# Patient Record
Sex: FEMALE | Race: WHITE | Employment: OTHER | URBAN - NONMETROPOLITAN AREA
[De-identification: names, ages, dates, MRNs, and addresses within clinical notes are randomized per-mention and may not be internally consistent; named-entity substitution may affect disease eponyms.]

---

## 2017-11-07 ENCOUNTER — HOSPITAL ENCOUNTER (OUTPATIENT)
Dept: INTERVENTIONAL RADIOLOGY/VASCULAR | Age: 82
Discharge: HOME | End: 2017-11-07
Payer: MEDICARE

## 2017-11-07 DIAGNOSIS — I82.4Z1 ACUTE DEEP VEIN THROMBOSIS (DVT) OF DISTAL VEIN OF RIGHT LOWER EXTREMITY (HCC): ICD-10-CM

## 2017-11-07 PROCEDURE — 93971 EXTREMITY STUDY: CPT

## 2018-12-06 ENCOUNTER — HOSPITAL ENCOUNTER (EMERGENCY)
Age: 83
Discharge: HOME | End: 2018-12-06
Payer: MEDICARE

## 2018-12-06 VITALS
TEMPERATURE: 98.3 F | HEART RATE: 87 BPM | SYSTOLIC BLOOD PRESSURE: 133 MMHG | OXYGEN SATURATION: 95 % | RESPIRATION RATE: 20 BRPM | BODY MASS INDEX: 28.28 KG/M2 | DIASTOLIC BLOOD PRESSURE: 63 MMHG | WEIGHT: 140 LBS

## 2018-12-06 DIAGNOSIS — B96.89 ACUTE BACTERIAL SINUSITIS: Primary | ICD-10-CM

## 2018-12-06 DIAGNOSIS — J01.90 ACUTE BACTERIAL SINUSITIS: Primary | ICD-10-CM

## 2018-12-06 PROCEDURE — 99213 OFFICE O/P EST LOW 20 MIN: CPT | Performed by: NURSE PRACTITIONER

## 2018-12-06 PROCEDURE — 99214 OFFICE O/P EST MOD 30 MIN: CPT

## 2018-12-06 RX ORDER — AMOXICILLIN AND CLAVULANATE POTASSIUM 875; 125 MG/1; MG/1
1 TABLET, FILM COATED ORAL 2 TIMES DAILY
Qty: 20 TABLET | Refills: 0 | Status: SHIPPED | OUTPATIENT
Start: 2018-12-06 | End: 2018-12-16

## 2018-12-06 ASSESSMENT — PAIN DESCRIPTION - PAIN TYPE: TYPE: ACUTE PAIN

## 2018-12-06 ASSESSMENT — ENCOUNTER SYMPTOMS
COUGH: 1
SINUS PAIN: 1
SINUS PRESSURE: 1

## 2018-12-06 ASSESSMENT — PAIN DESCRIPTION - LOCATION: LOCATION: THROAT

## 2018-12-06 ASSESSMENT — PAIN SCALES - GENERAL: PAINLEVEL_OUTOF10: 5

## 2019-09-09 ENCOUNTER — HOSPITAL ENCOUNTER (EMERGENCY)
Age: 84
Discharge: HOME | End: 2019-09-09
Payer: MEDICARE

## 2019-09-09 VITALS
SYSTOLIC BLOOD PRESSURE: 132 MMHG | RESPIRATION RATE: 16 BRPM | HEIGHT: 59 IN | TEMPERATURE: 98.3 F | WEIGHT: 126 LBS | OXYGEN SATURATION: 98 % | DIASTOLIC BLOOD PRESSURE: 76 MMHG | HEART RATE: 78 BPM | BODY MASS INDEX: 25.4 KG/M2

## 2019-09-09 DIAGNOSIS — R30.0 DYSURIA: Primary | ICD-10-CM

## 2019-09-09 DIAGNOSIS — N30.00 ACUTE CYSTITIS WITHOUT HEMATURIA: ICD-10-CM

## 2019-09-09 DIAGNOSIS — N34.2 URETHRITIS: ICD-10-CM

## 2019-09-09 LAB
BILIRUBIN URINE: NEGATIVE
BLOOD, URINE: ABNORMAL
CHARACTER, URINE: ABNORMAL
COLOR: YELLOW
GLUCOSE, URINE: NEGATIVE MG/DL
KETONES, URINE: NEGATIVE
LEUKOCYTES, UA: ABNORMAL
NITRATE, UA: POSITIVE
PH UA: 6.5 (ref 5–9)
PROTEIN UA: 30 MG/DL
REFLEX TO URINE C & S: ABNORMAL
SPECIFIC GRAVITY UA: 1.02 (ref 1–1.03)
UROBILINOGEN, URINE: 0.2 EU/DL (ref 0–1)

## 2019-09-09 PROCEDURE — 99213 OFFICE O/P EST LOW 20 MIN: CPT | Performed by: NURSE PRACTITIONER

## 2019-09-09 PROCEDURE — 87086 URINE CULTURE/COLONY COUNT: CPT

## 2019-09-09 PROCEDURE — 81003 URINALYSIS AUTO W/O SCOPE: CPT

## 2019-09-09 PROCEDURE — 87186 SC STD MICRODIL/AGAR DIL: CPT

## 2019-09-09 PROCEDURE — 99213 OFFICE O/P EST LOW 20 MIN: CPT

## 2019-09-09 PROCEDURE — 87077 CULTURE AEROBIC IDENTIFY: CPT

## 2019-09-09 RX ORDER — NITROFURANTOIN 25; 75 MG/1; MG/1
100 CAPSULE ORAL 2 TIMES DAILY
Qty: 20 CAPSULE | Refills: 0 | Status: SHIPPED | OUTPATIENT
Start: 2019-09-09 | End: 2019-09-19

## 2019-09-09 RX ORDER — PHENAZOPYRIDINE HYDROCHLORIDE 100 MG/1
100 TABLET, FILM COATED ORAL 3 TIMES DAILY PRN
Qty: 9 TABLET | Refills: 0 | Status: SHIPPED | OUTPATIENT
Start: 2019-09-09 | End: 2019-09-12

## 2019-09-09 ASSESSMENT — ENCOUNTER SYMPTOMS
ABDOMINAL PAIN: 0
DIARRHEA: 0
VOMITING: 0
BACK PAIN: 0
NAUSEA: 0

## 2019-09-09 NOTE — ED PROVIDER NOTES
Misc. Devices Lakshmi Chris) MISC Comments:   Reason for Stopping:         metroNIDAZOLE (METROGEL) 1 % gel Comments:   Reason for Stopping:         triamcinolone (KENALOG) 0.1 % cream Comments:   Reason for Stopping:         predniSONE (DELTASONE) 20 MG tablet Comments:   Reason for Stopping:               Discharge Medication List as of 9/9/2019  1:12 PM          EDUARDO Acosta NP    (Please note that portions of this note were completed with a voice recognition program. Efforts were made to edit the dictations but occasionally words are mis-transcribed.)         EDUARDO Nair NP  09/09/19 0959

## 2019-09-11 LAB
ORGANISM: ABNORMAL
URINE CULTURE REFLEX: ABNORMAL
URINE CULTURE REFLEX: ABNORMAL

## 2019-11-23 ENCOUNTER — HOSPITAL ENCOUNTER (EMERGENCY)
Age: 84
Discharge: HOME | End: 2019-11-23
Payer: MEDICARE

## 2019-11-23 VITALS
DIASTOLIC BLOOD PRESSURE: 69 MMHG | HEIGHT: 59 IN | WEIGHT: 122 LBS | SYSTOLIC BLOOD PRESSURE: 133 MMHG | BODY MASS INDEX: 24.6 KG/M2 | RESPIRATION RATE: 16 BRPM | HEART RATE: 71 BPM | TEMPERATURE: 99.6 F | OXYGEN SATURATION: 93 %

## 2019-11-23 DIAGNOSIS — N30.01 ACUTE CYSTITIS WITH HEMATURIA: Primary | ICD-10-CM

## 2019-11-23 LAB
BILIRUBIN URINE: NEGATIVE
BLOOD, URINE: NEGATIVE
CHARACTER, URINE: ABNORMAL
COLOR: ABNORMAL
GLUCOSE, URINE: NEGATIVE MG/DL
KETONES, URINE: NEGATIVE
LEUKOCYTES, UA: ABNORMAL
NITRATE, UA: POSITIVE
PH UA: 8.5 (ref 5–9)
PROTEIN UA: NEGATIVE MG/DL
REFLEX TO URINE C & S: ABNORMAL
SPECIFIC GRAVITY UA: 1.01 (ref 1–1.03)
UROBILINOGEN, URINE: 0.2 EU/DL (ref 0–1)

## 2019-11-23 PROCEDURE — 99214 OFFICE O/P EST MOD 30 MIN: CPT | Performed by: NURSE PRACTITIONER

## 2019-11-23 PROCEDURE — 87077 CULTURE AEROBIC IDENTIFY: CPT

## 2019-11-23 PROCEDURE — 87086 URINE CULTURE/COLONY COUNT: CPT

## 2019-11-23 PROCEDURE — 99213 OFFICE O/P EST LOW 20 MIN: CPT

## 2019-11-23 PROCEDURE — 87186 SC STD MICRODIL/AGAR DIL: CPT

## 2019-11-23 PROCEDURE — 81003 URINALYSIS AUTO W/O SCOPE: CPT

## 2019-11-23 RX ORDER — CIPROFLOXACIN 250 MG/1
250 TABLET, FILM COATED ORAL 2 TIMES DAILY
Qty: 20 TABLET | Refills: 0 | Status: SHIPPED | OUTPATIENT
Start: 2019-11-23 | End: 2019-12-03

## 2019-11-23 RX ORDER — PHENAZOPYRIDINE HYDROCHLORIDE 200 MG/1
200 TABLET, FILM COATED ORAL 3 TIMES DAILY PRN
Qty: 6 TABLET | Refills: 1 | Status: SHIPPED | OUTPATIENT
Start: 2019-11-23 | End: 2019-11-25

## 2019-11-23 RX ORDER — LATANOPROST 50 UG/ML
1 SOLUTION/ DROPS OPHTHALMIC NIGHTLY
COMMUNITY

## 2019-11-23 ASSESSMENT — ENCOUNTER SYMPTOMS
NAUSEA: 0
SINUS PRESSURE: 0
BACK PAIN: 0
SHORTNESS OF BREATH: 0
EYE ITCHING: 0
DIARRHEA: 0
TROUBLE SWALLOWING: 0
EYE REDNESS: 0
RHINORRHEA: 0
VOMITING: 0
COUGH: 0
ABDOMINAL PAIN: 0
CHEST TIGHTNESS: 0
EYE PAIN: 0
SORE THROAT: 0
WHEEZING: 0
EYE DISCHARGE: 0
CONSTIPATION: 0

## 2019-11-26 LAB
ORGANISM: ABNORMAL
URINE CULTURE REFLEX: ABNORMAL
URINE CULTURE REFLEX: ABNORMAL

## 2020-08-22 ENCOUNTER — NURSE ONLY (OUTPATIENT)
Dept: LAB | Age: 85
End: 2020-08-22

## 2020-08-22 LAB
ALBUMIN SERPL-MCNC: 4.4 G/DL (ref 3.5–5.1)
ALP BLD-CCNC: 36 U/L (ref 38–126)
ALT SERPL-CCNC: 13 U/L (ref 11–66)
ANION GAP SERPL CALCULATED.3IONS-SCNC: 10 MEQ/L (ref 8–16)
AST SERPL-CCNC: 17 U/L (ref 5–40)
BILIRUB SERPL-MCNC: 0.5 MG/DL (ref 0.3–1.2)
BUN BLDV-MCNC: 19 MG/DL (ref 7–22)
CALCIUM SERPL-MCNC: 9.7 MG/DL (ref 8.5–10.5)
CHLORIDE BLD-SCNC: 106 MEQ/L (ref 98–111)
CHOLESTEROL, FASTING: 196 MG/DL (ref 100–199)
CO2: 26 MEQ/L (ref 23–33)
CREAT SERPL-MCNC: 0.6 MG/DL (ref 0.4–1.2)
GFR SERPL CREATININE-BSD FRML MDRD: > 90 ML/MIN/1.73M2
GLUCOSE BLD-MCNC: 96 MG/DL (ref 70–108)
HDLC SERPL-MCNC: 68 MG/DL
LDL CHOLESTEROL CALCULATED: 106 MG/DL
POTASSIUM SERPL-SCNC: 4 MEQ/L (ref 3.5–5.2)
SODIUM BLD-SCNC: 142 MEQ/L (ref 135–145)
TOTAL PROTEIN: 7.4 G/DL (ref 6.1–8)
TRIGLYCERIDE, FASTING: 110 MG/DL (ref 0–199)

## 2020-10-14 ENCOUNTER — HOSPITAL ENCOUNTER (EMERGENCY)
Age: 85
Discharge: HOME | End: 2020-10-14
Payer: MEDICARE

## 2020-10-14 VITALS
WEIGHT: 120 LBS | TEMPERATURE: 97.4 F | BODY MASS INDEX: 24.19 KG/M2 | RESPIRATION RATE: 16 BRPM | HEIGHT: 59 IN | HEART RATE: 98 BPM | OXYGEN SATURATION: 96 % | SYSTOLIC BLOOD PRESSURE: 135 MMHG | DIASTOLIC BLOOD PRESSURE: 66 MMHG

## 2020-10-14 PROCEDURE — 6360000002 HC RX W HCPCS: Performed by: NURSE PRACTITIONER

## 2020-10-14 PROCEDURE — 99214 OFFICE O/P EST MOD 30 MIN: CPT | Performed by: NURSE PRACTITIONER

## 2020-10-14 PROCEDURE — 96372 THER/PROPH/DIAG INJ SC/IM: CPT

## 2020-10-14 PROCEDURE — 99212 OFFICE O/P EST SF 10 MIN: CPT

## 2020-10-14 RX ORDER — METHYLPREDNISOLONE ACETATE 80 MG/ML
80 INJECTION, SUSPENSION INTRA-ARTICULAR; INTRALESIONAL; INTRAMUSCULAR; SOFT TISSUE ONCE
Status: COMPLETED | OUTPATIENT
Start: 2020-10-14 | End: 2020-10-14

## 2020-10-14 RX ORDER — PREDNISONE 10 MG/1
TABLET ORAL
Qty: 15 TABLET | Refills: 0 | Status: SHIPPED | OUTPATIENT
Start: 2020-10-14 | End: 2020-10-24

## 2020-10-14 RX ADMIN — METHYLPREDNISOLONE ACETATE 80 MG: 80 INJECTION, SUSPENSION INTRA-ARTICULAR; INTRALESIONAL; INTRAMUSCULAR; SOFT TISSUE at 14:04

## 2020-10-14 ASSESSMENT — ENCOUNTER SYMPTOMS
CHEST TIGHTNESS: 0
EYE PAIN: 0
NAUSEA: 0
TROUBLE SWALLOWING: 0
VOMITING: 0
DIARRHEA: 0
COLOR CHANGE: 1
ABDOMINAL PAIN: 0
SHORTNESS OF BREATH: 0
EYE REDNESS: 0

## 2020-10-14 ASSESSMENT — PAIN SCALES - GENERAL: PAINLEVEL_OUTOF10: 8

## 2020-10-14 ASSESSMENT — PAIN DESCRIPTION - LOCATION: LOCATION: HAND;NECK

## 2020-10-14 NOTE — ED NOTES
To [facility] with complaints of redness and swelling to shila hands/wrists, back of neck. States she was stung by yellowjackets last night.       Tina Elaine RN  10/14/20 5462

## 2020-10-14 NOTE — ED PROVIDER NOTES
suspension; and Eye surgery. CURRENT MEDICATIONS       Previous Medications    ACETAMINOPHEN-CODEINE (TYLENOL/CODEINE #3) 300-30 MG PER TABLET    Take 0.5-1 tablets by mouth every 6 hours as needed (Pain. WARNING: THIS MEDICATION MAY MAKE YOU DROWSY OR SLEEPY AND AT INCREASED RISK FOR FALLS. USE PRECAUTION. DO NOT TAKE WITH FLEXERIL.)    CLONAZEPAM (KLONOPIN) 0.5 MG TABLET    Take 0.5 mg by mouth 2 times daily as needed. DENOSUMAB (PROLIA SC)    Inject into the skin    DHA-EPA-COENZYME Q10-VITAMIN E (COQ-10 & FISH OIL PO)    Take  by mouth. FISH OIL-OMEGA-3 FATTY ACIDS 1000 MG CAPSULE    Take 2 g by mouth daily. FLUTICASONE (FLONASE) 50 MCG/ACT NASAL SPRAY    1 spray by Nasal route daily. GLUCOSAMINE SULFATE 750 MG TABS    Take 1 tablet by mouth daily. IBUPROFEN (ADVIL;MOTRIN) 400 MG TABLET    Take 1.5 tablets by mouth every 6 hours as needed for Pain    LATANOPROST (XALATAN) 0.005 % OPHTHALMIC SOLUTION    1 drop nightly    MAGNESIUM 30 MG TABLET    Take 30 mg by mouth 2 times daily. POLYETHYLENE GLYCOL (GLYCOLAX) PACKET    Take 17 g by mouth daily as needed. PREDNISOLONE ACETATE (PRED FORTE) 1 % OPHTHALMIC SUSPENSION    1 drop 4 times daily. PSEUDOEPHEDRINE (SUDAFED) 30 MG TABLET    Take 30 mg by mouth every 4 hours as needed. THERAPEUTIC MULTIVITAMIN-MINERALS (THERAGRAN-M) TABLET    Take 1 tablet by mouth daily. VITAMIN D (CHOLECALCIFEROL) 400 UNITS TABS TABLET    Take 400 Units by mouth daily. ZINC 50 MG CAPS    Take  by mouth. ALLERGIES     Patient is is allergic to chocolate; sulfa antibiotics; and augmentin [amoxicillin-pot clavulanate]. Patients   There is no immunization history on file for this patient. FAMILY HISTORY     Patient's family history is not on file. SOCIAL HISTORY     Patient  reports that she has never smoked. She has never used smokeless tobacco. She reports that she does not drink alcohol or use drugs.     PHYSICAL EXAM     ED TRIAGE VITALS  BP: 135/66, Temp: 97.4 °F (36.3 °C), Pulse: 98, Resp: 16, SpO2: 96 %,Estimated body mass index is 24.24 kg/m² as calculated from the following:    Height as of this encounter: 4' 11\" (1.499 m). Weight as of this encounter: 120 lb (54.4 kg). ,No LMP recorded. Patient is postmenopausal.    Physical Exam  Vitals signs and nursing note reviewed. Constitutional:       General: She is not in acute distress. Appearance: Normal appearance. HENT:      Head: Normocephalic and atraumatic. Nose: No rhinorrhea. Mouth/Throat:      Mouth: Mucous membranes are moist.      Pharynx: No posterior oropharyngeal erythema. Eyes:      Conjunctiva/sclera: Conjunctivae normal.      Pupils: Pupils are equal, round, and reactive to light. Neck:      Musculoskeletal: Normal range of motion. No neck rigidity. Cardiovascular:      Rate and Rhythm: Normal rate and regular rhythm. Heart sounds: Normal heart sounds. Pulmonary:      Effort: Pulmonary effort is normal.      Breath sounds: Normal breath sounds. Musculoskeletal: Normal range of motion. Right wrist: She exhibits tenderness and swelling. She exhibits normal range of motion. Skin:     General: Skin is warm and dry. Findings: Erythema present. No ecchymosis. Comments: Redness and swelling to right neck and shoulder, bilateral upper arms, and left elbow. Neurological:      Mental Status: She is alert and oriented to person, place, and time. Psychiatric:         Behavior: Behavior normal.       DIAGNOSTIC RESULTS     Labs:No results found for this visit on 10/14/20.     IMAGING:  None    EKG:  None    URGENT CARE COURSE:     Vitals:    10/14/20 1338   BP: 135/66   Pulse: 98   Resp: 16   Temp: 97.4 °F (36.3 °C)   TempSrc: Temporal   SpO2: 96%   Weight: 120 lb (54.4 kg)   Height: 4' 11\" (1.499 m)       Medications   methylPREDNISolone acetate (DEPO-MEDROL) injection 80 mg (has no administration in time range) PROCEDURES:  None    FINAL IMPRESSION      1. Bee sting, accidental or unintentional, initial encounter    2. Allergic dermatitis      DISPOSITION/ PLAN   DISPOSITION Decision To Discharge 10/14/2020 01:49:56 PM     Clinical exam consistent with allergic reaction to multiple bee stings. Opted to treat her with Depo-Medrol injection today in clinic and prednisone for 10 days with tapering dose. She tolerated Depo-Medrol injection. Patient may use over-the-counter Benadryl for itching. Patient instructions provided regarding bee stings. Patient voiced understanding. She should follow-up with PCP if symptoms remain in 1 week. She was discharged in stable condition. PATIENT REFERRED TO:  EDUARDO Castillo CNP  [address] [address]      DISCHARGE MEDICATIONS:  New Prescriptions    PREDNISONE (DELTASONE) 10 MG TABLET    Take 2 tablets by mouth daily for 5 days, THEN 1 tablet daily for 5 days.        Discontinued Medications    No medications on file       Current Discharge Medication List          EDUARDO Tobar CNP    (Please note that portions of this note were completed with a voice recognition program. Efforts were made to edit the dictations but occasionally words are mis-transcribed.)           EDUARDO Tobar CNP  10/14/20 7847

## 2020-10-14 NOTE — ED NOTES
Pt. Released in stable condition, ambulated per self to private car. Instructed pt to follow-up with family doctor as needed for recheck or go directly to the emergency department for any concerns/worsening conditions. Pt. Verbalized understanding of instructions. No questions at this time. RX in hand.       María Quiroga RN  10/14/20 0068

## 2021-11-20 ENCOUNTER — APPOINTMENT (OUTPATIENT)
Dept: GENERAL RADIOLOGY | Age: 86
End: 2021-11-20
Payer: MEDICARE

## 2021-11-20 ENCOUNTER — HOSPITAL ENCOUNTER (EMERGENCY)
Age: 86
Discharge: HOME | End: 2021-11-21
Attending: EMERGENCY MEDICINE
Payer: MEDICARE

## 2021-11-20 DIAGNOSIS — U07.1 COVID: Primary | ICD-10-CM

## 2021-11-20 LAB
ALBUMIN SERPL-MCNC: 4.8 G/DL (ref 3.5–5.1)
ALP BLD-CCNC: 63 U/L (ref 38–126)
ALT SERPL-CCNC: 15 U/L (ref 11–66)
ANION GAP SERPL CALCULATED.3IONS-SCNC: 15 MEQ/L (ref 8–16)
AST SERPL-CCNC: 24 U/L (ref 5–40)
BASOPHILS # BLD: 0.8 %
BASOPHILS ABSOLUTE: 0.1 THOU/MM3 (ref 0–0.1)
BILIRUB SERPL-MCNC: 0.3 MG/DL (ref 0.3–1.2)
BILIRUBIN DIRECT: < 0.2 MG/DL (ref 0–0.3)
BUN BLDV-MCNC: 12 MG/DL (ref 7–22)
CALCIUM SERPL-MCNC: 9.2 MG/DL (ref 8.5–10.5)
CHLORIDE BLD-SCNC: 100 MEQ/L (ref 98–111)
CO2: 21 MEQ/L (ref 23–33)
CREAT SERPL-MCNC: 0.6 MG/DL (ref 0.4–1.2)
EOSINOPHIL # BLD: 0.3 %
EOSINOPHILS ABSOLUTE: 0 THOU/MM3 (ref 0–0.4)
ERYTHROCYTE [DISTWIDTH] IN BLOOD BY AUTOMATED COUNT: 13.2 % (ref 11.5–14.5)
ERYTHROCYTE [DISTWIDTH] IN BLOOD BY AUTOMATED COUNT: 46.1 FL (ref 35–45)
FERRITIN: 124 NG/ML (ref 10–291)
FIBRINOGEN: 332 MG/100ML (ref 155–475)
GFR SERPL CREATININE-BSD FRML MDRD: > 90 ML/MIN/1.73M2
GLUCOSE BLD-MCNC: 111 MG/DL (ref 70–108)
HCT VFR BLD CALC: 36.7 % (ref 37–47)
HEMOGLOBIN: 12.6 GM/DL (ref 12–16)
IMMATURE GRANS (ABS): 0.01 THOU/MM3 (ref 0–0.07)
IMMATURE GRANULOCYTES: 0.2 %
LACTIC ACID, SEPSIS: 1.2 MMOL/L (ref 0.5–1.9)
LD: 203 U/L (ref 100–190)
LYMPHOCYTES # BLD: 22.7 %
LYMPHOCYTES ABSOLUTE: 1.5 THOU/MM3 (ref 1–4.8)
MCH RBC QN AUTO: 33 PG (ref 26–33)
MCHC RBC AUTO-ENTMCNC: 34.3 GM/DL (ref 32.2–35.5)
MCV RBC AUTO: 96.1 FL (ref 81–99)
MONOCYTES # BLD: 12.7 %
MONOCYTES ABSOLUTE: 0.8 THOU/MM3 (ref 0.4–1.3)
NUCLEATED RED BLOOD CELLS: 0 /100 WBC
OSMOLALITY CALCULATION: 272.4 MOSMOL/KG (ref 275–300)
PLATELET # BLD: 190 THOU/MM3 (ref 130–400)
PMV BLD AUTO: 10.5 FL (ref 9.4–12.4)
POTASSIUM REFLEX MAGNESIUM: 3.6 MEQ/L (ref 3.5–5.2)
PROCALCITONIN: 0.16 NG/ML (ref 0.01–0.09)
RBC # BLD: 3.82 MILL/MM3 (ref 4.2–5.4)
SEG NEUTROPHILS: 63.3 %
SEGMENTED NEUTROPHILS ABSOLUTE COUNT: 4.1 THOU/MM3 (ref 1.8–7.7)
SODIUM BLD-SCNC: 136 MEQ/L (ref 135–145)
TOTAL PROTEIN: 7.6 G/DL (ref 6.1–8)
TROPONIN T: < 0.01 NG/ML
WBC # BLD: 6.5 THOU/MM3 (ref 4.8–10.8)

## 2021-11-20 PROCEDURE — 2580000003 HC RX 258: Performed by: EMERGENCY MEDICINE

## 2021-11-20 PROCEDURE — 6360000002 HC RX W HCPCS: Performed by: EMERGENCY MEDICINE

## 2021-11-20 PROCEDURE — 85385 FIBRINOGEN ANTIGEN: CPT

## 2021-11-20 PROCEDURE — 84145 PROCALCITONIN (PCT): CPT

## 2021-11-20 PROCEDURE — 36415 COLL VENOUS BLD VENIPUNCTURE: CPT

## 2021-11-20 PROCEDURE — 84484 ASSAY OF TROPONIN QUANT: CPT

## 2021-11-20 PROCEDURE — 83605 ASSAY OF LACTIC ACID: CPT

## 2021-11-20 PROCEDURE — 99284 EMERGENCY DEPT VISIT MOD MDM: CPT

## 2021-11-20 PROCEDURE — 83615 LACTATE (LD) (LDH) ENZYME: CPT

## 2021-11-20 PROCEDURE — 6370000000 HC RX 637 (ALT 250 FOR IP): Performed by: EMERGENCY MEDICINE

## 2021-11-20 PROCEDURE — 96365 THER/PROPH/DIAG IV INF INIT: CPT

## 2021-11-20 PROCEDURE — 93005 ELECTROCARDIOGRAM TRACING: CPT | Performed by: EMERGENCY MEDICINE

## 2021-11-20 PROCEDURE — 82728 ASSAY OF FERRITIN: CPT

## 2021-11-20 PROCEDURE — 71045 X-RAY EXAM CHEST 1 VIEW: CPT

## 2021-11-20 PROCEDURE — 80076 HEPATIC FUNCTION PANEL: CPT

## 2021-11-20 PROCEDURE — 85025 COMPLETE CBC W/AUTO DIFF WBC: CPT

## 2021-11-20 PROCEDURE — 2500000003 HC RX 250 WO HCPCS: Performed by: EMERGENCY MEDICINE

## 2021-11-20 PROCEDURE — 80048 BASIC METABOLIC PNL TOTAL CA: CPT

## 2021-11-20 RX ORDER — SODIUM CHLORIDE 9 MG/ML
100 INJECTION, SOLUTION INTRAVENOUS CONTINUOUS PRN
Status: DISCONTINUED | OUTPATIENT
Start: 2021-11-20 | End: 2021-11-21 | Stop reason: HOSPADM

## 2021-11-20 RX ORDER — ACETAMINOPHEN 500 MG
1000 TABLET ORAL ONCE
Status: COMPLETED | OUTPATIENT
Start: 2021-11-20 | End: 2021-11-20

## 2021-11-20 RX ORDER — 0.9 % SODIUM CHLORIDE 0.9 %
500 INTRAVENOUS SOLUTION INTRAVENOUS ONCE
Status: COMPLETED | OUTPATIENT
Start: 2021-11-20 | End: 2021-11-20

## 2021-11-20 RX ORDER — SODIUM CHLORIDE 9 MG/ML
20 INJECTION, SOLUTION INTRAVENOUS CONTINUOUS PRN
Status: DISCONTINUED | OUTPATIENT
Start: 2021-11-20 | End: 2021-11-21 | Stop reason: HOSPADM

## 2021-11-20 RX ORDER — DIPHENHYDRAMINE HYDROCHLORIDE 50 MG/ML
50 INJECTION INTRAMUSCULAR; INTRAVENOUS
Status: ACTIVE | OUTPATIENT
Start: 2021-11-20 | End: 2021-11-20

## 2021-11-20 RX ORDER — METHYLPREDNISOLONE SODIUM SUCCINATE 125 MG/2ML
125 INJECTION, POWDER, LYOPHILIZED, FOR SOLUTION INTRAMUSCULAR; INTRAVENOUS
Status: ACTIVE | OUTPATIENT
Start: 2021-11-20 | End: 2021-11-20

## 2021-11-20 RX ADMIN — ACETAMINOPHEN 1000 MG: 500 TABLET ORAL at 20:08

## 2021-11-20 RX ADMIN — SODIUM CHLORIDE: 9 INJECTION, SOLUTION INTRAVENOUS at 23:33

## 2021-11-20 RX ADMIN — SODIUM CHLORIDE 500 ML: 9 INJECTION, SOLUTION INTRAVENOUS at 20:09

## 2021-11-20 ASSESSMENT — PAIN SCALES - GENERAL
PAINLEVEL_OUTOF10: 2
PAINLEVEL_OUTOF10: 0

## 2021-11-21 VITALS
DIASTOLIC BLOOD PRESSURE: 57 MMHG | OXYGEN SATURATION: 93 % | SYSTOLIC BLOOD PRESSURE: 100 MMHG | HEIGHT: 58 IN | RESPIRATION RATE: 18 BRPM | WEIGHT: 130 LBS | HEART RATE: 82 BPM | BODY MASS INDEX: 27.29 KG/M2 | TEMPERATURE: 99.5 F

## 2021-11-21 LAB
EKG ATRIAL RATE: 90 BPM
EKG P AXIS: 58 DEGREES
EKG P-R INTERVAL: 154 MS
EKG Q-T INTERVAL: 342 MS
EKG QRS DURATION: 92 MS
EKG QTC CALCULATION (BAZETT): 418 MS
EKG R AXIS: 9 DEGREES
EKG T AXIS: 23 DEGREES
EKG VENTRICULAR RATE: 90 BPM

## 2021-11-21 PROCEDURE — 93010 ELECTROCARDIOGRAM REPORT: CPT | Performed by: NUCLEAR MEDICINE

## 2021-11-21 NOTE — ED NOTES
Pt is resting in bed, family at bedside. VSS, RR is regular and unlabored. Call light in reach. Denies any needs at this time.       Isela Alberts RN  11/20/21 2100

## 2021-11-21 NOTE — ED NOTES
Pt ambulated to restroom and back to bed. Nesmith was steady. Call light in reach. Denies any other needs at this time.       Dorothy Machado RN  11/20/21 2885

## 2021-11-21 NOTE — ED PROVIDER NOTES
10:48 PM     Received in signout. 31-year-old female diagnosed with Covid here in the emergency department. Ambulatory pulse ox is 93% however she was significantly febrile and appeared unwell. Hospitalist has been notified and requested that we give Regeneron and reevaluate after IV fluids and Regeneron. Patient is currently getting IV fluids. Waiting on Regeneron from the pharmacy. 1:08 AM feeling better than when she arrived here in the emergency department. Spoke with the family at length about how she will need to rest and continue to treat her fever as fever often makes us feel unwell. We will discharge her home with a home pulse ox.     DISPOSITION Decision To Discharge 11/21/2021 01:09:14 AM         Sarina Mullins MD  11/21/21 0111

## 2021-11-22 ENCOUNTER — CARE COORDINATION (OUTPATIENT)
Dept: CARE COORDINATION | Age: 86
End: 2021-11-22

## 2021-11-22 NOTE — ACP (ADVANCE CARE PLANNING)
Advance Care Planning   Healthcare Decision Maker:    Primary Decision Maker: Booker Solis - Child - [phone]    Secondary Decision Maker: Gely  - Spouse - [phone]    Click here to complete Healthcare Decision Makers including selection of the Healthcare Decision Maker Relationship (ie \"Primary\"). Today we documented Decision Maker(s) consistent with Legal Next of Kin hierarchy.

## 2021-11-22 NOTE — CARE COORDINATION
Pulse ox education given  She is making appt with PCP for follow up  Had dionne in ED    Patient contacted regarding COVID-19 diagnosis, pulse oximeter ordered at discharge and monoclonal antibody infusion follow up. Discussed COVID-19 related testing which was available at this time. Test results were positive. Patient informed of results, if available? Yes. Ambulatory Care Manager contacted the patient by telephone to perform post discharge assessment. Call within 2 business days of discharge: Yes. Verified name and  with patient as identifiers. Provided introduction to self, and explanation of the CTN/ACM role, and reason for call due to risk factors for infection and/or exposure to COVID-19. Symptoms reviewed with patient who verbalized the following symptoms: cough, no new symptoms and no worsening symptoms. Due to no new or worsening symptoms encounter was not routed to provider for escalation. Discussed follow-up appointments. If no appointment was previously scheduled, appointment scheduling offered: No.  [address] follow up appointment(s): No future appointments. Non-[facility] follow up appointment(s): she is making appt with PCP for follow up    Non-face-to-face services provided:  Obtained and reviewed discharge summary and/or continuity of care documents     Advance Care Planning:   Does patient have an Advance Directive:  reviewed and current. Educated patient about risk for severe COVID-19 due to risk factors according to CDC guidelines. ACM reviewed discharge instructions, medical action plan and red flag symptoms with the patient who verbalized understanding. Discussed COVID vaccination status: Yes. Education provided on COVID-19 vaccination as appropriate. Discussed exposure protocols and quarantine with CDC Guidelines.  Patient was given an opportunity to verbalize any questions and concerns and agrees to contact ACM or health care provider for questions related to their healthcare. Reviewed and educated patient on any new and changed medications related to discharge diagnosis     Was patient discharged with a pulse oximeter? Yes Discussed and confirmed pulse oximeter discharge instructions and when to notify provider or seek emergency care. ACM provided contact information. Plan for follow-up call in 5-7 days based on severity of symptoms and risk factors.

## 2021-11-25 ASSESSMENT — ENCOUNTER SYMPTOMS
SHORTNESS OF BREATH: 1
CHEST TIGHTNESS: 0
BLOOD IN STOOL: 0
BACK PAIN: 0
NAUSEA: 0
DIARRHEA: 0
COUGH: 1
VOMITING: 0
TROUBLE SWALLOWING: 0
VOICE CHANGE: 0
ABDOMINAL PAIN: 0

## 2021-11-25 NOTE — ED PROVIDER NOTES
[address] [facility] [address] [address] EMERGENCY DEPT    EMERGENCY MEDICINE     Pt Name: Gabe Rivas  MRN: [id number]  [place] 1935  Date of evaluation: 11/20/2021  Provider: Elzbieta Berger DO, [address]       Chief Complaint   Patient presents with    Shortness of Breath    Cough       HISTORY OF PRESENT ILLNESS    Gabe Rivas is a pleasant 81 y.o. female   Presents to the emergency department from home   SOB and cough, sats 93%. No LE pain or swelling./ No CP  +nonproductive Cough      Triage notes and Nursing notes were reviewed by myself. Any discrepancies are addressed above. PAST MEDICAL HISTORY     Past Medical History:   Diagnosis Date    Osteopenia     Osteopetrosis        SURGICAL HISTORY       Past Surgical History:   Procedure Laterality Date    APPENDECTOMY      BLADDER SUSPENSION      EYE SURGERY      TONSILLECTOMY         CURRENT MEDICATIONS       Discharge Medication List as of 11/21/2021  1:11 AM      CONTINUE these medications which have NOT CHANGED    Details   latanoprost (XALATAN) 0.005 % ophthalmic solution 1 drop nightlyHistorical Med      Denosumab (PROLIA SC) Inject into the skinHistorical Med      acetaminophen-codeine (TYLENOL/CODEINE #3) 300-30 MG per tablet Take 0.5-1 tablets by mouth every 6 hours as needed (Pain. WARNING: THIS MEDICATION MAY MAKE YOU DROWSY OR SLEEPY AND AT INCREASED RISK FOR FALLS. USE PRECAUTION. DO NOT TAKE WITH FLEXERIL.), Disp-10 tablet, R-0Print      ibuprofen (ADVIL;MOTRIN) 400 MG tablet Take 1.5 tablets by mouth every 6 hours as needed for Pain, Disp-12 tablet, R-0Print      vitamin D (CHOLECALCIFEROL) 400 UNITS TABS tablet Take 400 Units by mouth daily. clonazePAM (KLONOPIN) 0.5 MG tablet Take 0.5 mg by mouth 2 times daily as needed. fluticasone (FLONASE) 50 MCG/ACT nasal spray 1 spray by Nasal route daily. prednisoLONE acetate (PRED FORTE) 1 % ophthalmic suspension 1 drop 4 times daily.         Glucosamine Sulfate 750 MG TABS Take 1 tablet by mouth daily. therapeutic multivitamin-minerals (THERAGRAN-M) tablet Take 1 tablet by mouth daily. fish oil-omega-3 fatty acids 1000 MG capsule Take 2 g by mouth daily. magnesium 30 MG tablet Take 30 mg by mouth 2 times daily. Zinc 50 MG CAPS Take  by mouth. DHA-EPA-Coenzyme Q10-Vitamin E (COQ-10 & FISH OIL PO) Take  by mouth.        polyethylene glycol (GLYCOLAX) packet Take 17 g by mouth daily as needed. pseudoephedrine (SUDAFED) 30 MG tablet Take 30 mg by mouth every 4 hours as needed. ALLERGIES     Chocolate, Sulfa antibiotics, and Augmentin [amoxicillin-pot clavulanate]    FAMILY HISTORY     History reviewed. No pertinent family history. SOCIAL HISTORY       Social History     Socioeconomic History    Marital status:      Spouse name: None    Number of children: None    Years of education: None    Highest education level: None   Occupational History    None   Tobacco Use    Smoking status: Never Smoker    Smokeless tobacco: Never Used   Substance and Sexual Activity    Alcohol use: No    Drug use: No    Sexual activity: None   Other Topics Concern    None   Social History Narrative    None     Social Determinants of Health     Financial Resource Strain:     Difficulty of Paying Living Expenses: Not on file   Food Insecurity:     Worried About Running Out of Food in the Last Year: Not on file    Yajaira of Food in the Last Year: Not on file   Transportation Needs:     Lack of Transportation (Medical): Not on file    Lack of Transportation (Non-Medical):  Not on file   Physical Activity:     Days of Exercise per Week: Not on file    Minutes of Exercise per Session: Not on file   Stress:     Feeling of Stress : Not on file   Social Connections:     Frequency of Communication with Friends and Family: Not on file    Frequency of Social Gatherings with Friends and Family: Not on file    Attends Result   Opacities near the lung bases which could be due to infiltrate or atelectasis. **This report has been created using voice recognition software. It may contain minor errors which are inherent in voice recognition technology. **      Final report electronically signed by Dr Kenji Dasilva on 11/20/2021 8:22 PM          LABS:  Labs Reviewed   CBC WITH AUTO DIFFERENTIAL - Abnormal; Notable for the following components:       Result Value    RBC 3.82 (*)     Hematocrit 36.7 (*)     RDW-SD 46.1 (*)     All other components within normal limits   BASIC METABOLIC PANEL W/ REFLEX TO MG FOR LOW K - Abnormal; Notable for the following components:    CO2 21 (*)     Glucose 111 (*)     All other components within normal limits   LACTATE DEHYDROGENASE - Abnormal; Notable for the following components:     (*)     All other components within normal limits   PROCALCITONIN - Abnormal; Notable for the following components:    Procalcitonin 0.16 (*)     All other components within normal limits   OSMOLALITY - Abnormal; Notable for the following components:    Osmolality Calc 272.4 (*)     All other components within normal limits   HEPATIC FUNCTION PANEL   TROPONIN   LACTATE, SEPSIS   FERRITIN   FIBRINOGEN   ANION GAP   GLOMERULAR FILTRATION RATE, ESTIMATED       All other labs were within normal range or not returned as of this dictation. Please note, any cultures that may have been sent were not resulted at the time of this patient visit.     EMERGENCY DEPARTMENT COURSE andMedical Decision Making:     MDM/   Consented for regenron  Risks/benefits discussed  She agrees to proceed  Discussed case with dr Gael Moralez and case signed otu to her    ED Medications administered this visit:    Medications   diphenhydrAMINE (BENADRYL) injection 50 mg (has no administration in time range)   methylPREDNISolone sodium (SOLU-MEDROL) injection 125 mg (has no administration in time range)   famotidine (PEPCID) injection 20

## 2021-11-29 ENCOUNTER — CARE COORDINATION (OUTPATIENT)
Dept: CARE COORDINATION | Age: 86
End: 2021-11-29

## 2021-11-29 NOTE — CARE COORDINATION
You Patient resolved from the Care Transitions episode on 11-29-21  Discussed COVID-19 related testing which was available at this time. Test results were positive. Patient informed of results, if available? Yes    Patient/family has been provided the following resources and education related to COVID-19:                         Signs, symptoms and red flags related to COVID-19            CDC exposure and quarantine guidelines            [facility] exposure contact - [phone]            Contact for their local Department of Health                 Patient currently reports that the following symptoms have improved:  fatigue and no new/worsening symptoms     No further outreach scheduled with this CTN/ACM. Episode of Care resolved. Patient has this CTN/ACM contact information if future needs arise.

## 2021-12-08 ENCOUNTER — HOSPITAL ENCOUNTER (EMERGENCY)
Age: 86
Discharge: HOME | End: 2021-12-08
Payer: MEDICARE

## 2021-12-08 VITALS
BODY MASS INDEX: 27.17 KG/M2 | RESPIRATION RATE: 16 BRPM | SYSTOLIC BLOOD PRESSURE: 162 MMHG | HEART RATE: 83 BPM | WEIGHT: 130 LBS | DIASTOLIC BLOOD PRESSURE: 75 MMHG | TEMPERATURE: 97.1 F | OXYGEN SATURATION: 96 %

## 2021-12-08 DIAGNOSIS — R30.0 DYSURIA: Primary | ICD-10-CM

## 2021-12-08 LAB
BACTERIA: ABNORMAL
BILIRUBIN URINE: ABNORMAL
BLOOD, URINE: ABNORMAL
CASTS: ABNORMAL /LPF
CASTS: ABNORMAL /LPF
CHARACTER, URINE: ABNORMAL
COLOR: ABNORMAL
CRYSTALS: ABNORMAL
EPITHELIAL CELLS, UA: ABNORMAL /HPF
GLUCOSE, URINE: NEGATIVE MG/DL
ICTOTEST: NEGATIVE
KETONES, URINE: NEGATIVE
LEUKOCYTE EST, POC: ABNORMAL
MISCELLANEOUS LAB TEST RESULT: ABNORMAL
NITRITE, URINE: POSITIVE
PH UA: 6.5 (ref 5–9)
PROTEIN UA: 30 MG/DL
RBC URINE: ABNORMAL /HPF
RENAL EPITHELIAL, UA: ABNORMAL
SPECIFIC GRAVITY UA: 1.01 (ref 1–1.03)
UROBILINOGEN, URINE: 1 EU/DL (ref 0–1)
WBC UA: > 200 /HPF
YEAST: ABNORMAL

## 2021-12-08 PROCEDURE — 87086 URINE CULTURE/COLONY COUNT: CPT

## 2021-12-08 PROCEDURE — 87077 CULTURE AEROBIC IDENTIFY: CPT

## 2021-12-08 PROCEDURE — 99213 OFFICE O/P EST LOW 20 MIN: CPT

## 2021-12-08 PROCEDURE — 81001 URINALYSIS AUTO W/SCOPE: CPT

## 2021-12-08 PROCEDURE — 87186 SC STD MICRODIL/AGAR DIL: CPT

## 2021-12-08 PROCEDURE — 99213 OFFICE O/P EST LOW 20 MIN: CPT | Performed by: NURSE PRACTITIONER

## 2021-12-08 RX ORDER — CIPROFLOXACIN 500 MG/1
500 TABLET, FILM COATED ORAL 2 TIMES DAILY
Qty: 10 TABLET | Refills: 0 | Status: SHIPPED | OUTPATIENT
Start: 2021-12-08 | End: 2021-12-13

## 2021-12-08 ASSESSMENT — ENCOUNTER SYMPTOMS
COUGH: 0
NAUSEA: 0
SHORTNESS OF BREATH: 0
VOMITING: 0

## 2021-12-08 NOTE — ED NOTES
Discharge assessment complete. No changes. All discharge education and information given. Pt instructed to go to ED for any fever, bloody urine, abd. Pain, Verbalized Understanding. Left stable. E-script.      Cece Sommer LPN  46/93/80 5830

## 2021-12-08 NOTE — ED PROVIDER NOTES
[place]  Urgent Care Encounter       CHIEF COMPLAINT       Chief Complaint   Patient presents with    Urinary Tract Infection       Nurses Notes reviewed and I agree except as noted in the HPI. HISTORY OF PRESENT ILLNESS   Lilia Asencio is a 81 y.o. female who presents for evaluation of dysuria and urinary frequency that have been ongoing for the past 2 days. Patient denies any fever, chills, nausea, vomiting. States that she has had UTIs before but this feels similar. She states that she took Azo at home for her symptoms 1 hour before arrival to urgent care. The history is provided by the patient. REVIEW OF SYSTEMS     Review of Systems   Constitutional: Negative for chills and fever. Respiratory: Negative for cough and shortness of breath. Cardiovascular: Negative for chest pain. Gastrointestinal: Negative for nausea and vomiting. Genitourinary: Positive for dysuria and frequency. Musculoskeletal: Negative for arthralgias and myalgias. Skin: Negative for rash. Neurological: Negative for headaches. PAST MEDICAL HISTORY         Diagnosis Date    Osteopenia     Osteopetrosis        SURGICALHISTORY     Patient  has a past surgical history that includes Tonsillectomy; Appendectomy; bladder suspension; and Eye surgery. CURRENT MEDICATIONS       Previous Medications    CLONAZEPAM (KLONOPIN) 0.5 MG TABLET    Take 0.5 mg by mouth 2 times daily as needed. DHA-EPA-COENZYME Q10-VITAMIN E (COQ-10 & FISH OIL PO)    Take  by mouth. FISH OIL-OMEGA-3 FATTY ACIDS 1000 MG CAPSULE    Take 2 g by mouth daily. FLUTICASONE (FLONASE) 50 MCG/ACT NASAL SPRAY    1 spray by Nasal route daily. GLUCOSAMINE SULFATE 750 MG TABS    Take 1 tablet by mouth daily.       IBUPROFEN (ADVIL;MOTRIN) 400 MG TABLET    Take 1.5 tablets by mouth every 6 hours as needed for Pain    LATANOPROST (XALATAN) 0.005 % OPHTHALMIC SOLUTION    1 drop nightly    MAGNESIUM 30 MG TABLET    Take 30 mg by mouth 2 times daily. POLYETHYLENE GLYCOL (GLYCOLAX) PACKET    Take 17 g by mouth daily as needed. THERAPEUTIC MULTIVITAMIN-MINERALS (THERAGRAN-M) TABLET    Take 1 tablet by mouth daily. VITAMIN D (CHOLECALCIFEROL) 400 UNITS TABS TABLET    Take 400 Units by mouth daily. ZINC 50 MG CAPS    Take  by mouth. ALLERGIES     Patient is is allergic to chocolate, sulfa antibiotics, and augmentin [amoxicillin-pot clavulanate]. Patients   Immunization History   Administered Date(s) Administered    COVID-19, Fonseca Peter, PF, 30mcg/0.3mL 02/01/2021, 03/01/2021       FAMILY HISTORY     Patient's family history is not on file. SOCIAL HISTORY     Patient  reports that she has never smoked. She has never used smokeless tobacco. She reports that she does not drink alcohol and does not use drugs. PHYSICAL EXAM     ED TRIAGE VITALS  BP: (!) 162/75, Temp: 97.1 °F (36.2 °C), Pulse: 83, Resp: 16, SpO2: 96 %,Estimated body mass index is 27.17 kg/m² as calculated from the following:    Height as of 11/20/21: 4' 10\" (1.473 m). Weight as of this encounter: 130 lb (59 kg). ,No LMP recorded. Patient is postmenopausal.    Physical Exam  Vitals and nursing note reviewed. Constitutional:       General: She is not in acute distress. Appearance: She is well-developed. She is not diaphoretic. Eyes:      Conjunctiva/sclera:      Right eye: Right conjunctiva is not injected. Left eye: Left conjunctiva is not injected. Pupils: Pupils are equal.   Cardiovascular:      Rate and Rhythm: Normal rate and regular rhythm. Heart sounds: No murmur heard. Pulmonary:      Effort: Pulmonary effort is normal. No respiratory distress. Breath sounds: Normal breath sounds. Abdominal:      Palpations: Abdomen is soft. Tenderness: There is no abdominal tenderness. There is no right CVA tenderness or left CVA tenderness.    Musculoskeletal:      Cervical back: Normal range of motion. Right knee: Normal range of motion. Left knee: Normal range of motion. Skin:     General: Skin is warm. Findings: No rash. Neurological:      Mental Status: She is alert and oriented to person, place, and time. Psychiatric:         Behavior: Behavior normal.         DIAGNOSTIC RESULTS     Labs:No results found for this visit on 12/08/21. IMAGING:    No orders to display         EKG:      URGENT CARE COURSE:     Vitals:    12/08/21 1610   BP: (!) 162/75   Pulse: 83   Resp: 16   Temp: 97.1 °F (36.2 °C)   TempSrc: Temporal   SpO2: 96%   Weight: 130 lb (59 kg)       Medications - No data to display         PROCEDURES:  None    FINAL IMPRESSION      1. Dysuria          DISPOSITION/ PLAN     Patient's urine was unable to be ran at the urgent care due to having recently taken Azo. She will be placed on oral antibiotics and is advised to remain hydrated at home. She is instructed that she may continue her Azo and to follow-up on outpatient basis as needed. She is agreeable to plan as discussed. PATIENT REFERRED TO:  EDUARDO Thompson CNP  [address] [address]      DISCHARGE MEDICATIONS:  New Prescriptions    CIPROFLOXACIN (CIPRO) 500 MG TABLET    Take 1 tablet by mouth 2 times daily for 5 days       Discontinued Medications    ACETAMINOPHEN-CODEINE (TYLENOL/CODEINE #3) 300-30 MG PER TABLET    Take 0.5-1 tablets by mouth every 6 hours as needed (Pain. WARNING: THIS MEDICATION MAY MAKE YOU DROWSY OR SLEEPY AND AT INCREASED RISK FOR FALLS. USE PRECAUTION. DO NOT TAKE WITH FLEXERIL.)    DENOSUMAB (PROLIA SC)    Inject into the skin    PREDNISOLONE ACETATE (PRED FORTE) 1 % OPHTHALMIC SUSPENSION    1 drop 4 times daily. PSEUDOEPHEDRINE (SUDAFED) 30 MG TABLET    Take 30 mg by mouth every 4 hours as needed.          Current Discharge Medication List          EDUARDO Owens CNP    (Please note that portions of this note were completed with a voice recognition program. Efforts were made to edit the dictations but occasionally words are mis-transcribed.)          Hubert Laguerre, EDUARDO - TRAVIS  12/08/21 5487

## 2021-12-12 LAB
ORGANISM: ABNORMAL
URINE CULTURE, ROUTINE: ABNORMAL

## 2023-02-02 ENCOUNTER — APPOINTMENT (OUTPATIENT)
Dept: CT IMAGING | Age: 88
DRG: 065 | End: 2023-02-02
Payer: MEDICARE

## 2023-02-02 ENCOUNTER — APPOINTMENT (OUTPATIENT)
Dept: MRI IMAGING | Age: 88
DRG: 065 | End: 2023-02-02
Payer: MEDICARE

## 2023-02-02 ENCOUNTER — HOSPITAL ENCOUNTER (INPATIENT)
Age: 88
LOS: 7 days | Discharge: NURSING, REHAB OR LONG-TERM CARE | DRG: 065 | End: 2023-02-09
Attending: EMERGENCY MEDICINE | Admitting: INTERNAL MEDICINE
Payer: MEDICARE

## 2023-02-02 ENCOUNTER — APPOINTMENT (OUTPATIENT)
Dept: GENERAL RADIOLOGY | Age: 88
DRG: 065 | End: 2023-02-02
Payer: MEDICARE

## 2023-02-02 DIAGNOSIS — R13.19 ESOPHAGEAL DYSPHAGIA: ICD-10-CM

## 2023-02-02 DIAGNOSIS — I63.81 ACUTE LACUNAR INFARCTION (HCC): Primary | ICD-10-CM

## 2023-02-02 LAB
ALBUMIN SERPL BCG-MCNC: 4.3 G/DL (ref 3.5–5.1)
ALP SERPL-CCNC: 49 U/L (ref 38–126)
ALT SERPL W/O P-5'-P-CCNC: 13 U/L (ref 11–66)
ANION GAP SERPL CALC-SCNC: 9 MEQ/L (ref 8–16)
AST SERPL-CCNC: 20 U/L (ref 5–40)
BASOPHILS ABSOLUTE: 0.1 THOU/MM3 (ref 0–0.1)
BASOPHILS NFR BLD AUTO: 0.8 %
BILIRUB CONJ SERPL-MCNC: < 0.2 MG/DL (ref 0–0.3)
BILIRUB SERPL-MCNC: 0.4 MG/DL (ref 0.3–1.2)
BILIRUB UR QL STRIP.AUTO: NEGATIVE
BUN SERPL-MCNC: 15 MG/DL (ref 7–22)
CALCIUM SERPL-MCNC: 9.3 MG/DL (ref 8.5–10.5)
CHARACTER UR: CLEAR
CHLORIDE SERPL-SCNC: 104 MEQ/L (ref 98–111)
CO2 SERPL-SCNC: 26 MEQ/L (ref 23–33)
COLOR: YELLOW
CREAT SERPL-MCNC: 0.5 MG/DL (ref 0.4–1.2)
DEPRECATED RDW RBC AUTO: 46 FL (ref 35–45)
EKG ATRIAL RATE: 68 BPM
EKG P AXIS: 65 DEGREES
EKG P-R INTERVAL: 162 MS
EKG Q-T INTERVAL: 386 MS
EKG QRS DURATION: 88 MS
EKG QTC CALCULATION (BAZETT): 410 MS
EKG R AXIS: 29 DEGREES
EKG T AXIS: 52 DEGREES
EKG VENTRICULAR RATE: 68 BPM
EOSINOPHIL NFR BLD AUTO: 1.2 %
EOSINOPHILS ABSOLUTE: 0.1 THOU/MM3 (ref 0–0.4)
ERYTHROCYTE [DISTWIDTH] IN BLOOD BY AUTOMATED COUNT: 13.2 % (ref 11.5–14.5)
FLUAV RNA RESP QL NAA+PROBE: NOT DETECTED
FLUBV RNA RESP QL NAA+PROBE: NOT DETECTED
GFR SERPL CREATININE-BSD FRML MDRD: > 60 ML/MIN/1.73M2
GLUCOSE SERPL-MCNC: 108 MG/DL (ref 70–108)
GLUCOSE UR QL STRIP.AUTO: NEGATIVE MG/DL
HCT VFR BLD AUTO: 36.5 % (ref 37–47)
HGB BLD-MCNC: 12.2 GM/DL (ref 12–16)
HGB UR QL STRIP.AUTO: NEGATIVE
IMM GRANULOCYTES # BLD AUTO: 0.01 THOU/MM3 (ref 0–0.07)
IMM GRANULOCYTES NFR BLD AUTO: 0.1 %
KETONES UR QL STRIP.AUTO: NEGATIVE
LYMPHOCYTES ABSOLUTE: 1.6 THOU/MM3 (ref 1–4.8)
LYMPHOCYTES NFR BLD AUTO: 21.3 %
MCH RBC QN AUTO: 31.9 PG (ref 26–33)
MCHC RBC AUTO-ENTMCNC: 33.4 GM/DL (ref 32.2–35.5)
MCV RBC AUTO: 95.5 FL (ref 81–99)
MONOCYTES ABSOLUTE: 0.4 THOU/MM3 (ref 0.4–1.3)
MONOCYTES NFR BLD AUTO: 5.7 %
MRSA DNA SPEC QL NAA+PROBE: NEGATIVE
NEUTROPHILS NFR BLD AUTO: 70.9 %
NITRITE UR QL STRIP: NEGATIVE
NRBC BLD AUTO-RTO: 0 /100 WBC
OSMOLALITY SERPL CALC.SUM OF ELEC: 278.9 MOSMOL/KG (ref 275–300)
PH UR STRIP.AUTO: 7 [PH] (ref 5–9)
PLATELET # BLD AUTO: 226 THOU/MM3 (ref 130–400)
PMV BLD AUTO: 10.4 FL (ref 9.4–12.4)
POTASSIUM SERPL-SCNC: 4.1 MEQ/L (ref 3.5–5.2)
PROT SERPL-MCNC: 7 G/DL (ref 6.1–8)
PROT UR STRIP.AUTO-MCNC: NEGATIVE MG/DL
RBC # BLD AUTO: 3.82 MILL/MM3 (ref 4.2–5.4)
SARS-COV-2 RNA RESP QL NAA+PROBE: NOT DETECTED
SEGMENTED NEUTROPHILS ABSOLUTE COUNT: 5.5 THOU/MM3 (ref 1.8–7.7)
SODIUM SERPL-SCNC: 139 MEQ/L (ref 135–145)
SP GR UR REFRACT.AUTO: 1.01 (ref 1–1.03)
UROBILINOGEN, URINE: 0.2 EU/DL (ref 0–1)
VANA ISLT/SPM QL: NEGATIVE
WBC # BLD AUTO: 7.7 THOU/MM3 (ref 4.8–10.8)
WBC #/AREA URNS HPF: NEGATIVE /[HPF]

## 2023-02-02 PROCEDURE — 6370000000 HC RX 637 (ALT 250 FOR IP): Performed by: EMERGENCY MEDICINE

## 2023-02-02 PROCEDURE — 85025 COMPLETE CBC W/AUTO DIFF WBC: CPT

## 2023-02-02 PROCEDURE — 72125 CT NECK SPINE W/O DYE: CPT

## 2023-02-02 PROCEDURE — 73502 X-RAY EXAM HIP UNI 2-3 VIEWS: CPT

## 2023-02-02 PROCEDURE — 2580000003 HC RX 258: Performed by: INTERNAL MEDICINE

## 2023-02-02 PROCEDURE — 82248 BILIRUBIN DIRECT: CPT

## 2023-02-02 PROCEDURE — 87641 MR-STAPH DNA AMP PROBE: CPT

## 2023-02-02 PROCEDURE — 87070 CULTURE OTHR SPECIMN AEROBIC: CPT

## 2023-02-02 PROCEDURE — 99222 1ST HOSP IP/OBS MODERATE 55: CPT | Performed by: PHYSICAL MEDICINE & REHABILITATION

## 2023-02-02 PROCEDURE — 6370000000 HC RX 637 (ALT 250 FOR IP): Performed by: INTERNAL MEDICINE

## 2023-02-02 PROCEDURE — 71045 X-RAY EXAM CHEST 1 VIEW: CPT

## 2023-02-02 PROCEDURE — 81003 URINALYSIS AUTO W/O SCOPE: CPT

## 2023-02-02 PROCEDURE — 6360000002 HC RX W HCPCS: Performed by: INTERNAL MEDICINE

## 2023-02-02 PROCEDURE — 87636 SARSCOV2 & INF A&B AMP PRB: CPT

## 2023-02-02 PROCEDURE — 2580000003 HC RX 258: Performed by: EMERGENCY MEDICINE

## 2023-02-02 PROCEDURE — 99285 EMERGENCY DEPT VISIT HI MDM: CPT

## 2023-02-02 PROCEDURE — 2060000000 HC ICU INTERMEDIATE R&B

## 2023-02-02 PROCEDURE — 36415 COLL VENOUS BLD VENIPUNCTURE: CPT

## 2023-02-02 PROCEDURE — 93005 ELECTROCARDIOGRAM TRACING: CPT | Performed by: EMERGENCY MEDICINE

## 2023-02-02 PROCEDURE — 87500 VANOMYCIN DNA AMP PROBE: CPT

## 2023-02-02 PROCEDURE — 93010 ELECTROCARDIOGRAM REPORT: CPT | Performed by: INTERNAL MEDICINE

## 2023-02-02 PROCEDURE — 80053 COMPREHEN METABOLIC PANEL: CPT

## 2023-02-02 PROCEDURE — 6360000004 HC RX CONTRAST MEDICATION: Performed by: EMERGENCY MEDICINE

## 2023-02-02 PROCEDURE — 70450 CT HEAD/BRAIN W/O DYE: CPT

## 2023-02-02 PROCEDURE — 70498 CT ANGIOGRAPHY NECK: CPT

## 2023-02-02 PROCEDURE — 70496 CT ANGIOGRAPHY HEAD: CPT

## 2023-02-02 PROCEDURE — 70551 MRI BRAIN STEM W/O DYE: CPT

## 2023-02-02 RX ORDER — ASPIRIN 81 MG/1
81 TABLET ORAL DAILY
Status: DISCONTINUED | OUTPATIENT
Start: 2023-02-03 | End: 2023-02-09 | Stop reason: HOSPADM

## 2023-02-02 RX ORDER — MULTIVITAMIN WITH IRON
1 TABLET ORAL DAILY
Status: DISCONTINUED | OUTPATIENT
Start: 2023-02-02 | End: 2023-02-09 | Stop reason: HOSPADM

## 2023-02-02 RX ORDER — LATANOPROST 50 UG/ML
1 SOLUTION/ DROPS OPHTHALMIC NIGHTLY
Status: DISCONTINUED | OUTPATIENT
Start: 2023-02-02 | End: 2023-02-02

## 2023-02-02 RX ORDER — ONDANSETRON 2 MG/ML
4 INJECTION INTRAMUSCULAR; INTRAVENOUS EVERY 6 HOURS PRN
Status: DISCONTINUED | OUTPATIENT
Start: 2023-02-02 | End: 2023-02-09 | Stop reason: HOSPADM

## 2023-02-02 RX ORDER — SODIUM CHLORIDE 9 MG/ML
INJECTION, SOLUTION INTRAVENOUS CONTINUOUS
Status: DISCONTINUED | OUTPATIENT
Start: 2023-02-02 | End: 2023-02-02

## 2023-02-02 RX ORDER — ONDANSETRON 4 MG/1
4 TABLET, ORALLY DISINTEGRATING ORAL EVERY 8 HOURS PRN
Status: DISCONTINUED | OUTPATIENT
Start: 2023-02-02 | End: 2023-02-09 | Stop reason: HOSPADM

## 2023-02-02 RX ORDER — CLOPIDOGREL BISULFATE 75 MG/1
75 TABLET ORAL ONCE
Status: COMPLETED | OUTPATIENT
Start: 2023-02-02 | End: 2023-02-02

## 2023-02-02 RX ORDER — POLYVINYL ALCOHOL 14 MG/ML
1 SOLUTION/ DROPS OPHTHALMIC PRN
Status: DISCONTINUED | OUTPATIENT
Start: 2023-02-02 | End: 2023-02-09 | Stop reason: HOSPADM

## 2023-02-02 RX ORDER — ENOXAPARIN SODIUM 100 MG/ML
40 INJECTION SUBCUTANEOUS NIGHTLY
Status: DISCONTINUED | OUTPATIENT
Start: 2023-02-02 | End: 2023-02-08

## 2023-02-02 RX ORDER — MAGNESIUM 30 MG
30 TABLET ORAL 2 TIMES DAILY
Status: DISCONTINUED | OUTPATIENT
Start: 2023-02-02 | End: 2023-02-02 | Stop reason: RX

## 2023-02-02 RX ORDER — ROSUVASTATIN CALCIUM 20 MG/1
40 TABLET, COATED ORAL NIGHTLY
Status: DISCONTINUED | OUTPATIENT
Start: 2023-02-02 | End: 2023-02-09 | Stop reason: HOSPADM

## 2023-02-02 RX ORDER — POLYETHYLENE GLYCOL 3350 17 G/17G
17 POWDER, FOR SOLUTION ORAL DAILY PRN
Status: DISCONTINUED | OUTPATIENT
Start: 2023-02-02 | End: 2023-02-09 | Stop reason: HOSPADM

## 2023-02-02 RX ORDER — POLYVINYL ALCOHOL 14 MG/ML
1 SOLUTION/ DROPS OPHTHALMIC PRN
COMMUNITY

## 2023-02-02 RX ORDER — SODIUM CHLORIDE 9 MG/ML
INJECTION, SOLUTION INTRAVENOUS CONTINUOUS
Status: DISCONTINUED | OUTPATIENT
Start: 2023-02-02 | End: 2023-02-04

## 2023-02-02 RX ORDER — ASPIRIN 81 MG/1
324 TABLET, CHEWABLE ORAL ONCE
Status: COMPLETED | OUTPATIENT
Start: 2023-02-02 | End: 2023-02-02

## 2023-02-02 RX ORDER — TRAZODONE HYDROCHLORIDE 50 MG/1
50 TABLET ORAL NIGHTLY
Status: DISCONTINUED | OUTPATIENT
Start: 2023-02-02 | End: 2023-02-09 | Stop reason: HOSPADM

## 2023-02-02 RX ORDER — ASPIRIN 300 MG/1
300 SUPPOSITORY RECTAL DAILY
Status: DISCONTINUED | OUTPATIENT
Start: 2023-02-03 | End: 2023-02-08

## 2023-02-02 RX ORDER — VITAMIN B COMPLEX
500 TABLET ORAL DAILY
Status: DISCONTINUED | OUTPATIENT
Start: 2023-02-02 | End: 2023-02-09 | Stop reason: HOSPADM

## 2023-02-02 RX ORDER — OMEGA-3/DHA/EPA/FISH OIL 300-1000MG
2 CAPSULE ORAL DAILY
Status: DISCONTINUED | OUTPATIENT
Start: 2023-02-02 | End: 2023-02-02 | Stop reason: RX

## 2023-02-02 RX ORDER — ACETAMINOPHEN 325 MG/1
650 TABLET ORAL EVERY 4 HOURS PRN
Status: DISCONTINUED | OUTPATIENT
Start: 2023-02-02 | End: 2023-02-09 | Stop reason: HOSPADM

## 2023-02-02 RX ADMIN — ASPIRIN 81 MG 324 MG: 81 TABLET ORAL at 14:25

## 2023-02-02 RX ADMIN — IOPAMIDOL 80 ML: 755 INJECTION, SOLUTION INTRAVENOUS at 14:39

## 2023-02-02 RX ADMIN — SODIUM CHLORIDE: 9 INJECTION, SOLUTION INTRAVENOUS at 12:43

## 2023-02-02 RX ADMIN — ACETAMINOPHEN 650 MG: 325 TABLET ORAL at 23:54

## 2023-02-02 RX ADMIN — ROSUVASTATIN CALCIUM 40 MG: 20 TABLET, FILM COATED ORAL at 21:05

## 2023-02-02 RX ADMIN — Medication 1 TABLET: at 21:05

## 2023-02-02 RX ADMIN — SODIUM CHLORIDE: 9 INJECTION, SOLUTION INTRAVENOUS at 22:17

## 2023-02-02 RX ADMIN — Medication 500 UNITS: at 21:05

## 2023-02-02 RX ADMIN — TRAZODONE HYDROCHLORIDE 50 MG: 50 TABLET ORAL at 22:00

## 2023-02-02 RX ADMIN — CLOPIDOGREL BISULFATE 75 MG: 75 TABLET ORAL at 14:25

## 2023-02-02 RX ADMIN — ENOXAPARIN SODIUM 40 MG: 100 INJECTION SUBCUTANEOUS at 21:04

## 2023-02-02 ASSESSMENT — ENCOUNTER SYMPTOMS
WHEEZING: 0
DIARRHEA: 0
RHINORRHEA: 0
ABDOMINAL PAIN: 0
CONSTIPATION: 0
EYE DISCHARGE: 0
SHORTNESS OF BREATH: 0
COUGH: 0
VOMITING: 0
BACK PAIN: 0
EYE PAIN: 0
SORE THROAT: 0
NAUSEA: 0
TROUBLE SWALLOWING: 0

## 2023-02-02 ASSESSMENT — PAIN DESCRIPTION - FREQUENCY: FREQUENCY: INTERMITTENT

## 2023-02-02 ASSESSMENT — PAIN DESCRIPTION - PAIN TYPE: TYPE: ACUTE PAIN

## 2023-02-02 ASSESSMENT — PAIN DESCRIPTION - LOCATION: LOCATION: HEAD

## 2023-02-02 ASSESSMENT — PAIN - FUNCTIONAL ASSESSMENT: PAIN_FUNCTIONAL_ASSESSMENT: ACTIVITIES ARE NOT PREVENTED

## 2023-02-02 ASSESSMENT — PAIN DESCRIPTION - DESCRIPTORS: DESCRIPTORS: ACHING

## 2023-02-02 ASSESSMENT — PAIN DESCRIPTION - ONSET: ONSET: GRADUAL

## 2023-02-02 ASSESSMENT — PAIN DESCRIPTION - ORIENTATION: ORIENTATION: INNER

## 2023-02-02 NOTE — ED NOTES
Pt transported to  on cart in stable condition. Writer spoke to Baljinder prior to transport.       Anushka Eye  02/02/23 7845

## 2023-02-02 NOTE — ED PROVIDER NOTES
[address] ENCOUNTER        PATIENT NAME: Tra Trujillo  MRN: [id number]  : 1935  VIGIL: 2023  PROVIDER: Navid Mcclain MD    CHIEF COMPLAINT       Chief Complaint   Patient presents with    Fall    Other     Feels like something is stuck in throat       Patient is seen and evaluated in a timely fashion. Nurses Notes are reviewed and I agree except as noted in the HPI. HISTORY OF PRESENT ILLNESS   Tra Trujillo is a 81 y.o. female who presents to Emergency Department with Fall and Other (Feels like something is stuck in throat)     Patient is brought in by EMS for evaluation of fall x2 today. Patient says she fell in the kitchen and again in the dining room. Lives at home by herself. Patient states she had no idea why and how she fell. She denies syncope. No fainting episodes. She denies generalized weakness. After the second fall, patient noticed it was hard for her to get up, she called her daughter who recommended she should call EMS. On arrival, patient was alert and oriented x 4. She complains of mild left hip pain. She denies LOC. No headache. She has been having foreign body sensation in throat since last night, but she was able to swallow a piece of bread this morning. No vomiting. No fever or chills. No chest pain. No SOB. No abdominal pain. She has mild watery diarrhea. No urine symptoms. This HPI was provided by patient. REVIEW OF SYSTEMS   Ten-point review of systems is negative except those documented in above HPI including constitutional, HEENT, respiratory, cardiovascular, gastrointestinal, genitourinary, musculoskeletal, skin, neurological, hematological and behavioral     PASTMEDICAL HISTORY    has a past medical history of Osteopenia and Osteopetrosis. SURGICAL HISTORY      has a past surgical history that includes Tonsillectomy; Appendectomy; bladder suspension; and Eye surgery.     CURRENT MEDICATIONS Previous Medications    CLONAZEPAM (KLONOPIN) 0.5 MG TABLET    Take 0.5 mg by mouth 2 times daily as needed. DHA-EPA-COENZYME Q10-VITAMIN E (COQ-10 & FISH OIL PO)    Take  by mouth. FISH OIL-OMEGA-3 FATTY ACIDS 1000 MG CAPSULE    Take 2 g by mouth daily. FLUTICASONE (FLONASE) 50 MCG/ACT NASAL SPRAY    1 spray by Nasal route daily. GLUCOSAMINE SULFATE 750 MG TABS    Take 1 tablet by mouth daily. IBUPROFEN (ADVIL;MOTRIN) 400 MG TABLET    Take 1.5 tablets by mouth every 6 hours as needed for Pain    LATANOPROST (XALATAN) 0.005 % OPHTHALMIC SOLUTION    1 drop nightly    MAGNESIUM 30 MG TABLET    Take 30 mg by mouth 2 times daily. POLYETHYLENE GLYCOL (GLYCOLAX) PACKET    Take 17 g by mouth daily as needed. THERAPEUTIC MULTIVITAMIN-MINERALS (THERAGRAN-M) TABLET    Take 1 tablet by mouth daily. VITAMIN D (CHOLECALCIFEROL) 400 UNITS TABS TABLET    Take 400 Units by mouth daily. ZINC 50 MG CAPS    Take  by mouth. ALLERGIES     is allergic to chocolate, sulfa antibiotics, and augmentin [amoxicillin-pot clavulanate]. FAMILY HISTORY     She indicated that her mother is . She indicated that her father is . family history is not on file. SOCIAL HISTORY      reports that she has never smoked. She has never used smokeless tobacco. She reports that she does not drink alcohol and does not use drugs. PHYSICAL EXAM      height is 4' 11\" (1.499 m) and weight is 118 lb (53.5 kg). Her temperature is 97.9 °F (36.6 °C). Her blood pressure is 142/78 (abnormal) and her pulse is 70. Her respiration is 18 and oxygen saturation is 98%. Physical Exam  Vitals and nursing note reviewed. Constitutional:       Appearance: She is well-developed. She is not diaphoretic. HENT:      Head: Normocephalic and atraumatic. Nose: Nose normal.   Eyes:      General: No scleral icterus. Right eye: No discharge. Left eye: No discharge. Conjunctiva/sclera: Conjunctivae normal.      Pupils: Pupils are equal, round, and reactive to light. Neck:      Vascular: No JVD. Trachea: No tracheal deviation. Cardiovascular:      Rate and Rhythm: Normal rate and regular rhythm. Heart sounds: Normal heart sounds. No murmur heard. No friction rub. No gallop. Pulmonary:      Effort: Pulmonary effort is normal. No respiratory distress. Breath sounds: Normal breath sounds. No stridor. No wheezing or rales. Chest:      Chest wall: No tenderness. Abdominal:      General: Bowel sounds are normal. There is no distension. Palpations: Abdomen is soft. There is no mass. Tenderness: There is no abdominal tenderness. There is no guarding or rebound. Hernia: No hernia is present. Musculoskeletal:         General: Tenderness present. No deformity. Cervical back: Normal range of motion and neck supple. Comments: Mild left hip tenderness, left hip he has normal range of motion. Lymphadenopathy:      Cervical: No cervical adenopathy. Skin:     General: Skin is warm and dry. Capillary Refill: Capillary refill takes less than 2 seconds. Coloration: Skin is not pale. Findings: No erythema or rash. Neurological:      Mental Status: She is alert and oriented to person, place, and time. Cranial Nerves: No cranial nerve deficit. Sensory: No sensory deficit. Motor: No abnormal muscle tone. Coordination: Coordination normal.      Deep Tendon Reflexes: Reflexes normal.   Psychiatric:         Behavior: Behavior normal.         Thought Content: Thought content normal.         Judgment: Judgment normal.       FORMAL DIAGNOSTIC RESULTS     RADIOLOGY: Interpretation per the Radiologist below, if available at the time of this note (none if blank):  CT HEAD WO CONTRAST   Final Result    Small lacunar infarction right insula. **This report has been created using voice recognition software. It may contain minor errors which are inherent in voice recognition technology. **      Final report electronically signed by Dr. Madelyn Benitez on 2/2/2023 1:27 PM      CT CERVICAL SPINE WO CONTRAST   Final Result   No acute process            **This report has been created using voice recognition software. It may contain minor errors which are inherent in voice recognition technology. **      Final report electronically signed by Dr. Madelyn Benitez on 2/2/2023 1:35 PM      XR HIP 2-3 VW W PELVIS LEFT   Final Result   No acute process            **This report has been created using voice recognition software. It may contain minor errors which are inherent in voice recognition technology. **      Final report electronically signed by Dr. Madelyn Benitez on 2/2/2023 1:24 PM      XR CHEST 1 VIEW   Final Result   No acute cardiopulmonary disease            **This report has been created using voice recognition software. It may contain minor errors which are inherent in voice recognition technology. **      Final report electronically signed by Dr. Madelyn Benitez on 2/2/2023 1:24 PM      CTA HEAD W [address]    (Results Pending)   CTA NECK W [address]    (Results Pending)   MRI LIMITED BRAIN    (Results Pending)       LABS: (none if blank)  Results for orders placed or performed during the hospital encounter of 02/02/23   COVID-19 & Influenza Combo    Specimen: Nasopharyngeal Swab   Result Value Ref Range    SARS-CoV-2 RNA, RT PCR NOT DETECTED NOT DETECTED    INFLUENZA A NOT DETECTED NOT DETECTED    INFLUENZA B NOT DETECTED NOT DETECTED   Basic Metabolic Panel   Result Value Ref Range    Sodium 139 135 - 145 meq/L    Potassium 4.1 3.5 - 5.2 meq/L    Chloride 104 98 - 111 meq/L    CO2 26 23 - 33 meq/L    Glucose 108 70 - 108 mg/dL    BUN 15 7 - 22 mg/dL    Creatinine 0.5 0.4 - 1.2 mg/dL    Calcium 9.3 8.5 - 10.5 mg/dL   CBC with Auto Differential   Result Value Ref Range    WBC 7.7 4.8 - 10.8 thou/mm3    RBC 3.82 (L) 4.20 - 5.40 mill/mm3    Hemoglobin 12.2 12.0 - 16.0 gm/dl    Hematocrit 36.5 (L) 37.0 - 47.0 %    MCV 95.5 81.0 - 99.0 fL    MCH 31.9 26.0 - 33.0 pg    MCHC 33.4 32.2 - 35.5 gm/dl    RDW-CV 13.2 11.5 - 14.5 %    RDW-SD 46.0 (H) 35.0 - 45.0 fL    Platelets 782 187 - 627 thou/mm3    MPV 10.4 9.4 - 12.4 fL    Seg Neutrophils 70.9 %    Lymphocytes 21.3 %    Monocytes 5.7 %    Eosinophils 1.2 %    Basophils 0.8 %    Immature Granulocytes 0.1 %    Segs Absolute 5.5 1.8 - 7.7 thou/mm3    Lymphocytes Absolute 1.6 1.0 - 4.8 thou/mm3    Monocytes Absolute 0.4 0.4 - 1.3 thou/mm3    Eosinophils Absolute 0.1 0.0 - 0.4 thou/mm3    Basophils Absolute 0.1 0.0 - 0.1 thou/mm3    Immature Grans (Abs) 0.01 0.00 - 0.07 thou/mm3    nRBC 0 /100 wbc   Anion Gap   Result Value Ref Range    Anion Gap 9.0 8.0 - 16.0 meq/L   Glomerular Filtration Rate, Estimated   Result Value Ref Range    Est, Glom Filt Rate >60 >60 ml/min/1.73m2   Osmolality   Result Value Ref Range    Osmolality Calc 278.9 275.0 - 300.0 mOsmol/kg   Hepatic Function Panel   Result Value Ref Range    Albumin 4.3 3.5 - 5.1 g/dL    Total Bilirubin 0.4 0.3 - 1.2 mg/dL    Bilirubin, Direct <0.2 0.0 - 0.3 mg/dL    Alkaline Phosphatase 49 38 - 126 U/L    AST 20 5 - 40 U/L    ALT 13 11 - 66 U/L    Total Protein 7.0 6.1 - 8.0 g/dL   EKG Fall   Result Value Ref Range    Ventricular Rate 68 BPM    Atrial Rate 68 BPM    P-R Interval 162 ms    QRS Duration 88 ms    Q-T Interval 386 ms    QTc Calculation (Bazett) 410 ms    P Axis 65 degrees    R Axis 29 degrees    T Axis 52 degrees       (Any cultures that may have been sent were not resulted at the time of this patient visit)    [facility] ([facility]) and ED COURSE:     [facility] Summary:     Patient presents for evaluation of a fall x2. She had no idea how she fell and why she fell. She has mild pain from the left hip. She also complains throat foreign body sensation. No headache. Vital signs are stable on arrival.    Stable ED stay.   ED work-ups revealed lacunar infarct which seems to be new. Patient lives alone at home, patient had a fall x2 with no etiology. Patient complains of throat foreign body sensation which could be due to mild dysphagia. Patient's lacunar infarct certainly could be the cause of fall and dysphgia. Admission is warranted at least for PT/OT evaluation and swallow study. Patient also needs further stroke work-ups including CTAs head and neck, and brain MRI. Case is discussed and patient is admitted by Dr. Negrita Walker. Inpatient neurology consult is requested in the ED. Prior to admission, patient received aspirin and Plavix. ED Course as of 02/02/23 1430   Thu Feb 02, 2023   1200 Pt is seen and evaluated. [LUKE]   1421 CT HEAD WO CONTRAST  IMPRESSION:   Small lacunar infarction right insula. [LUKE]   1421 CTA head and neck ordered. Brain MRI images ordered. Discussed and admitted by Dr. Negrita Walker. [LUKE]   1421 ASA and Plavix are given. [LUKE]      ED Course User Index  [LUKE] Refugio Stein MD       Vitals:    02/02/23 1141 02/02/23 1236 02/02/23 1420   BP: 139/86  (!) 142/78   Pulse: 70  70   Resp: 20 20 18   Temp: 97.9 °F (36.6 °C)     SpO2: 98% 97% 98%   Weight: 118 lb (53.5 kg)     Height: 4' 11\" (1.499 m)         1) Number and Complexity of Problems        Problem List This Visit:   Fall and Other (Feels like something is stuck in throat)      Differential Diagnosis includes (but not limited to): Fall, syncope, dehydration, ACS, pneumonia, UTI, CVA, electrolyte derangement    Diagnoses Considered but I have low suspicion of:   ICH        Pertinent Comorbid Conditions:    See HPI, PMH and PSH    2)  Data Reviewed (none if left blank)    My Independent interpretations:       EKG:   Normal sinus rhythm, ventricular rate of 68 bpm, OK interval 162 ms, QRS duration 88 ms,  ms, no ST elevation or acute T wave    External Documentation Reviewed:   Care everywhere in [facility] is reviewed.     Previous patient encounter documents & history available on EMR was reviewed:   Yes    See Formal Diagnostic Results above for the lab and radiology tests and orders. 3)  Treatment and Disposition    ED Reassessment:  Stable ED stay    Case discussed with consulting clinician:    Dr. Kya Dial    Shared Decision-Making:   Treatment plan and disposition discussed with the patient/family, questions answered     Code Status:    Reviewed with patient and/or family as full code    ED Medications administered this visit:  (None if blank)  Medications   0.9 % sodium chloride infusion ( IntraVENous New Bag 2/2/23 1243)   aspirin chewable tablet 324 mg (324 mg Oral Given 2/2/23 1425)   clopidogrel (PLAVIX) tablet 75 mg (75 mg Oral Given 2/2/23 1425)       PROCEDURES:   None    CRITICAL CARE:   None    FINAL IMPRESSION      1. Acute lacunar infarction ([facility] [address]    2. Esophageal dysphagia          DISPOSITION/PLAN   DISPOSITION Admitted 02/02/2023 02:26:59 PM      OUTPATIENT FOLLOW UP THE PATIENT:  No follow-up provider specified.   DISCHARGE MEDICATIONS:(None if blank)  New Prescriptions    No medications on file         MD Toñito Lainez MD  02/02/23 7323

## 2023-02-02 NOTE — ED NOTES
ED to inpatient nurses report    Chief Complaint   Patient presents with   [address] like something is stuck in throat      Present to ED from home  LOC: alert and orientated to name, place, date  Vital signs   Vitals:    02/02/23 1141 02/02/23 1236 02/02/23 1420   BP: 139/86  (!) 142/78   Pulse: 70  70   Resp: 20 20 18   Temp: 97.9 °F (36.6 °C)     SpO2: 98% 97% 98%   Weight: 118 lb (53.5 kg)     Height: 4' 11\" (1.499 m)        Oxygen Baseline RA    Current needs required RA Bipap/Cpap No  LDAs:   Peripheral IV 02/02/23 Distal;Left; Anterior Cephalic (Active)     Mobility: Requires assistance * 2  Pending ED orders: NA  Present condition: STABLE  C-SSRS    Swallow Screening    Preferred Language: English     Electronically signed by Coleen Morales, RN on 2/2/2023 at 3:30 PM       Emanuel Pierce RN  02/02/23 [id number]

## 2023-02-02 NOTE — PROGRESS NOTES
Pt admitted to  [phone] in a wheelchair. IV site free of s/s of infection or infiltration. Vital signs obtained. Assessment and data collection initiated. Two nurse skin assessment performed by Emir Watson RN and Param Sapp. Oriented to room. Policies and procedures for [facility] explained. All questions answered with no further questions at this time. Fall prevention and safety brochure discussed with patient. Bed alarm on. Call light in reach.

## 2023-02-02 NOTE — CONSULTS
Physical Medicine & Rehabilitation Consultation Note      Admitting Physician: Mahi Gonzalez MD    Primary Care Provider: EDUARDO Nance CNP     Reason for Consult: Acute ischemic stroke    History of Present Illness:  Page Bennett is a 81 y.o. 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 The patient says she began feeling something stuck in her throat last night, 2/1/2023. Today, 2/2/2023, while she was walking in kitchen to try to get something to help him the throat discomfort sensation, she suddenly fell for no reason. She denies having weakness, pain, syncope or fainting prior to the fall. She struggled to get up but fell again after few step into her dining room. At that time she felt the need for bowel movement so she crawled to bathroom and had diarrhea like bowel movement. She did not call her daughter who called 911. The patient was sent to [address] [place] [address] ER for evaluation on 2./2/2023. She was found to have tenderness at the her left hip area. She also complains of feeling dizzy with room spinning sensation when she says suddenly sitting up from supine position. Her blood tests and urine test showed no significant abnormality. X-ray of left hip and chest revealed no acute abnormality. CT of the head without contrast done on 2/2/2023 showed small lacunar infarction at the right insula. CT of cervical spine done on 2/2/2023 was reported to show no acute process. CTA of head and neck done on 2/2/2023 show no significant hemodynamic stenosis in bilateral common and internal carotid arteries, and hypoplastic P1 segment of the right posterior cerebral artery.   MRI of brain was also performed on 2/2/2023 and revealed a small acute right posterior frontal/anterior parietal cortex infarct stroke, and mild white matter atrophy. Neurology consultation was requested. The patient was started on aspirin 81 mg. At the present time the patient says she has mild headache and dizziness. She says she still feels visual image of the room shaking or spinning when she suddenly sit up from supine position. She says she feels tired and fatigue but denies having specific weakness. She says her left hip feel tender to palpation. She still has some cough with throat discomfort sensation but denies having sore throat. She denies having numbness or tingling sensation, shortness of breath, chest pain, nausea or vomiting, diarrhea or constipation, bladder or bowel incontinence. Most Recent Rehabilitation Assessments:  PT:    Initial evaluation pending       OT:    Initial evaluation pending       ST:    Initial evaluation pending      Past Medical History:        Diagnosis Date    Anxiety     Glaucoma, bilateral     Diagnosed in 2000's    Osteopenia     Osteoporosis     Diagnosed in 1990s       Past Surgical History:        Procedure Laterality Date    APPENDECTOMY  1957    BLADDER SUSPENSION      In [address] Bilateral 2018    Eye stent placement for glaucoma    TONSILLECTOMY         Allergies:     Allergies   Allergen Reactions    Chocolate Rash    Sulfa Antibiotics Rash    Augmentin [Amoxicillin-Pot Clavulanate]      Pt States Breaks her out        Current Medications:   Current Facility-Administered Medications   Medication Dose Route Frequency Provider Last Rate Last Admin    latanoprost (XALATAN) 0.005 % ophthalmic solution 1 drop  1 drop Both Eyes Nightly Lexa Perez MD        polyethylene glycol (GLYCOLAX) packet 17 g  17 g Oral Daily PRN Ana Santos MD        Vitamin D (CHOLECALCIFEROL) tablet 500 Units  500 Units Oral Daily Ana Santos MD        multivitamin 1 tablet  1 tablet Oral Daily Ana Santos MD polyvinyl alcohol (LIQUIFILM TEARS) 1.4 % ophthalmic solution 1 drop  1 drop Both Eyes PRN Italia Boss MD        ondansetron (ZOFRAN-ODT) disintegrating tablet 4 mg  4 mg Oral Q8H PRN Italia Boss MD        Or    ondansetron (ZOFRAN) injection 4 mg  4 mg IntraVENous Q6H PRN Italia Boss MD        enoxaparin (LOVENOX) injection 40 mg  40 mg SubCUTAneous Nightly Italia Boss MD        [START ON 2/3/2023] aspirin EC tablet 81 mg  81 mg Oral Daily Italia Boss MD        Or    Edwin Reagan ON 2/3/2023] aspirin suppository 300 mg  300 mg Rectal Daily Lexa Perez MD        perflutren lipid microspheres (DEFINITY) injection 1.5 mL  1.5 mL IntraVENous ONCE PRN Italia Boss MD        0.9 % sodium chloride infusion   IntraVENous Continuous Lexa Preez MD        rosuvastatin (CRESTOR) tablet 40 mg  40 mg Oral Nightly Lexa Perez MD        bisacodyl (DULCOLAX) EC tablet 5 mg  5 mg Oral Daily PRN Italia Boss MD            Social History:  Social History     Socioeconomic History    Marital status:      Spouse name: Not on file    Number of children: Not on file    Years of education: Not on file    Highest education level: Not on file   Occupational History    Not on file   Tobacco Use    Smoking status: Never    Smokeless tobacco: Never   Substance and Sexual Activity    Alcohol use: No    Drug use: No    Sexual activity: Never   Other Topics Concern    Not on file   Social History Narrative    Not on file     Social Determinants of Health     Financial Resource Strain: Not on file   Food Insecurity: Not on file   Transportation Needs: Not on file   Physical Activity: Not on file   Stress: Not on file   Social Connections: Not on file   Intimate Partner Violence: Not on file   Housing Stability: Not on file     Occupation: Retired in 2002 from being   Lives with: Alone; her  passed away at the end of November 2022; she had 2 daughter who lives about 10 minutes drive away from her; one of the daughter works as a nurse and another daughter is a stay home mom  Home setup: 1 level plus basement and attic house; her bedroom and bathroom are on the first floor; there are 4 steps outside front door without handrail; there are 4 steps outside garage door with 1 side handrail  Previous level of independence: Independent in all ADLs, community long distance ambulation without using any assistive walking device, and driving      Family History:       Problem Relation Age of Onset    Heart Failure Mother     Diabetes Mother     Heart Disease Father     Bipolar Disorder Father     Personality Disorder Father     Anxiety Disorder Father     Anxiety Disorder Sister     Blindness Sister     Depression Sister     Anxiety Disorder Sister     Vision Loss Sister     Other Brother         Rheumatic fever    Heart Disease Brother     COPD Brother     Alcohol Abuse Maternal Grandfather     Alcohol Abuse Paternal Grandfather          Review of Systems:  Review of Systems   Constitutional:  Positive for fatigue. Negative for chills, diaphoresis and fever. HENT:  Negative for ear discharge, ear pain, hearing loss, rhinorrhea, sneezing, sore throat, tinnitus and trouble swallowing. Eyes:  Positive for visual disturbance (Room spinning sensation). Negative for pain and discharge. Respiratory:  Negative for cough, shortness of breath and wheezing. Cardiovascular:  Negative for chest pain, palpitations and leg swelling. Gastrointestinal:  Negative for abdominal pain, constipation, diarrhea, nausea and vomiting. Endocrine: Negative for cold intolerance and heat intolerance. Genitourinary:  Negative for difficulty urinating and dysuria. Musculoskeletal:  Positive for arthralgias (Left hip) and gait problem. Negative for back pain, myalgias and neck pain. Skin:  Negative for rash. Allergic/Immunologic: Positive for food allergies. Neurological:  Positive for dizziness and headaches.  Negative for tremors, seizures, speech difficulty, weakness and numbness. Hematological:  Does not bruise/bleed easily. Psychiatric/Behavioral:  Negative for dysphoric mood, hallucinations and sleep disturbance. The patient is not nervous/anxious.         Physical Exam:  BP (!) 158/74   Pulse 63   Temp (!) 96 °F (35.6 °C) (Oral)   Resp 18   Ht 4' 10\" (1.473 m)   Wt 119 lb 3.2 oz (54.1 kg)   SpO2 96%   BMI 24.91 kg/m²   Physical Exam  General:  well-developed, well nourished  female; in no acute distress ; appropriate affect & mood; lying in bed comfortably  Eyes: pupil equally round ; extra-ocular motion intact bilaterally  Head, Ear, Nose, Mouth & Throat : normocephalic ; no tenderness at the face or head scalp; no discharge from ears or nose ; no deformity ; no facial swelling ; oral mucosa pink   Neck :  supple ; tenderness at the right cervical paraspinal muscle and right upper trapezius muscle; mild muscle spasm at the bilateral cervical paraspinal muscle and bilateral upper trapezius muscles  Cardiovascular : regular rate & rhythm ; normal S1 & S2 heart sound ; no murmur ; normal peripheral pulse at the bilateral upper and the lower extremities  Pulmonary : Breath sounds present at bilateral lung fields; no wheezing ; no rale; no crackle  Gastrointestinal : soft, flat abdomen; mild tenderness at epigastric area, periumbilical area and suprapubic area without rebound tenderness; normal bowel sound present   Back : no tenderness; no muscle spasm  Skin: no skin lesion or rash ; no pitting edema at all 4 extremities  Musculoskeletal : no limb asymmetry; no limb deformity; tenderness at the left lateral hip; no tenderness at bilateral upper extremities & the rest of bilateral lower extremities; no palpable mass at limbs ; no joints laxity or crepitation ; shoulder flexion and abduction passive ROM reaching 170 degrees; hip flexion passive ROM reaching 110 degrees on right side and 120 degrees on left side; ankle dorsiflexion passive ROM reaching 0 degrees on right side and 5 degrees on left side; normal functional joints ROM at the rest of bilateral upper & lower extremities  Cerebral :  alert ; awake ; oriented to place, person and time; follow one-step verbal command; able to recall 3/3 items given immediately and 3/3 items about 3 minutes later; able to repeat series of 5 single digit numbers in right order forward but not backward; impaired abstract thinking; perform serial 7 subtraction test for 6 steps and making mistakes in all steps ([id number]  Cerebellum : no dysmetria with bilateral finger-to-nose test ; mild dysmetria with bilateral heel-to-shin test; no dysdiadochokinesia with rapid bilateral forearm supination/pronation  Cranial Nerves :  grossly intact CN II to XII function  Sensory : intact light touch and pin prick sensation at bilateral upper & lower extremities  Motor : normal tone at bilateral upper & lower extremities ; 4+/5 to 5/5 muscle strength at bilateral hip flexion; normal 5/5 muscle strength at bilateral upper extremities & the rest of bilateral lower extremities  Reflex : 2+ bilateral triceps reflexes; 1+ bilateral biceps, bilateral brachioradialis and bilateral knees reflexes; 0 bilateral ankle reflexes  Pathological Reflex :  No Cliff's sign ; no Babinski sign ; no ankle clonus  Gait : Not assessed      Diagnostics:  Recent Results (from the past 24 hour(s))   EKG Fall    Collection Time: 02/02/23 11:37 AM   Result Value Ref Range    Ventricular Rate 68 BPM    Atrial Rate 68 BPM    P-R Interval 162 ms    QRS Duration 88 ms    Q-T Interval 386 ms    QTc Calculation (Bazett) 410 ms    P Axis 65 degrees    R Axis 29 degrees    T Axis 52 degrees   Basic Metabolic Panel    Collection Time: 02/02/23 12:00 PM   Result Value Ref Range    Sodium 139 135 - 145 meq/L    Potassium 4.1 3.5 - 5.2 meq/L    Chloride 104 98 - 111 meq/L    CO2 26 23 - 33 meq/L    Glucose 108 70 - 108 mg/dL    BUN 15 7 - 22 mg/dL    Creatinine 0.5 0.4 - 1.2 mg/dL    Calcium 9.3 8.5 - 10.5 mg/dL   CBC with Auto Differential    Collection Time: 02/02/23 12:00 PM   Result Value Ref Range    WBC 7.7 4.8 - 10.8 thou/mm3    RBC 3.82 (L) 4.20 - 5.40 mill/mm3    Hemoglobin 12.2 12.0 - 16.0 gm/dl    Hematocrit 36.5 (L) 37.0 - 47.0 %    MCV 95.5 81.0 - 99.0 fL    MCH 31.9 26.0 - 33.0 pg    MCHC 33.4 32.2 - 35.5 gm/dl    RDW-CV 13.2 11.5 - 14.5 %    RDW-SD 46.0 (H) 35.0 - 45.0 fL    Platelets 716 898 - 096 thou/mm3    MPV 10.4 9.4 - 12.4 fL    Seg Neutrophils 70.9 %    Lymphocytes 21.3 %    Monocytes 5.7 %    Eosinophils 1.2 %    Basophils 0.8 %    Immature Granulocytes 0.1 %    Segs Absolute 5.5 1.8 - 7.7 thou/mm3    Lymphocytes Absolute 1.6 1.0 - 4.8 thou/mm3    Monocytes Absolute 0.4 0.4 - 1.3 thou/mm3    Eosinophils Absolute 0.1 0.0 - 0.4 thou/mm3    Basophils Absolute 0.1 0.0 - 0.1 thou/mm3    Immature Grans (Abs) 0.01 0.00 - 0.07 thou/mm3    nRBC 0 /100 wbc   Anion Gap    Collection Time: 02/02/23 12:00 PM   Result Value Ref Range    Anion Gap 9.0 8.0 - 16.0 meq/L   Glomerular Filtration Rate, Estimated    Collection Time: 02/02/23 12:00 PM   Result Value Ref Range    Est, Glom Filt Rate >60 >60 ml/min/1.73m2   Osmolality    Collection Time: 02/02/23 12:00 PM   Result Value Ref Range    Osmolality Calc 278.9 275.0 - 300.0 mOsmol/kg   Hepatic Function Panel    Collection Time: 02/02/23 12:00 PM   Result Value Ref Range    Albumin 4.3 3.5 - 5.1 g/dL    Total Bilirubin 0.4 0.3 - 1.2 mg/dL    Bilirubin, Direct <0.2 0.0 - 0.3 mg/dL    Alkaline Phosphatase 49 38 - 126 U/L    AST 20 5 - 40 U/L    ALT 13 11 - 66 U/L    Total Protein 7.0 6.1 - 8.0 g/dL   COVID-19 & Influenza Combo    Collection Time: 02/02/23 12:03 PM    Specimen: Nasopharyngeal Swab   Result Value Ref Range    SARS-CoV-2 RNA, RT PCR NOT DETECTED NOT DETECTED    INFLUENZA A NOT DETECTED NOT DETECTED    INFLUENZA B NOT DETECTED NOT DETECTED   Urinalysis with Reflex to Culture    Collection Time: 02/02/23  2:10 PM    Specimen: Urine   Result Value Ref Range    Glucose, Ur NEGATIVE NEGATIVE mg/dl    Bilirubin Urine NEGATIVE NEGATIVE    Ketones, Urine NEGATIVE NEGATIVE    Specific Gravity, Urine 1.010 1.002 - 1.030    Blood, Urine NEGATIVE NEGATIVE    pH, UA 7.0 5.0 - 9.0    Protein, UA NEGATIVE NEGATIVE    Urobilinogen, Urine 0.2 0.0 - 1.0 eu/dl    Nitrite, Urine NEGATIVE NEGATIVE    Leukocyte Esterase, Urine NEGATIVE NEGATIVE    Color, UA YELLOW STRAW-YELLOW    Character, Urine CLEAR CLEAR-SL CLOUD   MRSA by PCR    Collection Time: 02/02/23  5:00 PM    Specimen: Nares   Result Value Ref Range    MRSA SCREEN RT-PCR NEGATIVE    VRE Screen by PCR    Collection Time: 02/02/23  5:00 PM    Specimen: Rectal Swab   Result Value Ref Range    Vancomycin Resistant Enterococcus NEGATIVE         Latest Reference Range & Units 9/30/22 13:00 2/2/23 12:00   Sodium 135 - 145 meq/L 143 139   Potassium 3.5 - 5.2 meq/L 3.8 4.1   Chloride 98 - 111 meq/L 106 104   CO2 23 - 33 meq/L 25 26   BUN,BUNPL 7 - 22 mg/dL 20 15   Creatinine 0.4 - 1.2 mg/dL 0.6 0.5   Anion Gap 8.0 - 16.0 meq/L 12.0 9.0   Est, Glom Filt Rate >60 ml/min/1.73m2 >90 >60   Glucose, Random 70 - 108 mg/dL 98 108   CALCIUM, SERUM, 550180 8.5 - 10.5 mg/dL 9.5 9.3   Osmolality Calc 275.0 - 300.0 mOsmol/kg  278.9   Total Protein 6.1 - 8.0 g/dL 7.1 7.0        Latest Reference Range & Units 9/30/22 13:00   CHOLESTEROL, TOTAL, [id number] 100 - 199 mg/dL 181   HDL Cholesterol mg/dL 60   LDL Calculated mg/dL 103   Triglycerides 0 - 199 mg/dL 92        Latest Reference Range & Units 9/30/22 13:00 2/2/23 12:00   Albumin 3.5 - 5.1 g/dL 4.5 4.3   Alk Phos 38 - 126 U/L 54 49   ALT 11 - 66 U/L 13 13   AST 5 - 40 U/L 18 20   Bilirubin 0.3 - 1.2 mg/dL 0.5 0.4   Bilirubin, Direct 0.0 - 0.3 mg/dL  <0.2   Total Protein 6.1 - 8.0 g/dL 7.1 7.0        Latest Reference Range & Units 9/30/22 13:00 2/2/23 12:00   WBC 4.8 - 10.8 thou/mm3 6.2 7.7   RBC 4.20 - 5.40 mill/mm3 3.65 (L) 3.82 (L)   Hemoglobin Quant 12.0 - 16.0 gm/dl 11.9 (L) 12.2   Hematocrit 37.0 - 47.0 % 35.4 (L) 36.5 (L)   MCV 81.0 - 99.0 fL 97.0 95.5   MCH 26.0 - 33.0 pg 32.6 31.9   MCHC 32.2 - 35.5 gm/dl 33.6 33.4   MPV 9.4 - 12.4 fL 10.7 10.4   RDW-CV 11.5 - 14.5 % 13.2 13.2   RDW-SD 35.0 - 45.0 fL 47.6 (H) 46.0 (H)   Platelet Count 293 - 400 thou/mm3 236 226   Lymphocytes Absolute 1.0 - 4.8 thou/mm3 2.6 1.6   Monocytes Absolute 0.4 - 1.3 thou/mm3 0.4 0.4   Eosinophils Absolute 0.0 - 0.4 thou/mm3 0.2 0.1   Basophils Absolute 0.0 - 0.1 thou/mm3 0.1 0.1   Seg Neutrophils % 46.9 70.9   Segs Absolute 1.8 - 7.7 thou/mm3 2.9 5.5   Lymphocytes % 42.1 21.3   Monocytes % 6.8 5.7   Eosinophils % 2.9 1.2   Basophils % 1.0 0.8   Immature Grans (Abs) 0.00 - 0.07 thou/mm3 0.02 0.01   Immature Granulocytes % 0.3 0.1   (L): Data is abnormally low  (H): Data is abnormally high       Latest Reference Range & Units 2/2/23 14:10   Color, UA STRAW-YELLOW  YELLOW   Glucose, UA NEGATIVE mg/dl NEGATIVE   Bilirubin, Urine NEGATIVE  NEGATIVE   Ketones, Urine NEGATIVE  NEGATIVE   Blood, Urine NEGATIVE  NEGATIVE   pH, UA 5.0 - 9.0  7.0   Protein, UA NEGATIVE  NEGATIVE   Urobilinogen, Urine 0.0 - 1.0 eu/dl 0.2   Nitrite, Urine NEGATIVE  NEGATIVE   Leukocyte Esterase, Urine NEGATIVE  NEGATIVE   Character, Urine CLEAR-SL CLOUD  CLEAR       Chest x-ray (2/2/2023) : Impression   No acute cardiopulmonary disease       X-ray of left hip and pelvis (2/2/2023) : Impression   No acute process       CT of the head without contrast (2/2/2023) : Impression    Small lacunar infarction right insula. CT of cervical spine without contrast (2/2/2023) : Impression   No acute process       CTA of the head and neck with and without contrast (2/2/2023) : Impression   1. There is no significant hemodynamic stenosis in the right and left common and internal carotid arteries.    2. There is antegrade flow in the right and left vertebral arteries. 3. There is atherosclerotic calcification in the aortic arch and at the origin of the left subclavian and left common carotid arteries. 4. There is a hypoplastic P1 segment of the right posterior cerebral artery. There is fetal origin of the right posterior cerebral artery. 5. There is a patent left posterior communicating artery which helps supply the left posterior cerebral artery. 6. Otherwise negative CTA of the brain. 7. Diffusion MRI scan would be helpful for better evaluation in this patient. Limited MRI of brain (2/2/2023) : Impression   1. 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 Mild atrophy and probable ischemic changes in the white matter. 3. Mild inflammatory changes in ethmoid air cells bilaterally and left mastoid tip. Impression:  Right posterior frontal/anterior parietal cortex small acute infarct stroke causing dizziness, vertigo, and frequent fall  Cognitive impairment  Left hip contusion secondary to fall  History of bilateral glaucoma requiring eye stent placement  History of osteoporosis  History of anxiety disorder    We are waiting for patient to be evaluated by PT, OT and speech therapist in order to start rehabilitation intervention. We will follow up with the patient to monitor her response to initial rehabilitation treatment in order to provide further rehabilitation recommendation. Inpatient rehabilitation admission may be considered in the future. Recommendations:  Waiting for patient to be evaluated by PT, OT and speech therapy in order to start rehabilitation treatment  We will follow up with the patient to monitor her response to rehabilitation intervention in order to provide further rehabilitation recommendation      It was my pleasure to evaluate Dion Campoverde today. Please call with questions.     Rashid Man MD

## 2023-02-02 NOTE — ED NOTES
Patient to ED via EMS after falling at home. Patient was able to get herself up and called her daughter who told her to call the EMS. Patient states her legs just gave out. Patient then states this morning her throat feels like something is stuck. Patient was able to eat some bread and swallow without difficulties. Patient lives at home alone, denies using a walker or can. Patient is alert and oriented, no anticoagulant use.      Sivan Mcconnell RN  02/02/23 [id number]

## 2023-02-02 NOTE — FLOWSHEET NOTE
02/02/23 7367   Safe Environment   Safety Measures Other (comment)  (VN admission)   VN called into patients room and introduced myself and role. Patient answered and permitted video. Video activated. . Patient resting comfortably in bed. VN completed admission documents at this time. Patient voiced no needs or concerns at this time. Call light within reach.

## 2023-02-02 NOTE — ED NOTES
While obtaining orthostatic BP patient reports being very dizzy.  Orthostatic BP HANNAH Castro RN  02/02/23 0438

## 2023-02-03 LAB
CHOLEST SERPL-MCNC: 154 MG/DL (ref 100–199)
DEPRECATED MEAN GLUCOSE BLD GHB EST-ACNC: 123 MG/DL (ref 70–126)
DEPRECATED RDW RBC AUTO: 46.9 FL (ref 35–45)
ERYTHROCYTE [DISTWIDTH] IN BLOOD BY AUTOMATED COUNT: 13.2 % (ref 11.5–14.5)
HBA1C MFR BLD HPLC: 6.1 % (ref 4.4–6.4)
HCT VFR BLD AUTO: 33.9 % (ref 37–47)
HDLC SERPL-MCNC: 48 MG/DL
HGB BLD-MCNC: 11.1 GM/DL (ref 12–16)
LDLC SERPL CALC-MCNC: 80 MG/DL
LV EF: 60 %
LVEF MODALITY: NORMAL
MCH RBC QN AUTO: 31.6 PG (ref 26–33)
MCHC RBC AUTO-ENTMCNC: 32.7 GM/DL (ref 32.2–35.5)
MCV RBC AUTO: 96.6 FL (ref 81–99)
PLATELET # BLD AUTO: 208 THOU/MM3 (ref 130–400)
PMV BLD AUTO: 10.4 FL (ref 9.4–12.4)
RBC # BLD AUTO: 3.51 MILL/MM3 (ref 4.2–5.4)
TRIGL SERPL-MCNC: 129 MG/DL (ref 0–199)
WBC # BLD AUTO: 7.2 THOU/MM3 (ref 4.8–10.8)

## 2023-02-03 PROCEDURE — 99223 1ST HOSP IP/OBS HIGH 75: CPT | Performed by: NURSE PRACTITIONER

## 2023-02-03 PROCEDURE — 97530 THERAPEUTIC ACTIVITIES: CPT

## 2023-02-03 PROCEDURE — 92523 SPEECH SOUND LANG COMPREHEN: CPT

## 2023-02-03 PROCEDURE — 36415 COLL VENOUS BLD VENIPUNCTURE: CPT

## 2023-02-03 PROCEDURE — 2060000000 HC ICU INTERMEDIATE R&B

## 2023-02-03 PROCEDURE — 80061 LIPID PANEL: CPT

## 2023-02-03 PROCEDURE — 97129 THER IVNTJ 1ST 15 MIN: CPT

## 2023-02-03 PROCEDURE — 6370000000 HC RX 637 (ALT 250 FOR IP): Performed by: INTERNAL MEDICINE

## 2023-02-03 PROCEDURE — 99232 SBSQ HOSP IP/OBS MODERATE 35: CPT | Performed by: PHYSICAL MEDICINE & REHABILITATION

## 2023-02-03 PROCEDURE — 97162 PT EVAL MOD COMPLEX 30 MIN: CPT

## 2023-02-03 PROCEDURE — 97166 OT EVAL MOD COMPLEX 45 MIN: CPT

## 2023-02-03 PROCEDURE — 97112 NEUROMUSCULAR REEDUCATION: CPT

## 2023-02-03 PROCEDURE — 6360000002 HC RX W HCPCS: Performed by: INTERNAL MEDICINE

## 2023-02-03 PROCEDURE — 83036 HEMOGLOBIN GLYCOSYLATED A1C: CPT

## 2023-02-03 PROCEDURE — 93306 TTE W/DOPPLER COMPLETE: CPT

## 2023-02-03 PROCEDURE — 97110 THERAPEUTIC EXERCISES: CPT

## 2023-02-03 PROCEDURE — 92610 EVALUATE SWALLOWING FUNCTION: CPT

## 2023-02-03 PROCEDURE — 85027 COMPLETE CBC AUTOMATED: CPT

## 2023-02-03 PROCEDURE — 2580000003 HC RX 258: Performed by: INTERNAL MEDICINE

## 2023-02-03 PROCEDURE — 6370000000 HC RX 637 (ALT 250 FOR IP): Performed by: NURSE PRACTITIONER

## 2023-02-03 RX ORDER — CLONAZEPAM 0.5 MG/1
0.5 TABLET ORAL NIGHTLY
Status: DISCONTINUED | OUTPATIENT
Start: 2023-02-03 | End: 2023-02-09 | Stop reason: HOSPADM

## 2023-02-03 RX ORDER — CLOPIDOGREL BISULFATE 75 MG/1
75 TABLET ORAL DAILY
Status: DISCONTINUED | OUTPATIENT
Start: 2023-02-03 | End: 2023-02-08

## 2023-02-03 RX ADMIN — Medication 500 UNITS: at 09:15

## 2023-02-03 RX ADMIN — CLOPIDOGREL BISULFATE 75 MG: 75 TABLET ORAL at 16:27

## 2023-02-03 RX ADMIN — ASPIRIN 81 MG: 81 TABLET, COATED ORAL at 09:15

## 2023-02-03 RX ADMIN — SODIUM CHLORIDE: 9 INJECTION, SOLUTION INTRAVENOUS at 12:44

## 2023-02-03 RX ADMIN — Medication 1 TABLET: at 09:15

## 2023-02-03 RX ADMIN — TRAZODONE HYDROCHLORIDE 50 MG: 50 TABLET ORAL at 21:36

## 2023-02-03 RX ADMIN — ENOXAPARIN SODIUM 40 MG: 100 INJECTION SUBCUTANEOUS at 21:36

## 2023-02-03 RX ADMIN — POLYETHYLENE GLYCOL 3350 17 G: 17 POWDER, FOR SOLUTION ORAL at 20:00

## 2023-02-03 RX ADMIN — CLONAZEPAM 0.5 MG: 0.5 TABLET ORAL at 21:36

## 2023-02-03 RX ADMIN — ROSUVASTATIN CALCIUM 40 MG: 20 TABLET, FILM COATED ORAL at 21:36

## 2023-02-03 ASSESSMENT — ENCOUNTER SYMPTOMS
TROUBLE SWALLOWING: 0
COUGH: 0
VOMITING: 0
WHEEZING: 0
RHINORRHEA: 0
BACK PAIN: 0
NAUSEA: 0
DIARRHEA: 0
SHORTNESS OF BREATH: 0
ABDOMINAL PAIN: 0
SORE THROAT: 0
CONSTIPATION: 0

## 2023-02-03 ASSESSMENT — PAIN SCALES - GENERAL
PAINLEVEL_OUTOF10: 0

## 2023-02-03 ASSESSMENT — PAIN SCALES - WONG BAKER: WONGBAKER_NUMERICALRESPONSE: 0

## 2023-02-03 NOTE — PLAN OF CARE
Problem: Discharge Planning  Goal: Discharge to home or other facility with appropriate resources  2/3/2023 0921 by Concepcion Trevizo RN  Outcome: Progressing  Flowsheets (Taken 2/3/2023 3888)  Discharge to home or other facility with appropriate resources:   Identify barriers to discharge with patient and caregiver   Arrange for needed discharge resources and transportation as appropriate   Identify discharge learning needs (meds, wound care, etc)     Problem: Skin/Tissue Integrity  Goal: Absence of new skin breakdown  Description: 1. Monitor for areas of redness and/or skin breakdown  2. Assess vascular access sites hourly  3. Every 4-6 hours minimum:  Change oxygen saturation probe site  4. Every 4-6 hours:  If on nasal continuous positive airway pressure, respiratory therapy assess nares and determine need for appliance change or resting period.   2/3/2023 6422 by Concepcion Trevizo RN  Outcome: Progressing  Note: Qshift skin assessment, patient independent with turns/repositioning in the bed, heels elevated, patient continent-up to [facility] w/ assist, skin care provided, pillow support, activity encouraged     Problem: ABCDS Injury Assessment  Goal: Absence of physical injury  2/3/2023 4332 by Concepcion Trevizo RN  Outcome: Progressing  Flowsheets (Taken 2/3/2023 0282)  Absence of Physical Injury: Implement safety measures based on patient assessment     Problem: Infection - Adult  Goal: Absence of infection at discharge  2/3/2023 0921 by Concepcion Trevizo RN  Outcome: Progressing  Flowsheets (Taken 2/3/2023 1541)  Absence of infection at discharge:   Assess and monitor for signs and symptoms of infection   Monitor lab/diagnostic results   Monitor all insertion sites i.e., indwelling lines, tubes and drains   Monitor endotracheal (as able) and nasal secretions for changes in amount and color   Administer medications as ordered   Instruct and encourage patient and family to use good hand hygiene technique   Identify and instruct in appropriate isolation precautions for identified infection/condition     Problem: Infection - Adult  Goal: Absence of infection during hospitalization  2/3/2023 0921 by Jayda Ricardo RN  Outcome: Progressing  Flowsheets (Taken 2/3/2023 7167)  Absence of infection during hospitalization:   Assess and monitor for signs and symptoms of infection   Monitor lab/diagnostic results   Monitor all insertion sites i.e., indwelling lines, tubes and drains   Monitor endotracheal (as able) and nasal secretions for changes in amount and color   Administer medications as ordered   Instruct and encourage patient and family to use good hand hygiene technique   Identify and instruct in appropriate isolation precautions for identified infection/condition     Problem: Metabolic/Fluid and Electrolytes - Adult  Goal: Electrolytes maintained within normal limits  2/3/2023 0921 by Jayda Ricardo RN  Outcome: Progressing  Flowsheets (Taken 2/3/2023 6076)  Electrolytes maintained within normal limits:   Monitor labs and assess patient for signs and symptoms of electrolyte imbalances   Administer electrolyte replacement as ordered     Problem: Metabolic/Fluid and Electrolytes - Adult  Goal: Hemodynamic stability and optimal renal function maintained  2/3/2023 0921 by Jayda Ricardo RN  Outcome: Progressing  Flowsheets (Taken 2/3/2023 1813)  Hemodynamic stability and optimal renal function maintained:   Monitor labs and assess for signs and symptoms of volume excess or deficit   Monitor intake, output and patient weight   Monitor urine specific gravity, serum osmolarity and serum sodium as indicated or ordered   Monitor response to interventions for patient's volume status, including labs, urine output, blood pressure (other measures as available)   Encourage oral intake as appropriate   Instruct patient on fluid and nutrition restrictions as appropriate     Problem: Hematologic - Adult  Goal: Maintains hematologic stability  2/3/2023 0921 by Geena Hale RN  Outcome: Progressing  Flowsheets (Taken 2/3/2023 4382)  Maintains hematologic stability:   Assess for signs and symptoms of bleeding or hemorrhage   Monitor labs for bleeding or clotting disorders     Problem: Pain  Goal: Verbalizes/displays adequate comfort level or baseline comfort level  2/3/2023 0921 by Geena Hale RN  Outcome: Progressing  Flowsheets (Taken 2/3/2023 4137)  Verbalizes/displays adequate comfort level or baseline comfort level:   Encourage patient to monitor pain and request assistance   Assess pain using appropriate pain scale   Administer analgesics based on type and severity of pain and evaluate response   Implement non-pharmacological measures as appropriate and evaluate response     Problem: Chronic Conditions and Co-morbidities  Goal: Patient's chronic conditions and co-morbidity symptoms are monitored and maintained or improved  2/3/2023 0921 by Geena Hale RN  Outcome: Progressing  Flowsheets (Taken 2/3/2023 0921)  Care Plan - Patient's Chronic Conditions and Co-Morbidity Symptoms are Monitored and Maintained or Improved:   Monitor and assess patient's chronic conditions and comorbid symptoms for stability, deterioration, or improvement   Collaborate with multidisciplinary team to address chronic and comorbid conditions and prevent exacerbation or deterioration   Update acute care plan with appropriate goals if chronic or comorbid symptoms are exacerbated and prevent overall improvement and discharge     Problem: Safety - Adult  Goal: Free from fall injury  2/3/2023 0921 by Geena Hale RN  Outcome: Progressing  Flowsheets (Taken 2/3/2023 0921)  Free From Fall Injury:   Instruct family/caregiver on patient safety   Based on caregiver fall risk screen, instruct family/caregiver to ask for assistance with transferring infant if caregiver noted to have fall risk factors     Care plan reviewed with patient.  Patient verbalizes understanding of the plan of care and contribute to goal setting.

## 2023-02-03 NOTE — H&P
Internal Medicine  History and Physical    Patient:  Page Bennett  MRN: [id number]      History Obtained From:  patient  PCP: EDUARDO Nance CNP    CHIEF COMPLAINT:  fall, dizziness    HISTORY OF PRESENT ILLNESS:   The patient is a 81 y.o. female who presents with recurrent dizziness and falls at home. Patient experienced a fall today while standing in the kitchen environment. She reported that her legs went down on her. There was no loss of consciousness. Episode happened 1 more time subsequently crawled into restroom. There was no loss of consciousness with either of these 2 episodes. However she did hit her head. She endorses dizziness. Patient also endorses a sensation of food stuck in the throat, however she has since been able to swallow water and food at the bedside. She denies any headache, she denies any overt weakness in the arms or the legs, new visual disturbance. Patient was just seen in emergency room. CT scan of the head showed a small lacunar infarct in the right insula region. She is admitted for further evaluation. Past Medical History:        Diagnosis Date    Osteopenia     Osteopetrosis        Past Surgical History:        Procedure Laterality Date    APPENDECTOMY      BLADDER SUSPENSION      EYE SURGERY      TONSILLECTOMY         Medications Prior to Admission:    Prior to Admission medications    Medication Sig Start Date End Date Taking? Authorizing Provider   polyvinyl alcohol (LIQUIFILM TEARS) 1.4 % ophthalmic solution 1 drop as needed   Yes Historical Provider, MD   latanoprost (XALATAN) 0.005 % ophthalmic solution 1 drop nightly  Patient not taking: Reported on 2/2/2023    Historical Provider, MD   ibuprofen (ADVIL;MOTRIN) 400 MG tablet Take 1.5 tablets by mouth every 6 hours as needed for Pain 5/4/17   EDUARDO Ramirez CNP   vitamin D (CHOLECALCIFEROL) 400 UNITS TABS tablet Take 400 Units by mouth daily.       Historical Provider, MD   clonazePAM Markos Scanlon) 0.5 MG tablet Take 0.5 mg by mouth 2 times daily as needed. Historical Provider, MD   fluticasone (FLONASE) 50 MCG/ACT nasal spray 1 spray by Nasal route daily. Historical Provider, MD   Glucosamine Sulfate 750 MG TABS Take 1 tablet by mouth daily. Historical Provider, MD   therapeutic multivitamin-minerals (THERAGRAN-M) tablet Take 1 tablet by mouth daily. Historical Provider, MD   fish oil-omega-3 fatty acids 1000 MG capsule Take 2 g by mouth daily. Historical Provider, MD   magnesium 30 MG tablet Take 30 mg by mouth 2 times daily. Historical Provider, MD   Zinc 50 MG CAPS Take  by mouth. Patient not taking: Reported on 2/2/2023    Historical Provider, MD   DHA-EPA-Coenzyme Q10-Vitamin E (COQ-10 & FISH OIL PO) Take  by mouth. Patient not taking: Reported on 2/2/2023    Historical Provider, MD   polyethylene glycol [facility]) packet Take 17 g by mouth daily as needed. Historical Provider, MD       Allergies:  Chocolate, Sulfa antibiotics, and Augmentin [amoxicillin-pot clavulanate]    Social History:   TOBACCO:   reports that she has never smoked. She has never used smokeless tobacco.  ETOH:   reports no history of alcohol use. Family History:   History reviewed. No pertinent family history.     REVIEW OF SYSTEMS:  CONSTITUTIONAL:  positive for  fatigue and malaise  negative for  fevers and chills  EYES:  negative for  blurred vision, visual disturbance, irritation, and redness  HEENT:  negative for  hearing loss, nasal congestion, epistaxis, snoring, sore throat, and hoarseness  RESPIRATORY:  negative for  dry cough, dyspnea, and wheezing  CARDIOVASCULAR:  negative for  chest pain, dyspnea, palpitations  GASTROINTESTINAL:  negative for nausea, vomiting, change in bowel habits, abdominal mass, and abdominal distention  GENITOURINARY:  negative  INTEGUMENT/BREAST:  negative  HEMATOLOGIC/LYMPHATIC:  negative for easy bruising and bleeding  ALLERGIC/IMMUNOLOGIC: negative  ENDOCRINE:  negative for heat intolerance, cold intolerance, weight changes, and change in bowel habits  MUSCULOSKELETAL:  negative for  myalgias, arthralgias, decreased range of motion, and muscle weakness  NEUROLOGICAL:  positive for dizziness, weakness, and near syncope  negative for seizures, memory problems, speech problems, and tremor  BEHAVIOR/PSYCH:  positive for decreased sleep and fatigue and negative for increased agitation and anxiety    Physical Exam:    Vitals: BP (!) 158/74   Pulse 63   Temp (!) 96 °F (35.6 °C) (Oral)   Resp 18   Ht 4' 10\" (1.473 m)   Wt 119 lb 3.2 oz (54.1 kg)   SpO2 96%   BMI 24.91 kg/m²   CONSTITUTIONAL:  awake, alert, cooperative, no apparent distress, and appears stated age  EYES:  Lids and lashes normal, pupils equal, round and reactive to light, extra ocular muscles intact, sclera clear, conjunctiva normal  ENT:  normocepalic, without obvious abnormality  NECK:  supple, symmetrical, trachea midline  HEMATOLOGIC/LYMPHATICS:  no cervical lymphadenopathy and no supraclavicular lymphadenopathy  BACK:  symmetric  LUNGS:  clear to auscultation  CARDIOVASCULAR:  normal S1 and S2 and no edema  ABDOMEN:  No scars, normal bowel sounds, soft, non-distended, non-tender, no masses palpated, no hepatosplenomegally  MUSCULOSKELETAL:  there is no redness, warmth, or swelling of the joints  NEUROLOGIC:  Mental Status Exam:  Level of Alertness:   awake  Orientation:   person, place, time  Memory:   normal  Cranial Nerves:  cranial nerves II-XII are grossly intact  Motor Exam:  Motor exam is symmetrical 5 out of 5 all extremities bilaterally  Sensory:  Sensory intact  Coordination:  Finger/Nose:  Right:  normal  Left:  normal  SKIN:  normal skin color, texture, turgor      CBC:   Recent Labs     02/02/23  1200   WBC 7.7   HGB 12.2        BMP:    Recent Labs     02/02/23  1200      K 4.1      CO2 26   BUN 15   CREATININE 0.5   GLUCOSE 108     Hepatic:   Recent Labs     02/02/23  1200   AST 20   ALT 13   BILITOT 0.4   ALKPHOS 49     CT HEAD WO CONTRAST [[id number]]    Resulted: 02/02/23 1327    Updated: 02/02/23 1330    Narrative:     PROCEDURE: CT HEAD WO CONTRAST   CLINICAL INFORMATION: fall. COMPARISON: No prior study. TECHNIQUE: Noncontrast 5 mm axial images were obtained through the brain. Sagittal and coronal reconstructions were obtained. All CT scans at this facility use dose modulation, iterative reconstruction, and/or weight-based dosing when appropriate to reduce radiation dose to as low as reasonably achievable. FINDINGS: 7 mm hypodense lesion in the right insula suggesting small lacunar infarct. There is no hemorrhage. There are no intra-or extra-axial collections. There is no hydrocephalus, midline shift or mass effect. The gray-white matter differentiation is preserved. Mild generalized cerebral volume loss  The paranasal sinuses and mastoid air cells are normally aerated. There is no suspicious calvarial abnormality. Impression:      Small lacunar infarction right insula. CTA NECK W WO CONTRAST, CTA HEAD W WO CONTRAST  1. There is no significant hemodynamic stenosis in the right and left common and internal carotid arteries. 2. There is antegrade flow in the right and left vertebral arteries. 3. There is atherosclerotic calcification in the aortic arch and at the origin of the left subclavian and left common carotid arteries. 4. There is a hypoplastic P1 segment of the right posterior cerebral artery. There is fetal origin of the right posterior cerebral artery. 5. There is a patent left posterior communicating artery which helps supply the left posterior cerebral artery. 6. Otherwise negative CTA of the brain. 7. Diffusion MRI scan would be helpful for better evaluation in this patient.        MRI LIMITED BRAIN [[id number]]    Resulted: 02/02/23 1627    Updated: 02/02/23 1629    Narrative:     PROCEDURE: MRI LIMITED BRAIN     CLINICAL INFORMATION Small lacunar infarction right insula, fall x 2. COMPARISON: CT scan of the brain obtained on the same day. .     TECHNIQUE: A limited MRI scan was carried out through the brain. FINDINGS: The diffusion-weighted images demonstrate a small area of restricted diffusion in the right posterior frontal/anterior parietal cortex. There is diminished signal intensity on the ADC map consistent with an acute infarct. .  The brain volume is slightly   reduced. There is a mild amount of signal hyperintensity on the FLAIR and T2-weighted sequences in the white matter of the brain. This is consistent with mild severity chronic small vessel ischemic changes. There are no intra-or extra-axial collections. There is no hydrocephalus, midline shift or mass effect. The major intracranial vascular flow voids are present. There is increased signal intensity in the ethmoid air cells and left mastoid tip consistent with inflammatory changes. Impression:       1. 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 Mild atrophy and probable ischemic changes in the white matter. 3. Mild inflammatory changes in ethmoid air cells bilaterally and left mastoid tip.    -----------------------------------------------------------------  PA/lat CXR:   FINDINGS: Heart, mediastinal and hilar contours are within normal limits. No infiltrates or effusions. Vessels are not congested.    EKG leads overlie the chest.    Impression:     No acute cardiopulmonary disease           Ventricular Rate 68 BPM    Atrial Rate 68 BPM    P-R Interval 162 ms    QRS Duration 88 ms    Q-T Interval 386 ms    QTc Calculation (Bazett) 410 ms    P Axis 65 degrees    R Axis 29 degrees    T Axis 52 degrees   Narrative:     Normal sinus rhythm, 68 bpm  Normal ECG        Assessment and Plan    acute infarct in the right posterior frontal/anterior parietal cortex     ASA, IVF Hydration  Lovenox  Echo  Lipid panel am  Neurology consult  PM an R consult.     Patient Active Problem List   Diagnosis Code    [facility] I63.81       Alexandra Reinoso MD, MD  Admitting Internist

## 2023-02-03 NOTE — NURSE NAVIGATOR
Stroke Folder given. What is Stroke/CVA  Signs and Symptoms of stroke (BEFAST)  Treatments for Stroke  Personal Risk Factors for Stroke discussed  Education--Call 911    Patient/family has been educated on their personal risk factors of:  History of DVT    They have been given hand outs on the following medications:( give handouts/attach to AVS)   asa  Plavix  statins(Crestor)      Treatment for stroke includes:  Risk factor modifications  Following the medication regime prescribed by physician      Educated on FAST-Face-Arm-Speech-Time    A stroke is a brain attack. Stroke is a brain injury. It occurs when the brain's blood supply is interrupted. Blood carries oxygen and nutrients to the brain. Without oxygen and nutrients from blood, brain tissue starts to die rapidly. This can happen in less than 10 minutes. A stroke occurs when blood flow to the brain is blocked (called ischemic stroke). This is caused by one of the following:   Sudden decreased blood flow   Damage to a blood vessel supplying blood to the brain can occur suddenly from either:   Injury   A clot that forms and breaks off from another part of the body (such as the heart or neck)   There are certain conditions which predispose people to form blood clots, such as:   Cancer   Pregnancy   Atrial fibrillation   Certain autoimmune diseases   Local blood clot   A build-up of fatty substances ( atherosclerotic plaque ) along the inner lining of the artery causes:   Narrowing of artery   Reduced elasticity   Local inflammation   Blood protein defects leading to increased clotting tendency   Decreased blood flow in the artery   Clot in an artery supplying the brain   Inflammatory conditions in the blood vessels (vasculitis)   A stroke may also occur if a blood vessel breaks and bleeds into or around the brain. This is called hemorrhagic stroke. This condition needs to be monitored closely. Be sure to keep all appointments.  Have exams and blood tests done as directed. Call 911 If Any of the Following Occurs   It is important that you and those around you know the warning signs for stroke. CALL 911 immediately if you have any of the following which may suggest a new stroke:   Sudden weakness or numbness of face, arm, or leg, especially on one side of the body   Sudden confusion   Sudden trouble speaking or understanding   Sudden trouble seeing in one or both eyes   Sudden dizziness, trouble walking, loss of balance, or coordination   Sudden severe headache with no known cause   If you think you have an emergency, CALL 911       To help reduce your risk of stroke, take the following steps:   Eat a well-balanced diet. The DASH diet rich in fruits, vegetables and low-fat dairy foods, and low in saturated fat, total fat, and cholesterolmay help keep your blood pressure in the healthy range. Exercise regularly. Maintain a healthy weight. (Your body mass index should be below 25.)   If you smoke, quit . Drink alcohol in moderation. Moderate is two or fewer drinks per day for men and one or fewer drinks per day for women and older adults. Control your diabetes    All patient/family questions were answered and teach back method was utilized.

## 2023-02-03 NOTE — PROGRESS NOTES
Patient had two 15-16 second runs of sinus tach, jumping up into the 150's and then returning to NSR. Patient is asymptomatic and currently at 89 HR. Dr Danielle Ryder notified and she said we will just continue to monitor for now.

## 2023-02-03 NOTE — PROGRESS NOTES
Physical Medicine & Rehabilitation Progress Note    Chief Complaint: Acute stroke with intermittent dizziness and history of frequent fall    Subjective:    Esdras Farrell is a 81 y.o. 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 on 2/2/2023 after 2 falls accident at home. The patient says she began feeling something stuck in her throat last night, 2/1/2023. Today, 2/2/2023, while she was walking in kitchen to try to get something to help him the throat discomfort sensation, she suddenly fell for no reason. She denies having weakness, pain, syncope or fainting prior to the fall. She struggled to get up but fell again after few step into her dining room. At that time she felt the need for bowel movement so she crawled to bathroom and had diarrhea like bowel movement. She did not call her daughter who called 911. The patient was sent to [address] [place] [address] ER for evaluation on 2./2/2023. She was found to have tenderness at the her left hip area. She also complains of feeling dizzy with room spinning sensation when she says suddenly sitting up from supine position. Her blood tests and urine test showed no significant abnormality. X-ray of left hip and chest revealed no acute abnormality. CT of the head without contrast done on 2/2/2023 showed small lacunar infarction at the right insula. CT of cervical spine done on 2/2/2023 was reported to show no acute process. CTA of head and neck done on 2/2/2023 show no significant hemodynamic stenosis in bilateral common and internal carotid arteries, and hypoplastic P1 segment of the right posterior cerebral artery.   MRI of brain was also performed on 2/2/2023 and revealed a small acute right posterior frontal/anterior parietal cortex infarct stroke, and mild white matter atrophy. The patient was started on aspirin 81 mg.  Echocardiogram was performed on 2/3/2023 but result is pending. Neurology service was consulted on 2/3/2023. Aspirin 81 mg daily was continued and Plavix 75 mg daily for 21 days was added. The patient says she feels better today. She has less dizziness and visual image moving sensation when she sits up. She says her left hip still feels tender when palpated. She tolerated the rehab intervention today. Rehabilitation:  PT: Reviewed.    (2/3/2023) : Timed Code Treatment Minutes: 25 Minutes  Activity Tolerance:  Patient tolerance of  treatment: good. Balance:  Static Standing Balance: Contact Guard Assistance, X 1  Dynamic Standing Balance: Contact Guard Assistance, X 1     Bed Mobility:  Not Tested     Transfers:  Sit to Stand: Contact Guard Assistance, X 1; several trials, from recliner and couch  Stand to Sit:Contact Basilio Schwab, X 1     Ambulation:  Minimal Assistance, X 1  Distance: 20 feet, 3 feet x 2  Surface: Level Tile  Device:No Device  Gait Deviations:  Decreased Step Length Bilaterally, Decreased Weight Shift Bilaterally, Decreased Arm Swing, Decreased Trunk Rotation, Decreased Gait Speed, and Decreased Heel Strike Bilaterally  **Short step length, guarded pace, absent heel strike       OT: Reviewed.    (2/3/2023) : Timed Code Treatment Minutes: 18 Minutes  Activity Tolerance:  Patient tolerance of  treatment: good. Motivated to participate in OT, no c/o pain, fatigue or SOB during eval.        ADL:   Footwear Management: Stand By Assistance. To don (B) slipper socks seated EOB; no LOB during dyn sitting task  Patient declined to complete any other ADLs due to feeling cold and wanted to eat breakfast prior to brushing her teeth. Duncan Dias BALANCE:  Sitting Balance:  Stand By Assistance. Seated EOB due to c/o initial dizziness when sitting EOB  Standing Balance: Contact Guard Assistance.  With no AD with patient holding onto IV pole and bed rail. Resistive to using 2 w/w     BED MOBILITY:  Supine to Sit: Minimal Assistance cues for sequencing and hand placement     TRANSFERS:  Sit to Stand:  Contact Guard Assistance. Cues for slow extension to prevent dizziness and to stand for a few seconds prior to moving     FUNCTIONAL MOBILITY:  Assistive Device: None and hand held assist, reaching out to IV pole  Assist Level:  Contact Guard Assistance. Distance:  from EOB to recliner  Cues for locating recliner and for lining up with recliner to sit safely. ST: Reviewed.    (2/3/2023) : [facility] Cognitive Assessment [facility]) version 7.1 completed. Patient scored 22/30. Normal is greater than or equal to 26/30. DIAGNOSTIC IMPRESSIONS:    Jjkfcs-Kkejiigs-Kijqjjtki Evaluation: Patient presents with mild cognitive impairment evidenced by aforementioned deficits. Speech and voice appear to be [facility] with no presence of dysarthria, dysphonia, or aphonia; patient intelligible at the conversation level with approximately 100% accuracy. Expressive and receptive language skills grossly intact with no apparent communicative breakdowns. Skilled ST services are recommended at this time in order to improve the aforementioned deficits and permit potential return to [facility]. Anticipate needs for supervision with IADLs and ongoing skilled ST services via IPR at discharge. *Patient is an EXCELLENT IPR candidate. Clinical Swallow Evaluation: Patient presents with oral phase of swallow function that is essentially [facility] with inability to fully discern potential presence of pharyngeal phase deficits without formal instrumentation. All labial/lingual structures intact and appear to be functioning appropriately at bedside. Oral phase highly unremarkable during consumption of hard/textured solids with patient demonstrating adequate mastication pattern for textural breakdown, cohesive bolus formation, and manipulation.  Thin liquids consumed without overt difficulty and with suspected control/containment of fluid bolus. NO overt s/s aspiration exhibited across all consistencies/trials consumed, certainly not able to exclude pharyngeal phase dysfunction and/or airway invasion events in its entirety at bedside alone. Patient's swallow physiology does appear appropriate to support PO intake without distress with instrumental evaluation not warranted. Recommend continuation of regular diet with thin liquids. No further ST services are warranted at this time r/t dysphagia management given baseline status of swallow function achieved; please re-consult should further needs be identified. Post evaluation, patient without respiratory distress upon leaving room; RN Abdirahman Mckeon notified re: clinical findings and recommendations from the assessment; verbal receptiveness noted. Review of Systems:  Review of Systems   Constitutional:  Positive for fatigue. Negative for chills, diaphoresis and fever. HENT:  Negative for hearing loss, rhinorrhea, sneezing, sore throat and trouble swallowing. Eyes:  Positive for visual disturbance (Room moving sensation). Respiratory:  Negative for cough, shortness of breath and wheezing. Cardiovascular:  Negative for chest pain and palpitations. Gastrointestinal:  Negative for abdominal pain, constipation, diarrhea, nausea and vomiting. Genitourinary:  Negative for dysuria. Musculoskeletal:  Positive for gait problem. Negative for arthralgias, back pain, myalgias and neck pain. Skin:  Negative for rash. Neurological:  Positive for dizziness. Negative for tremors, speech difficulty, weakness, light-headedness, numbness and headaches. Psychiatric/Behavioral:  Negative for dysphoric mood, hallucinations and sleep disturbance. The patient is not nervous/anxious.          Objective:  /67   Pulse 69   Temp 98.5 °F (36.9 °C) (Oral)   Resp 18   Ht 4' 10\" (1.473 m)   Wt 119 lb 4.3 oz (54.1 kg)   SpO2 93% BMI 24.93 kg/m²   Physical Exam   General:  well-developed, well nourished  female; in no acute distress ; appropriate affect & mood; lying in bed comfortably  Eyes: pupil equally round ; extra-ocular motion intact bilaterally  Head, Ear, Nose, Mouth & Throat : normocephalic ; no discharge from ears or nose ; no deformity ; no facial swelling ; oral mucosa pink   Neck :  supple ; mild tenderness at the right cervical paraspinal muscle and right upper trapezius muscle; mild muscle spasm at the bilateral cervical paraspinal muscle and bilateral upper trapezius muscles  Cardiovascular : regular rate & rhythm ; normal S1 & S2 heart sound ; no murmur ; normal peripheral pulse at the bilateral upper and the lower extremities  Pulmonary : Breath sounds present at bilateral lung fields; no wheezing ; no rale; no crackle  Gastrointestinal : soft, flat abdomen; mild tenderness at epigastric area, periumbilical area and suprapubic area without rebound tenderness; normal bowel sound present   Back : no tenderness; no muscle spasm  Skin: no skin lesion or rash ; no pitting edema at all 4 extremities  Musculoskeletal : no limb asymmetry; no limb deformity; tenderness at the left lateral hip; no tenderness at bilateral upper extremities & the rest of bilateral lower extremities; no palpable mass at limbs ; no joints laxity or crepitation ; shoulder flexion and abduction passive ROM reaching 170 degrees; hip flexion passive ROM reaching 110 degrees on right side and 120 degrees on left side; ankle dorsiflexion passive ROM reaching 0 degrees on right side and 5 degrees on left side; normal functional joints ROM at the rest of bilateral upper & lower extremities  Cerebral :  alert ; awake ; oriented to place, person and time; follow one-step verbal command  Cerebellum : no dysmetria with bilateral finger-to-nose test ; mild dysmetria with bilateral heel-to-shin test  Cranial Nerves :  grossly intact CN II to XII function  Sensory : intact light touch and pin prick sensation at bilateral upper & lower extremities  Motor : normal tone at bilateral upper & lower extremities ; 4+/5 to 5/5 muscle strength at bilateral hip flexion; normal 5/5 muscle strength at bilateral upper extremities & the rest of bilateral lower extremities  Reflex : 1+ bilateral biceps, bilateral brachioradialis and bilateral knees reflexes  Pathological Reflex :  No Cliff's sign ; no ankle clonus  Gait : Not assessed      Diagnostics:   Recent Results (from the past 24 hour(s))   MRSA by PCR    Collection Time: 02/02/23  5:00 PM    Specimen: Nares   Result Value Ref Range    MRSA SCREEN RT-PCR NEGATIVE    VRE Screen by PCR    Collection Time: 02/02/23  5:00 PM    Specimen: Rectal Swab   Result Value Ref Range    Vancomycin Resistant Enterococcus NEGATIVE    CBC    Collection Time: 02/03/23  3:42 AM   Result Value Ref Range    WBC 7.2 4.8 - 10.8 thou/mm3    RBC 3.51 (L) 4.20 - 5.40 mill/mm3    Hemoglobin 11.1 (L) 12.0 - 16.0 gm/dl    Hematocrit 33.9 (L) 37.0 - 47.0 %    MCV 96.6 81.0 - 99.0 fL    MCH 31.6 26.0 - 33.0 pg    MCHC 32.7 32.2 - 35.5 gm/dl    RDW-CV 13.2 11.5 - 14.5 %    RDW-SD 46.9 (H) 35.0 - 45.0 fL    Platelets 249 992 - 762 thou/mm3    MPV 10.4 9.4 - 12.4 fL   Hemoglobin A1c    Collection Time: 02/03/23  3:42 AM   Result Value Ref Range    Hemoglobin A1C 6.1 4.4 - 6.4 %    AVERAGE GLUCOSE 123 70 - 126 mg/dL   Lipid Panel    Collection Time: 02/03/23  3:42 AM   Result Value Ref Range    Cholesterol, Total 154 100 - 199 mg/dL    Triglycerides 129 0 - 199 mg/dL    HDL 48 mg/dL    LDL Calculated 80 mg/dL         Impression:  Right posterior frontal/anterior parietal cortex small acute infarct stroke causing dizziness, vertigo, and frequent fall  Cognitive impairment  Left hip contusion secondary to fall  History of bilateral glaucoma requiring eye stent placement  History of osteoporosis  History of anxiety disorder    Continuing ongoing PT, OT and speech therapy is still medically indicated. The patient will benefit from a course of intensive inpatient rehabilitation treatment program to improve her function. The patient should be able to tolerate minimal 3-hour rehab intervention required for intensive inpatient rehab program.  The patient initially was reluctant to consider inpatient rehab admission but later agrees with suggestion of inpatient rehab after she was persuaded by her daughter and her friends. We will initiate insurance to pre-cert process on [employer],0/5/2097. Plan:  Continuing ongoing PT, OT and speech therapy treatment while the patient remains in acute hospital  The patient will benefit from the intensive inpatient rehabilitation treatment program to improve her function. We will initiate insurance pre-cert process on Monday 2/6/2023.   Plan to admit the patient to inpatient rehab service when insurance approval is obtained, all medically necessary diagnostic test and treatment are completed, and the patient is medically stable and cleared to be discharged from acute hospital.      Ned Mcconnell MD

## 2023-02-03 NOTE — PROGRESS NOTES
[facility] THERAPY  STR ICU STEPDOWN TELEMETRY 4K  Speech - Language - Cognitive Evaluation + Clinical Swallow Evaluation + Cognitive Treatment    SLP Individual Minutes  Time In: 0232  Time Out: 0998  Minutes: 35  Timed Code Treatment Minutes: 8 Minutes     Speech, Language, Cognitive Evaluation: 18 minutes  Clinical Swallow Evaluation: 8 minutes  Cognitive Tx: 8 minutes    Date: 2/3/2023  Patient Name: Mateo Collier      CSN: [id number]   : 1935  (81 y.o.)  Gender: female   Referring Physician:  Kamila Winters MD  Diagnosis: Esophageal Dysphagia  Precautions: Fall risk  History of Present Illness/Injury: Patient admitted to [facility] with above diagnosis; please see physician H&P for full report. Per chart review, \"Summer Bonilla is a 81 y.o. 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 The patient says she began feeling something stuck in her throat last night, 2023. Today, 2023, while she was walking in kitchen to try to get something to help him the throat discomfort sensation, she suddenly fell for no reason. She denies having weakness, pain, syncope or fainting prior to the fall. She struggled to get up but fell again after few step into her dining room. At that time she felt the need for bowel movement so she crawled to bathroom and had diarrhea like bowel movement. She did not call her daughter who called 911. The patient was sent to [address] [place] [address] ER for evaluation on . She was found to have tenderness at the her left hip area. She also complains of feeling dizzy with room spinning sensation when she says suddenly sitting up from supine position.   Her blood tests and urine test showed no significant abnormality. X-ray of left hip and chest revealed no acute abnormality. CT of the head without contrast done on 2/2/2023 showed small lacunar infarction at the right insula. CT of cervical spine done on 2/2/2023 was reported to show no acute process. CTA of head and neck done on 2/2/2023 show no significant hemodynamic stenosis in bilateral common and internal carotid arteries, and hypoplastic P1 segment of the right posterior cerebral artery. MRI of brain was also performed on 2/2/2023 and revealed a small acute right posterior frontal/anterior parietal cortex infarct stroke, and mild white matter atrophy. Neurology consultation was requested. The patient was started on aspirin 81 mg.\"    ST consulted to further evaluate oropharyngeal swallow integrity and cognitive function with implementation of goals/POC as clinically indicated. Past Medical History:   Diagnosis Date    Anxiety     Glaucoma, bilateral     Diagnosed in 2000's    Osteopenia     Osteoporosis     Diagnosed in 1990s       Pain: No pain reported. Subjective:  Patient seen with ANGELY May permission. Patient seen sitting upright in recliner consuming breakfast meal upon ST arrival; alert and cooperative throughout evaluation. No family present. 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    IADLs: Independent  Hobbies: singing, gardening  Vision Status: Impaired; corrective lenses worn  Hearing: WFL  Type of Home: House  Home Layout: One level, Laundry in basement  Home Access: Stairs to enter with rails  Entrance Stairs - Number of Steps: 4  Home Equipment: Walker, rolling, Cane, Rollator, [place] / VOICE:  Speech and Voice appear to be grossly intact for basic and complex daily communication    LANGUAGE:  Receptive:  Receptive language skills appear to be grossly intact for basic and complex daily communication. Expressive:  Expressive language skills appear to be grossly intact for basic and complex daily communication. COGNITION:  [facility] Cognitive Assessment [facility]) version 7.1 completed. Patient scored 22/30. Normal is greater than or equal to 26/30. Orientation: 6/6 independent  Immediate Recall: 5/5 independent, 5/5 with repetition  Short-Term Recall: 2/5 independent, 3/5 with category cuing  Divergent Namin members/60 seconds (Target=11)  Problem Solving: Mildly Impaired  Reasonin/2 independent  Sequencing: Mildly Impaired  Thought Organization: Impaired  Insight: Fair  Attention: 3/3 independent  Math Computation: 1/5 independent  Executive Functionin/5 independent    SWALLOWING:    Respiratory Status: Room Air      Behavioral Observation: Alert and Oriented    CRANIAL NERVE ASSESSMENT   CN V (Trigeminal) Closes and Opens Mandible WFL    Rotary Jaw Movement WFL      CN VII (Facial) Cheeks Hold Food out of Sulci WFL    Opens, Closes/Seals, Protrudes, Retracts Lips WFL    General Appearance WFL    Sensation WFL      CN X (Vagus - Pharyngeal) Raises Back of Tongue WFL      CN XI (Accessory) Lifts Soft Palate WFL      CN XII (Hypoglossal) Elevates Tongue Up and Back WFL    Protrusion   WFL    Lateralizes Tongue WFL    Sensation Not Tested      Other Observations Dentition Good dentition    Vocal Quality WFL    Cough WFL     PATIENT WAS EVALUATED USING:  Thin Liquids, Puree, Soft Solids, and Coarse Solids, Medications    ORAL PHASE:  WFL    PHARYNGEAL PHASE:  WFL:  Pharyngeal phase appears WFL but cannot rule out pharyngeal phase deficits from a bedside swallowing evaluation alone.     SIGNS AND SYMPTOMS OF LARYNGEAL PENETRATION / ASPIRATION:  No signs/symptoms of aspiration evident in this evaluation, but cannot rule out silent aspiration. INSTRUMENTAL EVALUATION: Instrumental evaluation not indicated at this time. DIET RECOMMENDATIONS:  Regular Diet with Thin Liquids    STRATEGIES: Full Upright Position, Limit Distractions, and Monitor for Fatigue        RECOMMENDATIONS/ASSESSMENT:  DIAGNOSTIC IMPRESSIONS:    Suisdc-Lpozndrl-Ytitwpdut Evaluation: Patient presents with mild cognitive impairment evidenced by aforementioned deficits. [facility] with no presence of dysarthria, dysphonia, or aphonia; patient intelligible at the conversation level with approximately 100% accuracy. Expressive and receptive language skills grossly intact with no apparent communicative breakdowns. Skilled ST services are recommended at this time in order to improve the aforementioned deficits and permit potential return to [facility]. Anticipate needs for supervision with IADLs and ongoing skilled ST services via IPR at discharge. *Patient is an EXCELLENT IPR candidate. Clinical Swallow Evaluation: Patient presents with oral phase of swallow function that is essentially [facility] with inability to fully discern potential presence of pharyngeal phase deficits without formal instrumentation. All labial/lingual structures intact and appear to be functioning appropriately at bedside. Oral phase highly unremarkable during consumption of hard/textured solids with patient demonstrating adequate mastication pattern for textural breakdown, cohesive bolus formation, and manipulation. Thin liquids consumed without overt difficulty and with suspected control/containment of fluid bolus. NO overt s/s aspiration exhibited across all consistencies/trials consumed, certainly not able to exclude pharyngeal phase dysfunction and/or airway invasion events in its entirety at bedside alone. Patient's swallow physiology does appear appropriate to support PO intake without distress with instrumental evaluation not warranted.  Recommend continuation of regular diet with thin liquids. No further ST services are warranted at this time r/t dysphagia management given baseline status of swallow function achieved; please re-consult should further needs be identified. Post evaluation, patient withOUT respiratory distress upon leaving room; RN Carol Stein notified re: clinical findings and recommendations from the assessment; verbal receptiveness noted. Rehabilitation Potential: good  Discharge Recommendations: Inpatient Rehabilitation    EDUCATION:  Learner: Patient  Education:  Reviewed results and recommendations of this evaluation, Reviewed diet and strategies, Reviewed ST goals and Plan of Care, Reviewed recommendations for follow-up, Education Related to Potential Risks and Complications Due to Impairment/Illness/Injury, Education Related to Prevention of Recurrence of Impairment/Illness/Injury, Education Related to [employer] and Wellness, and Home Safety Education  Evaluation of Education: Verbalizes understanding, Needs further instruction, and Family not present    PLAN:  Skilled SLP intervention on acute care 3-5 x per week or until goals met and/or pt plateaus in function. Specific interventions for next session may include: cognitive therapy. PATIENT GOAL:    Return to prior level of function. SHORT TERM GOALS:  Short Term Goals  Time Frame for Short Term Goals: 2 weeks  Goal 1: Patient will complete verbal/visual reasoning and thought organization/workin memory tasks (i.e., calendar, scheduling, etc.) with 80% accuracy and moderate cuing in order to allow for safe return to completion of ADLs/IADLs. Goal 2: Patient will complete problem solving and executive functioning tasks (i.e., medications, finances, etc.) with 80% accuracy and moderate cuing in order to improve completion of ADLs/IADLs.   Goal 3: Patient will complete immediate and delayed recall tasks with 80% accuracy and moderate cuing in order to improve retention of novel information. Goal 4: Patient will complete mildly complex sustained, selective, alternating, and divided attention tasks with no more than three errors/redirections in a five minute/one task in order to allow for safe return to driving and PLOF. INTERVENTIONS: Upon completion of evaluation, ST with provision of education re: IPR stay prior to discharge with patient indicating interest in participation in therapeutic interventions. Additional education to patient should she elect to return to PLOF withOUT rehabilitation re: HH vs. OP ST, driving evaluation, and supervision with IADLs (I.e., medications, finances, etc.), as well as consideration for [employer] with verbal receptiveness noted. LONG TERM GOALS:  No LTGs established due to short ELOS.       Oneal Adam M.S., [facility]

## 2023-02-03 NOTE — CARE COORDINATION
Case Management Assessment  Initial Evaluation    Date/Time of Evaluation: 2/3/2023 11:40 AM  Assessment Completed by: Ned Garnica RN    If patient is discharged prior to next notation, then this note serves as note for discharge by case management. Patient Name: Ventura Webb                   YOB: 1935  Diagnosis: Esophageal dysphagia [R13.19]  Acute lacunar infarction [facility]) [I63.81]                   Date / Time: 2/2/2023 11:30 AM  Location: [address] [place] [address]     Patient Admission Status: Inpatient   Readmission Risk (Low < 19, Mod (19-27), High > 27): Readmission Risk Score: 12.3    Current PCP: EDUARDO Wooten CNP  PCP verified by CM? Yes    Chart Reviewed: Yes      History Provided by: Patient, Medical Record  Patient Orientation: Alert and Oriented    Patient Cognition: Alert    Hospitalization in the last 30 days (Readmission):  No    If yes, Readmission Assessment in CM Navigator will be completed. Advance Directives:      Code Status: Full Code   Patient's Primary Decision Maker is: Legal Next of Kin    Primary Decision Maker: Ashlie Gutierrez - Child - [phone]    Secondary Decision Maker: Danay Medina - Spouse - [phone]    Discharge Planning:    Patient lives with: Alone Type of Home: House  Primary Care Giver: Self  Patient Support Systems include: Children   Current Financial resources: Medicare  Current community resources: None  Current services prior to admission: None            Current DME:              Type of Home Care services:  None    ADLS  Prior functional level: Independent in ADLs/IADLs  Current functional level: Independent in ADLs/IADLs    Family can provide assistance at DC: Yes  Would you like Case Management to discuss the discharge plan with any other family members/significant others, and if so, who?  No  Plans to Return to Present Housing: Unknown at present  Other Identified Issues/Barriers to RETURNING to current housing: unknown    Potential Assistance needed at discharge: Cristobal Castillo            Potential DME:    Patient expects to discharge to: Acute rehab  Plan for transportation at discharge: Family    Financial    Payor: Maryse Carter / Plan: [id number] PPO / Product Type: Medicare /     Does insurance require precert for SNF: Yes    Potential assistance Purchasing Medications: No  Meds-to-Beds request: Yes      [id number] [place], [place] - [address] [facility] [phone]  [address] [place], [place] [address]lexie Tam  Phone: [phone] Fax: [phone]      Notes:    Factors facilitating achievement of predicted outcomes: Family support and Cooperative    Barriers to discharge: Limited family support, Cognitive deficit, Limited safety awareness, Anxiety, and Unrealistic expectations    Additional Case Management Notes:   CVA/Fall    ECHO planned today    IVF    The Plan for Transition of Care is related to the following treatment goals of Esophageal dysphagia [R13.19]  Acute lacunar infarction [facility]) [I63.81]    Patient Goals/Plan/Treatment Preferences:   Lives alone; considering HH ([facility] in past) v [facility] (precert, eval pending; await Physiatry recommendations; patient wants to discuss w daughter/RN Jaelyn Knowles); has cane, walker; therapy following  Transportation/Food Security/Housekeeping Addressed: No issues identified.      Therese Ospina RN  Case Management Department

## 2023-02-03 NOTE — FLOWSHEET NOTE
Wound Care Instructions    1.  Keep area dry today.    2.  Starting tomorrow wash gently with soap and water once daily.      3.  Apply Vaseline or antibiotic ointment to the area and cover with a bandage if desired.  Do not let it dry out and form a scab as this will make the resulting scar more noticeable.    4. Protect the area from sun for up to one year afterward as the scar is continuing to remodel.  Sun exposure will also make the resulting scar more noticeable.    5.  Call if the area is very red, tender, has a discharge or is very itchy while healing, or if you have any other questions.  These may be signs of early infection or allergy.     Virtual RN rounds completed. Family at bedside, no needs.

## 2023-02-03 NOTE — CONSULTS
Neurology Consult Note    Date:2/3/2023       LTGP:[id number]  Patient Yolanda Perez     YOB: 1935     Age:87 y.o. Requesting Physician: Obed Damon MD     Reason for Consult:  Evaluate for CVA      Chief Complaint:   Chief Complaint   Patient presents with    Fall    Other     Feels like something is stuck in throat       Sury Whittaker is a 81 y.o. female with a history of anxiety, glaucoma, DVT was on Xarelto approximately 4 years ago who presents to [address] [place] [address] on 2/2/2020 2:23 falls at home. The patient reports that upon waking up at 9:30 in the morning she had felt as if something was stuck in her throat so she had went into the kitchen and tried to eat a piece of bread and taken aspirin as she was worried she was having something happening with her heart. She walked out of the kitchen and dropped without a warning hitting her head on the stove. She then got up and started walking again and dropped again hitting her head on the cupboard. She denies any prodromal symptoms such as vision changes, lightheadedness, dizziness, chest pain, shortness of breath. She denies any loss of consciousness and reports that she did not trip, but rather just drop to the ground. She denies any history of seizures, urinary incontinence, bowel incontinence, or tongue biting. After her second fall she is having difficulty getting up so she crawled to the bathroom because she had to have a bowel movement and after this she called her daughter who then told her to call 911. She did not check her blood sugar or blood pressure at that time. She denies any history of recent falls, but reports approximately 3 years ago she had fallen 7 times in 1 year but this was due to more mechanical falls. She does not use any assistive device for walking at home. While in the emergency department a CT head was completed and showed an acute infarction right insula.   CT angiogram head and neck negative for any hemodynamically significant stenosis. She denies any history of stroke, atrial fibrillation, or smoking. She does report at one point when she was trying to get sit up in bed from lying in the emergency department that she had started to feel dizzy as if the room was spinning. She denies any weakness, numbness, speech difficulty, vision changes, or headache. Review of Systems   Review of Systems   Constitutional:  Negative for chills and fever. HENT:  Negative for rhinorrhea and sore throat. Eyes:  Negative for visual disturbance. Respiratory:  Negative for cough and shortness of breath. Cardiovascular:  Negative for chest pain and palpitations. Gastrointestinal:  Negative for abdominal pain, constipation, diarrhea, nausea and vomiting. Genitourinary:  Negative for dysuria. Musculoskeletal:  Negative for arthralgias and myalgias. Skin:  Negative for rash. Neurological:  Positive for dizziness. Negative for seizures, syncope, speech difficulty, weakness, light-headedness, numbness and headaches. Psychiatric/Behavioral:  The patient is not nervous/anxious. Medications   Scheduled Meds:    Vitamin D  500 Units Oral Daily    multivitamin  1 tablet Oral Daily    enoxaparin  40 mg SubCUTAneous Nightly    aspirin  81 mg Oral Daily    Or    aspirin  300 mg Rectal Daily    rosuvastatin  40 mg Oral Nightly    traZODone  50 mg Oral Nightly     Continuous Infusions:    sodium chloride 75 mL/hr at 02/02/23 4339     PRN Meds: polyethylene glycol, polyvinyl alcohol, ondansetron **OR** ondansetron, perflutren lipid microspheres, bisacodyl, acetaminophen  Medications Prior to Admission:   No current facility-administered medications on file prior to encounter.      Current Outpatient Medications on File Prior to Encounter   Medication Sig Dispense Refill    polyvinyl alcohol (LIQUIFILM TEARS) 1.4 % ophthalmic solution 1 drop as needed      latanoprost (XALATAN) 0.005 % ophthalmic solution 1 drop nightly (Patient not taking: Reported on 2/2/2023)      ibuprofen (ADVIL;MOTRIN) 400 MG tablet Take 1.5 tablets by mouth every 6 hours as needed for Pain 12 tablet 0    vitamin D (CHOLECALCIFEROL) 400 UNITS TABS tablet Take 400 Units by mouth daily. clonazePAM (KLONOPIN) 0.5 MG tablet Take 0.5 mg by mouth 2 times daily as needed. fluticasone (FLONASE) 50 MCG/ACT nasal spray 1 spray by Nasal route daily. Glucosamine Sulfate 750 MG TABS Take 1 tablet by mouth daily. therapeutic multivitamin-minerals (THERAGRAN-M) tablet Take 1 tablet by mouth daily. fish oil-omega-3 fatty acids 1000 MG capsule Take 2 g by mouth daily. magnesium 30 MG tablet Take 30 mg by mouth 2 times daily. Zinc 50 MG CAPS Take  by mouth. (Patient not taking: Reported on 2/2/2023)      DHA-EPA-Coenzyme Q10-Vitamin E (COQ-10 & FISH OIL PO) Take  by mouth. (Patient not taking: Reported on 2/2/2023)      polyethylene glycol (GLYCOLAX) packet Take 17 g by mouth daily as needed. Past History    Past Medical History:   has a past medical history of Anxiety, Glaucoma, bilateral, Osteopenia, and Osteoporosis. Social History:   reports that she has never smoked. She has never used smokeless tobacco. She reports that she does not currently use alcohol. She reports that she does not use drugs.      Family History:   Family History   Problem Relation Age of Onset    Heart Failure Mother     Diabetes Mother     Heart Disease Father     Bipolar Disorder Father     Personality Disorder Father     Anxiety Disorder Father     Anxiety Disorder Sister     Blindness Sister     Depression Sister     Anxiety Disorder Sister     Vision Loss Sister     Other Brother         Rheumatic fever    Heart Disease Brother     COPD Brother     Alcohol Abuse Maternal Grandfather     Alcohol Abuse Paternal Grandfather        Physical Examination      Vitals:  BP (!) 97/54   Pulse 68 Temp 98.1 °F (36.7 °C) (Oral)   Resp 18   Ht 4' 10\" (1.473 m)   Wt 119 lb 4.3 oz (54.1 kg)   SpO2 95%   BMI 24.93 kg/m²   Temp (24hrs), Av.8 °F (36.6 °C), Min:96 °F (35.6 °C), Max:98.3 °F (36.8 °C)      I/O (24Hr): Intake/Output Summary (Last 24 hours) at 2/3/2023 0853  Last data filed at 2/3/2023 0326  Gross per 24 hour   Intake 400 ml   Output --   Net 400 ml         Physical Exam  Vitals reviewed. Constitutional:       General: She is not in acute distress. Appearance: Normal appearance. She is not ill-appearing. HENT:      Head: Normocephalic and atraumatic. Right Ear: External ear normal.      Left Ear: External ear normal.      Nose: Nose normal.      Mouth/Throat:      Mouth: Mucous membranes are moist.      Pharynx: No oropharyngeal exudate or posterior oropharyngeal erythema. Eyes:      Extraocular Movements: EOM normal.      Pupils: Pupils are equal, round, and reactive to light. Cardiovascular:      Rate and Rhythm: Normal rate and regular rhythm. Heart sounds: Normal heart sounds. No murmur heard. Pulmonary:      Effort: Pulmonary effort is normal. No respiratory distress. Breath sounds: Normal breath sounds. No wheezing. Abdominal:      General: Bowel sounds are normal.      Palpations: Abdomen is soft. Tenderness: There is no abdominal tenderness. Musculoskeletal:         General: Normal range of motion. Right lower leg: No edema. Left lower leg: No edema. Skin:     General: Skin is warm. Findings: No rash. Neurological:      Mental Status: She is alert and oriented to person, place, and time. Coordination: Finger-Nose-Finger Test and Heel to [employer] normal.   Psychiatric:         Mood and Affect: Mood normal.         Speech: Speech normal.         Behavior: Behavior normal.     Neurologic Exam     Mental Status   Oriented to person, place, and time. Registration: recalls 3 of 3 objects. Follows 2 step commands.    Attention: normal. Concentration: normal.   Speech: speech is normal   Level of consciousness: alert  Knowledge: good. Able to name object. Able to read. Able to repeat. Normal comprehension. Cranial Nerves     CN II   Visual fields full to confrontation. CN III, IV, VI   Pupils are equal, round, and reactive to light. Extraocular motions are normal.   Right pupil: Size: 3 mm. Shape: regular. Reactivity: brisk. Left pupil: Size: 3 mm. Shape: regular. Reactivity: brisk. CN V   Facial sensation intact. CN VII   Facial expression full, symmetric. CN VIII   CN VIII normal.     CN IX, X   CN IX normal.   CN X normal.   Palate: symmetric    CN XI   CN XI normal.   Right trapezius strength: normal  Left trapezius strength: normal    CN XII   CN XII normal.   Tongue deviation: none    Motor Exam   Overall muscle tone: normal  Right arm pronator drift: absent  Left arm pronator drift: absent    BUE: 5/5  BLE: 5/5     Sensory Exam   Light touch normal.     Gait, Coordination, and Reflexes     Coordination   Finger to nose coordination: normal  Heel to shin coordination: normal    Tremor   Resting tremor: absent  Intention tremor: absent  Action tremor: absent     Labs/Imaging/Diagnostics   Labs:  CBC:  Recent Labs     02/02/23  1200 02/03/23  0342   WBC 7.7 7.2   RBC 3.82* 3.51*   HGB 12.2 11.1*   HCT 36.5* 33.9*   MCV 95.5 96.6    208     CHEMISTRIES:  Recent Labs     02/02/23  1200      K 4.1      CO2 26   BUN 15   CREATININE 0.5   GLUCOSE 108     COAGULATION STUDIES:No results for input(s): PROTIME, INR, APTT in the last 72 hours.   LIVER PROFILE:  Recent Labs     02/02/23  1200   AST 20   ALT 13   BILIDIR <0.2   BILITOT 0.4   ALKPHOS 49     CHOLESTEROL AND A1C:  Recent Labs     02/03/23  0342   LDLCALC 80   HDL 48   CHOL 154   TRIG 129   LABA1C 6.1      Imaging Last 24 Hours:  CTA HEAD W WO CONTRAST    Result Date: 2/2/2023  PROCEDURE: CTA NECK W WO CONTRAST, CTA HEAD W WO CONTRAST CLINICAL INFORMATION: Small lacunar infarction right insula. COMPARISON: CT scan of the brain obtained on the same day. TECHNIQUE: 1 mm axial images were obtained through the head and neck after the fast bolus administration of contrast. A noncontrast localizer was obtained. 3-D reconstructions were performed on a dedicated 3-D workstation. These include multiplanar MPR images and multiplanar MIP images. Centerline reconstructions were obtained of the carotid systems. Isovue intravenous contrast was given. All carotid artery measurements are performed utilizing Nascet criteria. All CT scans at this facility use dose modulation, iterative reconstruction, and/or weight-based dosing when appropriate to reduce radiation dose to as low as reasonably achievable. FINDINGS: CTA NECK:  Aortic arch and branches: There is atherosclerotic calcification in the aortic arch and at the origins of the left subclavian and left common carotid arteries. Right common carotid artery/ICA: There is no significant hemodynamic stenosis involving the right common and internal carotid arteries Left common carotid artery/ICA: There is no significant hemodynamic stenosis involving the left common and internal carotid arteries. Vertebral arteries: There is antegrade flow in the right and left vertebral arteries. CTA HEAD: Internal carotid arteries: There is antegrade flow in the internal carotid arteries bilaterally. . Middle cerebral arteries: There is antegrade flow in the middle cerebral arteries bilaterally. Ga Brunt Anterior cerebral arteries: There is antegrade flow in the anterior cerebral arteries bilaterally. Vertebral arteries: There is antegrade flow in the right and left vertebral arteries. Basilar artery: There is antegrade flow in the basilar artery. . Superior cerebellar arteries: There is antegrade flow in the superior cerebellar arteries bilaterally. Ga Brunt Posterior cerebral arteries:  There is a hypoplastic P1 segment of the right posterior cerebral artery. There is fetal origin of the right posterior cerebral artery. There is a patent posterior communicating artery which helps supply the left posterior cerebral artery. No aneurysms, stenoses or occlusions are noted. The superior sagittal sinus, vein of Enrike, internal cerebral veins, straight sinus, transverse sinuses and sigmoid sinuses are patent. Axial source data: There is old granulomatous disease in the subcarinal space. There is  atelectasis or scarring in the right upper lobe posteriorly. There is cervical spondylosis     1. There is no significant hemodynamic stenosis in the right and left common and internal carotid arteries. 2. There is antegrade flow in the right and left vertebral arteries. 3. There is atherosclerotic calcification in the aortic arch and at the origin of the left subclavian and left common carotid arteries. 4. There is a hypoplastic P1 segment of the right posterior cerebral artery. There is fetal origin of the right posterior cerebral artery. 5. There is a patent left posterior communicating artery which helps supply the left posterior cerebral artery. 6. Otherwise negative CTA of the brain. 7. Diffusion MRI scan would be helpful for better evaluation in this patient. **This report has been created using voice recognition software. It may contain minor errors which are inherent in voice recognition technology. ** Final report electronically signed by DR Ryanne Murillo on 2/2/2023 3:20 PM    CT HEAD WO CONTRAST    Result Date: 2/2/2023  PROCEDURE: CT HEAD WO CONTRAST CLINICAL INFORMATION: fall. COMPARISON: No prior study. TECHNIQUE: Noncontrast 5 mm axial images were obtained through the brain. Sagittal and coronal reconstructions were obtained. All CT scans at this facility use dose modulation, iterative reconstruction, and/or weight-based dosing when appropriate to reduce radiation dose to as low as reasonably achievable.  FINDINGS: 7 mm hypodense lesion in the right insula suggesting small lacunar infarct. There is no hemorrhage. There are no intra-or extra-axial collections. There is no hydrocephalus, midline shift or mass effect. The gray-white matter differentiation is preserved. Mild generalized cerebral volume loss  The paranasal sinuses and mastoid air cells are normally aerated. There is no suspicious calvarial abnormality. Small lacunar infarction right insula. **This report has been created using voice recognition software. It may contain minor errors which are inherent in voice recognition technology. ** Final report electronically signed by Dr. Harriet Osborne on 2/2/2023 1:27 PM    CTA NECK W WO CONTRAST    Result Date: 2/2/2023  PROCEDURE: CTA NECK W WO CONTRAST, CTA HEAD W WO CONTRAST CLINICAL INFORMATION: Small lacunar infarction right insula. COMPARISON: CT scan of the brain obtained on the same day. TECHNIQUE: 1 mm axial images were obtained through the head and neck after the fast bolus administration of contrast. A noncontrast localizer was obtained. 3-D reconstructions were performed on a dedicated 3-D workstation. These include multiplanar MPR images and multiplanar MIP images. Centerline reconstructions were obtained of the carotid systems. Isovue intravenous contrast was given. All carotid artery measurements are performed utilizing Nascet criteria. All CT scans at this facility use dose modulation, iterative reconstruction, and/or weight-based dosing when appropriate to reduce radiation dose to as low as reasonably achievable. FINDINGS: CTA NECK:  Aortic arch and branches: There is atherosclerotic calcification in the aortic arch and at the origins of the left subclavian and left common carotid arteries.  Right common carotid artery/ICA: There is no significant hemodynamic stenosis involving the right common and internal carotid arteries Left common carotid artery/ICA: There is no significant hemodynamic stenosis involving the left common and internal carotid arteries. Vertebral arteries: There is antegrade flow in the right and left vertebral arteries. CTA HEAD: Internal carotid arteries: There is antegrade flow in the internal carotid arteries bilaterally. . Middle cerebral arteries: There is antegrade flow in the middle cerebral arteries bilaterally. Emma Lager Anterior cerebral arteries: There is antegrade flow in the anterior cerebral arteries bilaterally. Vertebral arteries: There is antegrade flow in the right and left vertebral arteries. Basilar artery: There is antegrade flow in the basilar artery. . Superior cerebellar arteries: There is antegrade flow in the superior cerebellar arteries bilaterally. Emma Lager Posterior cerebral arteries: There is a hypoplastic P1 segment of the right posterior cerebral artery. There is fetal origin of the right posterior cerebral artery. There is a patent posterior communicating artery which helps supply the left posterior cerebral artery. No aneurysms, stenoses or occlusions are noted. The superior sagittal sinus, vein of Enrike, internal cerebral veins, straight sinus, transverse sinuses and sigmoid sinuses are patent. Axial source data: There is old granulomatous disease in the subcarinal space. There is  atelectasis or scarring in the right upper lobe posteriorly. There is cervical spondylosis     1. There is no significant hemodynamic stenosis in the right and left common and internal carotid arteries. 2. There is antegrade flow in the right and left vertebral arteries. 3. There is atherosclerotic calcification in the aortic arch and at the origin of the left subclavian and left common carotid arteries. 4. There is a hypoplastic P1 segment of the right posterior cerebral artery. There is fetal origin of the right posterior cerebral artery. 5. There is a patent left posterior communicating artery which helps supply the left posterior cerebral artery. 6. Otherwise negative CTA of the brain.  7. Diffusion MRI scan would be helpful for better evaluation in this patient. **This report has been created using voice recognition software. It may contain minor errors which are inherent in voice recognition technology. ** Final report electronically signed by DR Hernan Solis on 2/2/2023 3:20 PM    CT CERVICAL SPINE WO CONTRAST    Result Date: 2/2/2023  PROCEDURE: CT CERVICAL SPINE WO CONTRAST CLINICAL INFORMATION: fall, neck FB sensation, Trauma . TECHNIQUE: 3 mm CT scans of the cervical spine with sagittal and coronal reconstructions. All CT scans at this facility use dose modulation, iterative reconstruction, and/or weight-based dosing when appropriate to reduce radiation dose to as low as reasonably achievable. COMPARISON: No prior study. FINDINGS: There is no fracture or acute subluxation. There is disc space narrowing and degenerative change at every level with the exception of C2-3. There is no significant foraminal or central encroachment at any level. No precervical soft tissue swelling is seen. No acute process **This report has been created using voice recognition software. It may contain minor errors which are inherent in voice recognition technology. ** Final report electronically signed by Dr. Concha Mcclain on 2/2/2023 1:35 PM    XR CHEST 1 VIEW    Result Date: 2/2/2023  PROCEDURE: XR CHEST 1 VIEW CLINICAL INFORMATION: fall . TECHNIQUE: AP upright COMPARISON: 11/20/2021 FINDINGS: Heart, mediastinal and hilar contours are within normal limits. No infiltrates or effusions. Vessels are not congested. EKG leads overlie the chest.     No acute cardiopulmonary disease **This report has been created using voice recognition software. It may contain minor errors which are inherent in voice recognition technology. ** Final report electronically signed by Dr. Concha Mcclain on 2/2/2023 1:24 PM    MRI LIMITED BRAIN    Result Date: 2/2/2023  PROCEDURE: MRI LIMITED BRAIN CLINICAL INFORMATION Small lacunar infarction right insula, fall x 2.  COMPARISON: CT scan of the brain obtained on the same day. . TECHNIQUE: A limited MRI scan was carried out through the brain. FINDINGS: The diffusion-weighted images demonstrate a small area of restricted diffusion in the right posterior frontal/anterior parietal cortex. There is diminished signal intensity on the ADC map consistent with an acute infarct. .  The brain volume is slightly reduced. There is a mild amount of signal hyperintensity on the FLAIR and T2-weighted sequences in the white matter of the brain. This is consistent with mild severity chronic small vessel ischemic changes. There are no intra-or extra-axial collections. There is no hydrocephalus, midline shift or mass effect. The major intracranial vascular flow voids are present. There is increased signal intensity in the ethmoid air cells and left mastoid tip consistent with inflammatory changes. 1. Small acute infarct in the right posterior frontal/anterior parietal cortex 2. Mild atrophy and probable ischemic changes in the white matter. 3. Mild inflammatory changes in ethmoid air cells bilaterally and left mastoid tip. **This report has been created using voice recognition software. It may contain minor errors which are inherent in voice recognition technology. ** Final report electronically signed by DR Angelo Hirsch on 2/2/2023 4:27 PM    XR HIP 2-3 VW W PELVIS LEFT    Result Date: 2/2/2023  PROCEDURE: XR HIP 2-3 VW W PELVIS LEFT CLINICAL INFORMATION: fall, left hip pain . TECHNIQUE: AP and frog leg projections left hip AP pelvis COMPARISON: No prior study. FINDINGS: Pelvis is rotated. No fracture or bone destruction is identified. Right SI joint is intact left not well assessed due to the obliquity. No soft tissue abnormality. No acute process **This report has been created using voice recognition software. It may contain minor errors which are inherent in voice recognition technology. ** Final report electronically signed by Dr. Rocco Melissa on 2/2/2023 1:24 PM        Assessment and Plan:        Acute small infarct in the right posterior frontal/anterior parietal cortex  Imaging  CT revealed an acute infarction right insula. CTA of head and neck revealed no hemodynamically significant stenosis. No need for carotid ultrasound. MRI of brain without contrast reveals acute small infarct in the right posterior frontal/anterior parietal cortex, full read above  2D echocardiogram completed, read pending  Stat CT head is needed if the patient develops new-onset altered mental status, a severe headache, or new-onset neurologic deficit  Risk factors and medications  Blood pressure goal: permissive hypertension up to 220/120 for 24 hours after stroke onset. following this period, less than 130/80. Keep well hydrated. Initiate normal saline at 75 ml/hr as needed. Antithrombotics: aspirin 81 mg daily and Plavix 75 mg daily for 21 days. Then aspirin 81 mg daily indefinitely  HgbA1C 6.1  LDL 80 LDL goal of 45-70. Start atorvastatin 40mg daily  Smoking and alcohol cessation when applicable. Provide stroke eduction for individualized risk factors. EKG/telemetry to monitor for atrial fibrillation  Core stroke metrics  Dysphagia screen prior to oral intake  PT/OT/SLP consult. IPR consult if applicable. DVT prophylaxis: Lovenox  NIHSS every shift. Neuro checks per unit unless otherwise specified. Pre-morbid Modified Lamar Scale: 1 - No significant disability: despite symptoms, able to carry out all usual duties and activities. 30-day cardiac event monitor upon discharge  Follow up with outpatient general neurology Dr. Jazmin Dexter in 2-4 weeks. This patient was seen and evaluated with Dr. Susan Wolf and he is in agreement with the assessment and plan. Electronically signed by EDUARDO Oscar CNP on 2/3/23 at 3:59 PM EST    I have independently reviewed the hospital record and examined this patient on 2/4/23.  I have discussed my findings with Taye Reynaga Shane, APRN - CNP. I have personally seen and examined this patient and the treatment plan was developed by me and outlined in this note by EDUARDO Fernandez CNP. The timing of the note filing does not necessarily correlate with the timing of when the patient was seen by me.       Melisa Alpers, MD, [address] [facility]  Vascular & Interventional Neurology and NeuroCritical Care

## 2023-02-03 NOTE — DISCHARGE INSTRUCTIONS
*******Please document Modified Nahun Score on the Barthel Index Scale flow sheet before discharge.   *******

## 2023-02-03 NOTE — PROGRESS NOTES
[facility] COORDINATOR CONSULT    Referral Type: internal    Patient Name: Homa Soler      MRN: [id number]    : 1935  (81 y.o.)  Gender: female   Race:White (non-)     Payor Source: Payor: Kiran Barreto / Plan: [employer] PPO / Product Type: Medicare /   Secondary Payor Source:      Isolation Status: No active isolations    Lives With: Alone  Type of Home: House  Home Layout: One level, Laundry in basement  Home Access: Stairs to enter with rails  Entrance Stairs - Number of Steps: 4  Receives Help From: Family (check in on patient)  Occupation: Retired  Additional Comments: patient used no AD prior to admit. Pt has 3 kids that can assist as needed    Disciplines Required upon Admission to Inpatient Rehabilitation: Physical Therapy, Occupational Therapy, and Speech Therapy  Post operative: No  Fall: Yes  Dialysis: No  Diet: ADULT DIET; Regular; Low Sodium (2 gm)  ADULT ORAL NUTRITION SUPPLEMENT; Breakfast, Dinner; Standard 4 oz Oral Supplement  Discussed patient with  and PM&R provider: Await medical work up completion and completion of therapy evaluations for further determination of patient needs.

## 2023-02-03 NOTE — PROGRESS NOTES
INTERNAL MEDICINE Progress Note  2/3/2023 3:18 PM  Subjective:   Admit Date: 2/2/2023  PCP: EDUARDO Nance CNP  Interval History:     Dizziness is better, no HA      Objective:   Vitals: BP 98/63   Pulse 63   Temp 98.4 °F (36.9 °C) (Oral)   Resp 18   Ht 4' 10\" (1.473 m)   Wt 119 lb 4.3 oz (54.1 kg)   SpO2 97%   BMI 24.93 kg/m²   General appearance: alert and cooperative with exam  HEENT: Head: atraumatic  Neck: no adenopathy, no carotid bruit, no JVD, and supple, symmetrical, trachea midline  Lungs: clear to auscultation bilaterally  Heart: S1, S2 normal  Abdomen: soft, non-tender; bowel sounds normal; no masses,  no organomegaly  Extremities: no edema, redness or tenderness in the calves or thighs  Neurologic: Mental status: Alert, oriented, thought content appropriate  No focal motor deficit  Gait deferred      Medications:   Scheduled Meds:   clopidogrel  75 mg Oral Daily    Vitamin D  500 Units Oral Daily    multivitamin  1 tablet Oral Daily    enoxaparin  40 mg SubCUTAneous Nightly    aspirin  81 mg Oral Daily    Or    aspirin  300 mg Rectal Daily    rosuvastatin  40 mg Oral Nightly    traZODone  50 mg Oral Nightly     Continuous Infusions:   sodium chloride 75 mL/hr at 02/03/23 1244       Lab Results:   CBC:   Recent Labs     02/02/23  1200 02/03/23  0342   WBC 7.7 7.2   HGB 12.2 11.1*    208     BMP:    Recent Labs     02/02/23  1200      K 4.1      CO2 26   BUN 15   CREATININE 0.5   GLUCOSE 108     Hepatic:   Recent Labs     02/02/23  1200   AST 20   ALT 13   BILITOT 0.4   ALKPHOS 49       Lipids:   Recent Labs     02/03/23  0342   CHOL 154   HDL 48      LDL Calculated 80       HgBA1c:    Lab Results   Component Value Date/Time    LABA1C 6.1 02/03/2023 03:42 AM     TSH:    Lab Results   Component Value Date/Time    TSH 1.310 12/10/2020 02:55 PM       FERRITIN:    Lab Results   Component Value Date/Time    FERRITIN 124 11/20/2021 07:39 PM        MRI of brain (2/2/2023) :  Impression   1. 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 Mild atrophy and probable ischemic changes in the white matter. 3. Mild inflammatory changes in ethmoid air cells bilaterally and left mastoid tip.             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

## 2023-02-03 NOTE — PROGRESS NOTES
[address] [address]  INPATIENT PHYSICAL THERAPY  EVALUATION  STR ICU STEPDOWN TELEMETRY 4K - 4K-17/017-A    Time In: 0945  Time Out: 1024  Timed Code Treatment Minutes: 25 Minutes  Minutes: 39          Date: 2/3/2023  Patient Name: Will Cotto,  Gender:  female        MRN: [id number]  : 1935  (81 y.o.)      Referring Practitioner: Mayco Velasco MD  Diagnosis: Esophageal dysphagia  Additional Pertinent Hx: Will Cotto is a 81 y.o. female who presents to Emergency Department with Fall and Other (Feels like something is stuck in throat). Patient is brought in by EMS for evaluation of fall x2 today. Patient says she fell in the kitchen and again in the dining room. Lives at home by herself. Patient states she had no idea why and how she fell. MRI shows small acute infarct in the right posterior frontal/anterior parietal cortex. Restrictions/Precautions:  Restrictions/Precautions: Fall Risk, General Precautions    Subjective:  Chart Reviewed: Yes  Patient assessed for rehabilitation services?: Yes  Family / Caregiver Present: No  Subjective: RN approved session, pt is seated in recliner, agreeable to PT. Pt reports she is tired, hasn't slept well in the last two nights. Pt reports had room spinning vertigo when in the ED, is better now, education on BPPV and assessment/tx that is available, pt declines [facility] at this time, may be will to try tomorrow.     General:  Overall Orientation Status: Within Functional Limits  Vision: Within Functional Limits  Hearing: Within functional limits       Pain: slight headache    Vitals: Vitals not assessed per clinical judgement, see nursing flowsheet    Social/Functional History:    Lives With: Alone  Type of Home: House  Home Layout: One level, Laundry in basement  Home Access: Stairs to enter with rails  Entrance Stairs - Number of Steps: 4  Home Equipment: Walker, rolling, Cane, Rollator, [employer]     Bathroom Shower/Tub: Tub/Shower unit  [employer]: Standard  Bathroom Equipment: Tub transfer bench, Grab bars in shower, Toilet raiser  Bathroom Accessibility: Accessible    Receives Help From: Family (check in on patient)  ADL Assistance: Independent  Homemaking Assistance: Independent  Ambulation Assistance: Independent  Transfer Assistance: Independent    Active : Yes  Occupation: Retired  Additional Comments: patient used no AD prior to admit. Pt has 3 kids that can assist as needed    OBJECTIVE:  Range of Motion:  Bilateral Lower Extremity: WFL    Strength:  Bilateral Lower Extremity: Impaired - 4/5 all joints    Balance:  Static Standing Balance: Contact Guard Assistance, X 1  Dynamic Standing Balance: Contact Guard Assistance, X 1    Bed Mobility:  Not Tested    Transfers:  Sit to Stand: Contact Guard Assistance, X 1; several trials, from recliner and couch  Stand to Sit:Contact Basilio Schwab, X 1    Ambulation:  Minimal Assistance, X 1  Distance: 20 feet, 3 feet x 2  Surface: Level Tile  Device:No Device  Gait Deviations:  Decreased Step Length Bilaterally, Decreased Weight Shift Bilaterally, Decreased Arm Swing, Decreased Trunk Rotation, Decreased Gait Speed, and Decreased Heel Strike Bilaterally  **Short step length, guarded pace, absent heel strike    Exercise:  Patient was guided in 1 set(s) 10 reps of exercise to both lower extremities. Ankle pumps, Quad sets, Hip abduction/adduction, Seated marches, and Long arc quads. Exercises were completed for increased independence with functional mobility.     Functional Outcome Measures: Completed  Balance Score: 12  Gait Score: 9  Tinetti Total Score: 21/28    Risk Indicators:  Less than/equal to 18 = high risk  19-23 Moderate risk  Greater than/equal to 24 = low risk     5X Sit-to-Stand Test: 22.57 seconds              Age Bracket         Time (sec)               61-75 yo          11.4               66-78 yo          12.6               80-81 yo          14.8 Fall Risk:  Geriatrics    Need for further assessment of fall risk greater or equal to 12 sec   Recurrent fall and frailty > 15 sec   Vestibular Disorders   Fall risk > 15 sec  Parkinson's Disease   Fall risk > 16 sec        AM-PAC Inpatient Mobility without Stair Climbing Raw Score : 15  AM-PAC Inpatient without Stair Climbing T-Scale Score : 43.03    ASSESSMENT:  Activity Tolerance:  Patient tolerance of  treatment: good. Treatment Initiated: Treatment and education initiated within context of evaluation. Evaluation time included review of current medical information, gathering information related to past medical, social and functional history, completion of standardized testing, formal and informal observation of tasks, assessment of data and development of plan of care and goals. Treatment time included skilled education and facilitation of tasks to increase safety and independence with functional mobility for improved independence and quality of life. Assessment: Body Structures, Functions, Activity Limitations Requiring Skilled Therapeutic Intervention: Decreased functional mobility , Decreased balance, Decreased strength  Assessment: Ramandeep Welch is a 81 y.o. female that presents with CVA. she is ind prior to admission now requiring assist for basic mobility. Pt demonstrates a decrease in baseline by way of bed mobility, transfers and ambulation secondary to decreased activity tolerance, strength, fatigue, and balance deficits. Pt will benefit from skilled PT services throughout admission and beyond hospital discharge for improvements in functional mobility and in order to decrease fall risk and return pt to OF. Therapy Prognosis: Excellent    Requires PT Follow-Up: Yes    Discharge Recommendations:  Discharge Recommendations: IP Rehab    Patient Education:      .     Patient Education  Education Given To: Patient  Education Provided: Role of Therapy, Plan of Care  Education Method: Verbal  Barriers to Learning: None  Education Outcome: Verbalized understanding, Continued education needed       Equipment Recommendations:  Equipment Needed: No    Plan:  Current Treatment Recommendations: Strengthening, Balance training, Functional mobility training, Transfer training, Gait training, Stair training, Neuromuscular re-education, Patient/Caregiver education & training, Safety education & training, Therapeutic activities  General Plan:  (6x N)    Goals:  Patient Goals : to get better  Short Term Goals  Time Frame for Short Term Goals: by discharge  Short Term Goal 1: Pt to transfer supine <--> sit mod I to enable pt to get in/out of bed. Short Term Goal 2: Pt to transfer sit <--> stand mod I for increased functional mobility. Short Term Goal 3: Pt to ambulate >100 feet without AD SBA for household ambulation. Short Term Goal 4: Pt to improve Tinetti score to 28/28 to minimize fall risk. Long Term Goals  Time Frame for Long Term Goals : NA due to short length of stay. Following session, patient left in safe position with all fall risk precautions in place.

## 2023-02-03 NOTE — CARE COORDINATION
02/03/23 1045   Service Assessment   Patient Orientation Alert and Oriented   Cognition Alert   History Provided By Patient;Medical Record   Primary Caregiver Self   Accompanied By/Relationship none   Support Systems Children   Patient's Healthcare Decision Maker is: Legal Next of Kin   PCP Verified by CM Yes   Last Visit to PCP Within last 3 months   Prior Functional Level Independent in ADLs/IADLs   Current Functional Level Independent in ADLs/IADLs   Can patient return to prior living arrangement Unknown at present   Ability to make needs known: Good   Family able to assist with home care needs: Yes   Would you like for me to discuss the discharge plan with any other family members/significant others, and if so, who? No   Financial Resources Medicare   Community Resources None   CM/SW Referral ADLs/IADLs   Discharge Planning   Type of Residence House   Living Arrangements Alone   Current Services Prior To Admission None   Potential Assistance Needed Cristobal BROWN Ordered? No   Potential Assistance Purchasing Medications No   Type of Home Care Services None   Patient expects to be discharged to: Acute rehab   Follow Up Appointment: Best Day/Time    (PM)   Services At/After Discharge   Transition of Care Consult (CM Consult) [address] Dis[address] Inpatient rehab   Confirm Follow Up Transport Family   Condition of Participation: Discharge Planning   The Plan for Transition of Care is related to the following treatment goals: CVA treatment   Freedom of Choice list was provided with basic dialogue that supports the patient's individualized plan of care/goals, treatment preferences, and shares the quality data associated with the providers?   Yes

## 2023-02-03 NOTE — PROGRESS NOTES
Discussed rehab referral with patient, she states she does not think she needs rehab here she states her husbands have had in home therapy. Spoke briefly with therapy staff who endorse patient need for IPR. Explained to patient that she would benefit and perhaps she should think this over and discuss with her daughter who will be visiting today. HANSEL Lima updated on this visit.

## 2023-02-03 NOTE — PROGRESS NOTES
[facility] [address] ICU STEPDOWN TELEMETRY 4K  EVALUATION    Time:   Time In: 3141  Time Out: 5744  Timed Code Treatment Minutes: 25 Minutes  Minutes: 27          Date: 2/3/2023  Patient Name: Belle Yang,   Gender: female      MRN: [id number]  : 1935  (81 y.o.)  Referring Practitioner: Candy Mcnally MD  Diagnosis: acute lacunar infarction  Additional Pertinent Hx: per chart review; Belle Yang is a 81 y.o. female who presents to Emergency Department with Fall and Other (Feels like something is stuck in throat)     Patient is brought in by EMS for evaluation of fall x2 today. Patient says she fell in the kitchen and again in the dining room. Lives at home by herself. Patient states she had no idea why and how she fell. She denies syncope. No fainting episodes. She denies generalized weakness. After the second fall, patient noticed it was hard for her to get up, she called her daughter who recommended she should call EMS. On arrival, patient was alert and oriented x 4. She complains of mild left hip pain. She denies LOC. No headache. She has been having foreign body sensation in throat since last night, but she was able to swallow a piece of bread this morning. No vomiting. No fever or chills. No chest pain. No SOB. No abdominal pain. She has mild watery diarrhea. No urine symptoms    Restrictions/Precautions:  Restrictions/Precautions: Fall Risk, General Precautions    Subjective  Chart Reviewed: Yes, Orders, History and Physical, Progress Notes, Imaging  Patient assessed for rehabilitation services?: Yes  Family / Caregiver Present: No    Subjective: RN approved session, patient supine in bed with head elevated upon OT arrival and agreeable to eval. patient A & O x 4. appeared to have some difficulty with word finding however stated she didn't sleep well last night.  patient declined any ADLs due to being too cold or hadn't eaten breakfast yet. reports main concern is dizziness and notices no other changes from baseline. Pain: 0/10: main complaint is discomfort from feeling dizzy with movement. Vitals: Vitals not assessed per clinical judgement, see nursing flowsheet    Social/Functional History:  Lives With: Alone  Type of Home: House  Home Layout: One level, Laundry in basement  Home Access: Stairs to enter with rails  Entrance Stairs - Number of Steps: 4  Home Equipment: Walker, rolling, Cane, Rollator, [employer]   Bathroom Shower/Tub: Tub/Shower unit  Bathroom Toilet: Standard  Bathroom Equipment: Tub transfer bench, Grab bars in shower, Toilet raiser  Bathroom Accessibility: Accessible    Receives Help From: Family (check in on patient)  ADL Assistance: Independent  Homemaking Assistance: Independent  Ambulation Assistance: Independent  Transfer Assistance: Independent    Active : Yes  Occupation: Retired  Additional Comments: patient used no AD prior to admit. VISION:Corrected    HEARING:  WFL    COGNITION: Decreased Insight, Decreased Problem Solving, Tangential, and talkative    RANGE OF MOTION:  Bilateral Upper Extremity:  WFL    STRENGTH:  Bilateral Upper Extremity:  shldr 4/5 elbow flex 4+/5 ext 4/5  (F)    SENSATION:   WFL    ADL:   Footwear Management: Stand By Assistance. To don (B) slipper socks seated EOB; no LOB during dyn sitting task  Patient declined to complete any other ADLs due to feeling cold and wanted to eat breakfast prior to brushing her teeth. Linda Billings BALANCE:  Sitting Balance:  Stand By Assistance. Seated EOB due to c/o initial dizziness when sitting EOB  Standing Balance: Contact Guard Assistance. With no AD with patient holding onto IV pole and bed rail. Resistive to using 2 w/w    BED MOBILITY:  Supine to Sit: Minimal Assistance cues for sequencing and hand placement    TRANSFERS:  Sit to Stand:  Contact Guard Assistance.  Cues for slow extension to prevent dizziness and to stand for a few seconds prior to moving    FUNCTIONAL MOBILITY:  Assistive Device: None and hand held assist, reaching out to IV pole  Assist Level:  Contact Guard Assistance. Distance:  from EOB to recliner  Cues for locating recliner and for lining up with recliner to sit safely. Activity Tolerance:  Patient tolerance of  treatment: good. Motivated to participate in OT, no c/o pain, fatigue or SOB during eval.        Assessment:  Assessment: patient demo overall de-conditioning, weakness and decreased balance impacting her abilty to safely complete ADLs and functional transfers unassisted at this time. patient would benefit from continued, skilled OT to increase activity tolerance, UB strength, ease and (I) with ADLs and functional transfers to safely transition to prior living environment, decrease caregiver burden and prevent falls. Performance deficits / Impairments: Decreased functional mobility , Decreased ADL status, Decreased endurance, Decreased strength, Decreased balance, Decreased safe awareness  Prognosis: Good  REQUIRES OT FOLLOW-UP: Yes  Decision Making: Medium Complexity    Treatment Initiated: Treatment and education initiated within context of evaluation. Evaluation time included review of current medical information, gathering information related to past medical, social and functional history, completion of standardized testing, formal and informal observation of tasks, assessment of data and development of plan of care and goals. Treatment time included skilled education and facilitation of tasks to increase safety and independence with ADL's for improved functional independence and quality of life.     Discharge Recommendations:  Continue to assess pending progress, Patient would benefit from continued therapy after discharge, IP Rehab    Patient Education:     Patient Education  Education Given To: Patient  Education Provided: Transfer Training, Role of Therapy, Plan of Care, Fall Prevention Strategies, Precautions, ADL Adaptive Strategies  Barriers to Learning: None    Equipment Recommendations:  Equipment Needed: No    Plan:  Times Per Week: 5-6x  Times Per Day: Once a day  Current Treatment Recommendations: Strengthening, Balance training, Functional mobility training, Endurance training, Safety education & training, Neuromuscular re-education, Patient/Caregiver education & training, Self-Care / ADL, Coordination training. See long-term goal time frame for expected duration of plan of care. If no long-term goals established, a short length of stay is anticipated. Goals:  Patient goals : \"be able to walk safely\"; return home at [employer]  Short Term Goals  Time Frame for Short Term Goals: by discharge  Short Term Goal 1: patient will tolerate 5 min functional standing with two hand release with (S) to increase ease with toileting and grooming. Short Term Goal 2: patient will functionally ambulate to/from BR with least restrictive device with SBA. Short Term Goal 3: patient will complete ADL routine with SBA and 0-1 cues for safety and sequencing. Short Term Goal 4: patient will participate in moderate resistive UB exer to increase UB strength for functional transfers. Following session, patient left in safe position with all fall risk precautions in place.

## 2023-02-03 NOTE — PLAN OF CARE
Problem: Discharge Planning  Goal: Discharge to home or other facility with appropriate resources  2/3/2023 0115 by Bhumika Cody RN  Outcome: Progressing  Flowsheets (Taken 2/3/2023 0115)  Discharge to home or other facility with appropriate resources:   Identify barriers to discharge with patient and caregiver   Arrange for needed discharge resources and transportation as appropriate   Identify discharge learning needs (meds, wound care, etc)  2/3/2023 0047 by Bhumika Cody RN  Outcome: Progressing  Flowsheets (Taken 2/3/2023 0047)  Discharge to home or other facility with appropriate resources:   Arrange for interpreters to assist at discharge as needed   Arrange for needed discharge resources and transportation as appropriate   Identify discharge learning needs (meds, wound care, etc)   Identify barriers to discharge with patient and caregiver     Problem: Skin/Tissue Integrity  Goal: Absence of new skin breakdown  Description: 1. Monitor for areas of redness and/or skin breakdown  2. Assess vascular access sites hourly  3. Every 4-6 hours minimum:  Change oxygen saturation probe site  4. Every 4-6 hours:  If on nasal continuous positive airway pressure, respiratory therapy assess nares and determine need for appliance change or resting period.   2/3/2023 0115 by Bhumika Cody RN  Outcome: Progressing  Note: No new skin abnormalities    2/3/2023 0047 by Bhumika Cody RN  Outcome: Progressing  Note: No new skin abnormal skin     Problem: ABCDS Injury Assessment  Goal: Absence of physical injury  2/3/2023 0115 by Bhumika Cody RN  Outcome: Progressing  Flowsheets (Taken 2/3/2023 0115)  Absence of Physical Injury: Implement safety measures based on patient assessment  2/3/2023 0047 by Bhumika Cody RN  Outcome: Progressing     Problem: Skin/Tissue Integrity - Adult  Goal: Skin integrity remains intact  2/3/2023 0115 by Bhumika Cody RN  Outcome: Progressing  Flowsheets (Taken 2/3/2023 0115)  Skin Integrity Remains Intact:   Every 4-6 hours: If on nasal continuous positive airway pressure, respiratory therapy assesses nares and determine need for appliance change or resting period   Every 4-6 hours minimum: Change oxygen saturation probe site   Assess vascular access sites hourly   Monitor for areas of redness and/or skin breakdown  2/3/2023 0047 by Enriqueta Najera RN  Outcome: Progressing  Goal: Oral mucous membranes remain intact  2/3/2023 0115 by Enriqueta Najera RN  Outcome: Progressing  Flowsheets (Taken 2/3/2023 0115)  Oral Mucous Membranes Remain Intact:   Assess oral mucosa and hygiene practices   Implement preventative oral hygiene regimen  2/3/2023 0047 by Enriqueta Najera RN  Outcome: Progressing     Problem: Infection - Adult  Goal: Absence of infection at discharge  2/3/2023 0115 by Enriqueta Najera RN  Outcome: Progressing  Flowsheets (Taken 2/3/2023 0115)  Absence of infection at discharge:   Monitor all insertion sites i.e., indwelling lines, tubes and drains   Monitor lab/diagnostic results   Assess and monitor for signs and symptoms of infection   Administer medications as ordered  2/3/2023 0047 by Enriqueta Najera RN  Outcome: Progressing  Goal: Absence of infection during hospitalization  2/3/2023 0115 by Enriqueta Najera RN  Outcome: Progressing  Flowsheets (Taken 2/3/2023 0115)  Absence of infection during hospitalization:   Monitor lab/diagnostic results   Monitor all insertion sites i.e., indwelling lines, tubes and drains   Monitor endotracheal (as able) and nasal secretions for changes in amount and color   Assess and monitor for signs and symptoms of infection  2/3/2023 0047 by Enriqueta Najera RN  Outcome: Progressing  Goal: Absence of fever/infection during anticipated neutropenic period  2/3/2023 0115 by Enriqueta Najera RN  Outcome: Progressing  2/3/2023 0047 by Erniqueta Najera RN  Outcome: Progressing     Problem: Metabolic/Fluid and Electrolytes - Adult  Goal: Electrolytes maintained within normal limits  2/3/2023 0115 by Sarah Stacy RN  Outcome: Progressing  Flowsheets (Taken 2/3/2023 0115)  Electrolytes maintained within normal limits:   Monitor response to electrolyte replacements, including repeat lab results as appropriate   Administer electrolyte replacement as ordered   Monitor labs and assess patient for signs and symptoms of electrolyte imbalances   Fluid restriction as ordered  2/3/2023 0047 by Sarah Stacy RN  Outcome: Progressing  Goal: Hemodynamic stability and optimal renal function maintained  2/3/2023 0115 by Sarah Stacy RN  Outcome: Progressing  Flowsheets (Taken 2/3/2023 0115)  Hemodynamic stability and optimal renal function maintained:   Monitor urine specific gravity, serum osmolarity and serum sodium as indicated or ordered   Monitor intake, output and patient weight   Monitor labs and assess for signs and symptoms of volume excess or deficit   Monitor response to interventions for patient's volume status, including labs, urine output, blood pressure (other measures as available)  2/3/2023 0047 by Sarah Stacy RN  Outcome: Progressing  Goal: Glucose maintained within prescribed range  2/3/2023 0115 by Sarah Stacy RN  Outcome: Progressing  Flowsheets (Taken 2/3/2023 0115)  Glucose maintained within prescribed range:   Assess barriers to adequate nutritional intake and initiate nutrition consult as needed   Administer ordered medications to maintain glucose within target range   Assess for signs and symptoms of hyperglycemia and hypoglycemia   Monitor blood glucose as ordered  2/3/2023 0047 by Sarah Stacy RN  Outcome: Progressing     Problem: Hematologic - Adult  Goal: Maintains hematologic stability  2/3/2023 0115 by Sarah Stacy RN  Outcome: Progressing  Flowsheets (Taken 2/3/2023 0115)  Maintains hematologic stability:   Assess for signs and symptoms of bleeding or hemorrhage   Monitor labs for bleeding or clotting disorders  2/3/2023 0047 by Sarah Stacy RN  Outcome: Progressing     Problem: Pain  Goal: Verbalizes/displays adequate comfort level or baseline comfort level  2/3/2023 0115 by Gayathri Benoit RN  Outcome: Progressing  Flowsheets (Taken 2/3/2023 0115)  Verbalizes/displays adequate comfort level or baseline comfort level:   Consider cultural and social influences on pain and pain management   Implement non-pharmacological measures as appropriate and evaluate response   Administer analgesics based on type and severity of pain and evaluate response  2/3/2023 0047 by Gayathri Benoit RN  Outcome: Progressing     Problem: Chronic Conditions and Co-morbidities  Goal: Patient's chronic conditions and co-morbidity symptoms are monitored and maintained or improved  2/3/2023 0115 by Gayathri Benoit RN  Outcome: Progressing  Flowsheets (Taken 2/3/2023 0115)  Care Plan - Patient's Chronic Conditions and Co-Morbidity Symptoms are Monitored and Maintained or Improved:   Collaborate with multidisciplinary team to address chronic and comorbid conditions and prevent exacerbation or deterioration   Monitor and assess patient's chronic conditions and comorbid symptoms for stability, deterioration, or improvement   Update acute care plan with appropriate goals if chronic or comorbid symptoms are exacerbated and prevent overall improvement and discharge  2/3/2023 0047 by Gayathri Benoit RN  Outcome: Progressing     Problem: Safety - Adult  Goal: Free from fall injury  2/3/2023 0115 by Gayathri Benoit RN  Outcome: Progressing  Flowsheets (Taken 2/3/2023 0115)  Free From Fall Injury:   Instruct family/caregiver on patient safety   Based on caregiver fall risk screen, instruct family/caregiver to ask for assistance with transferring infant if caregiver noted to have fall risk factors  2/3/2023 0047 by Gayathri Benoit RN  Outcome: Progressing   Care plan reviewed with patient. Patient  verbalize understanding of the plan of care and contribute to goal setting.

## 2023-02-04 PROCEDURE — 6370000000 HC RX 637 (ALT 250 FOR IP): Performed by: INTERNAL MEDICINE

## 2023-02-04 PROCEDURE — 6370000000 HC RX 637 (ALT 250 FOR IP): Performed by: NURSE PRACTITIONER

## 2023-02-04 PROCEDURE — 97535 SELF CARE MNGMENT TRAINING: CPT

## 2023-02-04 PROCEDURE — 99232 SBSQ HOSP IP/OBS MODERATE 35: CPT | Performed by: NURSE PRACTITIONER

## 2023-02-04 PROCEDURE — 95992 CANALITH REPOSITIONING PROC: CPT

## 2023-02-04 PROCEDURE — 97530 THERAPEUTIC ACTIVITIES: CPT

## 2023-02-04 PROCEDURE — 97110 THERAPEUTIC EXERCISES: CPT

## 2023-02-04 PROCEDURE — 2060000000 HC ICU INTERMEDIATE R&B

## 2023-02-04 PROCEDURE — 6360000002 HC RX W HCPCS: Performed by: INTERNAL MEDICINE

## 2023-02-04 PROCEDURE — 97116 GAIT TRAINING THERAPY: CPT

## 2023-02-04 RX ORDER — DOCUSATE SODIUM 100 MG/1
100 CAPSULE, LIQUID FILLED ORAL DAILY
Status: DISCONTINUED | OUTPATIENT
Start: 2023-02-04 | End: 2023-02-09 | Stop reason: HOSPADM

## 2023-02-04 RX ADMIN — TRAZODONE HYDROCHLORIDE 50 MG: 50 TABLET ORAL at 20:10

## 2023-02-04 RX ADMIN — ENOXAPARIN SODIUM 40 MG: 100 INJECTION SUBCUTANEOUS at 20:10

## 2023-02-04 RX ADMIN — Medication 500 UNITS: at 08:28

## 2023-02-04 RX ADMIN — CLONAZEPAM 0.5 MG: 0.5 TABLET ORAL at 20:10

## 2023-02-04 RX ADMIN — CLOPIDOGREL BISULFATE 75 MG: 75 TABLET ORAL at 08:31

## 2023-02-04 RX ADMIN — ROSUVASTATIN CALCIUM 40 MG: 20 TABLET, FILM COATED ORAL at 20:10

## 2023-02-04 RX ADMIN — ASPIRIN 81 MG: 81 TABLET, COATED ORAL at 08:31

## 2023-02-04 RX ADMIN — Medication 1 TABLET: at 08:28

## 2023-02-04 RX ADMIN — DOCUSATE SODIUM 100 MG: 100 CAPSULE, LIQUID FILLED ORAL at 08:32

## 2023-02-04 ASSESSMENT — PAIN SCALES - GENERAL
PAINLEVEL_OUTOF10: 1
PAINLEVEL_OUTOF10: 0

## 2023-02-04 ASSESSMENT — PAIN DESCRIPTION - DESCRIPTORS: DESCRIPTORS: ACHING

## 2023-02-04 ASSESSMENT — PAIN DESCRIPTION - LOCATION: LOCATION: ARM

## 2023-02-04 ASSESSMENT — PAIN DESCRIPTION - ORIENTATION: ORIENTATION: RIGHT;LEFT

## 2023-02-04 ASSESSMENT — PAIN DESCRIPTION - PAIN TYPE: TYPE: ACUTE PAIN

## 2023-02-04 NOTE — PROGRESS NOTES
[address] [address]  INPATIENT PHYSICAL THERAPY  DAILY NOTE  [facility] ICU STEPDOWN TELEMETRY 4K - 4K-17/017-A    Time In: 1411  Time Out: 1449  Timed Code Treatment Minutes: 45 Minutes  Minutes: 38          Date: 2023  Patient Name: Peterson Castillo,  Gender:  female        MRN: [id number]  : 1935  (81 y.o.)     Referring Practitioner: Deborah Oseguera MD  Diagnosis: Esophageal dysphagia  Additional Pertinent Hx: Peterson Castillo is a 81 y.o. female who presents to Emergency Department with Fall and Other (Feels like something is stuck in throat). Patient is brought in by EMS for evaluation of fall x2 today. Patient says she fell in the kitchen and again in the dining room. Lives at home by herself. Patient states she had no idea why and how she fell. MRI shows small acute infarct in the right posterior frontal/anterior parietal cortex. Prior Level of Function:  Lives With: Alone  Type of Home: House  Home Layout: One level, Laundry in basement  Home Access: Stairs to enter with rails  Entrance Stairs - Number of Steps: 4  Home Equipment: Walker, rolling, Cane, Rollator, [employer]   Bathroom Shower/Tub: Tub/Shower unit  [employer]: Standard  Bathroom Equipment: Tub transfer bench, Grab bars in shower, Toilet raiser  Bathroom Accessibility: Accessible    Receives Help From: Family (check in on patient)  ADL Assistance: [address] [place] [address] Avenue: Independent  Ambulation Assistance: Independent  Transfer Assistance: Independent  Active : Yes  Additional Comments: patient used no AD prior to admit. Pt has 3 kids that can assist as needed    Restrictions/Precautions:  Restrictions/Precautions: Fall Risk, General Precautions     SUBJECTIVE: RN approved session. Pt pleasant and agreeable to therapy.      PAIN: 0/10: denies pain     Vitals: Vitals not assessed per clinical judgement, see nursing flowsheet    OBJECTIVE:  Bed Mobility:  Rolling to Left: Contact Guard Assistance   Supine to Sit: Contact Guard Assistance  Sit to Supine: Contact Guard Assistance     Transfers:  Sit to Stand: Contact Guard Assistance  Stand to Sit:Contact Guard Assistance    Ambulation:  Contact Guard Assistance  Distance: 30' and short distances in room   Surface: Level Tile  Device:Rolling Walker  Gait Deviations:  Slow Susu, Decreased Step Length Bilaterally, Decreased Arm Swing, Decreased Trunk Rotation, and Decreased Gait Speed    Balance:  Static Sitting Balance:  Supervision  Static Standing Balance: Stand By Assistance    Benign Paroxysmal Positional Vertigo (BPPV)    History of Falls: Yes; no injury  Diagnostic Testing: Yes; CTA neck:   Vertebral arteries: There is antegrade flow in the right and left vertebral arteries. VBI: negative bilaterally     Neck ROM: minimally limited    Vestibular Screening Tool (VST):   4/8  *>4/8 Predictive of vestibular dysfunction    Oculomotor Function Tests:  Smooth Pursuits: irregular   End range nystagmus: not present    Saccades: Normal, <2 eye movements   Head Thrust Test: not tested     POSITIONAL VERTIGO TESTING:  LOADED MAHOGANY SANDRA PIKE (Posterior/Anterior Canals)  Right Ear:  Positive  Left Ear:   N/A    ROLL TEST (Horizontal Canals)  Right Ear:  N/A  Left Ear:   N/A    MANEUVER PERFORMED: Epley Maneuver for R side     PATIENT EDUCATION: Pt educated on the pathophysiology of BPPV, treatment options, and benefits of physical therapy. No precautions required. *Referral to Outpatient Vestibular Physical Therapy Recommended*      Functional Outcome Measures: Completed  AM-PAC Inpatient Mobility without Stair Climbing Raw Score : 15  AM-PAC Inpatient without Stair Climbing T-Scale Score : 43.03    ASSESSMENT:  Assessment: Patient progressing toward established goals. Activity Tolerance:  Patient tolerance of  treatment: good.       Equipment Recommendations:Equipment Needed: No  Discharge Recommendations: Inpatient Rehabilitation  Plan: Current Treatment Recommendations: Strengthening, Balance training, Functional mobility training, Transfer training, Gait training, Stair training, Neuromuscular re-education, Patient/Caregiver education & training, Safety education & training, Therapeutic activities  General Plan:  (6x N)    Patient Education  Patient Education: Plan of Care, Precautions/Restrictions, Bed Mobility, Transfers, Gait, Health Promotion and Wellness Education    Goals:  Patient Goals : to get better  Short Term Goals  Time Frame for Short Term Goals: by discharge  Short Term Goal 1: Pt to transfer supine <--> sit mod I to enable pt to get in/out of bed. Short Term Goal 2: Pt to transfer sit <--> stand mod I for increased functional mobility. Short Term Goal 3: Pt to ambulate >100 feet without AD SBA for household ambulation. Short Term Goal 4: Pt to improve Tinetti score to 28/28 to minimize fall risk. Long Term Goals  Time Frame for Long Term Goals : NA due to short length of stay. Following session, patient left in safe position with all fall risk precautions in place.      Nadira Obrien ([employer]) PT, DPT

## 2023-02-04 NOTE — PROGRESS NOTES
[facility] ICU STEPDOWN TELEMETRY 4K  Occupational Therapy  Daily Note  Time:    Time In: 2653  Time Out: 1022  Timed Code Treatment Minutes: 28 Minutes  Minutes: 28          Date: 2023  Patient Name: Mamadou Haas,   Gender: female      Room: [facility]17/017-A  MRN: [id number]  : 1935  (81 y.o.)  Referring Practitioner: Boubacar Perez MD  Diagnosis: acute lacunar infarction  Additional Pertinent Hx: per chart review; Mamadou Haas is a 81 y.o. female who presents to Emergency Department with Fall and Other (Feels like something is stuck in throat)     Patient is brought in by EMS for evaluation of fall x2 today. Patient says she fell in the kitchen and again in the dining room. Lives at home by herself. Patient states she had no idea why and how she fell. She denies syncope. No fainting episodes. She denies generalized weakness. After the second fall, patient noticed it was hard for her to get up, she called her daughter who recommended she should call EMS. On arrival, patient was alert and oriented x 4. She complains of mild left hip pain. She denies LOC. No headache. She has been having foreign body sensation in throat since last night, but she was able to swallow a piece of bread this morning. No vomiting. No  fever or chills. No chest pain. No SOB. No abdominal pain. She has mild watery diarrhea. No urine symptoms  MRI 2/2 Small acute infarct in the right posterior frontal/anterior parietal cortex    Restrictions/Precautions:  Restrictions/Precautions: Fall Risk, General Precautions      SUBJECTIVE: agreeable to session    PAIN: 0/10: no c/o pain during session    Vitals: Heart Rate: 78-82    COGNITION: Decreased Insight and Decreased Safety Awareness    ADL:   Grooming: Stand By Assistance. Stood at sink to brush teeth  Footwear Management: with set-up. Donned slippers while seated in recliner . BALANCE:  Sitting Balance:  Supervision.     Standing Balance: Stand By Assistance. Static, occasional CGA with dynamic    BED MOBILITY:  Not Tested    TRANSFERS:  Sit to Stand:  Contact Guard Assistance. Stand to Sit: Stand By Assistance. **vcs for safety in backing self up to recliner better    FUNCTIONAL MOBILITY:  Assistive Device: Rolling Walker  Assist Level:  Contact Guard Assistance. Distance: To and from bathroom and + 30ft in hallway    **noted Pt walking to the right of of walker instead of in the middle, vcs for awareness      ASSESSMENT:     Activity Tolerance:  Patient tolerance of  treatment: fair. Discharge Recommendations: Inpatient Rehabilitation  Equipment Recommendations: Equipment Needed: No  Plan: Times Per Week: 5-6x  Times Per Day: Once a day  Current Treatment Recommendations: Strengthening, Balance training, Functional mobility training, Endurance training, Safety education & training, Neuromuscular re-education, Patient/Caregiver education & training, Self-Care / ADL, Coordination training    Patient Education  Patient Education:  walker safety    Goals  Short Term Goals  Time Frame for Short Term Goals: by discharge  Short Term Goal 1: patient will tolerate 5 min functional standing with two hand release with (S) to increase ease with toileting and grooming. Short Term Goal 2: patient will functionally ambulate to/from BR with least restrictive device with SBA. Short Term Goal 3: patient will complete ADL routine with SBA and 0-1 cues for safety and sequencing. Short Term Goal 4: patient will participate in moderate resistive UB exer to increase UB strength for functional transfers. Following session, patient left in safe position with all fall risk precautions in place.

## 2023-02-04 NOTE — PROGRESS NOTES
Received report from night shift primary nurse, Jyoti, and day shift primary nurse, Pastora Ch.  NS/RSC George Fitzpatrick

## 2023-02-04 NOTE — PROGRESS NOTES
Progress note      [facility]             Patient:  Reji Craft  YOB: 1935    MRN: [id number]   Acct:  [id number]   [id number]  Primary Care Physician: EDUARDO Monzon - CNP    Admit Date: 2/2/2023           Subjective: Pt in chair talking on phone. Pt stated she wants her glucosamine that she takes at home due to helping her knee pain. Pt denies any other needs at this time. Objective:      Physical Exam:    Vitals:Patient Vitals for the past 24 hrs:   BP Temp Temp src Pulse Resp SpO2   02/04/23 1130 (!) 108/54 97.8 °F (36.6 °C) Oral 70 16 96 %   02/04/23 0830 (!) 116/55 98.1 °F (36.7 °C) Oral 67 20 95 %   02/04/23 0308 (!) 98/55 97.9 °F (36.6 °C) Oral 69 16 95 %   02/04/23 0000 125/75 98.5 °F (36.9 °C) Axillary 70 -- --   02/03/23 2000 127/69 98.3 °F (36.8 °C) Oral 73 -- --   02/03/23 1600 137/67 98.5 °F (36.9 °C) Oral 69 18 93 %     Weight: Weight: 119 lb 4.3 oz (54.1 kg)     24 hour intake/output:  Intake/Output Summary (Last 24 hours) at 2/4/2023 1436  Last data filed at 2/4/2023 1308  Gross per 24 hour   Intake 560 ml   Output --   Net 560 ml       General appearance - alert, well appearing, and in no distress  Eyes - pupils equal and reactive, extraocular eye movements intact  Mouth - mucous membranes moist, pharynx normal without lesions  Neck - supple, no significant adenopathy  Chest - clear to auscultation, no wheezes, rales or rhonchi, symmetric air entry  Heart - normal rate, regular rhythm, normal S1, S2, no murmurs, rubs, clicks or gallops  Abdomen - soft, nontender, nondistended, no masses or organomegaly, pos bs.   Neurological - alert, oriented, normal speech, no focal findings or movement disorder noted  Musculoskeletal - no joint tenderness, deformity or swelling  Extremities - peripheral pulses normal, no pedal edema, no clubbing or cyanosis  Skin - normal coloration and turgor, no rashes, no suspicious skin lesions noted    Review of Labs and Diagnostic Testing:    CBC:   Recent Labs     02/03/23  0342   WBC 7.2   HGB 11.1*   HCT 33.9*   MCV 96.6        BMP:   Recent Labs     02/02/23  1200      K 4.1      CO2 26   BUN 15   CREATININE 0.5   CALCIUM 9.3   GLUCOSE 108     PT/INR: No results for input(s): PROTIME, INR in the last 72 hours. APTT: No results for input(s): APTT in the last 72 hours. Lipids:   Recent Labs     02/02/23  1200   ALKPHOS 49   ALT 13   AST 20   BILITOT 0.4   BILIDIR <0.2   LABALBU 4.3     Troponin: No results for input(s): TROPONINT in the last 72 hours. Imaging:  [unfilled]    EKG:      Diet: ADULT DIET; Regular; Low Sodium (2 gm)  ADULT ORAL NUTRITION SUPPLEMENT; Breakfast, Dinner; Standard 4 oz Oral Supplement        Data:   Scheduled Meds: Scheduled Meds:   docusate sodium  100 mg Oral Daily    clopidogrel  75 mg Oral Daily    clonazePAM  0.5 mg Oral Nightly    Vitamin D  500 Units Oral Daily    multivitamin  1 tablet Oral Daily    enoxaparin  40 mg SubCUTAneous Nightly    aspirin  81 mg Oral Daily    Or    aspirin  300 mg Rectal Daily    rosuvastatin  40 mg Oral Nightly    traZODone  50 mg Oral Nightly     Continuous Infusions:  PRN Meds:.polyethylene glycol, polyvinyl alcohol, ondansetron **OR** ondansetron, perflutren lipid microspheres, bisacodyl, acetaminophen  Continuous Infusions:      Assessment   acute infarct in the right posterior frontal/anterior parietal cortex   HLD, target LDL < 70     ECHO-  Normal left ventricle size and systolic function. Ejection fraction was  estimated at 60 %. There were no regional left ventricular wall motion  abnormalities and wall thickness was within normal limits. Doppler parameters were consistent with abnormal left ventricular  relaxation (grade 1 diastolic dysfunction). The left atrium is Mildly dilated. Mild aortic regurgitation is noted.      Plan   Home medication resumed  Await inpt rehab  PTOT      Electronically signed by EDUARDO Lopez CNP on 2/4/2023 at 2:36 PM    Assessment and plan of care discussed with supervising physician, Dr Shey Molina.    Pt seen and examined by me  D/w Constance Richter PT OT  Pt is awkae   Neuro able to follow instructions    Electronically signed by Afshin Ohara MD on 2/4/2023 at 5:23 PM

## 2023-02-04 NOTE — PROGRESS NOTES
Neurology Progress Note    Date:2/4/2023       GFSY:[id number]  Patient Cici Bonilla     YOB: 1935     Age:87 y.o. Requesting Physician: Gian Werner MD     Reason for Consult:  Evaluate for CVA      Chief Complaint:   Chief Complaint   Patient presents with    Fall    Other     Feels like something is stuck in throat       Subjective     Rizwan Pena is a 81 y.o. female with a history of anxiety, glaucoma, DVT was on Xarelto approximately 4 years ago who presents to [address] [place] [address] on 2/2/2020 2:23 falls at home. The patient reports that upon waking up at 9:30 in the morning she had felt as if something was stuck in her throat so she had went into the kitchen and tried to eat a piece of bread and taken aspirin as she was worried she was having something happening with her heart. She walked out of the kitchen and dropped without a warning hitting her head on the stove. She then got up and started walking again and dropped again hitting her head on the cupboard. She denies any prodromal symptoms such as vision changes, lightheadedness, dizziness, chest pain, shortness of breath. She denies any loss of consciousness and reports that she did not trip, but rather just drop to the ground. She denies any history of seizures, urinary incontinence, bowel incontinence, or tongue biting. After her second fall she is having difficulty getting up so she crawled to the bathroom because she had to have a bowel movement and after this she called her daughter who then told her to call 911. She did not check her blood sugar or blood pressure at that time. She denies any history of recent falls, but reports approximately 3 years ago she had fallen 7 times in 1 year but this was due to more mechanical falls. She does not use any assistive device for walking at home. While in the emergency department a CT head was completed and showed an acute infarction right insula.   CT angiogram head and neck negative for any hemodynamically significant stenosis. She denies any history of stroke, atrial fibrillation, or smoking. She does report at one point when she was trying to get sit up in bed from lying in the emergency department that she had started to feel dizzy as if the room was spinning. She denies any weakness, numbness, speech difficulty, vision changes, or headache. Interval history 2/4/23:  No acute events overnight. Patient denies any new concerns. She is sitting up in the chair this morning. Review of Systems   Review of Systems   Eyes:  Negative for visual disturbance. Neurological:  Positive for dizziness. Negative for syncope, facial asymmetry, speech difficulty, weakness, light-headedness, numbness and headaches. Psychiatric/Behavioral:  Negative for confusion. Medications   Scheduled Meds:    docusate sodium  100 mg Oral Daily    clopidogrel  75 mg Oral Daily    clonazePAM  0.5 mg Oral Nightly    Vitamin D  500 Units Oral Daily    multivitamin  1 tablet Oral Daily    enoxaparin  40 mg SubCUTAneous Nightly    aspirin  81 mg Oral Daily    Or    aspirin  300 mg Rectal Daily    rosuvastatin  40 mg Oral Nightly    traZODone  50 mg Oral Nightly     Continuous Infusions:       PRN Meds: polyethylene glycol, polyvinyl alcohol, ondansetron **OR** ondansetron, perflutren lipid microspheres, bisacodyl, acetaminophen  Medications Prior to Admission:   No current facility-administered medications on file prior to encounter.      Current Outpatient Medications on File Prior to Encounter   Medication Sig Dispense Refill    polyvinyl alcohol (LIQUIFILM TEARS) 1.4 % ophthalmic solution 1 drop as needed      latanoprost (XALATAN) 0.005 % ophthalmic solution 1 drop nightly (Patient not taking: Reported on 2/2/2023)      ibuprofen (ADVIL;MOTRIN) 400 MG tablet Take 1.5 tablets by mouth every 6 hours as needed for Pain 12 tablet 0    vitamin D (CHOLECALCIFEROL) 400 UNITS TABS tablet Take 400 Units by mouth daily. clonazePAM (KLONOPIN) 0.5 MG tablet Take 0.5 mg by mouth 2 times daily as needed. fluticasone (FLONASE) 50 MCG/ACT nasal spray 1 spray by Nasal route daily. Glucosamine Sulfate 750 MG TABS Take 1 tablet by mouth daily. therapeutic multivitamin-minerals (THERAGRAN-M) tablet Take 1 tablet by mouth daily. fish oil-omega-3 fatty acids 1000 MG capsule Take 2 g by mouth daily. magnesium 30 MG tablet Take 30 mg by mouth 2 times daily. Zinc 50 MG CAPS Take  by mouth. (Patient not taking: Reported on 2023)      DHA-EPA-Coenzyme Q10-Vitamin E (COQ-10 & FISH OIL PO) Take  by mouth. (Patient not taking: Reported on 2023)      polyethylene glycol (GLYCOLAX) packet Take 17 g by mouth daily as needed. Past History    Past Medical History:   has a past medical history of Anxiety, Glaucoma, bilateral, Osteopenia, and Osteoporosis. Social History:   reports that she has never smoked. She has never used smokeless tobacco. She reports that she does not currently use alcohol. She reports that she does not use drugs. Family History:   Family History   Problem Relation Age of Onset    Heart Failure Mother     Diabetes Mother     Heart Disease Father     Bipolar Disorder Father     Personality Disorder Father     Anxiety Disorder Father     Anxiety Disorder Sister     Blindness Sister     Depression Sister     Anxiety Disorder Sister     Vision Loss Sister     Other Brother         Rheumatic fever    Heart Disease Brother     COPD Brother     Alcohol Abuse Maternal Grandfather     Alcohol Abuse Paternal Grandfather        Physical Examination      Vitals:  BP (!) 98/55   Pulse 69   Temp 97.9 °F (36.6 °C) (Oral)   Resp 16   Ht 4' 10\" (1.473 m)   Wt 119 lb 4.3 oz (54.1 kg)   SpO2 95%   BMI 24.93 kg/m²   Temp (24hrs), Av.3 °F (36.8 °C), Min:97.9 °F (36.6 °C), Max:98.5 °F (36.9 °C)      I/O (24Hr):     Intake/Output Summary (Last 24 hours) at 2/4/2023 0725  Last data filed at 2/4/2023 0308  Gross per 24 hour   Intake 200 ml   Output --   Net 200 ml           Physical Exam  Vitals reviewed. Constitutional:       General: She is not in acute distress. Appearance: Normal appearance. She is not ill-appearing. HENT:      Head: Normocephalic and atraumatic. Right Ear: External ear normal.      Left Ear: External ear normal.      Nose: Nose normal.      Mouth/Throat:      Mouth: Mucous membranes are moist.      Pharynx: No oropharyngeal exudate or posterior oropharyngeal erythema. Eyes:      Extraocular Movements: EOM normal.      Pupils: Pupils are equal, round, and reactive to light. Cardiovascular:      Rate and Rhythm: Normal rate. Pulmonary:      Effort: Pulmonary effort is normal. No respiratory distress. Musculoskeletal:         General: Normal range of motion. Right lower leg: No edema. Left lower leg: No edema. Skin:     General: Skin is warm. Findings: No rash. Neurological:      Mental Status: She is alert and oriented to person, place, and time. Coordination: Finger-Nose-Finger Test and Heel to [employer] normal.   Psychiatric:         Mood and Affect: Mood normal.         Speech: Speech normal.         Behavior: Behavior normal.     Neurologic Exam     Mental Status   Oriented to person, place, and time. Attention: normal. Concentration: normal.   Speech: speech is normal   Level of consciousness: alert  Knowledge: good. Normal comprehension. Cranial Nerves     CN II   Visual fields full to confrontation. CN III, IV, VI   Pupils are equal, round, and reactive to light. Extraocular motions are normal.   Right pupil: Size: 3 mm. Shape: regular. Reactivity: brisk. Left pupil: Size: 3 mm. Shape: regular. Reactivity: brisk. CN V   Facial sensation intact. CN VII   Facial expression full, symmetric.      CN VIII   CN VIII normal.     CN IX, X   CN IX normal.   CN X normal.   Palate: symmetric    CN XI   CN XI normal.   Right trapezius strength: normal  Left trapezius strength: normal    CN XII   CN XII normal.   Tongue deviation: none    Motor Exam   Overall muscle tone: normal  Right arm pronator drift: absent  Left arm pronator drift: absent    BUE: 5/5  BLE: 5/5     Sensory Exam   Light touch normal.     Gait, Coordination, and Reflexes     Coordination   Finger to nose coordination: normal  Heel to shin coordination: normal    Tremor   Resting tremor: absent  Intention tremor: absent  Action tremor: absent     Labs/Imaging/Diagnostics   Labs:  CBC:  Recent Labs     02/02/23  1200 02/03/23  0342   WBC 7.7 7.2   RBC 3.82* 3.51*   HGB 12.2 11.1*   HCT 36.5* 33.9*   MCV 95.5 96.6    208       CHEMISTRIES:  Recent Labs     02/02/23  1200      K 4.1      CO2 26   BUN 15   CREATININE 0.5   GLUCOSE 108       COAGULATION STUDIES:No results for input(s): PROTIME, INR, APTT in the last 72 hours. LIVER PROFILE:  Recent Labs     02/02/23  1200   AST 20   ALT 13   BILIDIR <0.2   BILITOT 0.4   ALKPHOS 49       CHOLESTEROL AND A1C:  Recent Labs     02/03/23  0342   LDLCALC 80   HDL 48   CHOL 154   TRIG 129   LABA1C 6.1        Imaging Last 24 Hours:  CTA HEAD W WO CONTRAST    Result Date: 2/2/2023  PROCEDURE: CTA NECK W WO CONTRAST, CTA HEAD W WO CONTRAST CLINICAL INFORMATION: Small lacunar infarction right insula. COMPARISON: CT scan of the brain obtained on the same day. TECHNIQUE: 1 mm axial images were obtained through the head and neck after the fast bolus administration of contrast. A noncontrast localizer was obtained. 3-D reconstructions were performed on a dedicated 3-D workstation. These include multiplanar MPR images and multiplanar MIP images. Centerline reconstructions were obtained of the carotid systems. Isovue intravenous contrast was given. All carotid artery measurements are performed utilizing Nascet criteria.  All CT scans at this facility use dose modulation, iterative reconstruction, and/or weight-based dosing when appropriate to reduce radiation dose to as low as reasonably achievable. FINDINGS: CTA NECK:  Aortic arch and branches: There is atherosclerotic calcification in the aortic arch and at the origins of the left subclavian and left common carotid arteries. Right common carotid artery/ICA: There is no significant hemodynamic stenosis involving the right common and internal carotid arteries Left common carotid artery/ICA: There is no significant hemodynamic stenosis involving the left common and internal carotid arteries. Vertebral arteries: There is antegrade flow in the right and left vertebral arteries. CTA HEAD: Internal carotid arteries: There is antegrade flow in the internal carotid arteries bilaterally. . Middle cerebral arteries: There is antegrade flow in the middle cerebral arteries bilaterally. Bobetta Batsheva Anterior cerebral arteries: There is antegrade flow in the anterior cerebral arteries bilaterally. Vertebral arteries: There is antegrade flow in the right and left vertebral arteries. Basilar artery: There is antegrade flow in the basilar artery. . Superior cerebellar arteries: There is antegrade flow in the superior cerebellar arteries bilaterally. Bobetta Batsheva Posterior cerebral arteries: There is a hypoplastic P1 segment of the right posterior cerebral artery. There is fetal origin of the right posterior cerebral artery. There is a patent posterior communicating artery which helps supply the left posterior cerebral artery. No aneurysms, stenoses or occlusions are noted. The superior sagittal sinus, vein of Enrike, internal cerebral veins, straight sinus, transverse sinuses and sigmoid sinuses are patent. Axial source data: There is old granulomatous disease in the subcarinal space. There is  atelectasis or scarring in the right upper lobe posteriorly. There is cervical spondylosis     1.  There is no significant hemodynamic stenosis in the right and left common and internal carotid arteries. 2. There is antegrade flow in the right and left vertebral arteries. 3. There is atherosclerotic calcification in the aortic arch and at the origin of the left subclavian and left common carotid arteries. 4. There is a hypoplastic P1 segment of the right posterior cerebral artery. There is fetal origin of the right posterior cerebral artery. 5. There is a patent left posterior communicating artery which helps supply the left posterior cerebral artery. 6. Otherwise negative CTA of the brain. 7. Diffusion MRI scan would be helpful for better evaluation in this patient. **This report has been created using voice recognition software. It may contain minor errors which are inherent in voice recognition technology. ** Final report electronically signed by DR Azael Patricio on 2/2/2023 3:20 PM    CT HEAD WO CONTRAST    Result Date: 2/2/2023  PROCEDURE: CT HEAD WO CONTRAST CLINICAL INFORMATION: fall. COMPARISON: No prior study. TECHNIQUE: Noncontrast 5 mm axial images were obtained through the brain. Sagittal and coronal reconstructions were obtained. All CT scans at this facility use dose modulation, iterative reconstruction, and/or weight-based dosing when appropriate to reduce radiation dose to as low as reasonably achievable. FINDINGS: 7 mm hypodense lesion in the right insula suggesting small lacunar infarct. There is no hemorrhage. There are no intra-or extra-axial collections. There is no hydrocephalus, midline shift or mass effect. The gray-white matter differentiation is preserved. Mild generalized cerebral volume loss  The paranasal sinuses and mastoid air cells are normally aerated. There is no suspicious calvarial abnormality. Small lacunar infarction right insula. **This report has been created using voice recognition software. It may contain minor errors which are inherent in voice recognition technology. ** Final report electronically signed by Dr. Clinton Ortiz on 2/2/2023 1:27 PM    CTA NECK W WO CONTRAST    Result Date: 2/2/2023  PROCEDURE: CTA NECK W WO CONTRAST, CTA HEAD W WO CONTRAST CLINICAL INFORMATION: Small lacunar infarction right insula. COMPARISON: CT scan of the brain obtained on the same day. TECHNIQUE: 1 mm axial images were obtained through the head and neck after the fast bolus administration of contrast. A noncontrast localizer was obtained. 3-D reconstructions were performed on a dedicated 3-D workstation. These include multiplanar MPR images and multiplanar MIP images. Centerline reconstructions were obtained of the carotid systems. Isovue intravenous contrast was given. All carotid artery measurements are performed utilizing Nascet criteria. All CT scans at this facility use dose modulation, iterative reconstruction, and/or weight-based dosing when appropriate to reduce radiation dose to as low as reasonably achievable. FINDINGS: CTA NECK:  Aortic arch and branches: There is atherosclerotic calcification in the aortic arch and at the origins of the left subclavian and left common carotid arteries. Right common carotid artery/ICA: There is no significant hemodynamic stenosis involving the right common and internal carotid arteries Left common carotid artery/ICA: There is no significant hemodynamic stenosis involving the left common and internal carotid arteries. Vertebral arteries: There is antegrade flow in the right and left vertebral arteries. CTA HEAD: Internal carotid arteries: There is antegrade flow in the internal carotid arteries bilaterally. . Middle cerebral arteries: There is antegrade flow in the middle cerebral arteries bilaterally. Allena Closs Anterior cerebral arteries: There is antegrade flow in the anterior cerebral arteries bilaterally. Vertebral arteries: There is antegrade flow in the right and left vertebral arteries. Basilar artery: There is antegrade flow in the basilar artery. . Superior cerebellar arteries:  There is antegrade flow in the superior cerebellar arteries bilaterally. Lance Partida Posterior cerebral arteries: There is a hypoplastic P1 segment of the right posterior cerebral artery. There is fetal origin of the right posterior cerebral artery. There is a patent posterior communicating artery which helps supply the left posterior cerebral artery. No aneurysms, stenoses or occlusions are noted. The superior sagittal sinus, vein of Enrike, internal cerebral veins, straight sinus, transverse sinuses and sigmoid sinuses are patent. Axial source data: There is old granulomatous disease in the subcarinal space. There is  atelectasis or scarring in the right upper lobe posteriorly. There is cervical spondylosis     1. There is no significant hemodynamic stenosis in the right and left common and internal carotid arteries. 2. There is antegrade flow in the right and left vertebral arteries. 3. There is atherosclerotic calcification in the aortic arch and at the origin of the left subclavian and left common carotid arteries. 4. There is a hypoplastic P1 segment of the right posterior cerebral artery. There is fetal origin of the right posterior cerebral artery. 5. There is a patent left posterior communicating artery which helps supply the left posterior cerebral artery. 6. Otherwise negative CTA of the brain. 7. Diffusion MRI scan would be helpful for better evaluation in this patient. **This report has been created using voice recognition software. It may contain minor errors which are inherent in voice recognition technology. ** Final report electronically signed by DR Paul Burks on 2/2/2023 3:20 PM    CT CERVICAL SPINE WO CONTRAST    Result Date: 2/2/2023  PROCEDURE: CT CERVICAL SPINE WO CONTRAST CLINICAL INFORMATION: fall, neck FB sensation, Trauma . TECHNIQUE: 3 mm CT scans of the cervical spine with sagittal and coronal reconstructions.  All CT scans at this facility use dose modulation, iterative reconstruction, and/or weight-based dosing when appropriate to reduce radiation dose to as low as reasonably achievable. COMPARISON: No prior study. FINDINGS: There is no fracture or acute subluxation. There is disc space narrowing and degenerative change at every level with the exception of C2-3. There is no significant foraminal or central encroachment at any level. No precervical soft tissue swelling is seen. No acute process **This report has been created using voice recognition software. It may contain minor errors which are inherent in voice recognition technology. ** Final report electronically signed by Dr. Mitzy José on 2/2/2023 1:35 PM    XR CHEST 1 VIEW    Result Date: 2/2/2023  PROCEDURE: XR CHEST 1 VIEW CLINICAL INFORMATION: fall . TECHNIQUE: AP upright COMPARISON: 11/20/2021 FINDINGS: Heart, mediastinal and hilar contours are within normal limits. No infiltrates or effusions. Vessels are not congested. EKG leads overlie the chest.     No acute cardiopulmonary disease **This report has been created using voice recognition software. It may contain minor errors which are inherent in voice recognition technology. ** Final report electronically signed by Dr. Mitzy José on 2/2/2023 1:24 PM    MRI LIMITED BRAIN    Result Date: 2/2/2023  PROCEDURE: MRI LIMITED BRAIN CLINICAL INFORMATION Small lacunar infarction right insula, fall x 2. COMPARISON: CT scan of the brain obtained on the same day. . TECHNIQUE: A limited MRI scan was carried out through the brain. FINDINGS: The diffusion-weighted images demonstrate a small area of restricted diffusion in the right posterior frontal/anterior parietal cortex. There is diminished signal intensity on the ADC map consistent with an acute infarct. .  The brain volume is slightly reduced. There is a mild amount of signal hyperintensity on the FLAIR and T2-weighted sequences in the white matter of the brain. This is consistent with mild severity chronic small vessel ischemic changes. There are no intra-or extra-axial collections.   There is no hydrocephalus, midline shift or mass effect. The major intracranial vascular flow voids are present. There is increased signal intensity in the ethmoid air cells and left mastoid tip consistent with inflammatory changes. 1. Small acute infarct in the right posterior frontal/anterior parietal cortex 2. Mild atrophy and probable ischemic changes in the white matter. 3. Mild inflammatory changes in ethmoid air cells bilaterally and left mastoid tip. **This report has been created using voice recognition software. It may contain minor errors which are inherent in voice recognition technology. ** Final report electronically signed by DR Frederic Mcintyre on 2/2/2023 4:27 PM    XR HIP 2-3 VW W PELVIS LEFT    Result Date: 2/2/2023  PROCEDURE: XR HIP 2-3 VW W PELVIS LEFT CLINICAL INFORMATION: fall, left hip pain . TECHNIQUE: AP and frog leg projections left hip AP pelvis COMPARISON: No prior study. FINDINGS: Pelvis is rotated. No fracture or bone destruction is identified. Right SI joint is intact left not well assessed due to the obliquity. No soft tissue abnormality. No acute process **This report has been created using voice recognition software. It may contain minor errors which are inherent in voice recognition technology. ** Final report electronically signed by Dr. 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 CTA of head and neck revealed no hemodynamically significant stenosis. No need for carotid ultrasound. MRI of brain without contrast reveals acute small infarct in the right posterior frontal/anterior parietal cortex, full read above  2D echocardiogram completed, EF 60%, no PFO noted. Mild aortic regurgitation.   Stat CT head is needed if the patient develops new-onset altered mental status, a severe headache, or new-onset neurologic deficit  Risk factors and medications  Blood pressure goal: Less than 130/80  Keep well hydrated. Initiate normal saline at 75 ml/hr as needed. Antithrombotics: aspirin 81 mg daily and Plavix 75 mg daily for 21 days. Then aspirin 81 mg daily indefinitely  HgbA1C 6.1  LDL 80 LDL goal of 45-70. Start atorvastatin 40mg daily  Smoking and alcohol cessation when applicable. Provide stroke eduction for individualized risk factors. EKG/telemetry to monitor for atrial fibrillation  Core stroke metrics  PT/OT/SLP consult. IPR consult if applicable. DVT prophylaxis: Lovenox  NIHSS every shift. Neuro checks per unit unless otherwise specified. Pre-morbid Modified [place] Scale: 1 - No significant disability: despite symptoms, able to carry out all usual duties and activities. 30-day cardiac event monitor upon discharge  Follow up with outpatient general neurology Dr. Junior Baltazar in 2-4 weeks. No further work-up or recommendations per neurology, we will sign off at this time. Please call with any questions or if we can be of additional assistance. Thank you for this consult. This was discussed with Dr. Beata Mcclain and he is in agreement with the assessment and plan.     Electronically signed by EDUARDO Reveles CNP on 2/4/23 at 12:29 PM EST

## 2023-02-05 PROCEDURE — 6370000000 HC RX 637 (ALT 250 FOR IP): Performed by: NURSE PRACTITIONER

## 2023-02-05 PROCEDURE — 6370000000 HC RX 637 (ALT 250 FOR IP): Performed by: INTERNAL MEDICINE

## 2023-02-05 PROCEDURE — 2060000000 HC ICU INTERMEDIATE R&B

## 2023-02-05 PROCEDURE — 6360000002 HC RX W HCPCS: Performed by: INTERNAL MEDICINE

## 2023-02-05 RX ADMIN — ASPIRIN 81 MG: 81 TABLET, COATED ORAL at 07:55

## 2023-02-05 RX ADMIN — ENOXAPARIN SODIUM 40 MG: 100 INJECTION SUBCUTANEOUS at 20:00

## 2023-02-05 RX ADMIN — POLYETHYLENE GLYCOL 3350 17 G: 17 POWDER, FOR SOLUTION ORAL at 19:59

## 2023-02-05 RX ADMIN — ACETAMINOPHEN 650 MG: 325 TABLET ORAL at 07:55

## 2023-02-05 RX ADMIN — CLOPIDOGREL BISULFATE 75 MG: 75 TABLET ORAL at 07:55

## 2023-02-05 RX ADMIN — Medication 1 TABLET: at 07:55

## 2023-02-05 RX ADMIN — ROSUVASTATIN CALCIUM 40 MG: 20 TABLET, FILM COATED ORAL at 20:00

## 2023-02-05 RX ADMIN — Medication 500 UNITS: at 07:55

## 2023-02-05 RX ADMIN — DOCUSATE SODIUM 100 MG: 100 CAPSULE, LIQUID FILLED ORAL at 07:55

## 2023-02-05 RX ADMIN — CLONAZEPAM 0.5 MG: 0.5 TABLET ORAL at 20:00

## 2023-02-05 ASSESSMENT — PAIN SCALES - GENERAL
PAINLEVEL_OUTOF10: 0
PAINLEVEL_OUTOF10: 7
PAINLEVEL_OUTOF10: 0

## 2023-02-05 ASSESSMENT — PAIN DESCRIPTION - LOCATION: LOCATION: HEAD;BACK

## 2023-02-05 NOTE — PLAN OF CARE
Problem: Discharge Planning  Goal: Discharge to home or other facility with appropriate resources  2/5/2023 0915 by Annamarie Walls RN  Outcome: Progressing  Flowsheets (Taken 2/4/2023 2001 by Luis Pastrana, RN)  Discharge to home or other facility with appropriate resources:   Identify barriers to discharge with patient and caregiver   Arrange for needed discharge resources and transportation as appropriate   Identify discharge learning needs (meds, wound care, etc)  2/4/2023 2233 by Luis Pastrana, RN  Outcome: Progressing  Flowsheets (Taken 2/4/2023 2001)  Discharge to home or other facility with appropriate resources:   Identify barriers to discharge with patient and caregiver   Arrange for needed discharge resources and transportation as appropriate   Identify discharge learning needs (meds, wound care, etc)     Problem: Skin/Tissue Integrity  Goal: Absence of new skin breakdown  Description: 1. Monitor for areas of redness and/or skin breakdown  2. Assess vascular access sites hourly  3. Every 4-6 hours minimum:  Change oxygen saturation probe site  4. Every 4-6 hours:  If on nasal continuous positive airway pressure, respiratory therapy assess nares and determine need for appliance change or resting period. 2/5/2023 0915 by Annamarie Walls RN  Outcome: Progressing  2/4/2023 2233 by Luis Pastrana RN  Outcome: Progressing  Note: No s/s of new skin breakdown. Will continue to monitor.       Problem: ABCDS Injury Assessment  Goal: Absence of physical injury  2/5/2023 0915 by Annamarie Walls RN  Outcome: Progressing  2/4/2023 2233 by Luis Pastrana RN  Outcome: Progressing  Flowsheets (Taken 2/4/2023 2233)  Absence of Physical Injury: Implement safety measures based on patient assessment     Problem: Skin/Tissue Integrity - Adult  Goal: Skin integrity remains intact  2/5/2023 0915 by Annamarie Walls RN  Outcome: Progressing  Flowsheets (Taken 2/4/2023 2237 by Luis Pastrana, RN)  Skin Integrity Remains Intact: Monitor for areas of redness and/or skin breakdown  2/4/2023 2233 by Iván Lewis RN  Outcome: Progressing  Flowsheets  Taken 2/4/2023 2233  Skin Integrity Remains Intact: Monitor for areas of redness and/or skin breakdown  Taken 2/4/2023 2001  Skin Integrity Remains Intact: Monitor for areas of redness and/or skin breakdown     Problem: Skin/Tissue Integrity - Adult  Goal: Oral mucous membranes remain intact  2/5/2023 0915 by Tommy Bhatti RN  Outcome: Progressing  Flowsheets (Taken 2/4/2023 2237 by Iván Lewis RN)  Oral Mucous Membranes Remain Intact:   Assess oral mucosa and hygiene practices   Implement oral medicated treatments as ordered   Implement preventative oral hygiene regimen     Problem: Infection - Adult  Goal: Absence of infection at discharge  2/5/2023 0915 by Tommy Bhatti RN  Outcome: Progressing  Flowsheets (Taken 2/4/2023 2233 by Iván Lewis RN)  Absence of infection at discharge:   Assess and monitor for signs and symptoms of infection   Monitor lab/diagnostic results   Monitor all insertion sites i.e., indwelling lines, tubes and drains  2/4/2023 2233 by Iván Lewis RN  Outcome: Progressing  Flowsheets  Taken 2/4/2023 2233  Absence of infection at discharge:   Assess and monitor for signs and symptoms of infection   Monitor lab/diagnostic results   Monitor all insertion sites i.e., indwelling lines, tubes and drains  Taken 2/4/2023 2001  Absence of infection at discharge:   Assess and monitor for signs and symptoms of infection   Monitor lab/diagnostic results   Monitor all insertion sites i.e., indwelling lines, tubes and drains     Problem: Metabolic/Fluid and Electrolytes - Adult  Goal: Electrolytes maintained within normal limits  2/5/2023 0915 by Tommy Bhatti RN  Outcome: Progressing  2/4/2023 2233 by Iván Lewis RN  Outcome: Progressing  Flowsheets (Taken 2/4/2023 2001)  Electrolytes maintained within normal limits:   Monitor labs and assess patient for signs and symptoms of electrolyte imbalances   Administer electrolyte replacement as ordered   Monitor response to electrolyte replacements, including repeat lab results as appropriate     Problem: Neurosensory - Adult  Goal: Achieves stable or improved neurological status  2/5/2023 0915 by Romayne Maltos, RN  Outcome: Progressing  Flowsheets (Taken 2/4/2023 2001 by Brent Lesches, RN)  Achieves stable or improved neurological status:   Assess for and report changes in neurological status   Initiate measures to prevent increased intracranial pressure   Maintain blood pressure and fluid volume within ordered parameters to optimize cerebral perfusion and minimize risk of hemorrhage   Monitor temperature, glucose, and sodium. Initiate appropriate interventions as ordered  2/4/2023 2233 by Brent Lesches, RN  Outcome: Progressing  Flowsheets (Taken 2/4/2023 2001)  Achieves stable or improved neurological status:   Assess for and report changes in neurological status   Initiate measures to prevent increased intracranial pressure   Maintain blood pressure and fluid volume within ordered parameters to optimize cerebral perfusion and minimize risk of hemorrhage   Monitor temperature, glucose, and sodium.  Initiate appropriate interventions as ordered     Problem: Pain  Goal: Verbalizes/displays adequate comfort level or baseline comfort level  2/5/2023 0915 by Romayne Maltos, RN  Outcome: Progressing  Flowsheets (Taken 2/4/2023 2233 by Brent Lesches, RN)  Verbalizes/displays adequate comfort level or baseline comfort level:   Encourage patient to monitor pain and request assistance   Administer analgesics based on type and severity of pain and evaluate response   Consider cultural and social influences on pain and pain management   Assess pain using appropriate pain scale   Implement non-pharmacological measures as appropriate and evaluate response   Notify Licensed Independent Practitioner if interventions unsuccessful or patient reports new pain  2/4/2023 2233 by Brent Lesches, RN  Outcome: Progressing  Flowsheets (Taken 2/4/2023 2233)  Verbalizes/displays adequate comfort level or baseline comfort level:   Encourage patient to monitor pain and request assistance   Administer analgesics based on type and severity of pain and evaluate response   Consider cultural and social influences on pain and pain management   Assess pain using appropriate pain scale   Implement non-pharmacological measures as appropriate and evaluate response   Notify Licensed Independent Practitioner if interventions unsuccessful or patient reports new pain     Problem: Chronic Conditions and Co-morbidities  Goal: Patient's chronic conditions and co-morbidity symptoms are monitored and maintained or improved  2/5/2023 0915 by Romayne Maltos, RN  Outcome: Progressing  Flowsheets (Taken 2/4/2023 2001 by Brent Lesches, RN)  Care Plan - Patient's Chronic Conditions and Co-Morbidity Symptoms are Monitored and Maintained or Improved:   Monitor and assess patient's chronic conditions and comorbid symptoms for stability, deterioration, or improvement   Collaborate with multidisciplinary team to address chronic and comorbid conditions and prevent exacerbation or deterioration   Update acute care plan with appropriate goals if chronic or comorbid symptoms are exacerbated and prevent overall improvement and discharge  2/4/2023 2233 by Brent Lesches, RN  Outcome: Progressing  Flowsheets (Taken 2/4/2023 2001)  Care Plan - Patient's Chronic Conditions and Co-Morbidity Symptoms are Monitored and Maintained or Improved:   Monitor and assess patient's chronic conditions and comorbid symptoms for stability, deterioration, or improvement   Collaborate with multidisciplinary team to address chronic and comorbid conditions and prevent exacerbation or deterioration   Update acute care plan with appropriate goals if chronic or comorbid symptoms are exacerbated and prevent overall improvement and discharge     Problem: Safety - Adult  Goal: Free from fall injury  2/5/2023 0915 by Christine Shields RN  Outcome: Progressing  Flowsheets (Taken 2/4/2023 2233 by Ryan Alatorre RN)  Free From Fall Injury:   Gemma Crooks family/caregiver on patient safety   Based on caregiver fall risk screen, instruct family/caregiver to ask for assistance with transferring infant if caregiver noted to have fall risk factors  2/4/2023 2233 by Ryan Alatorre RN  Outcome: Progressing  Flowsheets (Taken 2/4/2023 2233)  Free From Fall Injury:   Instruct family/caregiver on patient safety   Based on caregiver fall risk screen, instruct family/caregiver to ask for assistance with transferring infant if caregiver noted to have fall risk factors

## 2023-02-05 NOTE — PROGRESS NOTES
Progress note      [facility]             Patient:  Rizwan Pena  YOB: 1935    MRN: [id number]   Acct:  [id number]   [id number]  Primary Care Physician: EDUARDO Hwang - CNP    Admit Date: 2/2/2023           Subjective: Pt wanting to ambulate, eager to get into IPR        Objective:      Physical Exam:    Franck Hernandez for the past 24 hrs:   BP Temp Temp src Pulse Resp SpO2 Weight   02/05/23 1128 104/77 97.5 °F (36.4 °C) Oral 80 18 96 % --   02/05/23 0745 128/63 98.4 °F (36.9 °C) Oral 66 -- 98 % --   02/05/23 0435 (!) 121/59 98.2 °F (36.8 °C) Oral 65 -- 97 % 121 lb 11.1 oz (55.2 kg)   02/04/23 2311 134/60 97.8 °F (36.6 °C) Oral 63 16 95 % --   02/04/23 2001 127/70 98.2 °F (36.8 °C) Oral 72 18 99 % --   02/04/23 1557 (!) 109/59 97.8 °F (36.6 °C) Oral 69 16 96 % --     Weight: Weight: 121 lb 11.1 oz (55.2 kg)     24 hour intake/output:  Intake/Output Summary (Last 24 hours) at 2/5/2023 1443  Last data filed at 2/4/2023 2001  Gross per 24 hour   Intake 0 ml   Output --   Net 0 ml       General appearance - alert, well appearing, and in no distress  Eyes - pupils equal and reactive, extraocular eye movements intact  Mouth - mucous membranes moist, pharynx normal without lesions  Neck - supple, no significant adenopathy  Chest - clear to auscultation, no wheezes, rales or rhonchi, symmetric air entry  Heart - normal rate, regular rhythm, normal S1, S2, no murmurs, rubs, clicks or gallops  Abdomen - soft, nontender, nondistended, no masses or organomegaly, pos bs.   Neurological - alert, oriented, normal speech, no focal findings or movement disorder noted  Musculoskeletal - no joint tenderness, deformity or swelling  Extremities - peripheral pulses normal, no pedal edema, no clubbing or cyanosis  Skin - normal coloration and turgor, no rashes, no suspicious skin lesions noted    Review of Labs and Diagnostic Testing:    CBC:   Recent Labs     02/03/23  0342   WBC 7.2   HGB 11.1*   HCT 33.9*   MCV 96.6        BMP: No results for input(s): NA, K, CL, CO2, PHOS, BUN, CREATININE, CALCIUM, GLUCOSE in the last 72 hours. PT/INR: No results for input(s): PROTIME, INR in the last 72 hours. APTT: No results for input(s): APTT in the last 72 hours. Lipids: No results for input(s): ALKPHOS, ALT, AST, BILITOT, BILIDIR, LABALBU, AMYLASE, LIPASE in the last 72 hours. Troponin: No results for input(s): TROPONINT in the last 72 hours. Imaging:  [unfilled]    EKG:      Diet: ADULT DIET; Regular;  Low Sodium (2 gm)  ADULT ORAL NUTRITION SUPPLEMENT; Breakfast, Dinner; Standard 4 oz Oral Supplement        Data:   Scheduled Meds: Scheduled Meds:   docusate sodium  100 mg Oral Daily    clopidogrel  75 mg Oral Daily    clonazePAM  0.5 mg Oral Nightly    Vitamin D  500 Units Oral Daily    multivitamin  1 tablet Oral Daily    enoxaparin  40 mg SubCUTAneous Nightly    aspirin  81 mg Oral Daily    Or    aspirin  300 mg Rectal Daily    rosuvastatin  40 mg Oral Nightly    traZODone  50 mg Oral Nightly     Continuous Infusions:  PRN Meds:.polyethylene glycol, polyvinyl alcohol, ondansetron **OR** ondansetron, perflutren lipid microspheres, bisacodyl, acetaminophen  Continuous Infusions:      Assessment   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 Steve Buck resumes care in am    Electronically signed by EDUARDO Elena CNP on 2/5/2023 at 2:43 PM    Assessment and plan of care discussed with supervising physician, Dr Jag Rojas.    Pt seen and examined by me  D/w Deepti Rea PT OT  Await IPR  Neuro asleep did wake up to name    Electronically signed by Aimee Celestin MD on 2/5/2023 at 4:57 PM

## 2023-02-05 NOTE — PLAN OF CARE
Problem: Discharge Planning  Goal: Discharge to home or other facility with appropriate resources  Outcome: Progressing  Flowsheets (Taken 2/4/2023 2001)  Discharge to home or other facility with appropriate resources:   Identify barriers to discharge with patient and caregiver   Arrange for needed discharge resources and transportation as appropriate   Identify discharge learning needs (meds, wound care, etc)     Problem: Skin/Tissue Integrity  Goal: Absence of new skin breakdown  Description: 1. Monitor for areas of redness and/or skin breakdown  2. Assess vascular access sites hourly  3. Every 4-6 hours minimum:  Change oxygen saturation probe site  4. Every 4-6 hours:  If on nasal continuous positive airway pressure, respiratory therapy assess nares and determine need for appliance change or resting period. Outcome: Progressing  Note: No s/s of new skin breakdown. Will continue to monitor.       Problem: ABCDS Injury Assessment  Goal: Absence of physical injury  Outcome: Progressing  Flowsheets (Taken 2/4/2023 2233)  Absence of Physical Injury: Implement safety measures based on patient assessment     Problem: Skin/Tissue Integrity - Adult  Goal: Skin integrity remains intact  Outcome: Progressing  Flowsheets  Taken 2/4/2023 2233  Skin Integrity Remains Intact: Monitor for areas of redness and/or skin breakdown  Taken 2/4/2023 2001  Skin Integrity Remains Intact: Monitor for areas of redness and/or skin breakdown     Problem: Skin/Tissue Integrity - Adult  Goal: Oral mucous membranes remain intact  Outcome: Progressing  Flowsheets (Taken 2/4/2023 2001)  Oral Mucous Membranes Remain Intact:   Assess oral mucosa and hygiene practices   Implement preventative oral hygiene regimen   Implement oral medicated treatments as ordered     Problem: Infection - Adult  Goal: Absence of infection at discharge  Outcome: Progressing  Flowsheets  Taken 2/4/2023 2233  Absence of infection at discharge:   Assess and monitor for signs and symptoms of infection   Monitor lab/diagnostic results   Monitor all insertion sites i.e., indwelling lines, tubes and drains  Taken 2/4/2023 2001  Absence of infection at discharge:   Assess and monitor for signs and symptoms of infection   Monitor lab/diagnostic results   Monitor all insertion sites i.e., indwelling lines, tubes and drains     Problem: Infection - Adult  Goal: Absence of infection during hospitalization  Outcome: Progressing  Flowsheets (Taken 2/4/2023 2001)  Absence of infection during hospitalization:   Assess and monitor for signs and symptoms of infection   Monitor lab/diagnostic results   Monitor all insertion sites i.e., indwelling lines, tubes and drains     Problem: Infection - Adult  Goal: Absence of fever/infection during anticipated neutropenic period  Outcome: Progressing  Flowsheets (Taken 2/4/2023 2001)  Absence of fever/infection during anticipated neutropenic period:   Monitor white blood cell count   Administer growth factors as ordered   Implement neutropenic guidelines     Problem: Metabolic/Fluid and Electrolytes - Adult  Goal: Electrolytes maintained within normal limits  Outcome: Progressing  Flowsheets (Taken 2/4/2023 2001)  Electrolytes maintained within normal limits:   Monitor labs and assess patient for signs and symptoms of electrolyte imbalances   Administer electrolyte replacement as ordered   Monitor response to electrolyte replacements, including repeat lab results as appropriate     Problem: Metabolic/Fluid and Electrolytes - Adult  Goal: Hemodynamic stability and optimal renal function maintained  Outcome: Progressing  Flowsheets (Taken 2/4/2023 2001)  Hemodynamic stability and optimal renal function maintained:   Monitor labs and assess for signs and symptoms of volume excess or deficit   Monitor intake, output and patient weight   Monitor urine specific gravity, serum osmolarity and serum sodium as indicated or ordered     Problem: Metabolic/Fluid and Electrolytes - Adult  Goal: Glucose maintained within prescribed range  Outcome: Progressing  Flowsheets (Taken 2/4/2023 2001)  Glucose maintained within prescribed range:   Monitor blood glucose as ordered   Assess for signs and symptoms of hyperglycemia and hypoglycemia   Administer ordered medications to maintain glucose within target range   Assess barriers to adequate nutritional intake and initiate nutrition consult as needed     Problem: Hematologic - Adult  Goal: Maintains hematologic stability  Outcome: Progressing  Flowsheets (Taken 2/4/2023 2001)  Maintains hematologic stability:   Assess for signs and symptoms of bleeding or hemorrhage   Monitor labs for bleeding or clotting disorders   Administer blood products/factors as ordered     Problem: Pain  Goal: Verbalizes/displays adequate comfort level or baseline comfort level  Outcome: Progressing  Flowsheets (Taken 2/4/2023 2233)  Verbalizes/displays adequate comfort level or baseline comfort level:   Encourage patient to monitor pain and request assistance   Administer analgesics based on type and severity of pain and evaluate response   Consider cultural and social influences on pain and pain management   Assess pain using appropriate pain scale   Implement non-pharmacological measures as appropriate and evaluate response   Notify Licensed Independent Practitioner if interventions unsuccessful or patient reports new pain     Problem: Chronic Conditions and Co-morbidities  Goal: Patient's chronic conditions and co-morbidity symptoms are monitored and maintained or improved  Outcome: Progressing  Flowsheets (Taken 2/4/2023 2001)  Care Plan - Patient's Chronic Conditions and Co-Morbidity Symptoms are Monitored and Maintained or Improved:   Monitor and assess patient's chronic conditions and comorbid symptoms for stability, deterioration, or improvement   Collaborate with multidisciplinary team to address chronic and comorbid conditions and prevent exacerbation or deterioration   Update acute care plan with appropriate goals if chronic or comorbid symptoms are exacerbated and prevent overall improvement and discharge     Problem: Neurosensory - Adult  Goal: Achieves stable or improved neurological status  Outcome: Progressing  Flowsheets (Taken 2/4/2023 2001)  Achieves stable or improved neurological status:   Assess for and report changes in neurological status   Initiate measures to prevent increased intracranial pressure   Maintain blood pressure and fluid volume within ordered parameters to optimize cerebral perfusion and minimize risk of hemorrhage   Monitor temperature, glucose, and sodium. Initiate appropriate interventions as ordered    Care plan reviewed with patient. Patient verbalize understanding of the plan of care and contribute to goal setting.

## 2023-02-06 LAB — BACTERIA SPEC AEROBE CULT: NORMAL

## 2023-02-06 PROCEDURE — 97129 THER IVNTJ 1ST 15 MIN: CPT

## 2023-02-06 PROCEDURE — 97530 THERAPEUTIC ACTIVITIES: CPT

## 2023-02-06 PROCEDURE — 2060000000 HC ICU INTERMEDIATE R&B

## 2023-02-06 PROCEDURE — 97116 GAIT TRAINING THERAPY: CPT

## 2023-02-06 PROCEDURE — 97130 THER IVNTJ EA ADDL 15 MIN: CPT

## 2023-02-06 PROCEDURE — 97112 NEUROMUSCULAR REEDUCATION: CPT

## 2023-02-06 PROCEDURE — 6370000000 HC RX 637 (ALT 250 FOR IP): Performed by: INTERNAL MEDICINE

## 2023-02-06 PROCEDURE — 97535 SELF CARE MNGMENT TRAINING: CPT

## 2023-02-06 PROCEDURE — 6370000000 HC RX 637 (ALT 250 FOR IP): Performed by: NURSE PRACTITIONER

## 2023-02-06 PROCEDURE — 6360000002 HC RX W HCPCS: Performed by: INTERNAL MEDICINE

## 2023-02-06 RX ADMIN — CLOPIDOGREL BISULFATE 75 MG: 75 TABLET ORAL at 08:48

## 2023-02-06 RX ADMIN — CLONAZEPAM 0.5 MG: 0.5 TABLET ORAL at 22:58

## 2023-02-06 RX ADMIN — Medication 1 TABLET: at 08:48

## 2023-02-06 RX ADMIN — ASPIRIN 81 MG: 81 TABLET, COATED ORAL at 08:48

## 2023-02-06 RX ADMIN — DOCUSATE SODIUM 100 MG: 100 CAPSULE, LIQUID FILLED ORAL at 08:48

## 2023-02-06 RX ADMIN — Medication 500 UNITS: at 08:48

## 2023-02-06 RX ADMIN — ENOXAPARIN SODIUM 40 MG: 100 INJECTION SUBCUTANEOUS at 20:51

## 2023-02-06 RX ADMIN — ROSUVASTATIN CALCIUM 40 MG: 20 TABLET, FILM COATED ORAL at 20:51

## 2023-02-06 ASSESSMENT — PAIN SCALES - GENERAL
PAINLEVEL_OUTOF10: 0

## 2023-02-06 NOTE — PROGRESS NOTES
INTERNAL MED[facility]INE Progress Note  2/6/2023 6:37 PM  Subjective:   Admit Date: 2/2/2023  PCP: EDUARDO Wooten - CNP  Interval History:     pt is better, no HA  No dizziness    Objective:   Vitals: /71   Pulse 75   Temp 98.6 °F (37 °C) (Oral)   Resp 16   Ht 4' 10\" (1.473 m)   Wt 121 lb 7.6 oz (55.1 kg)   SpO2 95%   BMI 25.39 kg/m²   General appearance: alert and cooperative with exam  HEENT: Head: atraumatic  Neck: no adenopathy, no carotid bruit, no JVD, and supple, symmetrical, trachea midline  Lungs: clear to auscultation bilaterally  Heart: S1, S2 normal  Abdomen: soft, non-tender; bowel sounds normal; no masses,  no organomegaly  Extremities: no edema, redness or tenderness in the calves or thighs  Neurologic: Mental status: Alert, oriented, thought content appropriate  No focal motor deficit  Gait deferred      Medications:   Scheduled Meds:   docusate sodium  100 mg Oral Daily    clopidogrel  75 mg Oral Daily    clonazePAM  0.5 mg Oral Nightly    Vitamin D  500 Units Oral Daily    multivitamin  1 tablet Oral Daily    enoxaparin  40 mg SubCUTAneous Nightly    aspirin  81 mg Oral Daily    Or    aspirin  300 mg Rectal Daily    rosuvastatin  40 mg Oral Nightly    traZODone  50 mg Oral Nightly     Continuous Infusions:        Lab Results:   CBC:   No results for input(s): WBC, HGB, PLT in the last 72 hours. BMP:    No results for input(s): NA, K, CL, CO2, BUN, CREATININE, GLUCOSE in the last 72 hours. LDL Calculated 80       HgBA1c:    Lab Results   Component Value Date/Time    LABA1C 6.1 02/03/2023 03:42 AM     TSH:    Lab Results   Component Value Date/Time    TSH 1.310 12/10/2020 02:55 PM       FERRITIN:    Lab Results   Component Value Date/Time    FERRITIN 124 11/20/2021 07:39 PM        MRI of brain (2/2/2023) : Impression   1. 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 Mild atrophy and probable ischemic changes in the white matter.    3. Mild inflammatory changes in ethmoid air cells bilaterally and left mastoid tip.             Assessment and Plan:   Acute infarct in the right posterior frontal/anterior parietal cortex   HLD, target LDL < 70    plan   ASA, statin  Lovenox  PT/OT F/UP  Awaiting Ins precert for IPR    Lala Bull MD, MD

## 2023-02-06 NOTE — PLAN OF CARE
Problem: Discharge Planning  Goal: Discharge to home or other facility with appropriate resources  2/5/2023 2133 by Lowell Keys RN  Outcome: Progressing  Flowsheets (Taken 2/5/2023 1948)  Discharge to home or other facility with appropriate resources:   Identify barriers to discharge with patient and caregiver   Arrange for needed discharge resources and transportation as appropriate   Identify discharge learning needs (meds, wound care, etc)     Problem: Skin/Tissue Integrity  Goal: Absence of new skin breakdown  Description: 1. Monitor for areas of redness and/or skin breakdown  2. Assess vascular access sites hourly  3. Every 4-6 hours minimum:  Change oxygen saturation probe site  4. Every 4-6 hours:  If on nasal continuous positive airway pressure, respiratory therapy assess nares and determine need for appliance change or resting period. 2/5/2023 2133 by Lowell Keys RN  Outcome: Progressing  Note: No signs and/or symptoms of infection noted during this shift. Patient afebrile during this shift. No new skin breakdown noted during this shift. Patient able to turn self in bed; staff assistance provided as needed. Foot of bed elevated.      Problem: ABCDS Injury Assessment  Goal: Absence of physical injury  2/5/2023 2133 by Lowell Keys RN  Outcome: Progressing  Flowsheets (Taken 2/5/2023 2133)  Absence of Physical Injury: Implement safety measures based on patient assessment     Problem: Skin/Tissue Integrity - Adult  Goal: Skin integrity remains intact  2/5/2023 2133 by Lowell Keys RN  Outcome: Progressing  Flowsheets (Taken 2/5/2023 1948)  Skin Integrity Remains Intact: Monitor for areas of redness and/or skin breakdown     Problem: Infection - Adult  Goal: Absence of infection at discharge  2/5/2023 2133 by Lowell Keys RN  Outcome: Progressing  Flowsheets (Taken 2/5/2023 1948)  Absence of infection at discharge:   Assess and monitor for signs and symptoms of infection   Monitor lab/diagnostic results   Monitor all insertion sites i.e., indwelling lines, tubes and drains   Administer medications as ordered   Instruct and encourage patient and family to use good hand hygiene technique     Problem: Infection - Adult  Goal: Absence of fever/infection during anticipated neutropenic period  2/5/2023 2133 by Maat Mota RN  Outcome: Progressing  Flowsheets (Taken 2/5/2023 1948)  Absence of fever/infection during anticipated neutropenic period: Monitor white blood cell count     Problem: Metabolic/Fluid and Electrolytes - Adult  Goal: Electrolytes maintained within normal limits  2/5/2023 2133 by Mata Mota RN  Outcome: Progressing  Flowsheets (Taken 2/5/2023 1948)  Electrolytes maintained within normal limits:   Monitor labs and assess patient for signs and symptoms of electrolyte imbalances   Administer electrolyte replacement as ordered   Monitor response to electrolyte replacements, including repeat lab results as appropriate     Problem: Metabolic/Fluid and Electrolytes - Adult  Goal: Glucose maintained within prescribed range  2/5/2023 2133 by Mata Mota RN  Outcome: Progressing  Flowsheets (Taken 2/5/2023 1948)  Glucose maintained within prescribed range:   Monitor blood glucose as ordered   Assess for signs and symptoms of hyperglycemia and hypoglycemia   Administer ordered medications to maintain glucose within target range   Assess barriers to adequate nutritional intake and initiate nutrition consult as needed   Instruct patient on self management of diabetes and initiate consult as needed     Problem: Hematologic - Adult  Goal: Maintains hematologic stability  2/5/2023 2133 by Mata Mota RN  Outcome: Progressing  Flowsheets (Taken 2/5/2023 1948)  Maintains hematologic stability: Assess for signs and symptoms of bleeding or hemorrhage     Problem: Neurosensory - Adult  Goal: Achieves stable or improved neurological status  2/5/2023 2133 by Mata Mota RN  Outcome: Progressing  Flowsheets (Taken 2/5/2023 1948)  Achieves stable or improved neurological status: Assess for and report changes in neurological status     Problem: Pain  Goal: Verbalizes/displays adequate comfort level or baseline comfort level  2/5/2023 2133 by Tabitha Rodriguez RN  Outcome: Progressing  Flowsheets (Taken 2/5/2023 1948)  Verbalizes/displays adequate comfort level or baseline comfort level:   Encourage patient to monitor pain and request assistance   Assess pain using appropriate pain scale   Administer analgesics based on type and severity of pain and evaluate response   Implement non-pharmacological measures as appropriate and evaluate response     Problem: Chronic Conditions and Co-morbidities  Goal: Patient's chronic conditions and co-morbidity symptoms are monitored and maintained or improved  2/5/2023 2133 by Tabitha Rodriguez RN  Outcome: Progressing  Flowsheets (Taken 2/5/2023 1948)  Care Plan - Patient's Chronic Conditions and Co-Morbidity Symptoms are Monitored and Maintained or Improved:   Monitor and assess patient's chronic conditions and comorbid symptoms for stability, deterioration, or improvement   Collaborate with multidisciplinary team to address chronic and comorbid conditions and prevent exacerbation or deterioration   Update acute care plan with appropriate goals if chronic or comorbid symptoms are exacerbated and prevent overall improvement and discharge     Problem: Safety - Adult  Goal: Free from fall injury  2/5/2023 2133 by Tabitha Rodriguez RN  Outcome: Progressing  Flowsheets (Taken 2/5/2023 2133)  Free From Fall Injury: Instruct family/caregiver on patient safety    Care plan reviewed with patient. Patient verbalizes understanding of the plan of care and contributes to goal setting.

## 2023-02-06 NOTE — PROGRESS NOTES
[address] [address]  INPATIENT PHYSICAL THERAPY  DAILY NOTE  [facility] ICU STEPDOWN TELEMETRY 4K - 4K-17/017-A    Time In: 1003  Time Out: 1105  Timed Code Treatment Minutes: 62 Minutes  Minutes: 62          Date: 2023  Patient Name: Ryanne Marin,  Gender:  female        MRN: [id number]  : 1935  (81 y.o.)     Referring Practitioner: Sherie Lal MD  Diagnosis: Esophageal dysphagia  Additional Pertinent Hx: Ryanne Marin is a 81 y.o. female who presents to Emergency Department with Fall and Other (Feels like something is stuck in throat). Patient is brought in by EMS for evaluation of fall x2 today. Patient says she fell in the kitchen and again in the dining room. Lives at home by herself. Patient states she had no idea why and how she fell. MRI shows small acute infarct in the right posterior frontal/anterior parietal cortex. Prior Level of Function:  Lives With: Alone  Type of Home: House  Home Layout: One level, Laundry in basement  Home Access: Stairs to enter with rails  Entrance Stairs - Number of Steps: 4  Home Equipment: Walker, rolling, Cane, Rollator, [employer]   Bathroom Shower/Tub: Tub/Shower unit  [employer]: Standard  Bathroom Equipment: Tub transfer bench, Grab bars in shower, Toilet raiser  Bathroom Accessibility: Accessible    Receives Help From: Family (check in on patient)  ADL Assistance: [address] [place] [address] Avenue: Independent  Ambulation Assistance: Independent  Transfer Assistance: Independent  Active : Yes  Additional Comments: patient used no AD prior to admit. Pt has 3 kids that can assist as needed    Restrictions/Precautions:  Restrictions/Precautions: Fall Risk, General Precautions     SUBJECTIVE: pt is seated in recliner, agreeable to PT. Pt states feels better from having Epley done on Saturday, denies any room spinning sensation since.   Pt is very talkative, anxious, significant time spent on education on the need for inpatient rehab.     PAIN: denies    Vitals: Vitals not assessed per clinical judgement, see nursing flowsheet    OBJECTIVE:  Bed Mobility:  Not Tested    Transfers:  Sit to Stand: Contact Guard Assistance, X 1, with increased time for completion; several trials completed during session, from recliner and couch  [facility], X 1, with verbal cues; verbal cues for proper alignment to chair prior to sitting down    Ambulation:  Contact Guard Assistance, X 1  Distance: 30 feet  Surface: Level Tile  Device:No Device  Gait Deviations:  Slow Susu, Decreased Step Length Bilaterally, Decreased Weight Shift Bilaterally, Decreased Arm Swing, Decreased Trunk Rotation, Decreased Gait Speed, Decreased Heel Strike Bilaterally, and Mild Path Deviations  **Pt with guarded pace, apprehensive,short step length    Minimal Assistance, X 1  Distance: 20 feet  Surface: Level Tile  Device:Hand-Held Assist  Gait Deviations:  Slow Susu, Decreased Step Length Bilaterally, Decreased Weight Shift Bilaterally, Decreased Arm Swing, Decreased Trunk Rotation, Decreased Gait Speed, and Decreased Heel Strike Bilaterally   **R sided HHA, verbal cues to initiate due to pt apprehensive,fear of falling; verbal cues for increased stride length and heel strike, good understanding of education,however, unsteady and discontinuous steps    Balance:  Dynamic Sitting Balance: Stand By Assistance, X 1; pt dons shoes while seated on couch, increased time to complete, pt stating \"I just don't have the stamina\"  Static Standing Balance: Contact Guard Assistance, X 1  Dynamic Standing Balance: Minimal Assistance, X 1; pt performs toe taps on 6 inch step, with min A for balance;pt stands with 1 foot on 6 inch step x 30 seconds each, min A for balance    Functional Outcome Measures: Completed  AM-PAC Inpatient Mobility without Stair Climbing Raw Score : 15  AM-PAC Inpatient without Stair Climbing T-Scale Score : 43.03    ASSESSMENT:  Assessment: Patient progressing toward established goals. Activity Tolerance:  Patient tolerance of  treatment: good. Equipment Recommendations:Equipment Needed: No  Discharge Recommendations: Inpatient Rehabilitation  Plan: Current Treatment Recommendations: Strengthening, Balance training, Functional mobility training, Transfer training, Gait training, Stair training, Neuromuscular re-education, Patient/Caregiver education & training, Safety education & training, Therapeutic activities  General Plan:  (6x N)    Patient Education  Patient Education: Transfers, Gait, Education Related to Potential Risks and Complications Due to Impairment/Illness/Injury    Goals:  Patient Goals : to get better  Short Term Goals  Time Frame for Short Term Goals: by discharge  Short Term Goal 1: Pt to transfer supine <--> sit mod I to enable pt to get in/out of bed. Short Term Goal 2: Pt to transfer sit <--> stand mod I for increased functional mobility. Short Term Goal 3: Pt to ambulate >100 feet without AD SBA for household ambulation. Short Term Goal 4: Pt to improve Tinetti score to 28/28 to minimize fall risk. Long Term Goals  Time Frame for Long Term Goals : NA due to short length of stay. Following session, patient left in safe position with all fall risk precautions in place.

## 2023-02-06 NOTE — PROGRESS NOTES
Transaction ID: [id number] ID: [id number] Date: 2023-02-06  Meryle Slocumb Patient  Member ID  [id number]    Date of Birth  1935    Gender  Female    Eligibility Status  Active Coverage    Group Number  [id number]    Plan / Coverage Date  2022-01-01    Transaction Type  Inpatient Authorization    [facility] ([facility])    [facility]ST [facility] portal using attachment feature.       Authorization/Referral Update     Transaction ID: [id number] ID: [id number] Date: 2023-02-06  Reza Ahumada Patient  Member ID  [id number]    Date of Birth  1935    Gender  NA    Transaction Type  Inpatient Authorization    Gianluca Ricardo (sa 99)

## 2023-02-06 NOTE — PROGRESS NOTES
[facility] ICU STEPDOWN TELEMETRY 4K  Occupational Therapy  Daily Note  Time:   Time In: 0809  Time Out: 0840  Timed Code Treatment Minutes: 32 Minutes  Minutes: 31          Date: 2023  Patient Name: César Bob,   Gender: female      Room: -17/017-A  MRN: [id number]  : 1935  (81 y.o.)  Referring Practitioner: Donnell Lyn MD  Diagnosis: acute lacunar infarction  Additional Pertinent Hx: per chart review; César Bob is a 81 y.o. female who presents to Emergency Department with Fall and Other (Feels like something is stuck in throat)     Patient is brought in by EMS for evaluation of fall x2 today. Patient says she fell in the kitchen and again in the dining room. Lives at home by herself. Patient states she had no idea why and how she fell. She denies syncope. No fainting episodes. She denies generalized weakness. After the second fall, patient noticed it was hard for her to get up, she called her daughter who recommended she should call EMS. On arrival, patient was alert and oriented x 4. She complains of mild left hip pain. She denies LOC. No headache. She has been having foreign body sensation in throat since last night, but she was able to swallow a piece of bread this morning. No vomiting. No  fever or chills. No chest pain. No SOB. No abdominal pain. She has mild watery diarrhea. No urine symptoms  MRI 2/2 Small acute infarct in the right posterior frontal/anterior parietal cortex    Restrictions/Precautions:  Restrictions/Precautions: Fall Risk, General Precautions     SUBJECTIVE: Pt in the bathroom with STNA upon arrival, CHACON took over session, with pt agreeable to OT session, RN gave verbal approval for session     PAIN: 0/10:     Vitals: Nurse checked vitals prior to session    COGNITION: Slow Processing    ADL:   Grooming: Stand By Assistance. With pt standing at the sink  Bathing: Stand By Assistance.   With pt completing sponge bath at the sink sitting and standing  Upper Extremity Dressing: Stand By Assistance. With donning gown  Lower Extremity Dressing: Stand By Assistance. With pt donning brief  Footwear Management: Stand By Assistance. With pt donning socks . BALANCE:  Standing Balance: Stand By Assistance. With no AE used, and pt standing for 2-3 minute increments     BED MOBILITY:  Not Tested    TRANSFERS:  Sit to Stand:  Stand By Assistance. From all surfaces  Stand to Sit: Stand By Assistance. From all surfaces    FUNCTIONAL MOBILITY:  Assistive Device: Rolling Walker  Assist Level:  Stand By Assistance. Distance: To and from bathroom     ASSESSMENT:     Activity Tolerance:  Patient tolerance of  treatment: good. Discharge Recommendations: Continue to assess pending progress and Inpatient Rehabilitation  Equipment Recommendations: Equipment Needed: No  Plan: Times Per Week: 5-6x  Times Per Day: Once a day  Current Treatment Recommendations: Strengthening, Balance training, Functional mobility training, Endurance training, Safety education & training, Neuromuscular re-education, Patient/Caregiver education & training, Self-Care / ADL, Coordination training    Patient Education  Patient Education: ADL's    Goals  Short Term Goals  Time Frame for Short Term Goals: by discharge  Short Term Goal 1: patient will tolerate 5 min functional standing with two hand release with (S) to increase ease with toileting and grooming. Short Term Goal 2: patient will functionally ambulate to/from BR with least restrictive device with SBA. Short Term Goal 3: patient will complete ADL routine with SBA and 0-1 cues for safety and sequencing. Short Term Goal 4: patient will participate in moderate resistive UB exer to increase UB strength for functional transfers.     Following session, patient left in safe position with all fall risk precautions in place.    '

## 2023-02-06 NOTE — PROGRESS NOTES
Comprehensive Nutrition Assessment    Type and Reason for Visit:  Initial, Positive Nutrition Screen (weight loss, poor po)    Nutrition Recommendations/Plan:   Recommend diet as tolerated. Will discontinue Ensure (as pt. Dislikes). Recommend continue MVI. Will monitor blood sugars vs. Need for nutrition interventions. Encouraged po, good nutrition at best efforts for healing. Encouraged pt. To continue to limit/avoid salt, sodium. Malnutrition Assessment:  Malnutrition Status: At risk for malnutrition (Comment) (02/06/23 1114)    Context:  Acute Illness     Findings of the 6 clinical characteristics of malnutrition:  Energy Intake:  Mild decrease in energy intake (Comment)  Weight Loss:  No significant weight loss (pt. denies)     Body Fat Loss:  No significant body fat loss     Muscle Mass Loss:  No significant muscle mass loss (however difficult to evaluate with advanced age)    Fluid Accumulation:  Unable to assess     Strength:  Not Performed    Nutrition Assessment:     Pt. nutritionally compromised AEB decreased po intake on admission. At risk for further nutrition compromise r/t admit w/ CVA, sensation of food stuck in her throat, advanced age and underlying medical condition (hx osteoporosis). Nutrition Related Findings:      Wound Type: None     Pt. Report/Treatments/Miscellaneous: pt. Seen - reports sensation of food stuck in her throat has resolved; states eating well now and feels like she is eating too much; disliked Ensure ONS; states typically consumes 2 meals/day and snacks; tries to avoid salt and foods that taste salty, ? IPR  GI Status: BM 2/2  Pertinent Labs: 2/2: Glucose 108; 2/3: HgbA1c 6.1%; Lipid panel noted  Pertinent Meds: Colace, Glycolax, MVI, Crestor      Current Nutrition Intake & Therapies:    Average Meal Intake: 0%, % (mostly %)     ADULT DIET; Regular;  Low Sodium (2 gm)    Anthropometric Measures:  Height: 4' 10\" (147.3 cm)  Ideal Body Weight (IBW): 90 lbs (41 kg)    Admission Body Weight: 119 lb 3.2 oz (54.1 kg) (2/2 no edema)  Current Body Weight: 121 lb 7.6 oz (55.1 kg) (2/6 no edema),   IBW. Current BMI (kg/m2): 25.4  Usual Body Weight:  (per pt. 118-120# (states sometimes a little more); per EMR: 12/8/21: 130# (actual?))                       BMI Categories: Overweight (BMI 25.0-29. 9)    Estimated Daily Nutrient Needs:  Energy Requirements Based On: Kcal/kg  Weight Used for Energy Requirements: Other (Comment) (55)  Energy (kcal/day): 7132-1822 kcals (20-25)  Weight Used for Protein Requirements: Ideal (41)  Protein (g/day): 53+ grams (1.3+)       Nutrition Diagnosis:   Inadequate oral intake related to inadequate protein-energy intake as evidenced by poor intake prior to admission    Nutrition Interventions:   Food and/or Nutrient Delivery: Continue Current Diet  Nutrition Education/Counseling: Education initiated (2/6 Encouraged po, good nutrition at best efforts. Encouraged pt. to continue to limit salt, sodium. Encouraged protein sources w/ each meal.)  Coordination of Nutrition Care: Continue to monitor while inpatient       Goals:     Goals: PO intake 75% or greater, by next RD assessment       Nutrition Monitoring and Evaluation:      Food/Nutrient Intake Outcomes: Diet Advancement/Tolerance, Food and Nutrient Intake  Physical Signs/Symptoms Outcomes: Biochemical Data, Chewing or Swallowing, GI Status, Fluid Status or Edema, Nutrition Focused Physical Findings, Skin, Weight    Discharge Planning:     Too soon to determine     Brittani Monaco RD, LD  Contact: [phone]

## 2023-02-06 NOTE — CARE COORDINATION
2/6/23, 8:32 AM EST    DISCHARGE PLANNING EVALUATION    Received a SW consult for possible HH if IPR does not work out. Per CM Clarence, family is agreeable to IPR and they are able to accept with a precert to be started today.

## 2023-02-06 NOTE — PROGRESS NOTES
[address] [facility] THERAPY  [facility] ICU STEPDOWN TELEMETRY 4K  DAILY NOTE    TIME   SLP Individual Minutes  Time In: 2272  Time Out: 1440  Minutes: 28  Timed Code Treatment Minutes: 28 Minutes       Date: 2023  Patient Name: Belle Yang      CSN: [id number]   : 1935  (81 y.o.)  Gender: female   Referring Physician:  Ankit Pena MD  Diagnosis: Esophageal Dysphagia  Precautions: Fall Risk  Current Diet: Regular diet, thin liquids  Swallowing Strategies: Full Upright Position, Limit Distractions, and Monitor for Fatigue   Date of Last MBS/FEES: Not Applicable    Pain:  No pain reported. Subjective:  Patient sitting upright in recliner upon SLP arrival. Pleasant, cooperative, and agreeable to all ST services. No family present. *ST reviewed plan of care with pt. ST and pt agree with continued services with in-patient rehab. However, waiting for insurance approval. If not approved, recommend for outpatient ST services upon discharge. Short-Term Goals:  SHORT TERM GOAL #1:  Goal 1: Patient will complete verbal/visual reasoning and thought organization/workin memory tasks (i.e., calendar, scheduling, etc.) with 80% accuracy and moderate cuing in order to allow for safe return to completion of ADLs/IADLs. INTERVENTIONS:   Orientation (house floor plan):   indep,  with min cue   *Patient required mod/max cueing for initiation of task. *Showing deficits in working memory and selective attention with task. *Patient showed fair success in thought organization with task. SHORT TERM GOAL #2:  Goal 2: Patient will complete problem solving and executive functioning tasks (i.e., medications, finances, etc.) with 80% accuracy and moderate cuing in order to improve completion of ADLs/IADLs.   INTERVENTIONS:  Medication Management (evaluating pill box for errors):  Prescription 1: mod cueing for re-instruction, after cue able to ID error  Prescription 2: min cueing for re-instruction, after cue able to ID error  Prescription 3: min cueing to start at beginning of week, after cue able to indep ID errors  Prescription 4: max cueing for dosage time (2 tablets, twice weekly)  Prescription 5: indep able to ID errors  *Patient showed difficulty with task. Deficits noted in visual scanning and working memory. *Recommendations for supervision of medication management upon discharge. SHORT TERM GOAL #3:  Goal 3: Patient will complete immediate and delayed recall tasks with 80% accuracy and moderate cuing in order to improve retention of novel information. INTERVENTIONS: ST reviewed WRAP strategy with patient.  -Immediate recall of WRAP: 3/4 indep, 1/4 min logical cue  -Delayed recall of WRAP: 4/4 indep    SHORT TERM GOAL #4:  Goal 4: Patient will complete mildly complex sustained, selective, alternating, and divided attention tasks with no more than three errors/redirections in a five minute/one task in order to allow for safe return to driving and PLOF. INTERVENTIONS: DNT due to focus on other STGs. Long-Term Goals:  No LTGs established due to short ELOS. EDUCATION:  Learner: Patient  Education:  Reviewed ST goals and Plan of Care, Reviewed recommendations for follow-up, and Education Related to [employer] and Wellness  Evaluation of Education: Verbalizes understanding and Family not present    ASSESSMENT/PLAN:  Activity Tolerance:  Patient tolerance of  treatment: good. Assessment/Plan: Patient progressing toward established goals. Continues to require skilled care of licensed speech pathologist to progress toward achievement of established goals and plan of care. .     Plan for Next Session: Orientation (house plan), working memory, finances   Discharge Recommendations: [address], Student Speech Intern

## 2023-02-07 LAB
ANION GAP SERPL CALC-SCNC: 16 MEQ/L (ref 8–16)
BUN SERPL-MCNC: 22 MG/DL (ref 7–22)
CALCIUM SERPL-MCNC: 9.3 MG/DL (ref 8.5–10.5)
CHLORIDE SERPL-SCNC: 103 MEQ/L (ref 98–111)
CO2 SERPL-SCNC: 20 MEQ/L (ref 23–33)
CREAT SERPL-MCNC: 0.6 MG/DL (ref 0.4–1.2)
GFR SERPL CREATININE-BSD FRML MDRD: > 60 ML/MIN/1.73M2
GLUCOSE SERPL-MCNC: 198 MG/DL (ref 70–108)
MAGNESIUM SERPL-MCNC: 2 MG/DL (ref 1.6–2.4)
POTASSIUM SERPL-SCNC: 4.3 MEQ/L (ref 3.5–5.2)
SODIUM SERPL-SCNC: 139 MEQ/L (ref 135–145)
TSH SERPL DL<=0.005 MIU/L-ACNC: 2.03 UIU/ML (ref 0.4–4.2)

## 2023-02-07 PROCEDURE — 6360000002 HC RX W HCPCS: Performed by: INTERNAL MEDICINE

## 2023-02-07 PROCEDURE — 6370000000 HC RX 637 (ALT 250 FOR IP): Performed by: INTERNAL MEDICINE

## 2023-02-07 PROCEDURE — 80048 BASIC METABOLIC PNL TOTAL CA: CPT

## 2023-02-07 PROCEDURE — 2060000000 HC ICU INTERMEDIATE R&B

## 2023-02-07 PROCEDURE — 97112 NEUROMUSCULAR REEDUCATION: CPT

## 2023-02-07 PROCEDURE — 93005 ELECTROCARDIOGRAM TRACING: CPT | Performed by: INTERNAL MEDICINE

## 2023-02-07 PROCEDURE — 97535 SELF CARE MNGMENT TRAINING: CPT

## 2023-02-07 PROCEDURE — 83735 ASSAY OF MAGNESIUM: CPT

## 2023-02-07 PROCEDURE — 97530 THERAPEUTIC ACTIVITIES: CPT

## 2023-02-07 PROCEDURE — 6370000000 HC RX 637 (ALT 250 FOR IP): Performed by: NURSE PRACTITIONER

## 2023-02-07 PROCEDURE — 84443 ASSAY THYROID STIM HORMONE: CPT

## 2023-02-07 PROCEDURE — 36415 COLL VENOUS BLD VENIPUNCTURE: CPT

## 2023-02-07 PROCEDURE — 97110 THERAPEUTIC EXERCISES: CPT

## 2023-02-07 RX ADMIN — CLONAZEPAM 0.5 MG: 0.5 TABLET ORAL at 23:34

## 2023-02-07 RX ADMIN — DOCUSATE SODIUM 100 MG: 100 CAPSULE, LIQUID FILLED ORAL at 08:09

## 2023-02-07 RX ADMIN — ROSUVASTATIN CALCIUM 40 MG: 20 TABLET, FILM COATED ORAL at 20:37

## 2023-02-07 RX ADMIN — ENOXAPARIN SODIUM 40 MG: 100 INJECTION SUBCUTANEOUS at 20:37

## 2023-02-07 RX ADMIN — Medication 1 TABLET: at 08:09

## 2023-02-07 RX ADMIN — ASPIRIN 81 MG: 81 TABLET, COATED ORAL at 08:08

## 2023-02-07 RX ADMIN — CLOPIDOGREL BISULFATE 75 MG: 75 TABLET ORAL at 08:09

## 2023-02-07 RX ADMIN — Medication 500 UNITS: at 08:09

## 2023-02-07 NOTE — PLAN OF CARE
Problem: Discharge Planning  Goal: Discharge to home or other facility with appropriate resources  2/7/2023 1825 by Kai Pearson RN  Outcome: Progressing     Problem: Skin/Tissue Integrity  Goal: Absence of new skin breakdown  Description: 1. Monitor for areas of redness and/or skin breakdown  2. Assess vascular access sites hourly  3. Every 4-6 hours minimum:  Change oxygen saturation probe site  4. Every 4-6 hours:  If on nasal continuous positive airway pressure, respiratory therapy assess nares and determine need for appliance change or resting period.   2/7/2023 1825 by Kai Pearson RN  Outcome: Progressing     Problem: ABCDS Injury Assessment  Goal: Absence of physical injury  2/7/2023 1825 by Kai Pearson RN  Outcome: Progressing     Problem: Skin/Tissue Integrity - Adult  Goal: Skin integrity remains intact  2/7/2023 1825 by Kai Pearson RN  Outcome: Progressing     Problem: Hematologic - Adult  Goal: Maintains hematologic stability  2/7/2023 1825 by Kai Pearson RN  Outcome: Progressing

## 2023-02-07 NOTE — PROGRESS NOTES
[address] [address]  INPATIENT PHYSICAL THERAPY  DAILY NOTE  [facility] ICU STEPDOWN TELEMETRY 4K - 4K-17/017-A    Time In: 4339  Time Out: 1120  Timed Code Treatment Minutes: 32 Minutes  Minutes: 31          Date: 2023  Patient Name: Frankey Coco,  Gender:  female        MRN: [id number]  : 1935  (81 y.o.)     Referring Practitioner: Evans Burroughs MD  Diagnosis: Esophageal dysphagia  Additional Pertinent Hx: Frankey Coco is a 81 y.o. female who presents to Emergency Department with Fall and Other (Feels like something is stuck in throat). Patient is brought in by EMS for evaluation of fall x2 today. Patient says she fell in the kitchen and again in the dining room. Lives at home by herself. Patient states she had no idea why and how she fell. MRI shows small acute infarct in the right posterior frontal/anterior parietal cortex. Prior Level of Function:  Lives With: Alone  Type of Home: House  Home Layout: One level, Laundry in basement  Home Access: Stairs to enter with rails  Entrance Stairs - Number of Steps: 4  Home Equipment: Walker, rolling, Cane, Rollator, [place]   Bathroom Shower/Tub: Tub/Shower unit  [employer]: Standard  Bathroom Equipment: Tub transfer bench, Grab bars in shower, Toilet raiser  Bathroom Accessibility: Accessible    Receives Help From: Family (check in on patient)  ADL Assistance: [address]: Independent  Ambulation Assistance: Independent  Transfer Assistance: Independent  Active : Yes  Additional Comments: patient used no AD prior to admit. Pt has 3 kids that can assist as needed    Restrictions/Precautions:  Restrictions/Precautions: Fall Risk, General Precautions     SUBJECTIVE: RN approved session, pt is seated in recliner, agreeable to PT. Pt continues to be anxious during session.     PAIN: denies    Vitals: Vitals not assessed per clinical judgement, see nursing flowsheet    OBJECTIVE:  Bed Mobility:  Not Tested    Transfers:  Sit to Stand: Contact Guard Assistance, X 1; several trials from recliner and chair without arm rests  Stand to [facility], X 1    Ambulation:  Contact Guard Assistance, X 1  Distance: 40 feet, multiple in room bouts of ambulation  Surface: Level Tile  Device:No Device  Gait Deviations:  Decreased Step Length Bilaterally, Decreased Weight Shift Bilaterally, Decreased Trunk Rotation, Decreased Gait Speed, and Decreased Heel Strike Bilaterally  **Poor carryover of gait pattern education from session yesterday, did not recall education, verbal cues for improved stride length and heel strike; pt carries items within room with CGA for balance, pt wanting to reach for bed for support, CGA for balance    Balance:  Dynamic Standing Balance: Contact Guard Assistance, X 1- performs balance tasks standing with feet together, reaches within FRANKIE, apprehensive to reach outside FRANKIE    Exercise:  Patient was guided in 1 set(s) 10 reps of exercise to both lower extremities, with B UE support and CGA. Standing heel/toe raises, Standing marches, Standing hip abduction/adduction, and Mini squats. Exercises were completed for increased independence with functional mobility. Timed up and Go Test (TUG)  15.47 seconds without AD- demonstrates high fall risk      Normative Reference Values  60-69 years:  8.1 seconds  70-79 years: 9.2 seconds  80-99 years: 11.3 seconds    < 10 seconds is normal, a score of > 14 seconds indicates high fall risk    Functional Outcome Measures: Completed  AM-PAC Inpatient Mobility without Stair Climbing Raw Score : 15  AM-PAC Inpatient without Stair Climbing T-Scale Score : 43.03    ASSESSMENT:  Assessment: Patient progressing toward established goals. Activity Tolerance:  Patient tolerance of  treatment: good.       Equipment Recommendations:Equipment Needed: No  Discharge Recommendations: Inpatient Rehabilitation  Plan: Current Treatment Recommendations: Strengthening, Balance training, Functional mobility training, Transfer training, Gait training, Stair training, Neuromuscular re-education, Patient/Caregiver education & training, Safety education & training, Therapeutic activities  General Plan:  (6x N)    Patient Education  Patient Education: Transfers, Gait, Health Promotion and Wellness Education    Goals:  Patient Goals : to get better  Short Term Goals  Time Frame for Short Term Goals: by discharge  Short Term Goal 1: Pt to transfer supine <--> sit mod I to enable pt to get in/out of bed. Short Term Goal 2: Pt to transfer sit <--> stand mod I for increased functional mobility. Short Term Goal 3: Pt to ambulate >100 feet without AD SBA for household ambulation. Short Term Goal 4: Pt to improve Tinetti score to 28/28 to minimize fall risk. Long Term Goals  Time Frame for Long Term Goals : NA due to short length of stay. Following session, patient left in safe position with all fall risk precautions in place.

## 2023-02-07 NOTE — PLAN OF CARE
Problem: Discharge Planning  Goal: Discharge to home or other facility with appropriate resources  Outcome: Progressing  Flowsheets (Taken 2/7/2023 9773)  Discharge to home or other facility with appropriate resources:   Identify barriers to discharge with patient and caregiver   Arrange for needed discharge resources and transportation as appropriate   Identify discharge learning needs (meds, wound care, etc)   Refer to discharge planning if patient needs post-hospital services based on physician order or complex needs related to functional status, cognitive ability or social support system     Problem: Skin/Tissue Integrity  Goal: Absence of new skin breakdown  Description: 1. Monitor for areas of redness and/or skin breakdown  2. Assess vascular access sites hourly  3. Every 4-6 hours minimum:  Change oxygen saturation probe site  4. Every 4-6 hours:  If on nasal continuous positive airway pressure, respiratory therapy assess nares and determine need for appliance change or resting period. Outcome: Progressing  Note: No new skin breakdown this shift.      Problem: ABCDS Injury Assessment  Goal: Absence of physical injury  Outcome: Progressing  Flowsheets (Taken 2/7/2023 0742)  Absence of Physical Injury: Implement safety measures based on patient assessment     Problem: Skin/Tissue Integrity - Adult  Goal: Skin integrity remains intact  Outcome: Progressing  Flowsheets (Taken 2/7/2023 0742)  Skin Integrity Remains Intact: Monitor for areas of redness and/or skin breakdown     Problem: Skin/Tissue Integrity - Adult  Goal: Oral mucous membranes remain intact  Outcome: Progressing  Flowsheets (Taken 2/7/2023 0742)  Oral Mucous Membranes Remain Intact: Assess oral mucosa and hygiene practices     Problem: Infection - Adult  Goal: Absence of infection at discharge  Outcome: Progressing  Flowsheets (Taken 2/7/2023 0742)  Absence of infection at discharge:   Assess and monitor for signs and symptoms of infection Monitor lab/diagnostic results   Monitor all insertion sites i.e., indwelling lines, tubes and drains   Instruct and encourage patient and family to use good hand hygiene technique   Identify and instruct in appropriate isolation precautions for identified infection/condition   Administer medications as ordered     Problem: Infection - Adult  Goal: Absence of infection during hospitalization  Outcome: Progressing  Flowsheets (Taken 2/7/2023 0742)  Absence of infection during hospitalization:   Assess and monitor for signs and symptoms of infection   Monitor lab/diagnostic results   Monitor all insertion sites i.e., indwelling lines, tubes and drains   Administer medications as ordered   Instruct and encourage patient and family to use good hand hygiene technique   Identify and instruct in appropriate isolation precautions for identified infection/condition     Problem: Infection - Adult  Goal: Absence of fever/infection during anticipated neutropenic period  Outcome: Progressing  Flowsheets (Taken 2/7/2023 0742)  Absence of fever/infection during anticipated neutropenic period: Monitor white blood cell count     Problem: Metabolic/Fluid and Electrolytes - Adult  Goal: Electrolytes maintained within normal limits  Outcome: Progressing  Flowsheets (Taken 2/7/2023 0742)  Electrolytes maintained within normal limits:   Monitor labs and assess patient for signs and symptoms of electrolyte imbalances   Administer electrolyte replacement as ordered   Monitor response to electrolyte replacements, including repeat lab results as appropriate     Problem: Metabolic/Fluid and Electrolytes - Adult  Goal: Hemodynamic stability and optimal renal function maintained  Outcome: Progressing  Flowsheets (Taken 2/7/2023 0742)  Hemodynamic stability and optimal renal function maintained:   Monitor labs and assess for signs and symptoms of volume excess or deficit   Monitor intake, output and patient weight   Monitor urine specific gravity, serum osmolarity and serum sodium as indicated or ordered   Encourage oral intake as appropriate     Problem: Metabolic/Fluid and Electrolytes - Adult  Goal: Glucose maintained within prescribed range  Outcome: Progressing  Flowsheets (Taken 2/7/2023 0742)  Glucose maintained within prescribed range: Monitor blood glucose as ordered     Problem: Hematologic - Adult  Goal: Maintains hematologic stability  Outcome: Progressing  Flowsheets (Taken 2/7/2023 0742)  Maintains hematologic stability: Assess for signs and symptoms of bleeding or hemorrhage     Problem: Pain  Goal: Verbalizes/displays adequate comfort level or baseline comfort level  Outcome: Progressing  Flowsheets (Taken 2/7/2023 0742)  Verbalizes/displays adequate comfort level or baseline comfort level:   Encourage patient to monitor pain and request assistance   Assess pain using appropriate pain scale   Administer analgesics based on type and severity of pain and evaluate response   Implement non-pharmacological measures as appropriate and evaluate response   Consider cultural and social influences on pain and pain management   Notify Licensed Independent Practitioner if interventions unsuccessful or patient reports new pain     Problem: Chronic Conditions and Co-morbidities  Goal: Patient's chronic conditions and co-morbidity symptoms are monitored and maintained or improved  Outcome: Progressing  Flowsheets (Taken 2/7/2023 0742)  Care Plan - Patient's Chronic Conditions and Co-Morbidity Symptoms are Monitored and Maintained or Improved:   Monitor and assess patient's chronic conditions and comorbid symptoms for stability, deterioration, or improvement   Collaborate with multidisciplinary team to address chronic and comorbid conditions and prevent exacerbation or deterioration   Update acute care plan with appropriate goals if chronic or comorbid symptoms are exacerbated and prevent overall improvement and discharge     Problem: Safety - Adult  Goal: Free from fall injury  Outcome: Progressing  Flowsheets (Taken 2/7/2023 0742)  Free From Fall Injury: Instruct family/caregiver on patient safety     Problem: Neurosensory - Adult  Goal: Achieves stable or improved neurological status  Outcome: Progressing  Flowsheets (Taken 2/7/2023 0742)  Achieves stable or improved neurological status: Assess for and report changes in neurological status     Problem: Nutrition Deficit:  Goal: Optimize nutritional status  Outcome: Progressing  Flowsheets (Taken 2/7/2023 0742)  Nutrient intake appropriate for improving, restoring, or maintaining nutritional needs:   Assess nutritional status and recommend course of action   Monitor oral intake, labs, and treatment plans   Recommend appropriate diets, oral nutritional supplements, and vitamin/mineral supplements   Care plan reviewed with patient. Patient verbalized understanding of the plan of care and contribute to goal setting.

## 2023-02-07 NOTE — CARE COORDINATION
2/7/23, 12:26 PM EST    Patient goals/plan/ treatment preferences discussed by  and . Patient goals/plan/ treatment preferences reviewed with patient/ family. Patient/ family verbalize understanding of discharge plan and are in agreement with goal/plan/treatment preferences. Understanding was demonstrated using the teach back method. AVS provided by RN at time of discharge, which includes all necessary medical information pertaining to the patients current course of illness, treatment, post-discharge goals of care, and treatment preferences.      Services At/After Discharge: Inpatient rehab  Plans IPR when precerted (day 2); [place], [facility] Coordinator following

## 2023-02-07 NOTE — PROGRESS NOTES
INTERNAL MEDICINE Progress Note  2/7/2023 11:36 AM  Subjective:   Admit Date: 2/2/2023  PCP: EDUARDO Dumont CNP  Interval History:     No new c/o  no HA  No dizziness    Objective:   Vitals: /65   Pulse 71   Temp 98.1 °F (36.7 °C) (Oral)   Resp 18   Ht 4' 10\" (1.473 m)   Wt 121 lb 7.6 oz (55.1 kg)   SpO2 100%   BMI 25.39 kg/m²   General appearance: alert and cooperative with exam  HEENT: Head: atraumatic  Neck: no adenopathy, no carotid bruit, no JVD, and supple, symmetrical, trachea midline  Lungs: clear to auscultation bilaterally  Heart: S1, S2 normal  Abdomen: soft, non-tender; bowel sounds normal; no masses,  no organomegaly  Extremities: no edema, redness or tenderness in the calves or thighs  Neurologic: Mental status: Alert, oriented, thought content appropriate  No focal motor deficit      Medications:   Scheduled Meds:   docusate sodium  100 mg Oral Daily    clopidogrel  75 mg Oral Daily    clonazePAM  0.5 mg Oral Nightly    Vitamin D  500 Units Oral Daily    multivitamin  1 tablet Oral Daily    enoxaparin  40 mg SubCUTAneous Nightly    aspirin  81 mg Oral Daily    Or    aspirin  300 mg Rectal Daily    rosuvastatin  40 mg Oral Nightly    traZODone  50 mg Oral Nightly     Continuous Infusions:        Lab Results:   CBC:   No results for input(s): WBC, HGB, PLT in the last 72 hours. BMP:    No results for input(s): NA, K, CL, CO2, BUN, CREATININE, GLUCOSE in the last 72 hours. No results for input(s): CHOL, HDL in the last 72 hours. Invalid input(s): LDLCALCU,  TRIG,  LDL     LDL Calculated 80       HgBA1c:    Lab Results   Component Value Date/Time    LABA1C 6.1 02/03/2023 03:42 AM     TSH:    Lab Results   Component Value Date/Time    TSH 1.310 12/10/2020 02:55 PM       FERRITIN:    Lab Results   Component Value Date/Time    FERRITIN 124 11/20/2021 07:39 PM        MRI of brain (2/2/2023) : Impression   1.  Small acute infarct in the right posterior frontal/anterior parietal cortex 2. Mild atrophy and probable ischemic changes in the white matter. 3. Mild inflammatory changes in ethmoid air cells bilaterally and left mastoid tip.             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

## 2023-02-07 NOTE — PROGRESS NOTES
Transaction ID: [id number] ID: [id number] Date: 2023-02-07  Cole Bernard Patient  Member ID  [id number]    Date of Birth  1935    Gender  NA    Transaction Type  Inpatient Authorization    Organization  [address]    [employer] logo      Certificate Information  Certification Number  [id number]    Status  PENDED    Review Reason 1  Requires Medical Review    Message  RECEIVED CLINICAL INFORMATION NOW PENDING CLINICAL REVIEW    Service Information  Place of Service  [facility]    Admission - Discharge Date  2023-02-06    Admission Type  NA    Diagnosis Code 1   - 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    Admission Service Details  Certification Number  [id number]    Status  PENDED    Service Type Code 1  A9 - Rehabilitation    Start Date - End Date  2023-02-06 - 2023-02-19    Procedure Codes  Requesting Provider     Name  Arvind [facility]  [id number]    Provider Role  Provider    Phone  [phone]  Fax  [phone]    Contact Name  Christi Hilton    Rendering Providers     Provider 1  Name  Arvind Key  [id number]    Provider Role  Admitting Services    Provider 2  Name  Parth Herndon 61    NPI  [id number]    Provider Role  Attending    Provider 3  Name  Parth Herndon 61    NPI  [id number]    Provider Role  Facility

## 2023-02-07 NOTE — PROGRESS NOTES
Voicemail message left with HANSEL Lima regarding precert submitted 2/6 awaiting response. Update:  3951  Availity website states precert is PENDED.

## 2023-02-07 NOTE — PROGRESS NOTES
[facility] ICU STEPDOWN TELEMETRY 4K  Occupational Therapy  Daily Note  Time:   Time In: 0815  Time Out: 0900  Timed Code Treatment Minutes: 39 Minutes  Minutes: 45          Date: 2023  Patient Name: Ryanne Marin,   Gender: female      Room: -17/017-A  MRN: [id number]  : 1935  (81 y.o.)  Referring Practitioner: Verito Avery MD  Diagnosis: acute lacunar infarction  Additional Pertinent Hx: per chart review; Ryanne Marin is a 81 y.o. female who presents to Emergency Department with Fall and Other (Feels like something is stuck in throat)     Patient is brought in by EMS for evaluation of fall x2 today. Patient says she fell in the kitchen and again in the dining room. Lives at home by herself. Patient states she had no idea why and how she fell. She denies syncope. No fainting episodes. She denies generalized weakness. After the second fall, patient noticed it was hard for her to get up, she called her daughter who recommended she should call EMS. On arrival, patient was alert and oriented x 4. She complains of mild left hip pain. She denies LOC. No headache. She has been having foreign body sensation in throat since last night, but she was able to swallow a piece of bread this morning. No vomiting. No  fever or chills. No chest pain. No SOB. No abdominal pain. She has mild watery diarrhea. No urine symptoms  MRI 2/2 Small acute infarct in the right posterior frontal/anterior parietal cortex    Restrictions/Precautions:  Restrictions/Precautions: Fall Risk, General Precautions     SUBJECTIVE: Pt laying in bed upon arrival, pt agreeable to OT session, RN gave verbal approval for session    PAIN: 0/10:     Vitals: Nurse checked vitals prior to session    COGNITION: Decreased Insight, Impaired Memory, Inattention, and Decreased Problem Solving    ADL:   Bathing: Stand By Assistance.   With pt completing shower standing  Upper Extremity Dressing: Minimal Assistance. For gown management  Lower Extremity Dressing: Moderate Assistance. For brief management  Footwear Management: Moderate Assistance. For doffing socks  Shower Transfer: Stand By Assistance. For safety . BALANCE:  Standing Balance: Stand By Assistance. With RW, and BUE release from the walker    BED MOBILITY:  Supine to Sit: Supervision with [facility] lowered    TRANSFERS:  Sit to Stand:  Stand By Assistance. From the EOB  Stand to Sit: Stand By Assistance. From the shower    FUNCTIONAL MOBILITY:  Assistive Device: Rolling Walker  Assist Level:  Stand By Assistance. Distance: To and from bathroom    ASSESSMENT:     Activity Tolerance:  Patient tolerance of  treatment: good. Discharge Recommendations: Continue to assess pending progress and Inpatient Rehabilitation  Equipment Recommendations: Equipment Needed: No  Plan: Times Per Week: 5-6x  Times Per Day: Once a day  Current Treatment Recommendations: Strengthening, Balance training, Functional mobility training, Endurance training, Safety education & training, Neuromuscular re-education, Patient/Caregiver education & training, Self-Care / ADL, Coordination training    Patient Education  Patient Education: ADL's    Goals  Short Term Goals  Time Frame for Short Term Goals: by discharge  Short Term Goal 1: patient will tolerate 5 min functional standing with two hand release with (S) to increase ease with toileting and grooming. Short Term Goal 2: patient will functionally ambulate to/from BR with least restrictive device with SBA. Short Term Goal 3: patient will complete ADL routine with SBA and 0-1 cues for safety and sequencing. Short Term Goal 4: patient will participate in moderate resistive UB exer to increase UB strength for functional transfers. Following session, patient left in safe position with all fall risk precautions in place.

## 2023-02-08 PROBLEM — I48.91 ATRIAL FIBRILLATION, TRANSIENT (HCC): Status: ACTIVE | Noted: 2023-02-08

## 2023-02-08 PROBLEM — E78.00 PURE HYPERCHOLESTEROLEMIA: Status: ACTIVE | Noted: 2023-02-08

## 2023-02-08 PROBLEM — I63.9 CEREBRAL INFARCT (HCC): Status: ACTIVE | Noted: 2023-02-08

## 2023-02-08 LAB
EKG ATRIAL RATE: 80 BPM
EKG ATRIAL RATE: 86 BPM
EKG P AXIS: 40 DEGREES
EKG P AXIS: 55 DEGREES
EKG P-R INTERVAL: 154 MS
EKG P-R INTERVAL: 160 MS
EKG Q-T INTERVAL: 356 MS
EKG Q-T INTERVAL: 372 MS
EKG QRS DURATION: 84 MS
EKG QRS DURATION: 88 MS
EKG QTC CALCULATION (BAZETT): 426 MS
EKG QTC CALCULATION (BAZETT): 429 MS
EKG R AXIS: -6 DEGREES
EKG R AXIS: 2 DEGREES
EKG T AXIS: 34 DEGREES
EKG T AXIS: 53 DEGREES
EKG VENTRICULAR RATE: 80 BPM
EKG VENTRICULAR RATE: 86 BPM

## 2023-02-08 PROCEDURE — 6370000000 HC RX 637 (ALT 250 FOR IP): Performed by: NURSE PRACTITIONER

## 2023-02-08 PROCEDURE — 97535 SELF CARE MNGMENT TRAINING: CPT

## 2023-02-08 PROCEDURE — 6370000000 HC RX 637 (ALT 250 FOR IP): Performed by: INTERNAL MEDICINE

## 2023-02-08 PROCEDURE — 93010 ELECTROCARDIOGRAM REPORT: CPT | Performed by: INTERNAL MEDICINE

## 2023-02-08 PROCEDURE — 2060000000 HC ICU INTERMEDIATE R&B

## 2023-02-08 PROCEDURE — 97530 THERAPEUTIC ACTIVITIES: CPT

## 2023-02-08 PROCEDURE — 97110 THERAPEUTIC EXERCISES: CPT

## 2023-02-08 PROCEDURE — 97116 GAIT TRAINING THERAPY: CPT

## 2023-02-08 PROCEDURE — 93005 ELECTROCARDIOGRAM TRACING: CPT | Performed by: INTERNAL MEDICINE

## 2023-02-08 RX ORDER — POLYVINYL ALCOHOL 14 MG/ML
1 SOLUTION/ DROPS OPHTHALMIC PRN
Status: CANCELLED | OUTPATIENT
Start: 2023-02-08

## 2023-02-08 RX ORDER — MULTIVITAMIN WITH IRON
1 TABLET ORAL DAILY
Status: CANCELLED | OUTPATIENT
Start: 2023-02-09

## 2023-02-08 RX ORDER — ONDANSETRON 2 MG/ML
4 INJECTION INTRAMUSCULAR; INTRAVENOUS EVERY 6 HOURS PRN
Status: CANCELLED | OUTPATIENT
Start: 2023-02-08

## 2023-02-08 RX ORDER — ROSUVASTATIN CALCIUM 20 MG/1
40 TABLET, COATED ORAL NIGHTLY
Status: CANCELLED | OUTPATIENT
Start: 2023-02-08

## 2023-02-08 RX ORDER — ASPIRIN 81 MG/1
81 TABLET ORAL DAILY
Status: CANCELLED | OUTPATIENT
Start: 2023-02-09

## 2023-02-08 RX ORDER — POLYETHYLENE GLYCOL 3350 17 G/17G
17 POWDER, FOR SOLUTION ORAL DAILY PRN
Status: CANCELLED | OUTPATIENT
Start: 2023-02-08

## 2023-02-08 RX ORDER — VITAMIN B COMPLEX
500 TABLET ORAL DAILY
Status: CANCELLED | OUTPATIENT
Start: 2023-02-09

## 2023-02-08 RX ORDER — METOPROLOL SUCCINATE 25 MG/1
25 TABLET, EXTENDED RELEASE ORAL DAILY
Status: CANCELLED | OUTPATIENT
Start: 2023-02-08

## 2023-02-08 RX ORDER — TRAZODONE HYDROCHLORIDE 50 MG/1
50 TABLET ORAL NIGHTLY
Status: CANCELLED | OUTPATIENT
Start: 2023-02-08

## 2023-02-08 RX ORDER — METOPROLOL SUCCINATE 25 MG/1
25 TABLET, EXTENDED RELEASE ORAL DAILY
Status: DISCONTINUED | OUTPATIENT
Start: 2023-02-08 | End: 2023-02-09 | Stop reason: HOSPADM

## 2023-02-08 RX ORDER — ONDANSETRON 4 MG/1
4 TABLET, ORALLY DISINTEGRATING ORAL EVERY 8 HOURS PRN
Status: CANCELLED | OUTPATIENT
Start: 2023-02-08

## 2023-02-08 RX ORDER — ACETAMINOPHEN 325 MG/1
650 TABLET ORAL EVERY 4 HOURS PRN
Status: CANCELLED | OUTPATIENT
Start: 2023-02-08

## 2023-02-08 RX ORDER — CLONAZEPAM 0.5 MG/1
0.5 TABLET ORAL NIGHTLY
Status: CANCELLED | OUTPATIENT
Start: 2023-02-08

## 2023-02-08 RX ADMIN — CLOPIDOGREL BISULFATE 75 MG: 75 TABLET ORAL at 08:35

## 2023-02-08 RX ADMIN — RIVAROXABAN 20 MG: 20 TABLET, FILM COATED ORAL at 17:35

## 2023-02-08 RX ADMIN — TRAZODONE HYDROCHLORIDE 50 MG: 50 TABLET ORAL at 21:54

## 2023-02-08 RX ADMIN — DOCUSATE SODIUM 100 MG: 100 CAPSULE, LIQUID FILLED ORAL at 08:35

## 2023-02-08 RX ADMIN — ASPIRIN 81 MG: 81 TABLET, COATED ORAL at 08:35

## 2023-02-08 RX ADMIN — METOPROLOL SUCCINATE 25 MG: 25 TABLET, EXTENDED RELEASE ORAL at 15:36

## 2023-02-08 RX ADMIN — CLONAZEPAM 0.5 MG: 0.5 TABLET ORAL at 21:53

## 2023-02-08 RX ADMIN — ROSUVASTATIN CALCIUM 40 MG: 20 TABLET, FILM COATED ORAL at 21:53

## 2023-02-08 RX ADMIN — Medication 1 TABLET: at 08:36

## 2023-02-08 RX ADMIN — Medication 500 UNITS: at 08:36

## 2023-02-08 NOTE — PROGRESS NOTES
INTERNAL MEDICINE Progress Note  2/8/2023 12:37 PM  Subjective:   Admit Date: 2/2/2023  PCP: EDUARDO Hwang - CNP  Interval History:     Dynamic telemetry shows paroxysmal runs of a fib/ flutter and SVT  No palpitations  No dizziness    Objective:   Vitals: /81   Pulse (!) 112   Temp 98.1 °F (36.7 °C) (Oral)   Resp 21   Ht 4' 10\" (1.473 m)   Wt 121 lb 9.6 oz (55.2 kg)   SpO2 95%   BMI 25.41 kg/m²   General appearance: alert and cooperative with exam  HEENT: Head: atraumatic  Neck: no adenopathy, no carotid bruit, no JVD, and supple, symmetrical, trachea midline  Lungs: clear to auscultation bilaterally  Heart: S1, S2 normal  Abdomen: soft, non-tender; bowel sounds normal; no masses,  no organomegaly  Extremities: no edema, redness or tenderness in the calves or thighs  Neurologic: Mental status: Alert, oriented, thought content appropriate  No focal motor deficit      Medications:   Scheduled Meds:   docusate sodium  100 mg Oral Daily    clopidogrel  75 mg Oral Daily    clonazePAM  0.5 mg Oral Nightly    Vitamin D  500 Units Oral Daily    multivitamin  1 tablet Oral Daily    enoxaparin  40 mg SubCUTAneous Nightly    aspirin  81 mg Oral Daily    Or    aspirin  300 mg Rectal Daily    rosuvastatin  40 mg Oral Nightly    traZODone  50 mg Oral Nightly     Continuous Infusions:        Lab Results:   CBC:   No results for input(s): WBC, HGB, PLT in the last 72 hours. BMP:    Recent Labs     02/07/23  1915      K 4.3      CO2 20*   BUN 22   CREATININE 0.6   GLUCOSE 198*     No results for input(s): CHOL, HDL in the last 72 hours.     Invalid input(s): LDLCALCU,  TRIG,  LDL     LDL Calculated 80       HgBA1c:    Lab Results   Component Value Date/Time    LABA1C 6.1 02/03/2023 03:42 AM     TSH:    Lab Results   Component Value Date/Time    TSH 2.030 02/07/2023 07:15 PM       FERRITIN:    Lab Results   Component Value Date/Time    FERRITIN 124 11/20/2021 07:39 PM        MRI of brain (2/2/2023) :  Impression   1. 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 Mild atrophy and probable ischemic changes in the white matter. 3. Mild inflammatory changes in ethmoid air cells bilaterally and left mastoid tip.             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

## 2023-02-08 NOTE — PROGRESS NOTES
[facility] according to representative the case is with the nurse reviewer. Call reference number [id number].

## 2023-02-08 NOTE — CARE COORDINATION
2/8/23, 8:50 AM EST    Patient goals/plan/ treatment preferences discussed by  and . Patient goals/plan/ treatment preferences reviewed with patient/ family. Patient/ family verbalize understanding of discharge plan and are in agreement with goal/plan/treatment preferences. Understanding was demonstrated using the teach back method. AVS provided by RN at time of discharge, which includes all necessary medical information pertaining to the patients current course of illness, treatment, post-discharge goals of care, and treatment preferences.      Services At/After Discharge: Inpatient rehab  Plans IPR when precerted (day 3);, medically cleared (starting Toprol, Xarelto today) [facility] Coordinator following        Update: Cardiology consulted for A-fib/SVT;  documented; nsg reports /s at times sitting in chair; await Cardiology consult; collaborated w Werner Gaines RN, [facility] Amor Coordinator    Update: IPR precerted today; await Cardiology consult for SVT; collaborated w [facility] Amor Coordinator, [place], [place]

## 2023-02-08 NOTE — PLAN OF CARE
Problem: Discharge Planning  Goal: Discharge to home or other facility with appropriate resources  Outcome: Progressing  Flowsheets (Taken 2/8/2023 1852)  Discharge to home or other facility with appropriate resources:   Identify barriers to discharge with patient and caregiver   Identify discharge learning needs (meds, wound care, etc)   Refer to discharge planning if patient needs post-hospital services based on physician order or complex needs related to functional status, cognitive ability or social support system   Arrange for needed discharge resources and transportation as appropriate     Problem: Skin/Tissue Integrity  Goal: Absence of new skin breakdown  Description: 1. Monitor for areas of redness and/or skin breakdown  2. Assess vascular access sites hourly  3. Every 4-6 hours minimum:  Change oxygen saturation probe site  4. Every 4-6 hours:  If on nasal continuous positive airway pressure, respiratory therapy assess nares and determine need for appliance change or resting period.   Outcome: Progressing  Note: Assess and monitor      Problem: ABCDS Injury Assessment  Goal: Absence of physical injury  Outcome: Progressing  Flowsheets (Taken 2/8/2023 1852)  Absence of Physical Injury: Implement safety measures based on patient assessment     Problem: Skin/Tissue Integrity - Adult  Goal: Skin integrity remains intact  Outcome: Progressing  Flowsheets (Taken 2/8/2023 1852)  Skin Integrity Remains Intact: Monitor for areas of redness and/or skin breakdown     Problem: Skin/Tissue Integrity - Adult  Goal: Oral mucous membranes remain intact  Outcome: Progressing  Flowsheets (Taken 2/8/2023 1852)  Oral Mucous Membranes Remain Intact:   Assess oral mucosa and hygiene practices   Implement preventative oral hygiene regimen   Implement oral medicated treatments as ordered     Problem: Infection - Adult  Goal: Absence of infection at discharge  Outcome: Progressing  Flowsheets (Taken 2/8/2023 1852)  Absence of infection at discharge:   Assess and monitor for signs and symptoms of infection   Monitor lab/diagnostic results   Monitor all insertion sites i.e., indwelling lines, tubes and drains   Administer medications as ordered   Instruct and encourage patient and family to use good hand hygiene technique     Problem: Infection - Adult  Goal: Absence of infection during hospitalization  Outcome: Progressing  Flowsheets (Taken 2/8/2023 1852)  Absence of infection during hospitalization:   Assess and monitor for signs and symptoms of infection   Monitor lab/diagnostic results   Monitor all insertion sites i.e., indwelling lines, tubes and drains   Administer medications as ordered     Problem: Infection - Adult  Goal: Absence of fever/infection during anticipated neutropenic period  Outcome: Progressing  Flowsheets (Taken 2/8/2023 1852)  Absence of fever/infection during anticipated neutropenic period: Monitor white blood cell count     Problem: Metabolic/Fluid and Electrolytes - Adult  Goal: Electrolytes maintained within normal limits  Outcome: Progressing  Flowsheets (Taken 2/8/2023 1852)  Electrolytes maintained within normal limits:   Monitor labs and assess patient for signs and symptoms of electrolyte imbalances   Monitor response to electrolyte replacements, including repeat lab results as appropriate   Instruct patient on fluid and nutrition restrictions as appropriate   Administer electrolyte replacement as ordered     Problem: Metabolic/Fluid and Electrolytes - Adult  Goal: Hemodynamic stability and optimal renal function maintained  Outcome: Progressing  Flowsheets (Taken 2/8/2023 1852)  Hemodynamic stability and optimal renal function maintained:   Monitor labs and assess for signs and symptoms of volume excess or deficit   Monitor intake, output and patient weight   Monitor response to interventions for patient's volume status, including labs, urine output, blood pressure (other measures as available) Encourage oral intake as appropriate     Problem: Metabolic/Fluid and Electrolytes - Adult  Goal: Glucose maintained within prescribed range  Outcome: Progressing  Flowsheets (Taken 2/8/2023 1852)  Glucose maintained within prescribed range: Monitor blood glucose as ordered     Problem: Hematologic - Adult  Goal: Maintains hematologic stability  Outcome: Progressing  Flowsheets (Taken 2/8/2023 1852)  Maintains hematologic stability:   Assess for signs and symptoms of bleeding or hemorrhage   Monitor labs for bleeding or clotting disorders     Problem: Neurosensory - Adult  Goal: Achieves stable or improved neurological status  Outcome: Progressing  Flowsheets (Taken 2/8/2023 1852)  Achieves stable or improved neurological status:   Assess for and report changes in neurological status   Maintain blood pressure and fluid volume within ordered parameters to optimize cerebral perfusion and minimize risk of hemorrhage   Monitor temperature, glucose, and sodium.  Initiate appropriate interventions as ordered     Problem: Musculoskeletal - Adult  Goal: Return mobility to safest level of function  Outcome: Progressing  Flowsheets (Taken 2/8/2023 1852)  Return Mobility to Safest Level of Function:   Assess patient stability and activity tolerance for standing, transferring and ambulating with or without assistive devices   Assist with transfers and ambulation using safe patient handling equipment as needed   Obtain physical therapy/occupational therapy consults as needed   Instruct patient/family in ordered activity level     Problem: Musculoskeletal - Adult  Goal: Maintain proper alignment of affected body part  Outcome: Progressing  Flowsheets (Taken 2/8/2023 1852)  Maintain proper alignment of affected body part:   Support and protect limb and body alignment per provider's orders   Instruct and reinforce with patient and family use of appropriate assistive device and precautions (e.g. spinal or hip dislocation precautions)     Problem: Musculoskeletal - Adult  Goal: Return ADL status to a safe level of function  Outcome: Progressing  Flowsheets (Taken 2/8/2023 1852)  Return ADL Status to a Safe Level of Function:   Administer medication as ordered   Obtain physical therapy/occupational therapy consults as needed   Assess activities of daily living deficits and provide assistive devices as needed   Assist and instruct patient to increase activity and self care as tolerated     Problem: Pain  Goal: Verbalizes/displays adequate comfort level or baseline comfort level  Outcome: Progressing  Flowsheets (Taken 2/8/2023 1852)  Verbalizes/displays adequate comfort level or baseline comfort level:   Encourage patient to monitor pain and request assistance   Assess pain using appropriate pain scale   Implement non-pharmacological measures as appropriate and evaluate response   Consider cultural and social influences on pain and pain management  Note: Pain goal zero      Problem: Chronic Conditions and Co-morbidities  Goal: Patient's chronic conditions and co-morbidity symptoms are monitored and maintained or improved  Outcome: Progressing  Flowsheets (Taken 2/8/2023 1852)  Care Plan - Patient's Chronic Conditions and Co-Morbidity Symptoms are Monitored and Maintained or Improved:   Monitor and assess patient's chronic conditions and comorbid symptoms for stability, deterioration, or improvement   Collaborate with multidisciplinary team to address chronic and comorbid conditions and prevent exacerbation or deterioration   Update acute care plan with appropriate goals if chronic or comorbid symptoms are exacerbated and prevent overall improvement and discharge     Problem: Safety - Adult  Goal: Free from fall injury  Outcome: Progressing  Flowsheets (Taken 2/8/2023 1852)  Free From Fall Injury:   Instruct family/caregiver on patient safety   Based on caregiver fall risk screen, instruct family/caregiver to ask for assistance with transferring infant if caregiver noted to have fall risk factors     Problem: Nutrition Deficit:  Goal: Optimize nutritional status  Outcome: Progressing  Flowsheets (Taken 2/8/2023 6314)  Nutrient intake appropriate for improving, restoring, or maintaining nutritional needs:   Assess nutritional status and recommend course of action   Monitor oral intake, labs, and treatment plans   Recommend appropriate diets, oral nutritional supplements, and vitamin/mineral supplements   Care plan reviewed with patient. Patient verbalize understanding of the plan of care and contribute to goal setting.

## 2023-02-08 NOTE — PROGRESS NOTES
[facility] ICU STEPDOWN TELEMETRY 4K  Occupational Therapy  Daily Note  Time:   Time In: 1330  Time Out: 1400  Timed Code Treatment Minutes: 30 Minutes  Minutes: 30          Date: 2023  Patient Name: Esdras Farrell,   Gender: female      Room: -17/017-A  MRN: [id number]  : 1935  (81 y.o.)  Referring Practitioner: Amaury Cooper MD  Diagnosis: acute lacunar infarction  Additional Pertinent Hx: per chart review; Esdras Farrell is a 81 y.o. female who presents to Emergency Department with Fall and Other (Feels like something is stuck in throat)     Patient is brought in by EMS for evaluation of fall x2 today. Patient says she fell in the kitchen and again in the dining room. Lives at home by herself. Patient states she had no idea why and how she fell. She denies syncope. No fainting episodes. She denies generalized weakness. After the second fall, patient noticed it was hard for her to get up, she called her daughter who recommended she should call EMS. On arrival, patient was alert and oriented x 4. She complains of mild left hip pain. She denies LOC. No headache. She has been having foreign body sensation in throat since last night, but she was able to swallow a piece of bread this morning. No vomiting. No  fever or chills. No chest pain. No SOB. No abdominal pain. She has mild watery diarrhea. No urine symptoms  MRI 2/2 Small acute infarct in the right posterior frontal/anterior parietal cortex    Restrictions/Precautions:  Restrictions/Precautions: Fall Risk, General Precautions     SUBJECTIVE: RN approved session, patient seated up in recliner upon OT arrival and agreeable to tx. Patient A & O x 4. Talkative. Patient frequently fidgeting with items next to her on couch. Cues to stay on task.      PAIN: 0/10:     Vitals: Vitals not assessed per clinical judgement, see nursing flowsheet    COGNITION: Impaired Attention, Tangential, and talkative, decreased insight    ADL:   Footwear Management: Stand By Assistance. To doff tennis shoe with increased time to un-tie with patient mainly using R Hand to complete. MIN A to don for pulling shoe around heel of foot after increased time attempting and patient edu in tech for completing and then able to tie. Patient states shoes are new and difficult for her. Benson Cast BALANCE:  Sitting Balance:  Independent. BED MOBILITY:  Not Tested      ADDITIONAL ACTIVITIES:  Completed 1 set x 15 reps of UB exer with 1# free wt with rest breaks with patient reporting fatigue with shldr exer to increase UB strength for functional transfers. ASSESSMENT:  Activity Tolerance:  Patient tolerance of  treatment: good. Motivated to participate in OT      Discharge Recommendations: Continue to assess pending progress, Inpatient Rehabilitation, and Patient would benefit from continued OT at discharge  Equipment Recommendations: Equipment Needed: No  Plan: Times Per Week: 5-6x  Times Per Day: Once a day  Current Treatment Recommendations: Strengthening, Balance training, Functional mobility training, Endurance training, Safety education & training, Neuromuscular re-education, Patient/Caregiver education & training, Self-Care / ADL, Coordination training    Patient Education  Patient Education: Role of OT, Plan of Care, ADL's, Home Exercise Program, Precautions, Reviewed Prior Education, and Importance of Increasing Activity    Goals  Short Term Goals  Time Frame for Short Term Goals: by discharge  Short Term Goal 1: patient will tolerate 5 min functional standing with two hand release with (S) to increase ease with toileting and grooming. Short Term Goal 2: patient will functionally ambulate to/from BR with least restrictive device with SBA. Short Term Goal 3: patient will complete ADL routine with SBA and 0-1 cues for safety and sequencing.   Short Term Goal 4: patient will participate in moderate resistive UB exer to increase UB strength for functional transfers. Following session, patient left in safe position with all fall risk precautions in place.

## 2023-02-08 NOTE — PLAN OF CARE
Problem: Discharge Planning  Goal: Discharge to home or other facility with appropriate resources  2/7/2023 2111 by Salty Flores RN  Outcome: Progressing  Flowsheets (Taken 2/7/2023 2111)  Discharge to home or other facility with appropriate resources:   Identify barriers to discharge with patient and caregiver   Arrange for needed discharge resources and transportation as appropriate   Identify discharge learning needs (meds, wound care, etc)   Refer to discharge planning if patient needs post-hospital services based on physician order or complex needs related to functional status, cognitive ability or social support system     Problem: Skin/Tissue Integrity  Goal: Absence of new skin breakdown  Description: 1. Monitor for areas of redness and/or skin breakdown  2. Assess vascular access sites hourly  3. Every 4-6 hours minimum:  Change oxygen saturation probe site  4. Every 4-6 hours:  If on nasal continuous positive airway pressure, respiratory therapy assess nares and determine need for appliance change or resting period. 2/7/2023 2111 by Salty Flores RN  Outcome: Progressing  Note: No new skin breakdown this shift.      Problem: ABCDS Injury Assessment  Goal: Absence of physical injury  2/7/2023 2111 by Salty Flores RN  Outcome: Progressing  Flowsheets (Taken 2/7/2023 2111)  Absence of Physical Injury: Implement safety measures based on patient assessment     Problem: Skin/Tissue Integrity - Adult  Goal: Skin integrity remains intact  2/7/2023 2111 by Salty Flores RN  Outcome: Progressing  Flowsheets (Taken 2/7/2023 2111)  Skin Integrity Remains Intact: Monitor for areas of redness and/or skin breakdown     Problem: Skin/Tissue Integrity - Adult  Goal: Oral mucous membranes remain intact  2/7/2023 2111 by Salty Flores RN  Outcome: Progressing  Flowsheets (Taken 2/7/2023 2111)  Oral Mucous Membranes Remain Intact: Assess oral mucosa and hygiene practices     Problem: Infection - Adult  Goal: Absence of infection at discharge  2/7/2023 2111 by Shaq Tong RN  Outcome: Progressing  Flowsheets (Taken 2/7/2023 2111)  Absence of infection at discharge:   Assess and monitor for signs and symptoms of infection   Monitor lab/diagnostic results   Monitor all insertion sites i.e., indwelling lines, tubes and drains   Administer medications as ordered   Identify and instruct in appropriate isolation precautions for identified infection/condition   Instruct and encourage patient and family to use good hand hygiene technique     Problem: Infection - Adult  Goal: Absence of infection during hospitalization  2/7/2023 2111 by Shaq Tong RN  Outcome: Progressing  Flowsheets (Taken 2/7/2023 2111)  Absence of infection during hospitalization:   Assess and monitor for signs and symptoms of infection   Monitor lab/diagnostic results   Monitor all insertion sites i.e., indwelling lines, tubes and drains   Administer medications as ordered   Instruct and encourage patient and family to use good hand hygiene technique   Identify and instruct in appropriate isolation precautions for identified infection/condition     Problem: Infection - Adult  Goal: Absence of fever/infection during anticipated neutropenic period  2/7/2023 2111 by Shaq Tong RN  Outcome: Progressing  Flowsheets (Taken 2/7/2023 2111)  Absence of fever/infection during anticipated neutropenic period: Monitor white blood cell count     Problem: Metabolic/Fluid and Electrolytes - Adult  Goal: Electrolytes maintained within normal limits  2/7/2023 2111 by Shaq Tong RN  Outcome: Progressing  Flowsheets (Taken 2/7/2023 2111)  Electrolytes maintained within normal limits:   Monitor labs and assess patient for signs and symptoms of electrolyte imbalances   Administer electrolyte replacement as ordered   Monitor response to electrolyte replacements, including repeat lab results as appropriate   Fluid restriction as ordered   Instruct patient on fluid and nutrition restrictions as appropriate     Problem: Metabolic/Fluid and Electrolytes - Adult  Goal: Hemodynamic stability and optimal renal function maintained  2/7/2023 2111 by Lamine Jordan RN  Outcome: Progressing  Flowsheets (Taken 2/7/2023 2111)  Hemodynamic stability and optimal renal function maintained:   Monitor labs and assess for signs and symptoms of volume excess or deficit   Monitor intake, output and patient weight   Encourage oral intake as appropriate     Problem: Metabolic/Fluid and Electrolytes - Adult  Goal: Glucose maintained within prescribed range  2/7/2023 2111 by Lamine Jordan RN  Outcome: Progressing  Flowsheets (Taken 2/7/2023 2111)  Glucose maintained within prescribed range:   Monitor blood glucose as ordered   Assess for signs and symptoms of hyperglycemia and hypoglycemia     Problem: Hematologic - Adult  Goal: Maintains hematologic stability  2/7/2023 2111 by Lamine Jordan RN  Outcome: Progressing  Flowsheets (Taken 2/7/2023 2111)  Maintains hematologic stability: Assess for signs and symptoms of bleeding or hemorrhage     Problem: Pain  Goal: Verbalizes/displays adequate comfort level or baseline comfort level  2/7/2023 2111 by Lamine Jordan RN  Outcome: Progressing  Flowsheets (Taken 2/7/2023 2111)  Verbalizes/displays adequate comfort level or baseline comfort level:   Encourage patient to monitor pain and request assistance   Assess pain using appropriate pain scale   Administer analgesics based on type and severity of pain and evaluate response   Implement non-pharmacological measures as appropriate and evaluate response   Notify Licensed Independent Practitioner if interventions unsuccessful or patient reports new pain   Consider cultural and social influences on pain and pain management     Problem: Chronic Conditions and Co-morbidities  Goal: Patient's chronic conditions and co-morbidity symptoms are monitored and maintained or improved  2/7/2023 2111 by Mt Molina RN  Outcome: Progressing  Flowsheets (Taken 2/7/2023 2111)  Care Plan - Patient's Chronic Conditions and Co-Morbidity Symptoms are Monitored and Maintained or Improved:   Monitor and assess patient's chronic conditions and comorbid symptoms for stability, deterioration, or improvement   Collaborate with multidisciplinary team to address chronic and comorbid conditions and prevent exacerbation or deterioration   Update acute care plan with appropriate goals if chronic or comorbid symptoms are exacerbated and prevent overall improvement and discharge     Problem: Safety - Adult  Goal: Free from fall injury  2/7/2023 2111 by Mt Molina RN  Outcome: Progressing  Flowsheets (Taken 2/7/2023 2111)  Free From Fall Injury: Instruct family/caregiver on patient safety     Problem: Neurosensory - Adult  Goal: Achieves stable or improved neurological status  2/7/2023 2111 by Mt Molina RN  Outcome: Progressing  Flowsheets (Taken 2/7/2023 2111)  Achieves stable or improved neurological status: Assess for and report changes in neurological status     Problem: Nutrition Deficit:  Goal: Optimize nutritional status  2/7/2023 2111 by Mt Molina RN  Outcome: Progressing  Flowsheets (Taken 2/7/2023 2111)  Nutrient intake appropriate for improving, restoring, or maintaining nutritional needs:   Assess nutritional status and recommend course of action   Monitor oral intake, labs, and treatment plans   Recommend appropriate diets, oral nutritional supplements, and vitamin/mineral supplements   Care plan reviewed with patient. Patient verbalized understanding of the plan of care and contribute to goal setting.

## 2023-02-08 NOTE — PROGRESS NOTES
02/08/23 1800   Encounter Summary   Encounter Overview/Reason  Spiritual/Emotional Needs   Service Provided For: Patient and family together   Referral/Consult From: [address] Children;Family members   Last Encounter  02/08/23   Complexity of Encounter Moderate   Encounter    Type Initial Screen/Assessment   Spiritual/Emotional needs   Type Spiritual Support   Assessment/Intervention/Outcome   Assessment Calm   Intervention Prayer (assurance of)/[place];Nurtured Hope; Active listening   Outcome Comfort; Acceptance   [facility] PROGRESS NOTE      Patient: La Leal  Room #: 4S-16/832-A            YOB: 1935  Age: 81 y.o. Gender: female            Admit Date & Time: 2/2/2023 11:30 AM    Assessment:  Elmo Krueger is an 81years old female who is sitting up and eating her supper. Her daughter and grand children are in the room with her as is another male family member. She is Church and  Gaudencio has come to see her since she has been here. Interventions: Words of encouragement as well as prayer given, after our brief conversation    Outcomes:  Pt and family are very happy to have spiritual care contact. Plan: 1. Care Plan:  Continue spiritual and emotional care for patient and family. Including prayers.       Electronically signed by Nabil Slater on 2/8/2023 at 6:38 PM.  Cristobal Muse  [phone]

## 2023-02-08 NOTE — FLOWSHEET NOTE
02/08/23 1552   Treatment Team Notification   Reason for Communication Review case   Team Member Name Dr Elva Osgood Provider   Method of Communication Secure Message  (11 beats of VT)   Response Waiting for response   Notification Time

## 2023-02-08 NOTE — NURSE NAVIGATOR
Neuro Nurse Navigator Follow Up    This RN in to follow up with patient. Pt sitting in recliner watching TV when this RN enters room. No family at bedside. Pt reports she is doing well. She is waiting on insurance approval to go to [facility]. MRS completed by this RN. Pt denies any questions or concerns at this time.

## 2023-02-08 NOTE — PROGRESS NOTES
[address] [facility] PHYSICAL THERAPY  DAILY NOTE  STRZ ICU STEPDOWN TELEMETRY 4K - 4K-17/017-A    Time In: 3922  Time Out: 1445  Timed Code Treatment Minutes: 45 Minutes  Minutes: 38          Date: 2023  Patient Name: Mamadou Haas,  Gender:  female        MRN: [id number]  : 1935  (81 y.o.)     Referring Practitioner: Italia Boss MD  Diagnosis: Esophageal dysphagia  Additional Pertinent Hx: Mamadou Haas is a 81 y.o. female who presents to Emergency Department with Fall and Other (Feels like something is stuck in throat). Patient is brought in by EMS for evaluation of fall x2 today. Patient says she fell in the kitchen and again in the dining room. Lives at home by herself. Patient states she had no idea why and how she fell. MRI shows small acute infarct in the right posterior frontal/anterior parietal cortex. Prior Level of Function:  Lives With: Alone  Type of Home: House  Home Layout: One level, Laundry in basement  Home Access: Stairs to enter with rails  Entrance Stairs - Number of Steps: 4  Home Equipment: Walker, rolling, Cane, Rollator, [employer]   Bathroom Shower/Tub: Tub/Shower unit  [employer]: Standard  Bathroom Equipment: Tub transfer bench, Grab bars in shower, Toilet raiser  Bathroom Accessibility: Accessible    Receives Help From: Family (check in on patient)  ADL Assistance: [address]: Independent  Ambulation Assistance: Independent  Transfer Assistance: Independent  Active : Yes  Additional Comments: patient used no AD prior to admit. Pt has 3 kids that can assist as needed    Restrictions/Precautions:  Restrictions/Precautions: Fall Risk, General Precautions     SUBJECTIVE: RN approved session, pt is seated in recliner, agreeable to PT.     PAIN: denies    Vitals: Vitals not assessed per clinical judgement, see nursing flowsheet    OBJECTIVE:  Bed Mobility:  Not Tested    Transfers:  Sit to Stand: Stand By Assistance, X 1  Stand to Sit:Stand By Assistance, X 1    Ambulation:  Contact Guard Assistance, X 1  Distance: 40 feet  Surface: Level Tile  Device:No Device  Gait Deviations:  Slow Susu, Decreased Step Length Bilaterally, Decreased Weight Shift Bilaterally, Decreased Trunk Rotation, Decreased Gait Speed, Decreased Heel Strike Bilaterally, and Mild Path Deviations  **Verbal cues for increased step length, wider FRANKIE as pt's shoes rubbing together at times during swing phase    Balance:  Static Standing Balance: Contact Guard Assistance, X 1  Dynamic Standing Balance: Contact Guard Assistance, X 1; performs toileting and hand washing tasks with CGA; pt sidesteps 10 feet x 4 and ambulates 10 feet forward/retro x 4 with CGA for balance    Functional Outcome Measures: Completed  Dynamic Gait Total Score: 13  AM-PAC Inpatient Mobility without Stair Climbing Raw Score : 15  AM-PAC Inpatient without Stair Climbing T-Scale Score : 43.03    Dynamic Gait Total Score: 13/24; <19 is predictive of falls    ASSESSMENT:  Assessment: Patient progressing toward established goals. Activity Tolerance:  Patient tolerance of  treatment: good. Equipment Recommendations:Equipment Needed: No  Discharge Recommendations: Inpatient Rehabilitation  Plan: Current Treatment Recommendations: Strengthening, Balance training, Functional mobility training, Transfer training, Gait training, Stair training, Neuromuscular re-education, Patient/Caregiver education & training, Safety education & training, Therapeutic activities  General Plan:  (6x N)    Patient Education  Patient Education: Transfers, Reviewed Prior Education, Gait    Goals:  Patient Goals : to get better  Short Term Goals  Time Frame for Short Term Goals: by discharge  Short Term Goal 1: Pt to transfer supine <--> sit mod I to enable pt to get in/out of bed. Short Term Goal 2: Pt to transfer sit <--> stand mod I for increased functional mobility.   Short Term Goal 3: Pt to ambulate >100 feet without AD SBA for household ambulation. Short Term Goal 4: Pt to improve Tinetti score to 28/28 to minimize fall risk. Long Term Goals  Time Frame for Long Term Goals : NA due to short length of stay. Following session, patient left in safe position with all fall risk precautions in place.

## 2023-02-08 NOTE — PROGRESS NOTES
[facility] when medically stable. According to insurance company nurse we have until 2/14 to admit the patient. Dr. Katiuska Cramer and Ric Ormond, CM updated. Will admit to [facility] when medically stable. Approved 7 days next review date 2/14 fax clinicals to [phone] call [facility].  Certification number:  [id number]

## 2023-02-08 NOTE — DISCHARGE SUMMARY
Discharge Summary    César Bob  :  1935  MRN:  [id number]    Admit date:  2023  Discharge date:      Admitting Physician:  Lenin Jean MD    Discharge Diagnoses:    Acute infarct in the right posterior frontal/anterior parietal cortex   HLD, target LDL < 70  Anxiety   Paroxysmal a fib / SVT    Patient Active Problem List   Diagnosis    Acute lacunar infarction [facility])    Cerebral infarct ([facility] [address]    Pure hypercholesterolemia    Atrial fibrillation, transient ([facility] [address]       Admission Condition:  serious  Discharged Condition:  good    Hospital Course:   ***    Discharge Medications:      Scheduled Meds:  Scheduled Medications[]Expand by Default    rivaroxaban  20 mg Oral Daily    metoprolol succinate  25 mg Oral Daily    docusate sodium  100 mg Oral Daily    clonazePAM  0.5 mg Oral Nightly    Vitamin D  500 Units Oral Daily    multivitamin  1 tablet Oral Daily    aspirin  81 mg Oral Daily    rosuvastatin  40 mg Oral Nightly    traZODone  50 mg Oral Nightly          Consults:  neurology and rehabilitation medicine    Significant Diagnostic Studies: labs:    BMP:        Recent Labs     23  1915      K 4.3      CO2 20*   BUN 22   CREATININE 0.6   GLUCOSE 198*      No results for input(s): CHOL, HDL in the last 72 hours. Invalid input(s): LDLCALCU,  TRIG,  LDL       LDL Calculated 80         HgBA1c:          Lab Results   Component Value Date/Time     LABA1C 6.1 2023 03:42 AM      TSH:          Lab Results   Component Value Date/Time     TSH 2.030 2023 07:15 PM         FERRITIN:          Lab Results   Component Value Date/Time     FERRITIN 124 2021 07:39 PM         MRI of brain (2023) : Impression   1. 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 Mild atrophy and probable ischemic changes in the white matter. 3. Mild inflammatory changes in ethmoid air cells bilaterally and left mastoid tip.               Assessment and Plan:   Acute infarct in the right posterior frontal/anterior parietal cortex   HLD, target LDL < 70  Anxiety   Paroxysmal a fib / SVT       Treatments:     low dose BB  NOAC xarelto  ASA, statin  Cont PT/OT       Disposition:   IPR.     Signed:  Jan Steiner MD  2/9/2023, 3:46 PM

## 2023-02-08 NOTE — PROGRESS NOTES
[facility]  Acute Inpatient Rehab Preadmission Assessment    Patient Name: Elizabeth Zamudio        Ethnicity:Not of , Roya Hamman, or Wolof origin  Race:White  MRN: [id number]    : 1935  (81 y.o.)  Gender: female     Admitted from:[address]  Initial Assessment    Date of admission to the hospital: 2023 11:30 AM  Date patient eligible for admission:2023    Primary Diagnosis: CVA      Did patient have surgery?  no    Physicians: Leatha Lamb MD, Dr. Theresa Peres, Dr. Norma Wyatt, Dr. Sharon Durbin for clinical complications/co-morbidities:   Past Medical History:   Diagnosis Date    Anxiety     Glaucoma, bilateral     Diagnosed in     Osteopenia     Osteoporosis     Diagnosed in        Financial Information  Primary insurance:  [employer]    Secondary Insurance:  None    Has the patient had two or more falls in the past year or any fall with injury in the past year? yes    Did the patient have major surgery during the 100 days prior to admission? no    Precautions:   Restrictions/Precautions: Fall Risk, General Precautions      Isolation Precautions: None       Physiatrist: Dr. Theresa Peres    Patients Occupation: Retired  Reviewed Lab and Diagnostic reports from Current Admission: Yes    Patients Prior Functional  Level: Prior Function  Receives Help From: Family (check in on patient)  ADL Assistance: Independent  Homemaking Assistance: Independent  Ambulation Assistance: Independent  Transfer Assistance: Independent  Additional Comments: patient used no AD prior to admit. Pt has 3 kids that can assist as needed    Current functional status for upper extremity ADLs: Minimal assistance upper extremity ADL. Current functional status for lower extremity ADLs: Minimal assistance for lower body ADLs.     Current functional status for bed, chair, wheelchair transfers: Sit to Stand: Stand By Assistance, X 1  Stand to Sit:Stand By Assistance, X 1    Current functional status for toilet transfers:  Stand by assistance for toilet transfers.      Current functional status for locomotion: Contact Guard Assistance, X 1  Distance: 40 feet  Surface: Level Tile  Device:No Device  Gait Deviations:  Slow Susu, Decreased Step Length Bilaterally, Decreased Weight Shift Bilaterally, Decreased Trunk Rotation, Decreased Gait Speed, Decreased Heel Strike Bilaterally, and Mild Path Deviations  **Verbal cues for increased step length, wider FRANKIE as pt's shoes rubbing together at times during swing phase     Current functional status for bladder management: Complete independence    Current functional status for bowel management:Moderate assistance    Current functional status for comprehension: Minimal contact assistance    Current functional status for expression: Minimal contact assistance    Current functional status for social interaction: Minimal contact assistance    Current functional status for problem solving: Minimal contact assistance    Current functional status for memory: Minimal contact assistance    Expected level of Improvement in Self-Care:  Modified independence    Expected level of Improvement in Sphincter Control:  Modified independence    Expected level of Improvement in Transfers: Modified independence    Expected level of Improvement in Locomotion:  Modified independence    Expected level of Improvement in Communication and Social Cognition: Modified independence    Expected length of time to achieve that level of improvement: 2 weeks    Current rehab issues: ADL dysfunction,bladder management, bowel management,carry over of therapy techniques, discharge planning, disease and co-morbidity management, gait/mobility dysfunction, medication management, nutrition and hydration management, Ongoing assessment of safety, Pain management, Patient and family education, Prevention of secondary complications, Skin Integrity, cognitive impairment, communication impairment. Required therapy: Physical Therapy, Occupational Therapy and Speech Therapy 3 hours per day, 5-6 days per week. Recreational Therapy 1 hour per week. Expected Discharge Destination: Home    Expected Post Discharge Treatments: Out Patient    Other information relevant to the care needs:   Lives With: Alone  Type of Home: House  Home Layout: One level, Laundry in basement  Home Access: Stairs to enter with rails  Entrance Stairs - Number of Steps: 4  Bathroom Shower/Tub: Tub/Shower unit  Bathroom Toilet: Standard  Bathroom Equipment: Tub transfer bench, Grab bars in shower, Toilet raiser  Bathroom Accessibility: Accessible  Home Equipment: [address], rolling, [address], [address]  Has the patient had two or more falls in the past year or any fall with injury in the past year?: Yes  Receives Help From: Family (check in on patient)  ADL Assistance: Independent  Homemaking Assistance: Independent  Ambulation Assistance: Independent  Transfer Assistance: Independent  Active : Yes  Occupation: Retired  Additional Comments: patient used no AD prior to admit. Pt has 3 kids that can assist as needed    Acute Inpatient Rehabilitation Disclosure Statement provided to patient. Patient verbalized understanding. Patient requires intensive PT/OT/ST along with 24 hour nursing care and close physician supervision to manage patient many comorbidities, functional and cognitive status in order for patient to safely return to her home. I have reviewed and concur with the findings and results of the pre-admission screening assessment completed by the Inpatient Rehabilitation Admissions Coordinator.     Carla Reynolds MD

## 2023-02-09 ENCOUNTER — HOSPITAL ENCOUNTER (INPATIENT)
Age: 88
LOS: 9 days | Discharge: HOME | End: 2023-02-18
Attending: PHYSICAL MEDICINE & REHABILITATION | Admitting: PHYSICAL MEDICINE & REHABILITATION
Payer: MEDICARE

## 2023-02-09 VITALS
WEIGHT: 121.6 LBS | HEIGHT: 58 IN | DIASTOLIC BLOOD PRESSURE: 58 MMHG | BODY MASS INDEX: 25.53 KG/M2 | OXYGEN SATURATION: 94 % | SYSTOLIC BLOOD PRESSURE: 113 MMHG | TEMPERATURE: 97.8 F | HEART RATE: 70 BPM | RESPIRATION RATE: 17 BRPM

## 2023-02-09 DIAGNOSIS — I69.398 VERTIGO AS LATE EFFECT OF STROKE: Primary | ICD-10-CM

## 2023-02-09 DIAGNOSIS — R42 VERTIGO AS LATE EFFECT OF STROKE: Primary | ICD-10-CM

## 2023-02-09 PROBLEM — R41.89 IMPAIRED COGNITION: Status: ACTIVE | Noted: 2023-02-09

## 2023-02-09 PROBLEM — F41.9 ANXIETY: Status: ACTIVE | Noted: 2023-02-09

## 2023-02-09 PROBLEM — F51.01 PRIMARY INSOMNIA: Status: ACTIVE | Noted: 2023-02-09

## 2023-02-09 PROCEDURE — 97110 THERAPEUTIC EXERCISES: CPT

## 2023-02-09 PROCEDURE — 6370000000 HC RX 637 (ALT 250 FOR IP): Performed by: INTERNAL MEDICINE

## 2023-02-09 PROCEDURE — 99223 1ST HOSP IP/OBS HIGH 75: CPT | Performed by: INTERNAL MEDICINE

## 2023-02-09 PROCEDURE — 1180000000 HC REHAB R&B

## 2023-02-09 PROCEDURE — 97530 THERAPEUTIC ACTIVITIES: CPT

## 2023-02-09 PROCEDURE — 99222 1ST HOSP IP/OBS MODERATE 55: CPT | Performed by: PHYSICAL MEDICINE & REHABILITATION

## 2023-02-09 PROCEDURE — 93270 REMOTE 30 DAY ECG REV/REPORT: CPT

## 2023-02-09 PROCEDURE — 97535 SELF CARE MNGMENT TRAINING: CPT

## 2023-02-09 PROCEDURE — 6370000000 HC RX 637 (ALT 250 FOR IP): Performed by: PHYSICAL MEDICINE & REHABILITATION

## 2023-02-09 RX ORDER — ASPIRIN 81 MG/1
81 TABLET ORAL DAILY
Status: DISCONTINUED | OUTPATIENT
Start: 2023-02-10 | End: 2023-02-18 | Stop reason: HOSPADM

## 2023-02-09 RX ORDER — POLYETHYLENE GLYCOL 3350 17 G/17G
17 POWDER, FOR SOLUTION ORAL DAILY PRN
Status: CANCELLED | OUTPATIENT
Start: 2023-02-09

## 2023-02-09 RX ORDER — FLUTICASONE PROPIONATE 50 MCG
1 SPRAY, SUSPENSION (ML) NASAL DAILY
Status: DISCONTINUED | OUTPATIENT
Start: 2023-02-09 | End: 2023-02-18 | Stop reason: HOSPADM

## 2023-02-09 RX ORDER — METOPROLOL SUCCINATE 25 MG/1
25 TABLET, EXTENDED RELEASE ORAL DAILY
Status: DISCONTINUED | OUTPATIENT
Start: 2023-02-10 | End: 2023-02-18 | Stop reason: HOSPADM

## 2023-02-09 RX ORDER — LANOLIN ALCOHOL/MO/W.PET/CERES
3 CREAM (GRAM) TOPICAL NIGHTLY
Status: CANCELLED | OUTPATIENT
Start: 2023-02-09

## 2023-02-09 RX ORDER — ONDANSETRON 4 MG/1
4 TABLET, ORALLY DISINTEGRATING ORAL EVERY 8 HOURS PRN
Status: CANCELLED | OUTPATIENT
Start: 2023-02-09

## 2023-02-09 RX ORDER — ACETAMINOPHEN 325 MG/1
650 TABLET ORAL EVERY 4 HOURS PRN
Status: DISCONTINUED | OUTPATIENT
Start: 2023-02-09 | End: 2023-02-18 | Stop reason: HOSPADM

## 2023-02-09 RX ORDER — FLUTICASONE PROPIONATE 50 MCG
1 SPRAY, SUSPENSION (ML) NASAL DAILY
Status: CANCELLED | OUTPATIENT
Start: 2023-02-09

## 2023-02-09 RX ORDER — ONDANSETRON 4 MG/1
4 TABLET, ORALLY DISINTEGRATING ORAL EVERY 8 HOURS PRN
Status: DISCONTINUED | OUTPATIENT
Start: 2023-02-09 | End: 2023-02-18 | Stop reason: HOSPADM

## 2023-02-09 RX ORDER — M-VIT,TX,IRON,MINS/CALC/FOLIC 27MG-0.4MG
1 TABLET ORAL DAILY
Status: CANCELLED | OUTPATIENT
Start: 2023-02-09

## 2023-02-09 RX ORDER — ROSUVASTATIN CALCIUM 20 MG/1
40 TABLET, COATED ORAL NIGHTLY
Status: DISCONTINUED | OUTPATIENT
Start: 2023-02-09 | End: 2023-02-18 | Stop reason: HOSPADM

## 2023-02-09 RX ORDER — CLONAZEPAM 0.5 MG/1
0.5 TABLET ORAL EVERY 12 HOURS PRN
Status: DISCONTINUED | OUTPATIENT
Start: 2023-02-09 | End: 2023-02-10

## 2023-02-09 RX ORDER — VITAMIN B COMPLEX
500 TABLET ORAL DAILY
Status: DISCONTINUED | OUTPATIENT
Start: 2023-02-10 | End: 2023-02-18 | Stop reason: HOSPADM

## 2023-02-09 RX ORDER — LANOLIN ALCOHOL/MO/W.PET/CERES
3 CREAM (GRAM) TOPICAL NIGHTLY
Status: DISCONTINUED | OUTPATIENT
Start: 2023-02-09 | End: 2023-02-09

## 2023-02-09 RX ORDER — TRAZODONE HYDROCHLORIDE 50 MG/1
50 TABLET ORAL NIGHTLY
Status: DISCONTINUED | OUTPATIENT
Start: 2023-02-09 | End: 2023-02-18 | Stop reason: HOSPADM

## 2023-02-09 RX ORDER — LANOLIN ALCOHOL/MO/W.PET/CERES
6 CREAM (GRAM) TOPICAL NIGHTLY
Status: DISCONTINUED | OUTPATIENT
Start: 2023-02-09 | End: 2023-02-18 | Stop reason: HOSPADM

## 2023-02-09 RX ORDER — OMEGA-3/DHA/EPA/FISH OIL 300-1000MG
2 CAPSULE ORAL DAILY
Status: CANCELLED | OUTPATIENT
Start: 2023-02-09

## 2023-02-09 RX ORDER — CLONAZEPAM 0.5 MG/1
0.5 TABLET ORAL NIGHTLY
Status: DISCONTINUED | OUTPATIENT
Start: 2023-02-09 | End: 2023-02-10 | Stop reason: SDUPTHER

## 2023-02-09 RX ORDER — DOCUSATE SODIUM 100 MG/1
100 CAPSULE, LIQUID FILLED ORAL 2 TIMES DAILY
Status: CANCELLED | OUTPATIENT
Start: 2023-02-09

## 2023-02-09 RX ORDER — SENNA PLUS 8.6 MG/1
1 TABLET ORAL NIGHTLY PRN
Status: CANCELLED | OUTPATIENT
Start: 2023-02-09

## 2023-02-09 RX ORDER — OMEGA-3/DHA/EPA/FISH OIL 300-1000MG
2 CAPSULE ORAL DAILY
Status: DISCONTINUED | OUTPATIENT
Start: 2023-02-09 | End: 2023-02-09 | Stop reason: CLARIF

## 2023-02-09 RX ORDER — M-VIT,TX,IRON,MINS/CALC/FOLIC 27MG-0.4MG
1 TABLET ORAL DAILY
Status: DISCONTINUED | OUTPATIENT
Start: 2023-02-09 | End: 2023-02-09 | Stop reason: SDUPTHER

## 2023-02-09 RX ORDER — POLYVINYL ALCOHOL 14 MG/ML
1 SOLUTION/ DROPS OPHTHALMIC PRN
Status: DISCONTINUED | OUTPATIENT
Start: 2023-02-09 | End: 2023-02-18 | Stop reason: HOSPADM

## 2023-02-09 RX ORDER — POLYETHYLENE GLYCOL 3350 17 G/17G
17 POWDER, FOR SOLUTION ORAL DAILY PRN
Status: DISCONTINUED | OUTPATIENT
Start: 2023-02-09 | End: 2023-02-18 | Stop reason: HOSPADM

## 2023-02-09 RX ORDER — MULTIVITAMIN WITH IRON
1 TABLET ORAL DAILY
Status: DISCONTINUED | OUTPATIENT
Start: 2023-02-10 | End: 2023-02-18 | Stop reason: HOSPADM

## 2023-02-09 RX ORDER — DOCUSATE SODIUM 100 MG/1
100 CAPSULE, LIQUID FILLED ORAL 2 TIMES DAILY
Status: DISCONTINUED | OUTPATIENT
Start: 2023-02-09 | End: 2023-02-18 | Stop reason: HOSPADM

## 2023-02-09 RX ORDER — ACETAMINOPHEN 325 MG/1
650 TABLET ORAL EVERY 4 HOURS PRN
Status: DISCONTINUED | OUTPATIENT
Start: 2023-02-09 | End: 2023-02-09 | Stop reason: SDUPTHER

## 2023-02-09 RX ORDER — SENNA PLUS 8.6 MG/1
1 TABLET ORAL NIGHTLY PRN
Status: DISCONTINUED | OUTPATIENT
Start: 2023-02-09 | End: 2023-02-18 | Stop reason: HOSPADM

## 2023-02-09 RX ORDER — ACETAMINOPHEN 325 MG/1
650 TABLET ORAL EVERY 4 HOURS PRN
Status: CANCELLED | OUTPATIENT
Start: 2023-02-09

## 2023-02-09 RX ADMIN — DOCUSATE SODIUM 100 MG: 100 CAPSULE, LIQUID FILLED ORAL at 08:37

## 2023-02-09 RX ADMIN — Medication 1 TABLET: at 08:37

## 2023-02-09 RX ADMIN — ASPIRIN 81 MG: 81 TABLET, COATED ORAL at 08:36

## 2023-02-09 RX ADMIN — Medication 6 MG: at 20:01

## 2023-02-09 RX ADMIN — Medication 500 UNITS: at 08:38

## 2023-02-09 RX ADMIN — METOPROLOL SUCCINATE 25 MG: 25 TABLET, EXTENDED RELEASE ORAL at 12:29

## 2023-02-09 RX ADMIN — ROSUVASTATIN CALCIUM 40 MG: 20 TABLET, FILM COATED ORAL at 20:01

## 2023-02-09 RX ADMIN — TRAZODONE HYDROCHLORIDE 50 MG: 50 TABLET ORAL at 20:01

## 2023-02-09 RX ADMIN — CLONAZEPAM 0.5 MG: 0.5 TABLET ORAL at 20:00

## 2023-02-09 RX ADMIN — RIVAROXABAN 20 MG: 20 TABLET, FILM COATED ORAL at 18:14

## 2023-02-09 RX ADMIN — DOCUSATE SODIUM 100 MG: 100 CAPSULE, LIQUID FILLED ORAL at 20:01

## 2023-02-09 ASSESSMENT — ENCOUNTER SYMPTOMS
COUGH: 0
VOMITING: 0
CONSTIPATION: 0
SHORTNESS OF BREATH: 0
WHEEZING: 0
RHINORRHEA: 0
SORE THROAT: 0
NAUSEA: 0
BACK PAIN: 0
ABDOMINAL PAIN: 0
EYE DISCHARGE: 0
TROUBLE SWALLOWING: 0
EYE PAIN: 0
DIARRHEA: 0

## 2023-02-09 ASSESSMENT — PAIN SCALES - GENERAL
PAINLEVEL_OUTOF10: 0

## 2023-02-09 NOTE — PROGRESS NOTES
[address] [place] [address] PHYSICAL THERAPY  DAILY NOTE  [facility] ICU STEPDOWN TELEMETRY 4K - 4K-17/017-A    Time In: 8245  Time Out: 0820  Timed Code Treatment Minutes: 45 Minutes  Minutes: 38          Date: 2023  Patient Name: Dion Campoverde,  Gender:  female        MRN: [id number]  : 1935  (81 y.o.)     Referring Practitioner: Khadra Anderson MD  Diagnosis: Esophageal dysphagia  Additional Pertinent Hx: Dion Campoverde is a 81 y.o. female who presents to Emergency Department with Fall and Other (Feels like something is stuck in throat). Patient is brought in by EMS for evaluation of fall x2 today. Patient says she fell in the kitchen and again in the dining room. Lives at home by herself. Patient states she had no idea why and how she fell. MRI shows small acute infarct in the right posterior frontal/anterior parietal cortex. Prior Level of Function:  Lives With: Alone  Type of Home: House  Home Layout: One level, Laundry in basement  Home Access: Stairs to enter with rails  Entrance Stairs - Number of Steps: 4  Home Equipment: Walker, rolling, Cane, Rollator, [employer]   Bathroom Shower/Tub: Tub/Shower unit  [employer]: Standard  Bathroom Equipment: Tub transfer bench, Grab bars in shower, Toilet raiser  Bathroom Accessibility: Accessible    Receives Help From: Family (check in on patient)  ADL Assistance: [address] [place] [address] Avenue: Independent  Ambulation Assistance: Independent  Transfer Assistance: Independent  Active : Yes  Additional Comments: patient used no AD prior to admit. Pt has 3 kids that can assist as needed    Restrictions/Precautions:  Restrictions/Precautions: Fall Risk, General Precautions     SUBJECTIVE: RN approved session. Patient laying in bed upon arrival and agreeable to therapy. Patient impulsive and particular. Patient requested to use bathroom during session.       PAIN: denies    Vitals: Nurse checked vitals during session    OBJECTIVE:  Bed Mobility:  Supine to Sit: Stand By Assistance, with head of bed raised, with rail  Scooting: Stand By Assistance, X 1, with head of bed raised    Transfers:  Sit to Stand: Stand By Assistance  Stand to Sit:Stand By Assistance  **Verbal cues for hand placement on EOB, patient impulsive    Ambulation:  Stand By Assistance, [address], X 1  Distance: 15ft, 3ft, 15ft  Surface: Level Tile  Device:Rolling Walker  Gait Deviations:  Decreased Step Length Bilaterally, Slight Lean to Right upon standing from EOB, Decreased Heel Strike Bilaterally, and Narrow [place] of Support  **Verbal cues for using RW, no LOB noted     Balance:  Dynamic Standing Balance: Stand By Assistance, Contact Guard Assistance, X 1, with verbal cues   ~13 min, no UE support at sink while performing reaching tasks outside FRANKIE for self care, No LOB noted, verbal cue for posture    Exercise:  Patient was guided in 1 set(s) 10 reps of exercise to both lower extremities. Ankle pumps, Glut sets, Quad sets, Heelslides, Hip abduction/adduction, and Straight leg raises. Exercises were completed for increased independence with functional mobility. Functional Outcome Measures: Completed  -PAC Inpatient Mobility without Stair Climbing Raw Score : 15  AM-PAC Inpatient without Stair Climbing T-Scale Score : 43.03    ASSESSMENT:  Assessment: Patient progressing toward established goals. Activity Tolerance:  Patient tolerance of  treatment: good.       Equipment Recommendations:Equipment Needed: No  Discharge Recommendations: Inpatient Rehabilitation  Plan: Current Treatment Recommendations: Strengthening, Balance training, Functional mobility training, Transfer training, Gait training, Stair training, Neuromuscular re-education, Patient/Caregiver education & training, Safety education & training, Therapeutic activities  General Plan:  (6x N)    Patient Education  Patient Education: Plan of Care, Bed Mobility, Transfers, Gait, Up in Chair for All Meals, Verbal Exercise Instruction    Goals:  Patient Goals : to get better  Short Term Goals  Time Frame for Short Term Goals: by discharge  Short Term Goal 1: Pt to transfer supine <--> sit mod I to enable pt to get in/out of bed. Short Term Goal 2: Pt to transfer sit <--> stand mod I for increased functional mobility. Short Term Goal 3: Pt to ambulate >100 feet without AD SBA for household ambulation. Short Term Goal 4: Pt to improve Tinetti score to 28/28 to minimize fall risk. Long Term Goals  Time Frame for Long Term Goals : NA due to short length of stay. Following session, patient left in safe position with all fall risk precautions in place. Therapy session performed by [place] POLLARD under supervision of credentialed therapist Mary Jean PTA.

## 2023-02-09 NOTE — PROGRESS NOTES
Physical Medicine & Rehabilitation Progress Note    Chief Complaint: Stroke    Subjective:    Awilda Matt is a 81 y.o. 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 The patient says she began feeling something stuck in her throat on 2/1/2023 night. On 2/2/2023 while she was walking in kitchen to try to get something to help him the throat discomfort sensation, she suddenly fell for no reason. She denies having weakness, pain, syncope or fainting prior to the fall. She struggled to get up but fell again after few step into her dining room. At that time she felt the need for bowel movement so she crawled to bathroom and had diarrhea like bowel movement. She did not call her daughter who called 911. The patient was sent to [address] [place] [address] ER for evaluation on 2./2/2023. She was found to have tenderness at the her left hip area. She also complains of feeling dizzy with room spinning sensation when she says suddenly sitting up from supine position. Her blood tests and urine test showed no significant abnormality. X-ray of left hip and chest revealed no acute abnormality. CT of the head without contrast done on 2/2/2023 showed small lacunar infarction at the right insula. CT of cervical spine done on 2/2/2023 was reported to show no acute process. CTA of head and neck done on 2/2/2023 show no significant hemodynamic stenosis in bilateral common and internal carotid arteries, and hypoplastic P1 segment of the right posterior cerebral artery.   MRI of brain was also performed on 2/2/2023 and revealed a small acute right posterior frontal/anterior parietal cortex infarct stroke, and mild white matter atrophy. The patient was started on aspirin 81 mg.  Echocardiogram was performed on 2/3/2023 revealed only grade 1 left ventricle diastolic dysfunction with ejection fraction of 60%. Neurology service was consulted on 2/3/2023. Aspirin 81 mg daily was continued and Plavix 75 mg daily for 21 days was added. PM&R was consulted on 2/2/2023. On 2/8/2023 the patient had an episode of tachycardia. Cardiology was consulted and was diagnosed with new onset paroxysmal atrial fibrillation with intermittent short run rapid ventricular response. The patient was started on Xarelto and continue her daily baby aspirin. Plavix was discontinued. The patient was put on 30-day event monitor on 2/9/2023. The patient says she feels well. She says she did not sleep well because she only get 1 pill of 0.5 mg clonazepam.  She says she took 2 pills at home to help her sleep regularly. She says she still has some degree of fatigue but denies having specific weakness. She denies having any painful symptom, numbness or tingling feeling. She is starting the intensive inpatient rehabilitation treatment today. Rehabilitation:  PT: Reviewed. Initial evaluation in progress and pending      OT: Reviewed. ADL:   EATING:Setup or clean-up assistance. Sherryle Moder CARE Score: 5. ORAL HYGIENE:Supervision or touching assistance. SBA standing at sink. CARE Score: 4. TOILETING HYGIENE:Supervision or touching assistance. SBA. CARE Score: 4. SHOWERING/BATHING:Supervision or touching assistance. SBA in shower, completed half in sitting and half standing. Pt used grab bars and hand held shower with increased time to recall placement of hand held shower on stand. Pt demonstrated impaired thought organization and divided attention throughout shower. \"I can't be talking now, I need to focus on what body parts I have washed or not. \". CARE Score: 4.     UPPER BODY DRESSING:Partial/moderate assistance.   Asha Anderson A to fasten bra,   Min A to don long sleeve shirt with min A to re-orient her shirt. CARE Score: 3. LOWER BODY DRESSING:Partial/moderate assistance. Link Knowles CARE Score: 3. FOOTWEAR:Supervision or touching assistance   . CARE Score: 4. TOILET TRANSFER: Supervision or touching assistance. SBA. CARE Score: 4. SHOWER TRANSFER: stand by assistance      BALANCE:  Sitting Balance:  Supervision. Standing Balance: Contact Guard Assistance. BED MOBILITY:  Supine to Sit: Stand By Assistance       TRANSFERS:  Sit to Stand:  [employer], with increased time for completion, impulsive  . Stand to Sit: Contact Guard Assistance. FUNCTIONAL MOBILITY:  Assistive Device: None  Assist Level:  Contact Guard Assistance. Distance: To and from bathroom and To and from shower room  , min unsteadiness,  pt expressed uneasiness while ambulating through busy environment       ST: Reviewed. Initial evaluation in progress and pending      Review of Systems:  Review of Systems   Constitutional:  Positive for fatigue. Negative for chills, diaphoresis and fever. HENT:  Negative for hearing loss, rhinorrhea, sneezing, sore throat and trouble swallowing. Eyes:  Negative for visual disturbance. Respiratory:  Negative for cough, shortness of breath and wheezing. Cardiovascular:  Negative for chest pain and palpitations. Gastrointestinal:  Negative for abdominal pain, constipation, diarrhea, nausea and vomiting. Genitourinary:  Negative for dysuria. Musculoskeletal:  Positive for gait problem. Negative for arthralgias, back pain, myalgias and neck pain. Skin:  Negative for rash. Neurological:  Positive for weakness (Generalized). Negative for dizziness, tremors, facial asymmetry, speech difficulty, light-headedness, numbness and headaches. Psychiatric/Behavioral:  Positive for sleep disturbance. Negative for dysphoric mood and hallucinations. The patient is not nervous/anxious. Objective:  /62   Pulse 72   Temp 97.9 °F (36.6 °C) (Oral)   Resp 17   Ht 4' 10\" (1.473 m)   Wt 123 lb 9.6 oz (56.1 kg)   SpO2 96%   BMI 25.83 kg/m²   Physical Exam   General:  well-developed, well nourished  female; in no acute distress ; appropriate affect & mood; sitting on reclining chair comfortably  Eyes: pupil equally round ; extra-ocular motion intact bilaterally  Head, Ear, Nose, Mouth & Throat : normocephalic ; no discharge from ears or nose ; no deformity ; no facial swelling ; oral mucosa pink   Neck :  supple ; no tenderness ; mild muscle spasm at the bilateral cervical paraspinal muscle and bilateral upper trapezius muscles  Cardiovascular : regular rate & rhythm ; normal S1 & S2 heart sound ; no murmur ; normal peripheral pulse at the bilateral upper and lower extremities  Pulmonary : Breath sounds present at bilateral lung fields; no wheezing ; no rale; no crackle  Gastrointestinal : soft, flat abdomen; no tenderness; normal bowel sound present   Back : no tenderness; no muscle spasm  Skin: no skin lesion or rash ; no pitting edema at all 4 extremities  Musculoskeletal : no limb asymmetry; no limb deformity; no tenderness at bilateral upper extremities & lower extremities; no palpable mass at limbs ; no joints laxity or crepitation ; shoulder flexion and abduction passive ROM reaching 170 degrees; hip flexion passive ROM reaching 120 degrees bilaterally; ankle dorsiflexion passive ROM reaching 10 degrees on right side and 10 degrees on left side; normal functional joints ROM at the rest of bilateral upper & lower extremities  Cerebral :  alert ; awake ; oriented to place, person and time; follow one-step verbal command  Cerebellum : no dysmetria with bilateral finger-to-nose test ; mild dysmetria with bilateral heel-to-shin test  Cranial Nerves :  grossly intact CN II to XII function  Sensory : intact light touch and pin prick sensation at bilateral upper & lower extremities  Motor : normal tone at bilateral upper & lower extremities ; 4+/5 to 5/5 muscle strength at bilateral hip flexion and bilateral knee flexion; normal 5/5 muscle strength at bilateral upper extremities & the rest of bilateral lower extremities  Reflex : 1+ bilateral biceps, bilateral brachioradialis and bilateral knees reflexes  Pathological Reflex :  No Cliff's sign ; no ankle clonus  Gait : Not assessed      Diagnostics:   Recent Results (from the past 24 hour(s))   Comprehensive Metabolic Panel w/ Reflex to MG    Collection Time: 02/10/23  6:36 AM   Result Value Ref Range    Glucose 101 70 - 108 mg/dL    Creatinine 0.6 0.4 - 1.2 mg/dL    BUN 21 7 - 22 mg/dL    Sodium 144 135 - 145 meq/L    Potassium reflex Magnesium 4.2 3.5 - 5.2 meq/L    Chloride 109 98 - 111 meq/L    CO2 21 (L) 23 - 33 meq/L    Calcium 9.1 8.5 - 10.5 mg/dL    AST 21 5 - 40 U/L    Alkaline Phosphatase 45 38 - 126 U/L    Total Protein 6.4 6.1 - 8.0 g/dL    Albumin 3.9 3.5 - 5.1 g/dL    Total Bilirubin 0.3 0.3 - 1.2 mg/dL    ALT 28 11 - 66 U/L   Prealbumin    Collection Time: 02/10/23  6:36 AM   Result Value Ref Range    Prealbumin 21.2 20.0 - 40.0 mg/dl   CBC auto differential    Collection Time: 02/10/23  6:36 AM   Result Value Ref Range    WBC 5.4 4.8 - 10.8 thou/mm3    RBC 3.52 (L) 4.20 - 5.40 mill/mm3    Hemoglobin 11.5 (L) 12.0 - 16.0 gm/dl    Hematocrit 33.1 (L) 37.0 - 47.0 %    MCV 94.0 81.0 - 99.0 fL    MCH 32.7 26.0 - 33.0 pg    MCHC 34.7 32.2 - 35.5 gm/dl    RDW-CV 12.9 11.5 - 14.5 %    RDW-SD 44.2 35.0 - 45.0 fL    Platelets 666 196 - 959 thou/mm3    MPV 10.3 9.4 - 12.4 fL    Seg Neutrophils 49.1 %    Lymphocytes 36.5 %    Monocytes 8.8 %    Eosinophils 4.7 %    Basophils 0.7 %    Immature Granulocytes 0.2 %    Segs Absolute 2.7 1.8 - 7.7 thou/mm3    Lymphocytes Absolute 2.0 1.0 - 4.8 thou/mm3    Monocytes Absolute 0.5 0.4 - 1.3 thou/mm3    Eosinophils Absolute 0.3 0.0 - 0.4 thou/mm3    Basophils Absolute 0.0 0.0 - 0.1 thou/mm3    Immature Grans (Abs) 0.01 0.00 - 0.07 thou/mm3    nRBC 0 /100 wbc   Anion Gap    Collection Time: 02/10/23  6:36 AM   Result Value Ref Range    Anion Gap 14.0 8.0 - 16.0 meq/L   Glomerular Filtration Rate, Estimated    Collection Time: 02/10/23  6:36 AM   Result Value Ref Range    Est, Glom Filt Rate >60 >60 ml/min/1.73m2       Impression:  Right posterior frontal/anterior parietal cortex small acute infarct stroke causing dizziness, vertigo, and frequent fall  Cognitive impairment  Left hip contusion secondary to fall (symptom resolved)  New onset paroxysmal atrial fibrillation with intermittent rapid ventricular response  History of bilateral glaucoma requiring eye stent placement  History of osteoporosis  History of anxiety disorder     The patient's condition is stable. She complains of insomnia last night because she typically takes clonazepam 1 mg at night to help her sleep at home. I will change clonazepam to 1 mg at the bedtime. She continues having fatigue with mild generalized weakness. She is starting the intensive inpatient rehabilitation treatment today. Plan:  Continues intensive PT/OT/SLP/RT inpatient rehabilitation program at least 3 hours per day, 5 days per week in order to improve functional status prior to discharge. Family education and training will be completed. Equipment evaluations and recommendations will be completed as appropriate. Rehabilitation nursing continues to be involved for bowel, bladder, skin, and pain management. Nursing will also provide education and training to patient and family. Prophylaxis:  DVT: Patient on Xarelto, JUANCHO stocking, intermittent pneumatic compression device. GI: Colace, Dulcolax as needed, milk of magnesium as needed, Senokot as needed, GlycoLax as needed.   Pain: Tylenol as needed  Continue aspirin, Crestor for CVA  Continues Xarelto for paroxysmal atrial fibrillation  Continue Toprol XL for blood pressure control and atrial fibrillation with rapid ventricular response  Continues Flonase usage  Continues vitamin D and multivitamins supplement  Change clonazepam to 1 mg at bedtime for insomnia  Continues melatonin and continue trazodone as needed for insomnia  Nutrition:  Consultation to dietician for nutritional counseling and recommendations.     Bladder: Monitoring signs or symptoms of UTI  Bowel: Monitoring signs or symptoms of constipation   and case management consultations for coordination of care and discharge planning      Missed Therapy Time:  None      Kaleb Ledezma MD

## 2023-02-09 NOTE — PROGRESS NOTES
Planning admission to IPR when medically cleared. Spoike with Primary RN Shayy Dc regarding rehab admission when medically cleared. Any cardiac event monitor must be applied prior to admission to IPR if that is necessary.

## 2023-02-09 NOTE — PROGRESS NOTES
Occupational [facility] ICU STEPDOWN TELEMETRY 4K  Occupational Therapy  Daily Note  Time:   Time In: 1606  Time Out: 1002  Timed Code Treatment Minutes: 26 Minutes  Minutes: 26          Date: 2023  Patient Name: Suyapa Valerio,   Gender: female      Room: -17/017-A  MRN: [id number]  : 1935  (81 y.o.)  Referring Practitioner: Popeye Sierra MD  Diagnosis: acute lacunar infarction  Additional Pertinent Hx: per chart review; Suyapa Valerio is a 81 y.o. female who presents to Emergency Department with Fall and Other (Feels like something is stuck in throat)     Patient is brought in by EMS for evaluation of fall x2 today. Patient says she fell in the kitchen and again in the dining room. Lives at home by herself. Patient states she had no idea why and how she fell. She denies syncope. No fainting episodes. She denies generalized weakness. After the second fall, patient noticed it was hard for her to get up, she called her daughter who recommended she should call EMS. On arrival, patient was alert and oriented x 4. She complains of mild left hip pain. She denies LOC. No headache. She has been having foreign body sensation in throat since last night, but she was able to swallow a piece of bread this morning. No vomiting. No  fever or chills. No chest pain. No SOB. No abdominal pain. She has mild watery diarrhea. No urine symptoms  MRI 2/2 Small acute infarct in the right posterior frontal/anterior parietal cortex    Restrictions/Precautions:  Restrictions/Precautions: Fall Risk, General Precautions     SUBJECTIVE: Nurse approved OT intervention. Pt in recliner on OT arrival, A+Ox4, pleasant and agreeable to OT treatment.  Pt is requesting to get dressed for the day    PAIN: denies pain     Vitals: Vitals not assessed per clinical judgement, see nursing flowsheet    COGNITION: Decreased Insight, Impaired Attention, and Tangential    ADL:   Grooming: Stand By Assistance. In standing at the sink for grooming routine  Upper Extremity Dressing: Stand By Assistance. To doff nightgown overhead and hao long sleeve shirt. Lower Extremity Dressing: Stand By Assistance. To don jogger style sweatpants from recliner. Footwear Management: Moderate Assistance. Pt able to don and doff socks with SBA but requires moderate A to don shoes. Pt reports shoes are new and socks are too thick to be able to get shoes on. Toileting: Stand By Assistance. Toilet Transfer: Stand By Assistance. Lance Partida BALANCE:  Standing Balance: Stand By Assistance, [address] SBA with AD and single UE release, CGA without AD and B UE release      TRANSFERS:  Sit to Stand:  Stand By Assistance. Stand to Sit: Stand By Assistance. FUNCTIONAL MOBILITY:  Assistive Device: Rolling Walker and None  Assist Level:  Stand By Assistance and Contact Guard Assistance. Distance: To and from bathroom, Performed to the bathroom with RW at [facility] and from bathroom without AD at [facility] and decreased speed. ASSESSMENT:     Activity Tolerance:  Patient tolerance of  treatment: good. Discharge Recommendations: Inpatient Rehabilitation  Equipment Recommendations: Equipment Needed: No  Plan: Times Per Week: 5-6x  Times Per Day: Once a day  Current Treatment Recommendations: Strengthening, Balance training, Functional mobility training, Endurance training, Safety education & training, Neuromuscular re-education, Patient/Caregiver education & training, Self-Care / ADL, Coordination training    Patient Education  Patient Education: ADL's, Energy Conservation, and Home Safety    Goals  Short Term Goals  Time Frame for Short Term Goals: by discharge  Short Term Goal 1: patient will tolerate 5 min functional standing with two hand release with (S) to increase ease with toileting and grooming.   Short Term Goal 2: patient will functionally ambulate to/from BR with least restrictive device with SBA. Short Term Goal 3: patient will complete ADL routine with SBA and 0-1 cues for safety and sequencing. Short Term Goal 4: patient will participate in moderate resistive UB exer to increase UB strength for functional transfers. Following session, patient left in safe position with all fall risk precautions in place.

## 2023-02-09 NOTE — CARE COORDINATION
2/9/23, 12:02 PM EST    Patient goals/plan/ treatment preferences discussed by  and . Patient goals/plan/ treatment preferences reviewed with patient/ family. Patient/ family verbalize understanding of discharge plan and are in agreement with goal/plan/treatment preferences. Understanding was demonstrated using the teach back method. AVS provided by RN at time of discharge, which includes all necessary medical information pertaining to the patients current course of illness, treatment, post-discharge goals of care, and treatment preferences.      Services At/After Discharge: Inpatient rehab  Plans IPR today if cleared by Cardiology; collaborated w Jemima Underwood Coordinator

## 2023-02-09 NOTE — PROGRESS NOTES
Report called to Riley Rodas RN on [employer]. Last dose MAR and AVS sent with patient. 30 day even monitor placed. Patient's belongings sent with transport. Family notified.

## 2023-02-09 NOTE — PROGRESS NOTES
[place]  PREADMISSION CONVERSATION WITH PATIENT/FAMILY    Date of Admission: 2023 11:30 AM    Patient Name: Ese Marino      MRN: [id number]    : 1935  (81 y.o.)  Gender: female     Payor Source: Payor: Asher Wolf / Plan: Seth Cavazos PPO / Product Type: Medicare /   Secondary Payor Source:      Isolation Status: No active isolations    Spoke with patient  explained acute rehabilitation philosophy, including fact that rehab consists of inpatient stay at [facility]. Rehabilitation unit care includes 24-hour nursing care, oversight by physician, 3 hours of therapy 6 days a week, individualized treatment plan. Explained how benefits through insurance provider works. Discussed patient readiness for admission to Inpatient Rehab based. All medical testing must be completed and must be medically stable to be discharged from acute hospital setting to the acute inpatient rehabilitation unit. Patient and  agree with planned admission to acute inpatient rehab upon insurance approval and medical stability for admission. Patient  able to explain reason for admission to acute inpatient rehab. Planning admission to [facility] when precert is approved.

## 2023-02-09 NOTE — PROGRESS NOTES
[facility]  PREADMISSION CONVERSATION WITH PATIENT/FAMILY    Date of Admission: 2023 11:30 AM    Patient Name: Tra Trujillo      MRN: [id number]    : 1935  (81 y.o.)  Gender: female     Payor Source: Payor: Juan Taylor / Plan: Jody Mayers PPO / Product Type: Medicare /   Secondary Payor Source:      Isolation Status: No active isolations    Spoke patient and family explained acute rehabilitation philosophy, including fact that rehab consists of inpatient stay at [address] [place] [address]. Rehabilitation unit care includes 24-hour nursing care, oversight by physician, 3 hours of therapy 6 days a week, individualized treatment plan. Explained how benefits through insurance provider works. Discussed patient readiness for admission to Inpatient Rehab based. All medical testing must be completed and must be medically stable to be discharged from acute hospital setting to the acute inpatient rehabilitation unit. Patient and family agree with planned admission to acute inpatient rehab upon insurance approval and medical stability for admission. Patient and family able to explain reason for admission to acute inpatient rehab. Planning admission to IPR when medically cleared. Cardiology to see the patient before admission to IPR. This was explained to the patient daughter during conversation and to the Primary RN and HANSEL Lima.

## 2023-02-09 NOTE — CONSULTS
[facility]'s  Cardiology Consult      Patient:  Ryanne Marin  YOB: 1935    MRN: [id number]   Acct: [de-identified]     Primary Care Physician: EDUARDO Pruitt CNP    REASON FOR CONSULT:    paroxysmal a fib, Rt cerebral ischemic CVA     CHIEF COMPLAINT:    Dizziness     HISTORY OF PRESENT ILLNESS:    Ryanne Marin is a pleasant 81year old female patient with past medical history that includes:   Past Medical History:   Diagnosis Date    Anxiety     Glaucoma, bilateral     Diagnosed in 2000's    Osteopenia     Osteoporosis     Diagnosed in 1990s   Patient reports having dizziness, recent fall. Denies loss of consciousness and states that she usually \"stays active\" and denies recurrent falls. Patient denies chest pain, shortness of breath, dyspnea on exertion, orthopnea, paroxysmal nocturnal dyspnea, palpitations, syncope, recent weight gain or leg swelling. Brain MRI on 2/2/2023 revealed a small acute infarct in the right posterior frontal/anterior parietal cortex. Echocardiogram revealed an EF of 60%, mild AI, LAE. She has been evaluated by Neurology. The patient was started on Xarelto per primary team after she was noted to have atrial fibrillation, cardiology was consulted. Telemetry was reviewed, SR with intermittent runs of irregular narrow complex tachycardia that is consistent with paroxysmal atrial fibrillation was noted. EKG revealed SR, LVG, PACs. Troponin <0.01, <0.01. TSH 2. All labs, EKG's, diagnostic testing and images as well as cardiac cath, stress testing   were reviewed during this encounter    CARDIAC TESTING   Summary   Normal left ventricle size and systolic function. Ejection fraction was   estimated at 60 %. There were no regional left ventricular wall motion   abnormalities and wall thickness was within normal limits. Doppler parameters were consistent with abnormal left ventricular   relaxation (grade 1 diastolic dysfunction). The left atrium is Mildly dilated. Mild aortic regurgitation is noted. Signature      ----------------------------------------------------------------   Electronically signed by Ignacio Chaney MD (Interpreting   physician) on 02/03/2023 at 05:58 PM   ----------------------------------------------------------------       Past Medical History:    Past Medical History:   Diagnosis Date    Anxiety     Glaucoma, bilateral     Diagnosed in 2000's    Osteopenia     Osteoporosis     Diagnosed in 1990s       Past Surgical History:    Past Surgical History:   Procedure Laterality Date    [address]      In [address] Bilateral 2018    Eye stent placement for glaucoma    TONSILLECTOMY         Medications Prior to Admission:    Medications Prior to Admission: polyvinyl alcohol (LIQUIFILM TEARS) 1.4 % ophthalmic solution, 1 drop as needed  latanoprost (XALATAN) 0.005 % ophthalmic solution, 1 drop nightly (Patient not taking: Reported on 2/2/2023)  ibuprofen (ADVIL;MOTRIN) 400 MG tablet, Take 1.5 tablets by mouth every 6 hours as needed for Pain  vitamin D (CHOLECALCIFEROL) 400 UNITS TABS tablet, Take 400 Units by mouth daily. clonazePAM (KLONOPIN) 0.5 MG tablet, Take 0.5 mg by mouth 2 times daily as needed. fluticasone (FLONASE) 50 MCG/ACT nasal spray, 1 spray by Nasal route daily. Glucosamine Sulfate 750 MG TABS, Take 1 tablet by mouth daily. therapeutic multivitamin-minerals (THERAGRAN-M) tablet, Take 1 tablet by mouth daily. fish oil-omega-3 fatty acids 1000 MG capsule, Take 2 g by mouth daily. magnesium 30 MG tablet, Take 30 mg by mouth 2 times daily. Zinc 50 MG CAPS, Take  by mouth. (Patient not taking: Reported on 2/2/2023)  DHA-EPA-Coenzyme Q10-Vitamin E (COQ-10 & FISH OIL PO), Take  by mouth. (Patient not taking: Reported on 2/2/2023)  polyethylene glycol (GLYCOLAX) packet, Take 17 g by mouth daily as needed.       Allergies:    Chocolate, Sulfa antibiotics, and Augmentin [amoxicillin-pot clavulanate]    Social History:    reports that she has never smoked. She has never used smokeless tobacco. She reports that she does not currently use alcohol. She reports that she does not use drugs. Family History:   family history includes Alcohol Abuse in her maternal grandfather and paternal grandfather; Anxiety Disorder in her father, sister, and sister; Bipolar Disorder in her father; Blindness in her sister; COPD in her brother; Depression in her sister; Diabetes in her mother; Heart Disease in her brother and father; Heart Failure in her mother; Other in her brother; Personality Disorder in her father; Vision Loss in her sister. REVIEW OF SYSTEMS:  Constitutional: negative for anorexia, chills and fevers,weight change  Skin: negative for new skin rash per patient  HEENT: negative for head trauma or new visual changes  Respiratory: negative for cough, hemoptysis, wheezing  Cardiovascular: negative for  orthopnea, palpitations and syncope. Gastrointestinal: negative for abdominal pain,nausea , vomiting, constipation, diarrhea. Hematologic/lymphatic: negative for bruising,prolonged bleeding,blood clots  Musculoskeletal:negative for muscle weakness, myalgias,wasting  Neurological: negative for coordination problems, dizziness, gait problems and vertigo  Behavioral/Psych:negative for mood/sleep disturbance      PHYSICAL EXAM:   Vitals:Patient Vitals for the past 24 hrs:   BP Temp Temp src Pulse Resp SpO2   02/09/23 1100 (!) 113/58 97.8 °F (36.6 °C) Oral 70 17 94 %   02/09/23 0815 -- -- -- 76 -- --   02/09/23 0745 (!) 108/56 98.5 °F (36.9 °C) Oral 65 18 95 %   02/09/23 0438 110/62 97.8 °F (36.6 °C) Oral 74 18 96 %   02/08/23 2233 (!) 147/74 97.6 °F (36.4 °C) Oral 66 18 94 %   02/08/23 1951 (!) 155/74 97.4 °F (36.3 °C) Axillary 63 17 95 %   02/08/23 1532 120/61 98.2 °F (36.8 °C) Oral 75 20 97 %       Last 3 weights:    Wt Readings from Last 3 Encounters: 02/08/23 121 lb 9.6 oz (55.2 kg)   12/08/21 130 lb (59 kg)   11/20/21 130 lb (59 kg)     24 hour intake/output:  Intake/Output Summary (Last 24 hours) at 2/9/2023 1233  Last data filed at 2/9/2023 0438  Gross per 24 hour   Intake 450 ml   Output 0 ml   Net 450 ml     BMI:Body mass index is 25.41 kg/m². General Appearance: alert and oriented to person, place and time, well developed and well- nourished, in no acute distress  Skin: warm and dry, no rash or erythema  Eyes: pupils equal, round, and reactive to light, extraocular eye movements intact, conjunctivae normal  Neck: supple and non-tender without mass, no thyromegaly or thyroid nodules, no cervical lymphadenopathy  Pulmonary/Chest: clear to auscultation bilaterally- no wheezes, rales or rhonchi, normal air movement, no respiratory distress  Cardiovascular: normal rate, regular rhythm, normal S1 and S2, no murmur. No rubs, clicks, or gallops, distal pulses intact, no carotid bruits, Negative JVD  Radial Pulses: intact 2+  Abdomen: soft, non-tender, non-distended, normal bowel sounds, no masses or organomegaly  Extremities: no cyanosis, clubbing . no Edema  Musculoskeletal: normal range of motion, no joint swelling, deformity or tenderness      RADIOLOGY   No results found. LABS:  No results for input(s): CKTOTAL, CKMB, CKMBINDEX, TROPONINT in the last 72 hours.   CBC:   Lab Results   Component Value Date/Time    WBC 7.2 02/03/2023 03:42 AM    RBC 3.51 02/03/2023 03:42 AM    HGB 11.1 02/03/2023 03:42 AM    HCT 33.9 02/03/2023 03:42 AM    MCV 96.6 02/03/2023 03:42 AM    MCH 31.6 02/03/2023 03:42 AM    MCHC 32.7 02/03/2023 03:42 AM    RDW 13.3 05/04/2017 11:40 PM     02/03/2023 03:42 AM    MPV 10.4 02/03/2023 03:42 AM     BMP:    Lab Results   Component Value Date/Time     02/07/2023 07:15 PM    K 4.3 02/07/2023 07:15 PM    K 3.6 11/20/2021 07:39 PM     02/07/2023 07:15 PM    CO2 20 02/07/2023 07:15 PM    BUN 22 02/07/2023 07:15 PM LABALBU 4.3 02/02/2023 12:00 PM    CREATININE 0.6 02/07/2023 07:15 PM    CALCIUM 9.3 02/07/2023 07:15 PM    LABGLOM >60 02/07/2023 07:15 PM    GLUCOSE 198 02/07/2023 07:15 PM     Hepatic Function Panel:    Lab Results   Component Value Date/Time    ALKPHOS 49 02/02/2023 12:00 PM    ALT 13 02/02/2023 12:00 PM    AST 20 02/02/2023 12:00 PM    PROT 7.0 02/02/2023 12:00 PM    BILITOT 0.4 02/02/2023 12:00 PM    BILIDIR <0.2 02/02/2023 12:00 PM    LABALBU 4.3 02/02/2023 12:00 PM     Magnesium:    Lab Results   Component Value Date/Time    MG 2.0 02/07/2023 07:15 PM     Warfarin PT/INR:  No components found for: PTPATWAR, PTINRWAR  HgBA1c:    Lab Results   Component Value Date/Time    LABA1C 6.1 02/03/2023 03:42 AM     FLP:    Lab Results   Component Value Date/Time    TRIG 129 02/03/2023 03:42 AM    HDL 48 02/03/2023 03:42 AM    LDLCALC 80 02/03/2023 03:42 AM     TSH:    Lab Results   Component Value Date/Time    TSH 2.030 02/07/2023 07:15 PM     BNP: No results found for: BNP    ASSESSMENT:  New onset/newly diagnosed paroxysmal atrial fibrillation  Intermittent episodes of AF RVR, short runs   Acute CVA  Dyslipidemia     RECOMMENDATIONS:  Brain MRI on 2/2/2023 revealed a small acute infarct in the right posterior frontal/anterior parietal cortex  She has been evaluated by Neurology   Denies chest pain  EKG revealed SR, LVG, PACs  Troponin <0.01, <0.01  The patient was started on Xarelto per primary team after she was noted to have atrial fibrillation, cardiology was consulted  Telemetry was reviewed, SR with intermittent runs of irregular narrow complex tachycardia that is consistent with paroxysmal atrial fibrillation was noted  Agree with diagnosis of atrial fibrillation   [id number] score is 5, anticoagulation is recommended for stroke prevention, agree with Xarelto if ok with Neurology given her recent stroke  Echocardiogram revealed an EF of 60%, mild AI, LAE  TSH 2  Continue Telemetry monitoring  Monitor electrolytes, keep Mg>2, K>4  Rate control with Metoprolol   30 day event monitor on discharge   Outpatient follow up with cardiology     Above findings and plan of care were discussed with patient, questions were answered, agreeable to plan. Thank you for allowing me to participate in the care of this patient. Please let me know if I can be of any further assistance.       Lilibeth Aquino MD, [facility] Morris   12:33 PM  2/9/2023

## 2023-02-09 NOTE — PLAN OF CARE
Problem: Discharge Planning  Goal: Discharge to home or other facility with appropriate resources  2/9/2023 0300 by Sridhar Yao RN  Outcome: Progressing  Flowsheets (Taken 2/9/2023 0300)  Discharge to home or other facility with appropriate resources:   Identify barriers to discharge with patient and caregiver   Identify discharge learning needs (meds, wound care, etc)   Arrange for needed discharge resources and transportation as appropriate     Problem: Skin/Tissue Integrity  Goal: Absence of new skin breakdown  Description: 1. Monitor for areas of redness and/or skin breakdown  2. Assess vascular access sites hourly  3. Every 4-6 hours minimum:  Change oxygen saturation probe site  4. Every 4-6 hours:  If on nasal continuous positive airway pressure, respiratory therapy assess nares and determine need for appliance change or resting period. 2/9/2023 0300 by Sridhar Yao RN  Outcome: Progressing  Note: No new signs of skin breakdown assessed this shift.       Problem: ABCDS Injury Assessment  Goal: Absence of physical injury  2/9/2023 0300 by Sridhar Yao RN  Outcome: Progressing  Flowsheets (Taken 2/9/2023 0300)  Absence of Physical Injury: Implement safety measures based on patient assessment     Problem: Skin/Tissue Integrity - Adult  Goal: Skin integrity remains intact  2/9/2023 0300 by Sridhar Yao RN  Outcome: Progressing  Flowsheets (Taken 2/9/2023 0300)  Skin Integrity Remains Intact:   Monitor for areas of redness and/or skin breakdown   Assess vascular access sites hourly     Problem: Skin/Tissue Integrity - Adult  Goal: Oral mucous membranes remain intact  2/9/2023 0300 by Sridhar Yao RN  Outcome: Progressing  Flowsheets (Taken 2/9/2023 0300)  Oral Mucous Membranes Remain Intact: Assess oral mucosa and hygiene practices     Problem: Infection - Adult  Goal: Absence of infection at discharge  2/9/2023 0300 by Sridhar Yao RN  Outcome: Progressing  Flowsheets (Taken 2/9/2023 0300)  Absence of infection at discharge:   Assess and monitor for signs and symptoms of infection   Monitor lab/diagnostic results   Monitor all insertion sites i.e., indwelling lines, tubes and drains     Problem: Infection - Adult  Goal: Absence of infection during hospitalization  2/9/2023 0300 by Tato Purcell RN  Outcome: Progressing  Flowsheets (Taken 2/9/2023 0300)  Absence of infection during hospitalization:   Assess and monitor for signs and symptoms of infection   Monitor lab/diagnostic results   Monitor all insertion sites i.e., indwelling lines, tubes and drains     Problem: Infection - Adult  Goal: Absence of fever/infection during anticipated neutropenic period  2/9/2023 0300 by Tato Purcell RN  Outcome: Progressing  Flowsheets (Taken 2/9/2023 0300)  Absence of fever/infection during anticipated neutropenic period:   Monitor white blood cell count   Administer growth factors as ordered     Problem: Metabolic/Fluid and Electrolytes - Adult  Goal: Electrolytes maintained within normal limits  2/9/2023 0300 by Tato Purcell RN  Outcome: Progressing  Flowsheets (Taken 2/9/2023 0300)  Electrolytes maintained within normal limits:   Monitor labs and assess patient for signs and symptoms of electrolyte imbalances   Monitor response to electrolyte replacements, including repeat lab results as appropriate   Administer electrolyte replacement as ordered     Problem: Metabolic/Fluid and Electrolytes - Adult  Goal: Hemodynamic stability and optimal renal function maintained  2/9/2023 0300 by Tato Purcell RN  Outcome: Progressing  Flowsheets (Taken 2/9/2023 0300)  Hemodynamic stability and optimal renal function maintained:   Monitor labs and assess for signs and symptoms of volume excess or deficit   Monitor intake, output and patient weight   Monitor urine specific gravity, serum osmolarity and serum sodium as indicated or ordered     Problem: Metabolic/Fluid and Electrolytes - Adult  Goal: Glucose maintained within prescribed range  2/9/2023 0300 by Yamile Parr RN  Outcome: Progressing     Problem: Hematologic - Adult  Goal: Maintains hematologic stability  2/9/2023 0300 by Yamile Parr RN  Outcome: Progressing  Flowsheets (Taken 2/9/2023 0300)  Maintains hematologic stability:   Assess for signs and symptoms of bleeding or hemorrhage   Monitor labs for bleeding or clotting disorders     Problem: Pain  Goal: Verbalizes/displays adequate comfort level or baseline comfort level  2/9/2023 0300 by Yamile Parr RN  Outcome: Progressing  Flowsheets (Taken 2/9/2023 0300)  Verbalizes/displays adequate comfort level or baseline comfort level:   Encourage patient to monitor pain and request assistance   Assess pain using appropriate pain scale   Administer analgesics based on type and severity of pain and evaluate response     Problem: Chronic Conditions and Co-morbidities  Goal: Patient's chronic conditions and co-morbidity symptoms are monitored and maintained or improved  2/9/2023 0300 by Yamile Parr RN  Outcome: Progressing  Flowsheets (Taken 2/9/2023 0300)  Care Plan - Patient's Chronic Conditions and Co-Morbidity Symptoms are Monitored and Maintained or Improved:   Monitor and assess patient's chronic conditions and comorbid symptoms for stability, deterioration, or improvement   Collaborate with multidisciplinary team to address chronic and comorbid conditions and prevent exacerbation or deterioration     Problem: Safety - Adult  Goal: Free from fall injury  2/9/2023 0300 by Yamile Parr RN  Outcome: Progressing  Flowsheets (Taken 2/9/2023 0300)  Free From Fall Injury: Instruct family/caregiver on patient safety     Problem: Neurosensory - Adult  Goal: Achieves stable or improved neurological status  2/9/2023 0300 by Yamile Parr RN  Outcome: Progressing  Flowsheets (Taken 2/9/2023 0300)  Achieves stable or improved neurological status:   Assess for and report changes in neurological status Initiate measures to prevent increased intracranial pressure     Problem: Nutrition Deficit:  Goal: Optimize nutritional status  2/9/2023 0300 by Sridhar Yao RN  Outcome: Progressing  Flowsheets (Taken 2/9/2023 0300)  Nutrient intake appropriate for improving, restoring, or maintaining nutritional needs:   Assess nutritional status and recommend course of action   Recommend appropriate diets, oral nutritional supplements, and vitamin/mineral supplements   Monitor oral intake, labs, and treatment plans     Problem: Musculoskeletal - Adult  Goal: Return mobility to safest level of function  2/9/2023 0300 by Sridhar Yao RN  Outcome: Progressing  Flowsheets (Taken 2/9/2023 0300)  Return Mobility to Safest Level of Function:   Assess patient stability and activity tolerance for standing, transferring and ambulating with or without assistive devices   Assist with transfers and ambulation using safe patient handling equipment as needed   Ensure adequate protection for wounds/incisions during mobilization     Problem: Musculoskeletal - Adult  Goal: Maintain proper alignment of affected body part  2/9/2023 0300 by Sridhar Yao RN  Outcome: Progressing  Flowsheets (Taken 2/9/2023 0300)  Maintain proper alignment of affected body part: Support and protect limb and body alignment per provider's orders     Problem: Musculoskeletal - Adult  Goal: Return ADL status to a safe level of function  2/9/2023 0300 by Sridhar Yao RN  Outcome: Progressing  Flowsheets (Taken 2/9/2023 0300)  Return ADL Status to a Safe Level of Function:   Administer medication as ordered   Assess activities of daily living deficits and provide assistive devices as needed    Care plan reviewed with patient. Patient verbalized understanding of the plan of care and contribute to goal setting.

## 2023-02-09 NOTE — PROGRESS NOTES
Herbal and Nutritional Product Restrictions      The following herbal, alternative, and/or nutritional/dietary supplement product(s) has been discontinued per [facility] approved policy:      Glucosamine 750 mg tablet BID    Please reorder upon discharge if appropriate.     Thank you,  Violet Pallas, [facility]  2/9/2023 5:41 PM

## 2023-02-09 NOTE — H&P
Physical Medicine & Rehabilitation Admission History and Physical    Impression:  Right posterior frontal/anterior parietal cortex small acute infarct stroke causing dizziness, vertigo, and frequent fall  Cognitive impairment  Left hip contusion secondary to fall (symptom resolved)  New onset paroxysmal atrial fibrillation with intermittent rapid ventricular response  History of bilateral glaucoma requiring eye stent placement  History of osteoporosis  History of anxiety disorder       Plan:   Admit to the inpatient rehabilitation unit. The patient demonstrates good potential to participate in an inpatient rehabilitation program involving at least 3 hours per day, 5 days per week of intensive rehabilitation. Rehabilitation services will include PT, OT, and SLP/RT in order to improve functional status prior to discharge. Family education and training will be completed. Equipment evaluations and recommendations will be completed as appropriate. Rehabilitation nursing will be involved for bowel, bladder, skin, and pain management. Nursing will also provide education and training to patient and family. Prophylaxis:  DVT: Patient on Xarelto, JUANCHO stocking, intermittent pneumatic compression device. GI: Colace, Dulcolax as needed, milk of magnesium as needed, Senokot as needed, GlycoLax as needed. Pain: Tylenol as needed  Continue aspirin, Crestor for CVA  Continues Xarelto for paroxysmal atrial fibrillation  Continue Toprol XL for blood pressure control and atrial fibrillation with rapid ventricular response  Resume home Flonase usage  Resume vitamin D and multivitamins supplement  Resume clonazepam as needed for anxiety and insomnia  Start melatonin and continue trazodone as needed for insomnia  Nutrition:  Consultation to dietician for nutritional counseling and recommendations. Prealbumin will be checked on admission.    Bladder: Monitoring signs or symptoms of UTI  Bowel: Monitoring signs or symptoms of constipation   and case management consultations for coordination of care and discharge planning    The main medical problem(s) and comorbidities being actively managed by the physicians and requiring 24 hour rehabilitation nursing care during this stay include CVA, anxiety, osteoporosis, paroxysmal atrial fibrillation. The domains of functional impairment present in this patient which will require an intensive and interdisciplinary rehabilitation environment include self care, motor dysfunction, bowel/bladder management, safety, and cognitive function. Estimated length of stay for this admission : probably 10~14 days    Anticipated disposition: Home. The potential to achieve that is very good.    ==========================================================================================================================      Chief Complaint and Reason for Rehabilitation Admission:   Stroke causing fall accident      History of Present Illness:  Larissa Espinoza is a 81 y.o. right-handed  female with history of bilateral eye glaucoma requiring eye stent placement, anxiety, osteoporosis, status post appendectomy, tonsillectomy and bladder suspension surgery, is admitted to the inpatient rehabilitation unit on 2/9/2023 for intensive inpatient rehabilitation treatment of impaired ADL and ambulation secondary to recent stroke resulting for accident on 2/2/2023. The patient says she began feeling something stuck in her throat on 2/1/2023 night. On 2/2/2023 while she was walking in kitchen to try to get something to help him the throat discomfort sensation, she suddenly fell for no reason. She denies having weakness, pain, syncope or fainting prior to the fall. She struggled to get up but fell again after few step into her dining room. At that time she felt the need for bowel movement so she crawled to bathroom and had diarrhea like bowel movement.   She did not call her daughter who called 911. The patient was sent to [address] [place] [address] for evaluation on 2./2/2023. She was found to have tenderness at the her left hip area. She also complains of feeling dizzy with room spinning sensation when she says suddenly sitting up from supine position. Her blood tests and urine test showed no significant abnormality. X-ray of left hip and chest revealed no acute abnormality. CT of the head without contrast done on 2/2/2023 showed small lacunar infarction at the right insula. CT of cervical spine done on 2/2/2023 was reported to show no acute process. CTA of head and neck done on 2/2/2023 show no significant hemodynamic stenosis in bilateral common and internal carotid arteries, and hypoplastic P1 segment of the right posterior cerebral artery. MRI of brain was also performed on 2/2/2023 and revealed a small acute right posterior frontal/anterior parietal cortex infarct stroke, and mild white matter atrophy. The patient was started on aspirin 81 mg.  Echocardiogram was performed on 2/3/2023 revealed only grade 1 left ventricle diastolic dysfunction with ejection fraction of 60%. Neurology service was consulted on 2/3/2023. Aspirin 81 mg daily was continued and Plavix 75 mg daily for 21 days was added. PM&R was consulted on 2/2/2023. On 2/8/2023 the patient had an episode of tachycardia. Cardiology was consulted and was diagnosed with new onset paroxysmal atrial fibrillation with intermittent short run rapid ventricular response. The patient was started on Xarelto and continue her daily baby aspirin. Plavix was discontinued. The patient was put on 30-day event monitor on 2/9/2023. At the present time the patient denies having blurry vision, shaking visual image/vertigo, nausea or vomiting, shortness of breath, palpitation, chest pain, abdominal pain, diarrhea or constipation. She says she feels fatigue with some degree of generalized weakness.   She denies having any numbness or tingling feeling. She says her left hip is no longer tender to touch. She says she has history of difficulty sleeping and take clonazepam regularly at home at nighttime to help with sleep. Most Recent Rehabilitation Assessments:  PT:    (2/9/2023) : Bed Mobility:  Supine to Sit: Stand By Assistance, with head of bed raised, with rail  Scooting: Stand By Assistance, X 1, with head of bed raised     Transfers:  Sit to Stand: Stand By Assistance  Stand to Sit:Stand By Assistance  **Verbal cues for hand placement on EOB, patient impulsive     Ambulation:  Stand By Assistance, [address], X 1  Distance: 15ft, 3ft, 15ft  Surface: Level Tile  Device:Rolling Walker  Gait Deviations:  Decreased Step Length Bilaterally, Slight Lean to Right upon standing from EOB, Decreased Heel Strike Bilaterally, and Narrow [place] of Support  **Verbal cues for using RW, no LOB noted      Balance:  Dynamic Standing Balance: Stand By Assistance, Contact Guard Assistance, X 1, with verbal cues   ~13 min, no UE support at sink while performing reaching tasks outside FRANKIE for self care, No LOB noted, verbal cue for posture      OT:    (2/9/2023) :  ADL:   Grooming: Stand By Assistance. In standing at the sink for grooming routine  Upper Extremity Dressing: Stand By Assistance. To doff nightgown overhead and hao long sleeve shirt. Lower Extremity Dressing: Stand By Assistance. To don jogger style sweat pants from recliner. Footwear Management: Moderate Assistance. Pt able to don and doff socks with SBA but requires moderate A to don shoes. Pt reports shoes are new and socks are too thick to be able to get shoes on. Toileting: Stand By Assistance. Toilet Transfer: Stand By Assistance. Karen Gupta BALANCE:  Standing Balance: Stand By Assistance, [address] SBA with AD and single UE release, CGA without AD and B UE release        TRANSFERS:  Sit to Stand:  Stand By Assistance. Stand to Sit: Stand By Assistance. FUNCTIONAL MOBILITY:  Assistive Device: Rolling Walker and None  Assist Level:  Stand By Assistance and Contact Guard Assistance. Distance: To and from bathroom, Performed to the bathroom with RW at [facility] and from bathroom without AD at [facility] and decreased speed. ST:    (2/6/2023) : Orientation (house floor plan):  4/7 indep, 2/7 with min cue   *Patient required mod/max cueing for initiation of task. *Showing deficits in working memory and selective attention with task. *Patient showed fair success in thought organization with task. Medication Management (evaluating pill box for errors):  Prescription 1: mod cueing for re-instruction, after cue able to ID error  Prescription 2: min cueing for re-instruction, after cue able to ID error  Prescription 3: min cueing to start at beginning of week, after cue able to indep ID errors  Prescription 4: max cueing for dosage time (2 tablets, twice weekly)  Prescription 5: indep able to ID errors  *Patient showed difficulty with task. Deficits noted in visual scanning and working memory. *Recommendations for supervision of medication management upon discharge. ST reviewed WRAP strategy with patient.  -Immediate recall of WRAP: 3/4 indep, 1/4 min logical cue  -Delayed recall of WRAP: 4/4 indep      Past Medical History:      Diagnosis Date    Anxiety     Glaucoma, bilateral     Diagnosed in 2000's    Osteopenia     Osteoporosis     Diagnosed in 1990s       Primary care provider: EDUARDO Harris - CNP       Past Surgical History:      Procedure Laterality Date    [address] 2018    Eye stent placement for glaucoma    TONSILLECTOMY         Allergies:     Allergies   Allergen Reactions    Chocolate Rash    Sulfa Antibiotics Rash    Augmentin [Amoxicillin-Pot Clavulanate]      Pt States Breaks her out        Current Medications:    Current Facility-Administered Medications   Medication Dose Route Frequency Provider Last Rate Last Admin    ondansetron (ZOFRAN-ODT) disintegrating tablet 4 mg  4 mg Oral Q8H PRN Annie Mondragon MD        acetaminophen (TYLENOL) tablet 650 mg  650 mg Oral Q4H PRN Annie Mondragon MD        magnesium hydroxide (MILK OF MAGNESIA) 400 MG/5ML suspension 15 mL  15 mL Oral Daily PRN Annie Mondragon MD        [facility]) tablet 8.6 mg  1 tablet Oral Nightly PRN Annie Mondragon MD        fluticasone [facility]) 50 MCG/ACT nasal spray 1 spray  1 spray Nasal Daily Annie Mondragon MD        polyethylene glycol [facility]) packet 17 g  17 g Oral Daily PRN Annie Mondragon MD        [START ON 2/10/2023] aspirin EC tablet 81 mg  81 mg Oral Daily Julio Hutchins MD        bisacodyl (DULCOLAX) EC tablet 5 mg  5 mg Oral Daily PRN Julio Hutchins MD        clonazePAM (KLONOPIN) tablet 0.5 mg  0.5 mg Oral Nightly Julio Hutchins MD        docusate sodium (COLACE) capsule 100 mg  100 mg Oral BID Annie Mondragon MD        [facility] ON 2/10/2023] metoprolol succinate (TOPROL XL) extended release tablet 25 mg  25 mg Oral Daily Julio Hutchins MD        [START ON 2/10/2023] multivitamin 1 tablet  1 tablet Oral Daily Julio Hutchins MD        polyvinyl alcohol (LIQUIFILM TEARS) 1.4 % ophthalmic solution 1 drop  1 drop Both Eyes PRN Julio Hutchins MD        rivaroxaban (XARELTO) tablet 20 mg  20 mg Oral Daily Oldave Lopez MD        rosuvastatin (CRESTOR) tablet 40 mg  40 mg Oral Nightly Olanithami KING Lopez MD        traZODone (DESYREL) tablet 50 mg  50 mg Oral Nightly Julio Hutchins MD        [START ON 2/10/2023] Vitamin D (CHOLECALCIFEROL) tablet 500 Units  500 Units Oral Daily Julio Hutchins MD        clonazePAM (KLONOPIN) tablet 0.5 mg  0.5 mg Oral Q12H PRN Annie Mondragon MD        melatonin tablet 6 mg  6 mg Oral Nightly Annie Mondragon MD            Social History:  Social History     Socioeconomic History    Marital status:      Spouse name: Not on file    Number of children: Not on file    Years of education: Not on file    Highest education level: Not on file   Occupational History    Not on file   Tobacco Use    Smoking status: Never    Smokeless tobacco: Never   Substance and Sexual Activity    Alcohol use: Not Currently     Comment: Drinks 1 glass of wine about once per year    Drug use: No    Sexual activity: Never   Other Topics Concern    Not on file   Social History Narrative    Not on file     Social Determinants of Health     Financial Resource Strain: Not on file   Food Insecurity: Not on file   Transportation Needs: Not on file   Physical Activity: Not on file   Stress: Not on file   Social Connections: Not on file   Intimate Partner Violence: Not on file   Housing Stability: Not on file     Occupation: Retired in 2002 from being   Lives with: Alone; her  passed away at the end of November 2022; she had 2 daughter who lives about 10 minutes drive away from her; one of the daughter works as a nurse and another daughter is a stay home mom  Home setup: 1 level plus basement and attic house; her bedroom and bathroom are on the first floor; there are 4 steps outside front door without handrail; there are 4 steps outside garage door with 1 side handrail  Previous level of independence: Independent in all ADLs, community long distance ambulation without using any assistive walking device, and driving      Family History:       Problem Relation Age of Onset    Heart Failure Mother     Diabetes Mother     Heart Disease Father     Bipolar Disorder Father     Personality Disorder Father     Anxiety Disorder Father     Anxiety Disorder Sister     Blindness Sister     Depression Sister     Anxiety Disorder Sister     Vision Loss Sister     Other Brother         Rheumatic fever    Heart Disease Brother     COPD Brother     Alcohol Abuse Maternal Grandfather     Alcohol Abuse Paternal Grandfather        Review of Systems:  Review of Systems   Constitutional:  Positive for fatigue. Negative for chills, diaphoresis and fever. HENT:  Negative for ear discharge, ear pain, hearing loss, rhinorrhea, sneezing, sore throat, tinnitus and trouble swallowing. Eyes:  Negative for pain, discharge and visual disturbance. Respiratory:  Negative for cough, shortness of breath and wheezing. Cardiovascular:  Negative for chest pain, palpitations and leg swelling. Gastrointestinal:  Negative for abdominal pain, constipation, diarrhea, nausea and vomiting. Endocrine: Negative for cold intolerance and heat intolerance. Genitourinary:  Negative for difficulty urinating and dysuria. Musculoskeletal:  Positive for gait problem. Negative for arthralgias, back pain, myalgias and neck pain. Skin:  Negative for rash. Allergic/Immunologic: Negative for food allergies. Neurological:  Positive for weakness (Generalized). Negative for dizziness, tremors, facial asymmetry, speech difficulty, light-headedness, numbness and headaches. Hematological:  Does not bruise/bleed easily. Psychiatric/Behavioral:  Positive for sleep disturbance. Negative for dysphoric mood and hallucinations. The patient is not nervous/anxious.          Physical Exam:  /67   Pulse 62   Temp 97.9 °F (36.6 °C) (Oral)   Resp 18   SpO2 97%   General:  well-developed, well nourished  female; in no acute distress ; appropriate affect & mood; sitting on reclining chair comfortably  Eyes: pupil equally round ; extra-ocular motion intact bilaterally  Head, Ear, Nose, Mouth & Throat : normocephalic ; no tenderness at the face or head scalp; no discharge from ears or nose ; no deformity ; no facial swelling ; oral mucosa pink   Neck :  supple ; no tenderness ; mild muscle spasm at the bilateral cervical paraspinal muscle and bilateral upper trapezius muscles  Cardiovascular : regular rate & rhythm ; normal S1 & S2 heart sound ; no murmur ; normal peripheral pulse at the bilateral upper and lower extremities  Pulmonary : Breath sounds present at bilateral lung fields; no wheezing ; no rale; no crackle  Gastrointestinal : soft, flat abdomen; no tenderness; normal bowel sound present   Back : no tenderness; no muscle spasm  Skin: no skin lesion or rash ; no pitting edema at all 4 extremities  Musculoskeletal : no limb asymmetry; no limb deformity; no tenderness at bilateral upper extremities & lower extremities; no palpable mass at limbs ; no joints laxity or crepitation ; shoulder flexion and abduction passive ROM reaching 170 degrees; hip flexion passive ROM reaching 120 degrees bilaterally; ankle dorsiflexion passive ROM reaching 10 degrees on right side and 10 degrees on left side; normal functional joints ROM at the rest of bilateral upper & lower extremities  Cerebral :  alert ; awake ; oriented to place, person and time; follow one-step verbal command  Cerebellum : no dysmetria with bilateral finger-to-nose test ; mild dysmetria with bilateral heel-to-shin test  Cranial Nerves :  grossly intact CN II to XII function  Sensory : intact light touch and pin prick sensation at bilateral upper & lower extremities  Motor : normal tone at bilateral upper & lower extremities ; 4+/5 to 5/5 muscle strength at right hip flexion; normal 5/5 muscle strength at bilateral upper extremities & the rest of bilateral lower extremities  Reflex : 1+ bilateral biceps, bilateral brachioradialis and bilateral knees reflexes; 0 bilateral triceps and bilateral ankle reflexes  Pathological Reflex :  No Cliff's sign ; no ankle clonus; no Babinski sign  Gait : Not assessed      Diagnostics:  No results found for this or any previous visit (from the past 24 hour(s)).        Latest Reference Range & Units 9/30/22 13:00 2/2/23 12:00 2/7/23 19:15   Sodium 135 - 145 meq/L 143 139 139   Potassium 3.5 - 5.2 meq/L 3.8 4.1 4.3   Chloride 98 - 111 meq/L 106 104 103   CO2 23 - 33 meq/L 25 26 20 (L)   BUN,BUNPL 7 - 22 mg/dL 20 15 22   Creatinine 0.4 - 1.2 mg/dL 0.6 0.5 0.6   Anion Gap 8.0 - 16.0 meq/L 12.0 9.0 16.0   Est, Glom Filt Rate >60 ml/min/1.73m2 >90 >60 >60   Magnesium 1.6 - 2.4 mg/dL   2.0   Glucose, Random 70 - 108 mg/dL 98 108 198 (H)   CALCIUM, SERUM, 426102 8.5 - 10.5 mg/dL 9.5 9.3 9.3   Osmolality Calc 275.0 - 300.0 mOsmol/kg  278.9    (L): Data is abnormally low  (H): Data is abnormally high       Latest Reference Range & Units 9/30/22 13:00 2/3/23 03:42   HDL Cholesterol mg/dL 60 48   LDL Calculated mg/dL 103 80   Triglycerides 0 - 199 mg/dL 92 129        Latest Reference Range & Units 9/30/22 13:00 2/2/23 12:00   Albumin 3.5 - 5.1 g/dL 4.5 4.3   Alk Phos 38 - 126 U/L 54 49   ALT 11 - 66 U/L 13 13   AST 5 - 40 U/L 18 20   Bilirubin 0.3 - 1.2 mg/dL 0.5 0.4   Bilirubin, Direct 0.0 - 0.3 mg/dL  <0.2   Total Protein 6.1 - 8.0 g/dL 7.1 7.0        Latest Reference Range & Units 2/2/23 12:00 2/3/23 03:42   WBC 4.8 - 10.8 thou/mm3 7.7 7.2   RBC 4.20 - 5.40 mill/mm3 3.82 (L) 3.51 (L)   Hemoglobin Quant 12.0 - 16.0 gm/dl 12.2 11.1 (L)   Hematocrit 37.0 - 47.0 % 36.5 (L) 33.9 (L)   MCV 81.0 - 99.0 fL 95.5 96.6   MCH 26.0 - 33.0 pg 31.9 31.6   MCHC 32.2 - 35.5 gm/dl 33.4 32.7   MPV 9.4 - 12.4 fL 10.4 10.4   RDW-CV 11.5 - 14.5 % 13.2 13.2   RDW-SD 35.0 - 45.0 fL 46.0 (H) 46.9 (H)   Platelet Count 220 - 400 thou/mm3 226 208   Lymphocytes Absolute 1.0 - 4.8 thou/mm3 1.6    Monocytes Absolute 0.4 - 1.3 thou/mm3 0.4    Eosinophils Absolute 0.0 - 0.4 thou/mm3 0.1    Basophils Absolute 0.0 - 0.1 thou/mm3 0.1    Seg Neutrophils % 70.9    Segs Absolute 1.8 - 7.7 thou/mm3 5.5    Lymphocytes % 21.3    Monocytes % 5.7    Eosinophils % 1.2    Basophils % 0.8    Immature Grans (Abs) 0.00 - 0.07 thou/mm3 0.01    Immature Granulocytes % 0.1    (L): Data is abnormally low  (H): Data is abnormally high       Latest Reference Range & Units 2/2/23 14:10   Color, UA STRAW-YELLOW  YELLOW   Glucose, UA NEGATIVE mg/dl NEGATIVE   Bilirubin, Urine NEGATIVE  NEGATIVE   Ketones, Urine NEGATIVE  NEGATIVE   Blood, Urine NEGATIVE  NEGATIVE   pH, UA 5.0 - 9.0  7.0   Protein, UA NEGATIVE  NEGATIVE   Urobilinogen, Urine 0.0 - 1.0 eu/dl 0.2   Nitrite, Urine NEGATIVE  NEGATIVE   Leukocyte Esterase, Urine NEGATIVE  NEGATIVE   Character, Urine CLEAR-SL CLOUD  CLEAR          Chest x-ray (2/2/2023) : Impression   No acute cardiopulmonary disease         X-ray of left hip and pelvis (2/2/2023) : Impression   No acute process         CT of the head without contrast (2/2/2023) : Impression    Small lacunar infarction right insula. CT of cervical spine without contrast (2/2/2023) : Impression   No acute process         CTA of the head and neck with and without contrast (2/2/2023) : Impression   1. There is no significant hemodynamic stenosis in the right and left common and internal carotid arteries. 2. There is antegrade flow in the right and left vertebral arteries. 3. There is atherosclerotic calcification in the aortic arch and at the origin of the left subclavian and left common carotid arteries. 4. There is a hypoplastic P1 segment of the right posterior cerebral artery. There is fetal origin of the right posterior cerebral artery. 5. There is a patent left posterior communicating artery which helps supply the left posterior cerebral artery. 6. Otherwise negative CTA of the brain. 7. Diffusion MRI scan would be helpful for better evaluation in this patient. Limited MRI of brain (2/2/2023) : Impression   1. 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 Mild atrophy and probable ischemic changes in the white matter. 3. Mild inflammatory changes in ethmoid air cells bilaterally and left mastoid tip. Echocardiogram (2/3/2023) :  Conclusions Summary  Normal left ventricle size and systolic function. Ejection fraction was estimated at 60 %.    There were no regional left ventricular wall motion abnormalities and wall thickness was within normal limits. Doppler parameters were consistent with abnormal left ventricular relaxation (grade 1 diastolic dysfunction). The left atrium is Mildly dilated. Mild aortic regurgitation is noted. The post admission physician evaluation (ARNEL) is consistent with the pre-admission assessment. See above findings to reflect the elements required in the ARNEL. Patient's admitting condition is consistent with the findings of the preadmission assessment by the rehabilitation admissions coordinator.     Vinita Urban MD

## 2023-02-09 NOTE — PROCEDURES
The skin was prepped and a 30 day cardiac event monitor was applied. The patient was instructed on the documentation of symptoms and the purpose of the monitor as well as the things to avoid while wearing the monitor. The patient was instructed to remove and return the monitor on 03/11/2023.   The serial number of the monitor that was applied is [id number]

## 2023-02-09 NOTE — PROGRESS NOTES
INTERNAL MEDICINE Progress Note  2/9/2023 3:45 PM  Subjective:   Admit Date: 2/2/2023  PCP: EDUARDO Donald CNP  Interval History:     No palpitations  No dizziness    Objective:   Vitals: BP (!) 113/58   Pulse 70   Temp 97.8 °F (36.6 °C) (Oral)   Resp 17   Ht 4' 10\" (1.473 m)   Wt 121 lb 9.6 oz (55.2 kg)   SpO2 94%   BMI 25.41 kg/m²   General appearance: alert and cooperative with exam  HEENT: Head: atraumatic  Neck: no adenopathy, no carotid bruit, no JVD, and supple, symmetrical, trachea midline  Lungs: clear to auscultation bilaterally  Heart: S1, S2 normal  Abdomen: soft, non-tender; bowel sounds normal; no masses,  no organomegaly  Extremities: no edema, redness or tenderness in the calves or thighs  Neurologic: Mental status: Alert, oriented, thought content appropriate  No focal motor deficit      Medications:   Scheduled Meds:   rivaroxaban  20 mg Oral Daily    metoprolol succinate  25 mg Oral Daily    docusate sodium  100 mg Oral Daily    clonazePAM  0.5 mg Oral Nightly    Vitamin D  500 Units Oral Daily    multivitamin  1 tablet Oral Daily    aspirin  81 mg Oral Daily    rosuvastatin  40 mg Oral Nightly    traZODone  50 mg Oral Nightly     Continuous Infusions:        Lab Results:   CBC:   No results for input(s): WBC, HGB, PLT in the last 72 hours. BMP:    Recent Labs     02/07/23  1915      K 4.3      CO2 20*   BUN 22   CREATININE 0.6   GLUCOSE 198*     No results for input(s): CHOL, HDL in the last 72 hours. Invalid input(s): LDLCALCU,  TRIG,  LDL     LDL Calculated 80       HgBA1c:    Lab Results   Component Value Date/Time    LABA1C 6.1 02/03/2023 03:42 AM     TSH:    Lab Results   Component Value Date/Time    TSH 2.030 02/07/2023 07:15 PM       FERRITIN:    Lab Results   Component Value Date/Time    FERRITIN 124 11/20/2021 07:39 PM        MRI of brain (2/2/2023) : Impression   1. 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  Mild atrophy and probable ischemic changes in the white matter. 3. Mild inflammatory changes in ethmoid air cells bilaterally and left mastoid tip.             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

## 2023-02-09 NOTE — LETTER
[address]- [facility]  [address] [facility] [address]  Phone: [phone]        February 17, 2023     Patient: Tang Zaidi   YOB: 1935   Date of Visit: 2/9/2023       To Whom It May Concern:    Please be advised that Ms. Alex Garza was admitted to [address] [place] [address] inpatient rehabilitation service on 2/9/2023 for intensive rehabilitation treatment of her stroke. The patient is scheduled to be discharged on 2/18/2023. The patient is advised to reframed from driving until she passes outpatient  evaluation test.    If you have any questions or concerns, please don't hesitate to call.         Sincerely,        Kaleb Ledezma MD  Board Certified [address] [place] [address]

## 2023-02-09 NOTE — PROGRESS NOTES
Herbal and Nutritional Product Restrictions      The following herbal, alternative, and/or nutritional/dietary supplement product(s) has been discontinued per [employer] approved policy:      Fish oil-omega-3 fatty acids 2 g capsules daily    Please reorder upon discharge if appropriate.     Thank you,  Elif [address], [facility]  2/9/2023 4:25 PM

## 2023-02-09 NOTE — PROGRESS NOTES
Admitted to the Inpatient Rehabilitation Unit via wheelchair. Patient was then oriented to room and unit. Education provided on the rehabilitation routine: three hours of therapy five days per week. Explained patients right to have family, representative or physician notified of their admission. Patient has Declined for physician to be notified. Patient has Declined for family/representative to be notified. Admitting medication orders compared with acute stay medications; home medication list reviewed with patient/family. Medication issues identified No  Medication issue: n/a  If yes, physician notified Dr Jamar Olsen. Transportation:   Has transportation kept you from medical appointments, meetings, work, or from getting things needed for daily living? (Check all that apply)  No.      Health Literacy:   How often do you need to have someone help you when you read instructions, pamphlets, or other written material from your doctor or pharmacy? 0. - Never    Social Isolation:  How often do you feel lonely or isolated from those around you?  0. Never    Patient Mood Interview (PHQ-2 to 9) (from [email or web address])   Say to Patient: \"Over the last 2 weeks, have you been bothered by any of the following problems? \"   If symptom is present, enter yes in column 1 (Symptom Presence)  If yes in column 1, then ask the patient: About how often have you been bothered by this?  Indicate response in column 2, Symptom Frequency. Symptom Presence  No    Yes   9. No response  Symptom Frequency  Never or 1 day  2-6 days (several days)  7-11 days (half or more of the days)  12-14 days (nearly every day)    Symptom Presence Symptom Frequency   Little interest or pleasure in doing things 0. No 0. Never or 1 day   Feeling down, depressed, or hopeless 0. No 0. Never or 1 day   If either A or B above has symptom frequency coded 2 or 3, CONTINUE asking questions below.       If not, END the interview  and right click on next table to delete. Pain:  Pain Effect on Sleep    Ask patient: \"Over the past 5 days, how much of the time has pain made it hard for you to sleep at night?\" 1.  Rarely or not at all     If no pain is reported, Stop here. If pain is reported, continue with the additional questions. Pain Interference with Therapy Activities   Ask patient: Ilda Baez the past 5 days, how often have you limited your participation in rehabilitation therapy sessions due to pain?\" 1.  Rarely or not at all   Pain Interference with Day to Day Activities   Ask patient: Reterrell Frisk the past 5 days, how often have you limited your day to day activities (excluding rehabilitation therapy sessions) because of pain?\" 1.  Rarely or not at all         Bladder and Bowel Function Assessment:  Prior history of bladder problems: frequency  Number of pads used per day:  0  Frequency of night time voiding: twice  Fluid intake volume and pattern: adequate  Last BM: 2/8/23  Bowel problems (prior or current) No      Incontinence      Frequent diarrhea      No BM in 3 days this stay or history of constipation      Hemorrhoids      Diverticulitis      Bowel Surgery     Two nurse skin assessment performed by Estefany Fletcher RN  and [employer], RN . Weight: 121.3lb      Care plan was created with patient's input and goals were agreed upon. Admission folder provided with education regarding patients diagnoses, fall prevention, and skin care. \"Data Collection Information Summary for Patients in Inpatient Rehabilitation Facilities\" and \"Privacy Act Statement - Health Care Records\" provided. Please refer to the admission navigator for further information.

## 2023-02-09 NOTE — PROGRESS NOTES
Patient is a discharge/readmit to inpatient rehab room 5X17 when medically cleared. Please call report to [phone].

## 2023-02-09 NOTE — PLAN OF CARE
Problem: Discharge Planning  Goal: Discharge to home or other facility with appropriate resources  Outcome: Progressing  Note: Care conference on Tuesday to determine length of stay. Problem: Skin/Tissue Integrity - Adult  Goal: Skin integrity remains intact  Outcome: Progressing  Note: Patient has areas on redness, not over bony prominences, looks more like a dermatitis. Problem: Safety - Adult  Goal: Free from fall injury  Outcome: Progressing  Note: Patient will continue to use call light for assistance to prevent falls and promote patient safety.

## 2023-02-10 LAB
ALBUMIN SERPL BCG-MCNC: 3.9 G/DL (ref 3.5–5.1)
ALP SERPL-CCNC: 45 U/L (ref 38–126)
ALT SERPL W/O P-5'-P-CCNC: 28 U/L (ref 11–66)
ANION GAP SERPL CALC-SCNC: 14 MEQ/L (ref 8–16)
AST SERPL-CCNC: 21 U/L (ref 5–40)
BASOPHILS ABSOLUTE: 0 THOU/MM3 (ref 0–0.1)
BASOPHILS NFR BLD AUTO: 0.7 %
BILIRUB SERPL-MCNC: 0.3 MG/DL (ref 0.3–1.2)
BUN SERPL-MCNC: 21 MG/DL (ref 7–22)
CALCIUM SERPL-MCNC: 9.1 MG/DL (ref 8.5–10.5)
CHLORIDE SERPL-SCNC: 109 MEQ/L (ref 98–111)
CO2 SERPL-SCNC: 21 MEQ/L (ref 23–33)
CREAT SERPL-MCNC: 0.6 MG/DL (ref 0.4–1.2)
DEPRECATED RDW RBC AUTO: 44.2 FL (ref 35–45)
EOSINOPHIL NFR BLD AUTO: 4.7 %
EOSINOPHILS ABSOLUTE: 0.3 THOU/MM3 (ref 0–0.4)
ERYTHROCYTE [DISTWIDTH] IN BLOOD BY AUTOMATED COUNT: 12.9 % (ref 11.5–14.5)
GFR SERPL CREATININE-BSD FRML MDRD: > 60 ML/MIN/1.73M2
GLUCOSE SERPL-MCNC: 101 MG/DL (ref 70–108)
HCT VFR BLD AUTO: 33.1 % (ref 37–47)
HGB BLD-MCNC: 11.5 GM/DL (ref 12–16)
IMM GRANULOCYTES # BLD AUTO: 0.01 THOU/MM3 (ref 0–0.07)
IMM GRANULOCYTES NFR BLD AUTO: 0.2 %
LYMPHOCYTES ABSOLUTE: 2 THOU/MM3 (ref 1–4.8)
LYMPHOCYTES NFR BLD AUTO: 36.5 %
MCH RBC QN AUTO: 32.7 PG (ref 26–33)
MCHC RBC AUTO-ENTMCNC: 34.7 GM/DL (ref 32.2–35.5)
MCV RBC AUTO: 94 FL (ref 81–99)
MONOCYTES ABSOLUTE: 0.5 THOU/MM3 (ref 0.4–1.3)
MONOCYTES NFR BLD AUTO: 8.8 %
NEUTROPHILS NFR BLD AUTO: 49.1 %
NRBC BLD AUTO-RTO: 0 /100 WBC
PLATELET # BLD AUTO: 199 THOU/MM3 (ref 130–400)
PMV BLD AUTO: 10.3 FL (ref 9.4–12.4)
POTASSIUM SERPL-SCNC: 4.2 MEQ/L (ref 3.5–5.2)
PREALB SERPL-MCNC: 21.2 MG/DL (ref 20–40)
PROT SERPL-MCNC: 6.4 G/DL (ref 6.1–8)
RBC # BLD AUTO: 3.52 MILL/MM3 (ref 4.2–5.4)
SEGMENTED NEUTROPHILS ABSOLUTE COUNT: 2.7 THOU/MM3 (ref 1.8–7.7)
SODIUM SERPL-SCNC: 144 MEQ/L (ref 135–145)
WBC # BLD AUTO: 5.4 THOU/MM3 (ref 4.8–10.8)

## 2023-02-10 PROCEDURE — 80053 COMPREHEN METABOLIC PANEL: CPT

## 2023-02-10 PROCEDURE — 6370000000 HC RX 637 (ALT 250 FOR IP): Performed by: INTERNAL MEDICINE

## 2023-02-10 PROCEDURE — 97162 PT EVAL MOD COMPLEX 30 MIN: CPT

## 2023-02-10 PROCEDURE — 97129 THER IVNTJ 1ST 15 MIN: CPT

## 2023-02-10 PROCEDURE — 85025 COMPLETE CBC W/AUTO DIFF WBC: CPT

## 2023-02-10 PROCEDURE — 36415 COLL VENOUS BLD VENIPUNCTURE: CPT

## 2023-02-10 PROCEDURE — 99232 SBSQ HOSP IP/OBS MODERATE 35: CPT | Performed by: PHYSICAL MEDICINE & REHABILITATION

## 2023-02-10 PROCEDURE — 92523 SPEECH SOUND LANG COMPREHEN: CPT

## 2023-02-10 PROCEDURE — 1180000000 HC REHAB R&B

## 2023-02-10 PROCEDURE — 84134 ASSAY OF PREALBUMIN: CPT

## 2023-02-10 PROCEDURE — 97535 SELF CARE MNGMENT TRAINING: CPT

## 2023-02-10 PROCEDURE — 97130 THER IVNTJ EA ADDL 15 MIN: CPT

## 2023-02-10 PROCEDURE — 6370000000 HC RX 637 (ALT 250 FOR IP): Performed by: PHYSICAL MEDICINE & REHABILITATION

## 2023-02-10 PROCEDURE — 97166 OT EVAL MOD COMPLEX 45 MIN: CPT

## 2023-02-10 PROCEDURE — 97112 NEUROMUSCULAR REEDUCATION: CPT

## 2023-02-10 PROCEDURE — 97116 GAIT TRAINING THERAPY: CPT

## 2023-02-10 RX ORDER — CLONAZEPAM 0.5 MG/1
1 TABLET ORAL NIGHTLY
Status: DISCONTINUED | OUTPATIENT
Start: 2023-02-10 | End: 2023-02-18 | Stop reason: HOSPADM

## 2023-02-10 RX ADMIN — ASPIRIN 81 MG: 81 TABLET, COATED ORAL at 08:58

## 2023-02-10 RX ADMIN — Medication 6 MG: at 20:53

## 2023-02-10 RX ADMIN — METOPROLOL SUCCINATE 25 MG: 25 TABLET, EXTENDED RELEASE ORAL at 08:58

## 2023-02-10 RX ADMIN — DOCUSATE SODIUM 100 MG: 100 CAPSULE, LIQUID FILLED ORAL at 08:59

## 2023-02-10 RX ADMIN — FLUTICASONE PROPIONATE 1 SPRAY: 50 SPRAY, METERED NASAL at 09:00

## 2023-02-10 RX ADMIN — ROSUVASTATIN CALCIUM 40 MG: 20 TABLET, FILM COATED ORAL at 20:53

## 2023-02-10 RX ADMIN — RIVAROXABAN 20 MG: 20 TABLET, FILM COATED ORAL at 17:49

## 2023-02-10 RX ADMIN — DOCUSATE SODIUM 100 MG: 100 CAPSULE, LIQUID FILLED ORAL at 20:54

## 2023-02-10 RX ADMIN — TRAZODONE HYDROCHLORIDE 50 MG: 50 TABLET ORAL at 20:54

## 2023-02-10 RX ADMIN — Medication 1 TABLET: at 08:58

## 2023-02-10 RX ADMIN — CLONAZEPAM 1 MG: 0.5 TABLET ORAL at 20:53

## 2023-02-10 RX ADMIN — Medication 500 UNITS: at 08:58

## 2023-02-10 ASSESSMENT — ENCOUNTER SYMPTOMS
WHEEZING: 0
ABDOMINAL PAIN: 0
SORE THROAT: 0
CONSTIPATION: 0
RHINORRHEA: 0
BACK PAIN: 0
SHORTNESS OF BREATH: 0
TROUBLE SWALLOWING: 0
VOMITING: 0
NAUSEA: 0
DIARRHEA: 0
COUGH: 0

## 2023-02-10 ASSESSMENT — PAIN SCALES - GENERAL: PAINLEVEL_OUTOF10: 0

## 2023-02-10 NOTE — PROGRESS NOTES
[facility]  Speech - Language - Cognitive Evaluation    SLP Individual Minutes  Time In: 2886  Time Out: 2146  Minutes: 30  Timed Code Treatment Minutes: 30 Minutes       Date: 2/10/2023  Patient Name: Lilia Asencio      CSN: [id number]   : 1935  (81 y.o.)  Gender: female   Referring Physician:  Zeynep Curiel MD  Diagnosis: Esophageal Dysphagia  Precautions: Fall risk  History of Present Illness/Injury:   Patient admitted to [facility] with above diagnosis; please see physician H&P for full report. Per chart review, \"Summer Pendleton is a 81 y.o. 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 The patient says she began feeling something stuck in her throat last night, 2023. Today, 2023, while she was walking in kitchen to try to get something to help him the throat discomfort sensation, she suddenly fell for no reason. She denies having weakness, pain, syncope or fainting prior to the fall. She struggled to get up but fell again after few step into her dining room. At that time she felt the need for bowel movement so she crawled to bathroom and had diarrhea like bowel movement. She did not call her daughter who called 911. The patient was sent to [address] [place] [address] for evaluation on . She was found to have tenderness at the her left hip area. She also complains of feeling dizzy with room spinning sensation when she says suddenly sitting up from supine position. Her blood tests and urine test showed no significant abnormality. X-ray of left hip and chest revealed no acute abnormality.   CT of the head without contrast done on 2023 showed small lacunar infarction at the right insula. CT of cervical spine done on 2023 was reported to show no acute process. CTA of head and neck done on 2023 show no significant hemodynamic stenosis in bilateral common and internal carotid arteries, and hypoplastic P1 segment of the right posterior cerebral artery. MRI of brain was also performed on 2023 and revealed a small acute right posterior frontal/anterior parietal cortex infarct stroke, and mild white matter atrophy. Neurology consultation was requested. The patient was started on aspirin 81 mg.\"    ST consulted to re-evaluate cognitive function with implementation of POC. Past Medical History:   Diagnosis Date    Anxiety     Glaucoma, bilateral     Diagnosed in     Osteopenia     Osteoporosis     Diagnosed in        Pain: No pain reported. Subjective:  Upon SLP arrival, patient seen sitting upright in recliner. Agreeable and cooperative to ST evaluation. No family present. 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 Hobbies: Singing, gardening, attending [employer]  Vision Status: Glasses  Hearing: WFL  Type of Home: House  Home Layout: One level, Laundry in basement  Home Access: Stairs to enter with rails  Entrance Stairs - Number of Steps: 4  Entrance Stairs - Rails: Right  Home Equipment: Walker, rolling, Cane, Rollator, [place] / VOICE:  Speech and Voice appear to be grossly intact for basic and complex daily communication    LANGUAGE:  Receptive:  Receptive language skills appear to be grossly intact for basic and complex daily communication.     Expressive:  Expressive language skills appear to be grossly intact for basic and complex daily communication. COGNITION:  Patient completed the [facility] Cognitive Assessment [facility]) version 7.2. Patient scored 26/30; normal is greater than or equal to 26/30. Orientation: 5  Immediate Recall: T1: 3/5 indep, T2: 5/5 indep  Short-Term Recall: /5 indep, /5 with MC cue  Divergent Naming: 15 words in 60 seconds; target is 11 words in 60 seconds  Reasonin/2 indep  Sequencin/1 indep  Thought Organization: Impaired  Insight: Poor     SWALLOWING:  Current Diet: Regular diet with thin liquids. WFL    Comprehension: 4 - Patient understands basic needs 75-90%+ of the time  Expression: 4 - Expresses basic ideas/needs 75-90%+ of the time  Social Interaction: 3 - Patient appropriate  50%-74% of the time  Problem Solving: 3 - Patient solves simple/routine tasks 50%-74%  Memory: 3 - Patient remembers 50%-74% of the time    RECOMMENDATIONS/ASSESSMENT:  DIAGNOSTIC IMPRESSIONS: Patient presents with [facility] cognitive processing evidenced by a 26/30 on the [address] [place] [address]. However, patient demonstrating deficits in executive functioning skills such as math computation/reasoning, functional recall, and thought organization. Slowed processing noted throughout evaluation. Additionally patient with tangential thoughts, requiring re-direction back to task. Patient also demonstrating poor insight to deficits, denying difficulties with cognition. Speech and voice appear to be [facility]. No presence of dysarthria, dysphonia, or aphonia. Expressive/receptive languages skills WFL. Patient currently on regular diet with thin liquids. Patient stating swallowing as an increased difficulty since stroke however not causing concerns. Skilled ST services are recommended to address the above deficits listed to improve potential return to OF. Anticipate need for supervision with ADL/IADLs. Driving evaluation to be completed upon discharge.    Rehabilitation Potential: excellent  Discharge Recommendations: Continue to Assess Pending Progress    EDUCATION:  Learner: Patient  Education:  Reviewed results and recommendations of this evaluation, Reviewed ST goals and Plan of Care, Education Related to [employer] and Wellness, and Home Safety Education  Evaluation of Education: Verbalizes understanding and Family not present    PLAN:  Skilled SLP intervention on IP Rehab 60 minutes per day, 5 days per week. Specific interventions for next session may include: finances, medication management, thought organization    PATIENT GOAL:    Return to prior level of function. SHORT TERM GOALS:  Short Term Goals  Time Frame for Short Term Goals: 2 weeks  Goal 1: Patient will complete verbal/visual reasoning and thought organization/workin memory tasks (i.e., calendar, scheduling, etc.) with 80% accuracy and moderate cuing in order to allow for safe return to completion of ADLs/IADLs. Goal 2: Patient will complete problem solving and executive functioning tasks (i.e., medications, finances, etc.) with 80% accuracy and moderate cuing in order to improve completion of ADLs/IADLs. Goal 3: Patient will complete immediate and delayed recall tasks with 80% accuracy and moderate cuing in order to improve retention of novel information. Goal 4: Patient will complete mildly complex sustained, selective, alternating, and divided attention tasks with no more than three errors/redirections in a five minute/one task in order to allow for safe return to driving and PLOF. Goal 5: Patient to complete CSE due to patient report with increased swallowing difficulties post CVA.     LONG TERM GOALS:  Long Term Goals  Time Frame for Long Term Goals: 1 week  Goal 1: Patient will improve cognitive-linguistic skills to a min assist or greater in order to make safe return home with least amount of supervision/assistance     James Rodas., Student Speech Intern

## 2023-02-10 NOTE — PROGRESS NOTES
Pt is an 81y.o. female, sitting in easychair visiting with her son and her daughter-whom arrived shortly thereafter, in 7E-72.  visited due to a Consult entered, re: AD's. Please consult ACP note for additional context.      02/09/23 2120   Encounter Summary   Encounter Overview/Reason  Advance Care Planning   Service Provided For: Patient and family together   Referral/Consult From: Multi-disciplinary team   Support System Children;Family members   Last Encounter  02/09/23   Complexity of Encounter Low   Begin Time 1903   End Time  1918   Total Time Calculated 15 min   Advance Care Planning   Type ACP conversation

## 2023-02-10 NOTE — FLOWSHEET NOTE
02/10/23 1449   Treatment Team Notification   Reason for Communication Evaluate  (Psychology Consultto rule out reactive depression ( passed away in Nov. 2022 ; to assess coping skill with new disability due to CVA)   Team Member Name Dr. Gavino Lemus Provider   Method of Communication Secure Message   Response Waiting for response

## 2023-02-10 NOTE — PLAN OF CARE
Individualized Plan of [facility] Inpatient Rehabilitation Unit    Rehabilitation physician: Dr. Xochitl Ramos Date: 2/9/2023     Rehabilitation Diagnosis: Vertigo as late effect of stroke [I69.398, R42]      Rehabilitation impairments: self care, motor dysfunction, safety, and cognitive function    Factors facilitating achievement of predicted outcomes: Family support, Motivated, Cooperative, and Pleasant  Barriers to the achievement of predicted outcomes: Limited family support, Cognitive deficit, Limited insight into deficits, Unrealistic expectations, Decreased endurance, Decreased proprioception, Stairs at home, and Skin Care    Patient Goals: Improve independence with mobility, Improvement of mobility at a wheelchair level, Increase overall strength and endurance, Increase balance, Increase endurance, Increase independence with activities of daily living, Improve cognition, Increase self-awareness, Increase safety awareness, Increase community integration, Increase socialization, Functional communication with caregivers, Integrate appropriate pain management plan, Assure adequate nutritional option for discharge, Continence of bowel and bladder, and Provide appropriate patient and family education      NURSING:  Nursing goals for Sonam Coates while on the rehabilitation unit will include:  Continence of bowel and bladder, Adequate number of bowel movements, Urinate with no urinary retention >300ml in bladder, Maintain O2 SATs at an acceptable level during stay, Effective pain management while on the rehabilitation unit, Establish adequate pain control plan for discharge, Absence of skin breakdown while on the rehabilitation unit, Improved skin integrity via assessments including wound measurements, Avoidance of any hospital acquired infections, No signs/symptoms of infection at the wound site, Freedom from injury during hospitalization, and Complete education with patient/family with understanding demonstrated regarding disease process and resultant impairment     In order to achieve these goals, nursing interventions may include bowel/bladder training, education for medical assistive devices, medication education, O2 saturation management, energy conservation, stress management techniques, fall prevention, alarms protocol, seating and positioning, skin/wound care, pressure relief instruction, dressing changes, infection protection, DVT prophylaxis, assistance with safe transfers , and/or assistance with bathroom activities and hygiene. PHYSICAL THERAPY:  Goals:        Short Term Goals  Time Frame for Short Term Goals: 1 week  Short Term Goal 1: Pt to transfer supine <--> sit mod I to enable pt to get in/out of bed. Short Term Goal 2: Pt to transfer sit <--> stand mod I from various height surfaces for increased functional mobility. Short Term Goal 3: Pt to ambulate >100 feet without AD SBA for household ambulation. Short Term Goal 4: Pt to perform car transfer mod I to enable pt to get in/out of the car. Long Term Goals  Time Frame for Long Term Goals : 2 weeks  Long Term Goal 1: Pt to ascend/descend 15 steps while carrying laundry basket to enable pt to safely perform laundry tasks at home  Long Term Goal 2: Pt to ambulate >500 feet without AD mod I for community re-entry. Long Term Goal 3: Pt to score >41/56 on the Adam balance test to indicate low fall risk. Long Term Goal 4: Pt to perform TUG test in <11 seconds to indicate low fall risk and improved gait speed. Plan of Care: Patient to be seen by physical therapy services  (5x/wk 60 mins)       Anticipated interventions may include therapeutic exercises, gait training, neuromuscular re-ed, transfer training, community reintegration, bed mobility, w/c mobility and training.       OCCUPATIONAL THERAPY:  Goals:             Short Term Goals  Time Frame for Short Term Goals: 1 week  Short Term Goal 1: Pt will complete tub/ shower transfers with SBA to improve indep with showering at home. Short Term Goal 2: Pt will complete dressing tasks with SBA and 0 vcs for orientation of clothing to improve indep with self care. Short Term Goal 3: Pt will plan and sequence BADL routine with min vcs for thought organization to improve indep wtih self care routine at home. Short Term Goal 4: Pt will plan and execute a simple meal prep task with SBA and min vcs for problem solving/ safety to improve indep with preparing meals for herself. Short Term Goal 5: Pt will complete multiple step IADL tasks with SBA for balance and min vcs for recall to improve indep with getting groceries at home. Long Term Goals  Time Frame for Long Term Goals : 2 weeks  Long Term Goal 1: Pt will complete BADL routine with modified independence and 0 vcs for safety to improve indep with self care. Long Term Goal 2: Pt will complete a light IADL task with supervision to improve indep and safety within her home. Long Term Goal 3: Pt will demonstrate 10 min of dynamic standing balance with mod I to improve indep with showering and standing IADL tasks. Plan of Care: Patient to be seen by occupational therapy services 5x/wk for 90 min     Anticipated interventions may include ADL and IADL retraining, strengthening, safety education and training, patient/caregiver education and training, equipment evaluation/ training/procurement, neuromuscular reeducation, wheelchair mobility training. SPEECH THERAPY:   Short Term Goals  Time Frame for Short Term Goals: 2 weeks  Goal 1: Patient will complete verbal/visual reasoning and thought organization/workin memory tasks (i.e., calendar, scheduling, etc.) with 80% accuracy and moderate cuing in order to allow for safe return to completion of ADLs/IADLs.   Goal 2: Patient will complete problem solving and executive functioning tasks (i.e., medications, finances, etc.) with 80% accuracy and moderate cuing in order to improve completion of ADLs/IADLs. Goal 3: Patient will complete immediate and delayed recall tasks with 80% accuracy and moderate cuing in order to improve retention of novel information. Goal 4: Patient will complete mildly complex sustained, selective, alternating, and divided attention tasks with no more than three errors/redirections in a five minute/one task in order to allow for safe return to driving and PLOF. Goal 5: Patient to complete CSE due to patient report with increased swallowing difficulties post CVA. Plan of Care: Pt to be seen by speech therapy services 60 minutes 5 times per week. Anticipated interventions may include speech/language/communication therapy, cognitive training, group therapy, education, and/or dysphagia therapy based on the above goals. CASE MANAGEMENT:  Goals:   Assist patient/family with discharge planning, patient/family counseling,  and coordination with insurance during the inpatient rehabilitation stay. Other members of the multidisciplinary rehabilitation team that will be involved in the patient's plan of care include recreational therapy, dietary, respiratory therapy, and neuropsychology. Medical issues being managed closely and that require 24 hour availability of a physician:  Bowel/Bladder function, Pain management, Infection protection, DVT prophylaxis, Fall precautions, Fluid/Electrolyte balance, Nutritional status, and Anemia                                           Physician anticipated functional outcomes: Improved independence with functional measures   Estimated length of stay for this admission : 10~14 days  Medical Prognosis: Good  Anticipated disposition: Home. The potential to achieve the above medical and rehabilitative goals is very good.     This plan of care has been developed with the assistance and input of the multidisciplinary rehabilitation team.  The plan was reviewed with the patient. The patient has had the opportunity to provide input to the therapy team.    I have reviewed this Individualized Plan of Care and agree with its contents. Above documentation has been expanded, modified, adjusted to reflect the findings of my evaluations and goals for the patient.     Physician:  Merline Du MD

## 2023-02-10 NOTE — PROGRESS NOTES
[address]  EVALUATION    Time:    Time In: 0730  Time Out: 0830  Timed Code Treatment Minutes: 40 Minutes  Minutes: 60          Date: 2/10/2023  Patient Name: Tang Zaidi,   Gender: female      MRN: [id number]  : 1935  (81 y.o.)  Referring Practitioner: Irving Rubin MD  Diagnosis: Vertigo as late effect of stroke  Additional Pertinent Hx: Tang Zaidi is a 81 y.o. 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 MRI of brain was also performed on 2023 and revealed a small acute right posterior frontal/anterior parietal cortex infarct stroke, and mild white matter atrophy. Restrictions/Precautions:  Restrictions/Precautions: Fall Risk, General Precautions    Subjective  Chart Reviewed: Yes, Orders, Progress Notes, Previous Admission, History and Physical  Patient assessed for rehabilitation services?: Yes    Subjective: Pt supine in bed, agreeable to OT. Reassurance and repetition of instructions for OT eval process given throughout session.      Pain: 0 /10:      Vitals: Vitals not assessed     Social/Functional History:  Lives With: Alone  Type of Home: House  Home Layout: One level, Laundry in basement  Home Equipment: Walker, rolling, Cane, Rollator, [employer]   Bathroom Shower/Tub: Tub/Shower unit  [employer]: Standard  Bathroom Equipment: Tub transfer bench, Grab bars in shower, Toilet raiser  Bathroom Accessibility: Accessible    Receives Help From: Family (check in on patient)  ADL Assistance: [address] [place] [address] Avenue: Independent  Ambulation Assistance: Independent  Transfer Assistance: Independent    Active : Yes  Occupation: Retired  Additional Comments: Patient was independent with self care, driving, getting her own groceries, managing finances without AD. Pt has 3 children that can assist as needed. VISION: Hx of Glaucoma,  wears glasses      HEARING:  WFL    COGNITION: Slow Processing, Decreased Insight, Impaired Memory, Impulsive, and decreased divided attention,  and decreased thought organization     RANGE OF MOTION:  Bilateral Upper Extremity:  WFL    STRENGTH:  Bilateral Upper Extremity:  WFL    SENSATION:   WFL    ADL:   EATING:Setup or clean-up assistance. Wanda Mak CARE Score: 5. ORAL HYGIENE:Supervision or touching assistance. SBA standing at sink. CARE Score: 4. TOILETING HYGIENE:Supervision or touching assistance. SBA. CARE Score: 4. SHOWERING/BATHING:Supervision or touching assistance. SBA in shower, completed half in sitting and half standing. Pt used grab bars and hand held shower wiht increased time to recall placement of hand held shower on stand. Pt demonstrated impaired thought organization and divided attention throughout shower. \"I can't be talking now, I need to focus on what body parts I have washed or not. \". CARE Score: 4.     UPPER BODY DRESSING:Partial/moderate assistance. Min A to fasten bra,   Min A to don long sleeve shirt with min A to re-orient her shirt. CARE Score: 3. LOWER BODY DRESSING:Partial/moderate assistance. Wanda Mak CARE Score: 3. FOOTWEAR:Supervision or touching assistance   . CARE Score: 4. TOILET TRANSFER: Supervision or touching assistance. SBA. CARE Score: 4. SHOWER TRANSFER: stand by assistance     BALANCE:  Sitting Balance:  Supervision. Standing Balance: Contact Guard Assistance.       BED MOBILITY:  Supine to Sit: Stand By Assistance      TRANSFERS:  Sit to Stand:  [employer], with increased time for completion, impulsive  .    Stand to Sit: Contact Guard Assistance.      FUNCTIONAL MOBILITY:  Assistive Device: None  Assist Level:  Contact Guard Assistance.   Distance: To and from bathroom and To and from shower room  , min unsteadiness,  pt expressed uneasiness while ambulating through busy environment     Activity Tolerance:  Patient tolerance of  treatment: good.        Assessment:  Pt presents to occupational therapy following CVA of small acute right posterior frontal/anterior parietal cortex infarct stroke, and mild white matter atrophy with patient complaints of dizziness, decreased balance, impaired thought organization, divided attention and functional cognition skills impacting patient's ability to complete self care at prior level of functioning. Pt completes UB dressing routine with min A, LB dressing routine with SBA,  cues to intiate tasks and organize her thoughts.  Pt demonstrates difficulty with divided attention tasks.  Previous to her stroke, she was indep with all self care, IADLs and driving tasks while home alone.  Due to patient's performance deficits, patient would greatly benefit from continued occupational therapy for ADL remediation, endurance building, strengthening and neuro re-education to return to prior level of functioning and safe return to home environment.   Performance deficits / Impairments: Decreased functional mobility , Decreased ADL status, Decreased strength, Decreased safe awareness, Decreased balance, Decreased endurance, Decreased high-level IADLs  Prognosis: Good  Decision Making: Medium Complexity    Treatment Initiated: Treatment and education initiated within context of evaluation.  Evaluation time included review of current medical information, gathering information related to past medical, social and functional history, completion of standardized testing, formal and informal observation of tasks, assessment of data and development of plan of care and goals.  Treatment time  included skilled education and facilitation of tasks to increase safety and independence with ADL's for improved functional independence and quality of life. Discharge Recommendations:  Continue to assess pending progress    Patient Education:  Patient Education  Education Given To: Patient  Education Provided: Transfer Training, Role of Therapy, Plan of Care, Fall Prevention Strategies, Precautions, ADL Adaptive Strategies, Orientation  Education Method: Demonstration, Verbal  Barriers to Learning: Cognition  Education Outcome: Verbalized understanding, Continued education needed    Equipment Recommendations: Other: Pt has a tub tranfer bench, toilet raiser, and grab bars. No further AE recommended. Plan:  Times Per Week: 5x/wk for 90 min  Current Treatment Recommendations: Strengthening, Balance training, Functional mobility training, Endurance training, Safety education & training, Neuromuscular re-education, Patient/Caregiver education & training, Self-Care / ADL, Coordination training, Equipment evaluation, education, & procurement, Cognitive/Perceptual training. See long-term goal time frame for expected duration of plan of care. If no long-term goals established, a short length of stay is anticipated. Goals:  Patient goals : \"be able to walk safely\"; return home at [facility]  Short Term Goals  Time Frame for Short Term Goals: 1 week  Short Term Goal 1: Pt will complete tub/ shower transfers with SBA to improve indep with showering at home. Short Term Goal 2: Pt will complete dressing tasks with SBA and 0 vcs for orientation of clothing to improve indep with self care. Short Term Goal 3: Pt will plan and sequence BADL routine with min vcs for thought organization to improve indep wtih self care routine at home. Short Term Goal 4: Pt will plan and execute a simple meal prep task with SBA and min vcs for problem solving/ safety to improve indep with preparing meals for herself.   Short Term Goal 5: Pt will complete multiple step IADL tasks with SBA for balance and min vcs for recall to improve indep with getting groceries at home. Long Term Goals  Time Frame for Long Term Goals : 2 weeks  Long Term Goal 1: Pt will complete BADL routine with modified independence and 0 vcs for safety to improve indep with self care. Long Term Goal 2: Pt will complete a light IADL task with supervision to improve indep and safety within her home. Long Term Goal 3: Pt will demonstrate 10 min of dynamic standing balance with mod I to improve indep with showering and standing IADL tasks. Following session, patient left in safe position with all fall risk precautions in place.

## 2023-02-10 NOTE — PROGRESS NOTES
[address]  Occupational Therapy  Daily Note  Time:   Time In: 2561  Time Out: 1501  Timed Code Treatment Minutes: 30 Minutes  Minutes: 30    Date: 2/10/2023  Patient Name: Katia Frye,   Gender: female      Room: [facility]/072-A  MRN: [id number]  : 1935  (81 y.o.)  Referring Practitioner: Heidy Cabrera MD  Diagnosis: Vertigo as late effect of stroke  Additional Pertinent Hx: Katia Frye is a 81 y.o. 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 MRI of brain was also performed on 2023 and revealed a small acute right posterior frontal/anterior parietal cortex infarct stroke, and mild white matter atrophy. Restrictions/Precautions:  Restrictions/Precautions: Fall Risk, General Precautions     SUBJECTIVE: Patient pleasant and cooperative agreeable to OT. PAIN: Denies pain     Vitals: Vitals not assessed per clinical judgement, see nursing flowsheet    COGNITION: Decreased Safety Awareness and Impaired Attention    ADL:   No ADL's completed this session. BALANCE:  Sitting Balance:  Independent. Standing Balance: Stand By Assistance. BED MOBILITY:  Not Tested    TRANSFERS:  Sit to Stand:  Stand By Assistance. Recliner, standard chair   Stand to Sit: Stand By Assistance. FUNCTIONAL MOBILITY:  Assistive Device: None  Assist Level:  Stand By Assistance. Distance:  To and from therapy apartment  Slow pace, min distractible, min unsteadiness      ADDITIONAL ACTIVITIES:  [employer] fabricated unsafe and hazardous situations in kitchen this date to facilitate kitchen safety awareness and problem solving skills needed for IADLs; situations were graded ranging from obvious hazards to more discrete to challenge safety awareness. Pt was able to accurately identify 6/7 hazards, requiring mod cue'ing for 1/7 but required mod cue'ing for all hazards to explain in detail, as pt would often state, \"I don't know, that wouldn't happen at home\". Pt completed graded sequencing cards demo'ing 100% accuracy with min increased time. Pt did second guess self on virous details, however when talked details out loud, pt demo appropriate insight into sustaining attention and no cue'ing required. ASSESSMENT:  Assessment: Pt presents to occupational therapy following CVA of small acute right posterior frontal/anterior parietal cortex infarct stroke, and mild white matter atrophy with patient complaints of dizziness, decreased balance, impaired thought organization, divided attention and functional cognition skills impacting patient's ability to complete self care at prior level of functioning. Pt completes UB dressing routine with min A, LB dressing routine with SBA,  cues to intiate tasks and organize her thoughts. Pt demonstrates difficulty with divided attention tasks. Previous to her stroke, she was indep with all self care, IADLs and driving tasks while home alone. Due to patient's performance deficits, patient would greatly benefit from continued occupational therapy for ADL remediation, endurance building, strengthening and neuro re-education to return to prior level of functioning and safe return to home environment. Activity Tolerance:  Patient tolerance of  treatment: good. Discharge Recommendations: Continue to assess pending progress  Equipment Recommendations: Other: Pt has a tub tranfer bench, toilet raiser, and grab bars. No further AE recommended.   Plan: Times Per Week: 5x/wk for 90 min  Current Treatment Recommendations: Strengthening, Balance training, Functional mobility training, Endurance training, Safety education & training, Neuromuscular re-education, Patient/Caregiver education & training, Self-Care / ADL, Coordination training, Equipment evaluation, education, & procurement, Cognitive/Perceptual training    Patient Education  Patient Education: IADL's    Goals  Short Term Goals  Time Frame for Short Term Goals: 1 week  Short Term Goal 1: Pt will complete tub/ shower transfers with SBA to improve indep with showering at home. Short Term Goal 2: Pt will complete dressing tasks with SBA and 0 vcs for orientation of clothing to improve indep with self care. Short Term Goal 3: Pt will plan and sequence BADL routine with min vcs for thought organization to improve indep wtih self care routine at home. Short Term Goal 4: Pt will plan and execute a simple meal prep task with SBA and min vcs for problem solving/ safety to improve indep with preparing meals for herself. Short Term Goal 5: Pt will complete multiple step IADL tasks with SBA for balance and min vcs for recall to improve indep with getting groceries at home. Long Term Goals  Time Frame for Long Term Goals : 2 weeks  Long Term Goal 1: Pt will complete BADL routine with modified independence and 0 vcs for safety to improve indep with self care. Long Term Goal 2: Pt will complete a light IADL task with supervision to improve indep and safety within her home. Long Term Goal 3: Pt will demonstrate 10 min of dynamic standing balance with mod I to improve indep with showering and standing IADL tasks. Following session, patient left in safe position with all fall risk precautions in place.

## 2023-02-10 NOTE — PROGRESS NOTES
[address] [address]  Acute Inpatient Rehab Preadmission Assessment     Patient Name: Tristan Douglas        Ethnicity:Not of , [address], or Frisian origin  Race:White  MRN:   [id number]    : 1935  (81 y.o.)  Gender: female      Admitted from:[address]  Initial Assessment     Date of admission to the hospital: 2023 11:30 AM  Date patient eligible for admission:2023     Primary Diagnosis: CVA      Did patient have surgery?  no     Physicians: Lucila Mayo MD, Dr. Danya Watt, Dr. Jeanetta Pallas, Dr. Chico Urena for clinical complications/co-morbidities:   Past Medical History        Past Medical History:   Diagnosis Date    Anxiety      Glaucoma, bilateral       Diagnosed in     Osteopenia      Osteoporosis       Diagnosed in             Financial Information  Primary insurance:  [employer]     Secondary Insurance:  None     Has the patient had two or more falls in the past year or any fall with injury in the past year? yes     Did the patient have major surgery during the 100 days prior to admission? no     Precautions:   Restrictions/Precautions: Fall Risk, General Precautions       Isolation Precautions: None                  Physiatrist: Dr. Danya Watt     Patients Occupation: Retired  Reviewed Lab and Diagnostic reports from Current Admission: Yes     Patients Prior Functional  Level: Prior Function  Receives Help From: Family (check in on patient)  ADL Assistance: Independent  Homemaking Assistance: Independent  Ambulation Assistance: Independent  Transfer Assistance: Independent  Additional Comments: patient used no AD prior to admit. Pt has 3 kids that can assist as needed     Current functional status for upper extremity ADLs: Minimal assistance upper extremity ADL. Current functional status for lower extremity ADLs: Minimal assistance for lower body ADLs.      Current functional status for bed, chair, wheelchair transfers: Sit to Stand: Stand By Assistance, X 1  Stand to Sit:Stand By Assistance, X 1     Current functional status for toilet transfers:  Stand by assistance for toilet transfers.       Current functional status for locomotion: Contact Guard Assistance, X 1  Distance: 40 feet  Surface: Level Tile  Device:No Device  Gait Deviations:  Slow Susu, Decreased Step Length Bilaterally, Decreased Weight Shift Bilaterally, Decreased Trunk Rotation, Decreased Gait Speed, Decreased Heel Strike Bilaterally, and Mild Path Deviations  **Verbal cues for increased step length, wider FRANKIE as pt's shoes rubbing together at times during swing phase     Current functional status for bladder management: Complete independence     Current functional status for bowel management:Moderate assistance     Current functional status for comprehension: Minimal contact assistance     Current functional status for expression: Minimal contact assistance     Current functional status for social interaction: Minimal contact assistance     Current functional status for problem solving: Minimal contact assistance     Current functional status for memory: Minimal contact assistance     Expected level of Improvement in Self-Care:  Modified independence     Expected level of Improvement in Sphincter Control:  Modified independence     Expected level of Improvement in Transfers: Modified independence     Expected level of Improvement in Locomotion:  Modified independence     Expected level of Improvement in Communication and Social Cognition: Modified independence     Expected length of time to achieve that level of improvement: 2 weeks     Current rehab issues: ADL dysfunction,bladder management, bowel management,carry over of therapy techniques, discharge planning, disease and co-morbidity management, gait/mobility dysfunction, medication management, nutrition and hydration management, Ongoing assessment of safety, Pain management, Patient and family education, Prevention of secondary complications, Skin Integrity, cognitive impairment, communication impairment. Required therapy: Physical Therapy, Occupational Therapy and Speech Therapy 3 hours per day, 5-6 days per week. Recreational Therapy 1 hour per week. Expected Discharge Destination: Home     Expected Post Discharge Treatments: Out Patient     Other information relevant to the care needs:   Lives With: Alone  Type of Home: House  Home Layout: One level, Laundry in basement  Home Access: Stairs to enter with rails  Entrance Stairs - Number of Steps: 4  Bathroom Shower/Tub: Tub/Shower unit  Bathroom Toilet: Standard  Bathroom Equipment: Tub transfer bench, Grab bars in shower, Toilet raiser  Bathroom Accessibility: Accessible  Home Equipment: Indigoderohan Clarisa, rolling, [address] [facility], [address], [facility] [address], [facility] [address]  Has the patient had two or more falls in the past year or any fall with injury in the past year?: Yes  Receives Help From: Family (check in on patient)  ADL Assistance: Independent  Homemaking Assistance: Independent  Ambulation Assistance: Independent  Transfer Assistance: Independent  Active : Yes  Occupation: Retired  Additional Comments: patient used no AD prior to admit. Pt has 3 kids that can assist as needed     Acute Inpatient Rehabilitation Disclosure Statement provided to patient. Patient verbalized understanding. Patient requires intensive PT/OT/ST along with 24 hour nursing care and close physician supervision to manage patient many comorbidities, functional and cognitive status in order for patient to safely return to her home. I have reviewed and concur with the findings and results of the pre-admission screening assessment completed by the Inpatient Rehabilitation Admissions Coordinator.      Darin Plata MD                 Revision History  Date/Time User Provider Type Action   2/9/2023  8:06 AM José Villavicencio MD Physician Sign   2/9/2023  7:54 AM Israel Ledezma RN Registered Nurse Sign    View Details Report

## 2023-02-10 NOTE — PROGRESS NOTES
Comprehensive Nutrition Assessment    Type and Reason for Visit:  Initial, Positive Nutrition Screen, Consult (weight loss, poor po; Nutritional assessment)    Nutrition Recommendations/Plan:   Recommend diet as tolerated. Suggest continue MVI. Encouraged po, good nutrition at best efforts for healing. Will monitor need for additional nutrition interventions. Malnutrition Assessment:  Malnutrition Status: At risk for malnutrition (Comment) (02/10/23 1222)    Context:  Acute Illness     Findings of the 6 clinical characteristics of malnutrition:  Energy Intake:  Mild decrease in energy intake (Comment)  Weight Loss:  No significant weight loss     Body Fat Loss:  No significant body fat loss     Muscle Mass Loss:  No significant muscle mass loss (however difficult to evaluate w/ advanced age)    Fluid Accumulation:  Unable to assess     Strength:  Not Performed    Nutrition Assessment:      Pt. nutritionally compromised AEB decreased po intake on admission. At risk for further nutrition compromise r/t admit w/ CVA, sensation of food stuck in her throat, advanced age and underlying medical condition (hx osteoporosis). Nutrition Related Findings:      Wound Type: None     Pt. Report/Treatments/Miscellaneous: pt. Seen - reports eating \"too well\"; concerned she'll gain weight and feels she should cut back on amount of food she's ordering; discussed importance of good nutrition for healing and encouraged intake at best efforts  GI Status: BM 2/8  Pertinent Labs: 2/10: Glucose 101  Pertinent Meds: MVI, Vitamin D, Colace, Glycolax, Senokot      Current Nutrition Intake & Therapies:    Average Meal Intake: %     ADULT DIET; Regular;  Low Sodium (2 gm)    Anthropometric Measures:  Height: 4' 10\" (147.3 cm)  Ideal Body Weight (IBW): 90 lbs (41 kg)    Admission Body Weight: 123 lb 9.6 oz (56.1 kg) (2/9 no edema)  Current Body Weight: 123 lb 9.6 oz (56.1 kg) (2/9 no edema),       Current BMI (kg/m2): 25.8  Usual Body Weight:  (per pt. 118-120#; per EMR: 12/8/21: 130# (? actual vs stated); 2/6/23: 121# 7.6 oz)                       BMI Categories: Overweight (BMI 25.0-29. 9)    Estimated Daily Nutrient Needs:  Energy Requirements Based On: Kcal/kg  Weight Used for Energy Requirements: Other (Comment) (56)  Energy (kcal/day): 3768-8797 kcals (20-25)  Weight Used for Protein Requirements: Ideal (41)  Protein (g/day): 53+ grams (1.3+)       Nutrition Diagnosis:   Inadequate oral intake related to inadequate protein-energy intake as evidenced by poor intake prior to admission    Nutrition Interventions:   Food and/or Nutrient Delivery: Continue Current Diet  Nutrition Education/Counseling: Education initiated (2/10 Stressed importance of po, good nutrition at best efforts for healing.)  Coordination of Nutrition Care: Continue to monitor while inpatient       Goals:     Goals: PO intake 75% or greater, by next RD assessment       Nutrition Monitoring and Evaluation:      Food/Nutrient Intake Outcomes: Diet Advancement/Tolerance, Food and Nutrient Intake  Physical Signs/Symptoms Outcomes: Biochemical Data, Chewing or Swallowing, GI Status, Fluid Status or Edema, Nutrition Focused Physical Findings, Skin, Weight    Discharge Planning:     Too soon to determine     Sacha Barraza RD, LD  Contact: [phone]

## 2023-02-10 NOTE — PROGRESS NOTES
[facility]  Individualized Disclosure Statement      Patient: Lenin Nicole      Scope of Service  [facility] provides 24 hour individualized service to patients with functional limitations due to, but not limited to: stroke, brain injury, spinal cord injury, major multiple trauma, fractures, amputation, and neurological disorders. The [address] [place] [address] provides rehabilitative nursing and medical services as well as physical, occupational, speech, and recreation therapies. [address] [facility] is fully accredited by the Commission on Accreditation of Rehabilitation Facilities (CARF) as a comprehensive provider of rehabilitation services. Patients admitted to the [address] [place] [address] receive a minimum of three hours of therapy per day, at least six days per week, with a revised therapy schedule on weekends and holidays. Physical therapy, occupational therapy, and speech therapy are provided seven days per week including holidays. Other therapeutic services are available on weekends and evenings as needed or scheduled. Intensity of Treatment  Your treatment program will consist of Nursing Care and:  1.5 hours of Physical Therapy, per day  1.5 hours of Occupational Therapy, per day   30-60 minutes of Speech Therapy, per day  1 hour of Recreational Therapy, per week    [facility] maintains contracts with most insurance plans. Depending on the type of coverage, the insurance may impose limits on the coverage for rehabilitation care. Coverage is based on the premise that you are able to fully participate in the rehabilitation program and show continued progress. Please verify your own insurance information A copy of this was given to the patient/ family on this date.   Insurance Coverage  Your insurance company has made the following determination relative to the length of your stay:   Your estimated length of stay is 14 days   Your insurance Coverage has been verified as follows:    Primary Insurance: Payor: Enedina Sherwood /  /  /    Deductible: $150.00  Coverage: Active  Secondary Insurance:  None  secondary insurance policies often cover co-pay amounts, but to ensure payment please contact your insurance company.     Alternative Resources: Please ask the  for more information [phone]

## 2023-02-10 NOTE — PROGRESS NOTES
[address] [facility]  Recreational Therapy  Inpatient Rehabilitation Evaluation        Time Spent with Patient: 25 minutes    Date:  2/10/2023       Patient Name: Scheryl Seip      MRN: [id number]       YOB: 1935 (81 y.o.)       Gender: female  Diagnosis: Vertigo as late effect of stroke  Referring Practitioner: Stephen Henriquez MD    RESTRICTIONS/PRECAUTIONS:  Restrictions/Precautions: Fall Risk, General Precautions     Hearing: Within functional limits    PAIN: 0    SUBJECTIVE:  pt lives alone-her  just passed away last November-she has 3 children-2 close by and 1 in [place] also has 6 grandchildren -her brother is visiting this am and he is 80 yrs old     VISION:  Visual Impairment-states her vision is fuzzy-has glasses to read     HEARING: Within Normal Limit    LEISURE INTERESTS:   Pt states she likes to read and has devotions and the newspaper in her free time-she is visiting with her brother at this time and very pleasant and social-she is in hopes to be leaving early next week -has a supportive family-no leisure needs at this time-pt needs reminders not to get up by herself     BARRIERS TO LEISURE INTERESTS:    Decreased endurance, Impaired vision, and Limited insight into deficits        Patient Education  New Education Provided: Importance of Leisure, RT Plan of Care    Plan:  Continue to follow patient through this admission  See patient individually    Electronically signed by: JAYLA Seay  Date: 2/10/2023

## 2023-02-10 NOTE — PLAN OF CARE
Problem: Discharge Planning  Goal: Discharge to home or other facility with appropriate resources  Outcome: Progressing  Flowsheets (Taken 2/10/2023 0854)  Discharge to home or other facility with appropriate resources:   Identify barriers to discharge with patient and caregiver   Arrange for needed discharge resources and transportation as appropriate   Identify discharge learning needs (meds, wound care, etc)     Problem: Neurosensory - Adult  Goal: Achieves stable or improved neurological status  Outcome: Progressing  Flowsheets (Taken 2/10/2023 0854)  Achieves stable or improved neurological status:   Assess for and report changes in neurological status   Maintain blood pressure and fluid volume within ordered parameters to optimize cerebral perfusion and minimize risk of hemorrhage     Problem: Skin/Tissue Integrity - Adult  Goal: Skin integrity remains intact  Outcome: Progressing  Flowsheets  Taken 2/10/2023 1031 by Caryle Distel, RN  Skin Integrity Remains Intact:   Monitor for areas of redness and/or skin breakdown   Assess vascular access sites hourly  Taken 2/10/2023 0854 by Caryle Distel, RN  Skin Integrity Remains Intact:   Monitor for areas of redness and/or skin breakdown   Assess vascular access sites hourly  Taken 2/10/2023 0028 by Jordan Lino LPN  Skin Integrity Remains Intact: Monitor for areas of redness and/or skin breakdown     Problem: Musculoskeletal - Adult  Goal: Return mobility to safest level of function  Outcome: Progressing  Flowsheets (Taken 2/10/2023 0854)  Return Mobility to Safest Level of Function:   Assess patient stability and activity tolerance for standing, transferring and ambulating with or without assistive devices   Assist with transfers and ambulation using safe patient handling equipment as needed     Problem: Safety - Adult  Goal: Free from fall injury  2/10/2023 1052 by Caryle Distel, RN  Outcome: Progressing  Falling star prevention in place.  Bed and chair alarms in use. Call light in reach. Purposeful hourly rounding. Problem: Skin/Tissue Integrity  Goal: Absence of new skin breakdown  Description: 1. Monitor for areas of redness and/or skin breakdown  2. Assess vascular access sites hourly  3. Every 4-6 hours minimum:  Change oxygen saturation probe site  4. Every 4-6 hours:  If on nasal continuous positive airway pressure, respiratory therapy assess nares and determine need for appliance change or resting period. 2/10/2023 1052 by Ander Garvey RN  Outcome: Progressing     Problem: Pain  Goal: Verbalizes/displays adequate comfort level or baseline comfort level  Outcome: Progressing  Pain decreases with rest, repositioning, ice application, and prn pain medications.     Problem: Chronic Conditions and Co-morbidities  Goal: Patient's chronic conditions and co-morbidity symptoms are monitored and maintained or improved  Outcome: Progressing  Flowsheets (Taken 2/10/2023 0854)  Care Plan - Patient's Chronic Conditions and Co-Morbidity Symptoms are Monitored and Maintained or Improved:   Monitor and assess patient's chronic conditions and comorbid symptoms for stability, deterioration, or improvement   Collaborate with multidisciplinary team to address chronic and comorbid conditions and prevent exacerbation or deterioration   Update acute care plan with appropriate goals if chronic or comorbid symptoms are exacerbated and prevent overall improvement and discharge     Problem: ABCDS Injury Assessment  Goal: Absence of physical injury  Outcome: Progressing

## 2023-02-10 NOTE — ACP (ADVANCE CARE PLANNING)
Advance Care Planning     Advance Care Planning Inpatient Note  Spiritual Care Department    Today's Date: 2/9/2023  [facility] [address]    Received request from IDT Member. Upon review of chart and communication with care team, patient's decision making abilities are not in question. . Patient and Child/Children was/were present in the room during visit. Goals of ACP Conversation:  Discuss advance care planning documents    Health Care Decision Makers:       Primary Decision Maker: Shell Ervin - Jessica - [phone]    Secondary Decision Maker: July Higuera - [phone]  Summary:  Documented Next of Kin, per patient report    Advance Care Planning Documents (Patient Wishes):  Healthcare Power of /Advance Directive Appointment of Health Care Agent     Assessment:  Pt is an 81y.o. female, sitting in easychair visiting with her son, and her daughter who arrived shortly thereafter.  visited due to a Consult entered, re: advance directives. After brief explanation of AD's, Karthik Page and her daughter mentioned that she has them at home, but that they were completed a long time ago. Summer's daughter shared that it may be time for updating, but not certain. Another daughter will look for them at her house tomorrow and if present, will bring them in for review, potential updating and upload into pt chart.  provided explanation of AD's conversation and prayer-met with gratitude by those present.     Interventions:  Requested patient/family to submit existing document for our records: [address] of /Advance Directive [address]  Provided education on documents for clarity and greater understanding    Care Preferences Communicated:   No    Outcomes/Plan:  Summer's daughters will look for existent AD documentation at home and bring them to Karthik Page, so as to have Chaplains review for clarity, make edits/updates if necessary and upload into pt chart.     Electronically signed by Chaplain Sunil Fitzgerald on 2/9/2023 at 9:23 PM

## 2023-02-10 NOTE — PROGRESS NOTES
[facility]  INPATIENT PHYSICAL THERAPY  EVALUATION  [address] [facility] - 7E-72/072-A    Time In: 2419  Time Out: 1045  Timed Code Treatment Minutes: 39 Minutes  Minutes: 60          Date: 2/10/2023  Patient Name: Davida Farfan,  Gender:  female        MRN: [id number]  : 1935  (81 y.o.)      Referring Practitioner: Willie Hardin MD  Diagnosis: Vertigo as late effect of stroke  Additional Pertinent Hx: Davida Farfan is a 81 y.o. female who presents to Emergency Department with Fall and Other (Feels like something is stuck in throat). Patient is brought in by EMS for evaluation of fall x2 today. Patient says she fell in the kitchen and again in the dining room. Lives at home by herself. Patient states she had no idea why and how she fell. MRI shows small acute infarct in the right posterior frontal/anterior parietal cortex. To  rehab . Restrictions/Precautions:  Restrictions/Precautions: Fall Risk, General Precautions    Subjective:  Chart Reviewed: Yes  Patient assessed for rehabilitation services?: Yes  Family / Caregiver Present: No  Subjective: Pt is seated in recliner, agreeable to PT. Pt does not recall prior PT sessions from previous days, verbal cue reminders for 2 step commands.     General:  Overall Orientation Status: Within Functional Limits  Vision: Within Functional Limits  Hearing: Within functional limits       Pain: denies    Vitals: Vitals not assessed per clinical judgement, see nursing flowsheet    Social/Functional History:    Lives With: Alone  Type of Home: House  Home Layout: One level, Laundry in basement  Home Access: Stairs to enter with rails  Entrance Stairs - Number of Steps: 4  Entrance Stairs - Rails: Right  Home Equipment: Walker, rolling, Cane, Rollator, [employer]     Bathroom Shower/Tub: Tub/Shower unit  Bathroom Toilet: Standard  Bathroom Equipment: Tub transfer bench, Grab bars in shower, Toilet raiser  Bathroom Accessibility: Accessible    Receives Help From: Family (check in on patient)  ADL Assistance: Independent  Homemaking Assistance: Independent  Ambulation Assistance: Independent  Transfer Assistance: Independent    Active : Yes  Occupation: Retired  Type of Occupation: Teacher  Additional Comments: Patient was independent with self care, driving, getting her own groceries, managing finances without AD. Pt has 3 children that can assist as needed.     OBJECTIVE:  Range of Motion:  Bilateral Lower Extremity: WFL    Strength:  Bilateral Lower Extremity: Impaired - 4/5 B'ly    Balance:  Static Sitting Balance:  Independent  Static Standing Balance: Contact Guard Assistance, X 1  Dynamic Standing Balance: Contact Guard Assistance, X 1; retrieves object from floor without AD with CGA    Bed Mobility:  Rolling to Right: Stand By Assistance, X 1, with head of bed flat, without rail   Supine to Sit: Stand By Assistance, X 1, with head of bed flat, without rail, with increased time for completion  Sit to Supine: Stand By Assistance, X 1, with head of bed flat, without rail, with increased time for completion     Transfers:  Sit to Stand: Stand By [address] [place] [address], X 1; several trials, from chair with and without arm rests, from low couch  Stand to Sit:Contact Guard Assistance, X 1, with verbal cues  To/From Bed and Chair: Contact Guard Assistance, X 1, use of UE's to assist  Car:Contact Guard Assistance, X 1, cues for technique, to/from 's side    Ambulation:  Contact Guard Assistance, X 1  Distance: 40 feet x 3, 10 feet on uneven surfaces, small bouts in gym  Surface: Level Tile, Uneven Surface, and Carpet  Device:No Device  Gait Deviations:  Slow Susu, Decreased Step Length Bilaterally, Decreased Arm Swing, Decreased Trunk Rotation, Decreased Gait Speed, and Decreased Heel Strike Bilaterally  **Verbal cues for increased heel strike and wider FRANKIE, pt stating it's her shoes, consistent verbal cues provided    Stairs:  Contact Guard Assistance, X 1, with cues for safety  Number of Steps: 16  Height: 6\" step with Bilateral Handrails  **Pt impulsive, cues to slow down, pt lacks insight to current impairments    Stairs:  Contact Guard Assistance, X 1  Number of Steps: 1  Height: 6\" step with No Device, curb step    Wheelchair Mobility:  Stand By Assistance, X 1  Extremities Used: Bilateral Upper Extremities and Bilateral Lower Extremities  Type of Chair:Manual  Surface: Level Tile  Distance: 50 feet  Quality: Slow Velocity and Decreased Fluidity       Functional Outcome Measures: Completed  Adam Balance Score: 31  Timed Up and Go: 13.83 seconds without AD (normal based on age is 11 seconds)    5 TIMES SIT TO STAND: 15.06 seconds (normal based on age is 15 seconds)    Adam Balance Score: 31/56 (medium fall risk)    ASSESSMENT:  Activity Tolerance:  Patient tolerance of  treatment: good. Treatment Initiated: Treatment and education initiated within context of evaluation. Evaluation time included review of current medical information, gathering information related to past medical, social and functional history, completion of standardized testing, formal and informal observation of tasks, assessment of data and development of plan of care and goals. Treatment time included skilled education and facilitation of tasks to increase safety and independence with functional mobility for improved independence and quality of life. Assessment: Body Structures, Functions, Activity Limitations Requiring Skilled Therapeutic Intervention: Decreased functional mobility , Decreased balance, Decreased strength, Decreased endurance, Decreased safe awareness  Assessment: Sherwin Blandon is a 81 y.o. female that presents with CVA. she is ind prior to admission now requiring assist for basic mobility.  Pt demonstrates a decrease in baseline by way of bed mobility, transfers and ambulation secondary to decreased activity tolerance, strength, fatigue, and balance deficits. Pt will benefit from skilled PT services throughout admission and beyond hospital discharge for improvements in functional mobility and in order to decrease fall risk and return pt to PLOF. Therapy Prognosis: Excellent       Discharge Recommendations:  Discharge Recommendations: Continue to assess pending progress    Patient Education:      . Patient Education  Education Given To: Patient  Education Provided: Role of Therapy, Plan of Care  Education Method: Verbal  Barriers to Learning: Cognition  Education Outcome: Verbalized understanding, Continued education needed       Equipment Recommendations:  Equipment Needed: No    Plan:  Current Treatment Recommendations: Strengthening, Balance training, Functional mobility training, Transfer training, Gait training, Stair training, Neuromuscular re-education, Patient/Caregiver education & training, Safety education & training, Therapeutic activities, Endurance training, Home exercise program  General Plan:  (5x/wk 60 mins)    Goals:  Patient Goals : to go home  Short Term Goals  Time Frame for Short Term Goals: 1 week  Short Term Goal 1: Pt to transfer supine <--> sit mod I to enable pt to get in/out of bed. Short Term Goal 2: Pt to transfer sit <--> stand mod I from various height surfaces for increased functional mobility. Short Term Goal 3: Pt to ambulate >100 feet without AD SBA for household ambulation. Short Term Goal 4: Pt to perform car transfer mod I to enable pt to get in/out of the car. Long Term Goals  Time Frame for Long Term Goals : 2 weeks  Long Term Goal 1: Pt to ascend/descend 15 steps while carrying laundry basket to enable pt to safely perform laundry tasks at home  Long Term Goal 2: Pt to ambulate >500 feet without AD mod I for community re-entry. Long Term Goal 3: Pt to score >41/56 on the Adam balance test to indicate low fall risk.   Long Term Goal 4: Pt to perform TUG test in <11 seconds to indicate low fall risk and improved gait speed. Following session, patient left in safe position with all fall risk precautions in place.

## 2023-02-10 NOTE — PLAN OF CARE
Problem: Safety - Adult  Goal: Free from fall injury  2/10/2023 0247 by Amy Ni LPN  Outcome: Progressing  2/9/2023 1807 by Aminata Melissa RN  Outcome: Progressing  Note: Patient will continue to use call light for assistance to prevent falls and promote patient safety. Problem: Skin/Tissue Integrity  Goal: Absence of new skin breakdown  Description: 1.   Monitor for areas of redness and/or skin breakdown    Outcome: Progressing

## 2023-02-11 PROCEDURE — 97110 THERAPEUTIC EXERCISES: CPT

## 2023-02-11 PROCEDURE — 97129 THER IVNTJ 1ST 15 MIN: CPT

## 2023-02-11 PROCEDURE — 97112 NEUROMUSCULAR REEDUCATION: CPT

## 2023-02-11 PROCEDURE — 97530 THERAPEUTIC ACTIVITIES: CPT

## 2023-02-11 PROCEDURE — 6370000000 HC RX 637 (ALT 250 FOR IP): Performed by: INTERNAL MEDICINE

## 2023-02-11 PROCEDURE — 99232 SBSQ HOSP IP/OBS MODERATE 35: CPT | Performed by: PHYSICAL MEDICINE & REHABILITATION

## 2023-02-11 PROCEDURE — 97130 THER IVNTJ EA ADDL 15 MIN: CPT

## 2023-02-11 PROCEDURE — 1180000000 HC REHAB R&B

## 2023-02-11 PROCEDURE — 92610 EVALUATE SWALLOWING FUNCTION: CPT

## 2023-02-11 PROCEDURE — 97535 SELF CARE MNGMENT TRAINING: CPT

## 2023-02-11 PROCEDURE — 97116 GAIT TRAINING THERAPY: CPT

## 2023-02-11 PROCEDURE — 6370000000 HC RX 637 (ALT 250 FOR IP): Performed by: PHYSICAL MEDICINE & REHABILITATION

## 2023-02-11 RX ADMIN — ROSUVASTATIN CALCIUM 40 MG: 20 TABLET, FILM COATED ORAL at 21:14

## 2023-02-11 RX ADMIN — DOCUSATE SODIUM 100 MG: 100 CAPSULE, LIQUID FILLED ORAL at 09:37

## 2023-02-11 RX ADMIN — Medication 1 TABLET: at 12:23

## 2023-02-11 RX ADMIN — DOCUSATE SODIUM 100 MG: 100 CAPSULE, LIQUID FILLED ORAL at 21:13

## 2023-02-11 RX ADMIN — TRAZODONE HYDROCHLORIDE 50 MG: 50 TABLET ORAL at 21:13

## 2023-02-11 RX ADMIN — CLONAZEPAM 1 MG: 0.5 TABLET ORAL at 21:11

## 2023-02-11 RX ADMIN — Medication 1 TABLET: at 09:37

## 2023-02-11 RX ADMIN — Medication 6 MG: at 21:11

## 2023-02-11 RX ADMIN — ASPIRIN 81 MG: 81 TABLET, COATED ORAL at 09:37

## 2023-02-11 RX ADMIN — FLUTICASONE PROPIONATE 1 SPRAY: 50 SPRAY, METERED NASAL at 09:38

## 2023-02-11 RX ADMIN — RIVAROXABAN 20 MG: 20 TABLET, FILM COATED ORAL at 17:39

## 2023-02-11 RX ADMIN — MAGNESIUM HYDROXIDE 15 ML: 400 SUSPENSION ORAL at 21:11

## 2023-02-11 RX ADMIN — Medication 1 TABLET: at 21:15

## 2023-02-11 RX ADMIN — Medication 500 UNITS: at 09:37

## 2023-02-11 ASSESSMENT — PAIN SCALES - GENERAL: PAINLEVEL_OUTOF10: 0

## 2023-02-11 NOTE — PROGRESS NOTES
[address]  Occupational Therapy  Daily Note  Time:   Time In: 1130  Time Out: 1200  Timed Code Treatment Minutes: 30 Minutes  Minutes: 30          Date: 2023  Patient Name: Katia Frye,   Gender: female      Room: [facility]72/072-A  MRN: [id number]  : 1935  (81 y.o.)  Referring Practitioner: Heidy Cabrera MD  Diagnosis: Vertigo as late effect of stroke  Additional Pertinent Hx: Katia Frye is a 81 y.o. 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 MRI of brain was also performed on 2023 and revealed a small acute right posterior frontal/anterior parietal cortex infarct stroke, and mild white matter atrophy. Restrictions/Precautions:  Restrictions/Precautions: Fall Risk, General Precautions     SUBJECTIVE: Pt sitting in chair up on arrival. Agreeable to Tx. PAIN: not stated     Vitals: Vitals not assessed per clinical judgement, see nursing flowsheet    COGNITION: Decreased Insight    ADL:   No ADL's completed this session. Wanda Corbin BALANCE:  Sitting Balance:  Modified Independent. Standing Balance: Stand By Assistance. Slight unsteadiness with increased fatigue, 0 LOB standing unsupported     BED MOBILITY:  Not Tested    TRANSFERS:  Sit to Stand:  Stand By Assistance. Stand to Sit: Stand By Assistance. FUNCTIONAL MOBILITY:  Assistive Device: None  Assist Level:  Stand By Assistance. Distance: To and from therapy gym  0 LOB, slight unsteadiness with increased fatigue.       ADDITIONAL ACTIVITIES:  Completed seated BUE ex's 1x15 at elbow and wrist joints in all planes with 2#DB. RB between [address] set. Pt performed 1#DB BUE ex's 1x15 ea in all shoulder planes of motion with min RB between ea set to increase UB strength for self care tasks and transfers. ASSESSMENT:     Activity Tolerance:  Patient tolerance of  treatment: good. Discharge Recommendations: Home with assist as needed  Equipment Recommendations: Other: Pt has a tub tranfer bench, toilet raiser, and grab bars. No further AE recommended. Plan: Times Per Week: 5x/wk for 90 min  Current Treatment Recommendations: Strengthening, Balance training, Functional mobility training, Endurance training, Safety education & training, Neuromuscular re-education, Patient/Caregiver education & training, Self-Care / ADL, Coordination training, Equipment evaluation, education, & procurement, Cognitive/Perceptual training    Patient Education  Patient Education: Role of OT, Plan of Care, and Home Exercise Program    Goals  Short Term Goals  Time Frame for Short Term Goals: 1 week  Short Term Goal 1: Pt will complete tub/ shower transfers with SBA to improve indep with showering at home. Short Term Goal 2: Pt will complete dressing tasks with SBA and 0 vcs for orientation of clothing to improve indep with self care. Short Term Goal 3: Pt will plan and sequence BADL routine with min vcs for thought organization to improve indep wtih self care routine at home. Short Term Goal 4: Pt will plan and execute a simple meal prep task with SBA and min vcs for problem solving/ safety to improve indep with preparing meals for herself. Short Term Goal 5: Pt will complete multiple step IADL tasks with SBA for balance and min vcs for recall to improve indep with getting groceries at home. Long Term Goals  Time Frame for Long Term Goals : 2 weeks  Long Term Goal 1: Pt will complete BADL routine with modified independence and 0 vcs for safety to improve indep with self care.   Long Term Goal 2: Pt will complete a light IADL task with supervision to improve indep and safety within her home. Long Term Goal 3: Pt will demonstrate 10 min of dynamic standing balance with mod I to improve indep with showering and standing IADL tasks. Following session, patient left in safe position with all fall risk precautions in place.

## 2023-02-11 NOTE — PROGRESS NOTES
[facility]  Clinical Swallow Evaluation + Cognitive Treatment      SLP Individual Minutes  Time In: 1200  Time Out: 1300  Minutes: 60  Timed Code Treatment Minutes: 45 Minutes   CSE: 15 minutes  Cognitive Therapy: 45 minutes    Date: 2023  Patient Name: Sj Miller      CSN: [id number]   : 1935  (81 y.o.)  Gender: female   Referring Physician:  Stacy Mandel MD  Diagnosis: Esophageal Dysphagia and Vertigo as late effect of stroke  History of Present Illness/Injury: Patient admitted to [facility] with above diagnosis; please see physician H&P for full report. Per chart review, \"Summer Watson is a 81 y.o. 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 The patient says she began feeling something stuck in her throat last night, 2023. Today, 2023, while she was walking in kitchen to try to get something to help him the throat discomfort sensation, she suddenly fell for no reason. She denies having weakness, pain, syncope or fainting prior to the fall. She struggled to get up but fell again after few step into her dining room. At that time she felt the need for bowel movement so she crawled to bathroom and had diarrhea like bowel movement. She did not call her daughter who called 911. The patient was sent to [address] [place] [address] for evaluation on . She was found to have tenderness at the her left hip area. She also complains of feeling dizzy with room spinning sensation when she says suddenly sitting up from supine position. Her blood tests and urine test showed no significant abnormality.   X-ray of left hip and chest revealed no acute abnormality. CT of the head without contrast done on 2/2/2023 showed small lacunar infarction at the right insula. CT of cervical spine done on 2/2/2023 was reported to show no acute process. CTA of head and neck done on 2/2/2023 show no significant hemodynamic stenosis in bilateral common and internal carotid arteries, and hypoplastic P1 segment of the right posterior cerebral artery. MRI of brain was also performed on 2/2/2023 and revealed a small acute right posterior frontal/anterior parietal cortex infarct stroke, and mild white matter atrophy. Neurology consultation was requested. The patient was started on aspirin 81 mg.\"    Per [id number] evaluation completed by ST 2/10, \"Patient currently on regular diet with thin liquids. Patient stating swallowing as an increased difficulty since stroke\" with recommendations for completion of CSE to further evaluate swallow function. ST consulted to further evaluate oropharyngeal swallow integrity with implementation of goals/POC as clinically indicated. Past Medical History:   Diagnosis Date    Anxiety     Glaucoma, bilateral     Diagnosed in 2000's    Osteopenia     Osteoporosis     Diagnosed in 1990s       SUBJECTIVE:  Patient seen  sitting upright in  recliner upon ST arrival; alert and cooperative for completion of cognitive interventions and swallow evaluation given at least moderate encouragement/rationale by ST. No  family present. OBJECTIVE:    Pain:  No pain reported.     Current Diet: Regular Diet with Thin Liquids    Respiratory Status:  Room Air    Behavioral Observation:  Alert and Oriented    CRANIAL NERVE ASSESSMENT   CN V (Trigeminal) Closes and Opens Mandible WFL    Rotary Jaw Movement WFL       CN VII (Facial) Cheeks Hold Food out of Sulci WFL    Opens, Closes/Seals, Protrudes, Retracts Lips WFL    General Appearance WFL    Sensation WFL       CN X (Vagus - Pharyngeal) Raises Back of Tongue WFL       CN XI (Accessory) Lifts Soft Palate WFL       CN XII (Hypoglossal) Elevates Tongue Up and Back WFL    Protrusion    WFL    Lateralizes Tongue WFL    Sensation Not Tested       Other Observations Dentition Good dentition    Vocal Quality WFL    Cough WFL     Patient Evaluated Using: Thin Liquids, Soft Solids, and Coarse Solids    Oral Phase:  WFL    Pharyngeal Phase: WFL:  Pharyngeal phase appears WFL but cannot rule out pharyngeal phase deficits from a bedside swallowing evaluation alone. Signs and Symptoms of Laryngeal Penetration/Aspiration: Throat Clear    Impressions: Patient presents with oral phase of swallow function that is essentially [facility] with inability to fully discern potential presence of pharyngeal phase deficits without formal instrumentation. All labial/lingual structures intact and appear to be functioning appropriately at bedside. Oral phase highly unremarkable during consumption of hard/textured solids with patient demonstrating adequate mastication pattern for textural breakdown, cohesive bolus formation, and manipulation. Thin liquids consumed without overt difficulty and with suspected control/containment of fluid bolus. Patient with x1 throat clear to follow thin  liquids via straw (large bolus), however, dry vocal quality to follow. NO additional overt s/s aspiration exhibited across all consistencies/trials consumed, certainly not able to exclude pharyngeal phase dysfunction and/or airway invasion events in its entirety at bedside alone. ST with chart review with observation patient lung sounds clear  and  diminished  with no overt concerns reported by nursing staff. Patient's swallow physiology does appear appropriate to support PO intake without distress with recommendations for completion of instrumental evaluation 2/13/2023;  patient in agreement with plan given extensive  provision of rationale  by  ST.  Recommend continuation of regular diet with thin liquids with dysphagia management to be provided to determine  safety with current diet recommendations. F/U conversation  with RN, Charisse Katz, and Dr. Christine Castillo with verbal  receptiveness noted for POC. RECOMMENDATIONS/ASSESSMENT:  Instrumental Evaluation: Modified Barium Swallow (MBS) 2/13/2023  Diet Recommendations:  Regular Diet with Thin Liquids  Strategies:  Full Upright Position, Small Bite/Sip, and Alternate Solids and Liquids   Rehabilitation Potential: good  Discharge Recommendations: Continue to Assess Pending Progress    EDUCATION:  Learner: Patient  Education:  Reviewed results and recommendations of this evaluation, Reviewed diet and strategies, Reviewed signs, symptoms and risks of aspiration, Reviewed ST goals and Plan of Care, Reviewed recommendations for follow-up, Education Related to Potential Risks and Complications Due to Impairment/Illness/Injury, Education Related to Prevention of Recurrence of Impairment/Illness/Injury, Education Related to [employer] [facility]ness, and Home Safety Education  Evaluation of Education: Needs further instruction, No evidence of learning, and Family not present    PLAN:  Skilled SLP intervention on IP Rehab 60 minutes per day, 5 days per week. Specific interventions for next session may include: dietary  analysis, checkbook register    PATIENT GOAL:    Return to prior level of function. SHORT TERM GOALS:  Short Term Goals  Time Frame for Short Term Goals: 1 weeks  Goal 1: Patient will complete verbal/visual reasoning and thought organization/workin memory tasks (i.e., calendar, scheduling, etc.) with 80% accuracy and moderate cuing in order to allow for safe return to completion of ADLs/IADLs. Goal 2: Patient will complete problem solving and executive functioning tasks (i.e., medications, finances, etc.) with 80% accuracy and moderate cuing in order to improve completion of ADLs/IADLs.   INTERVENTIONS:   Check Writing Task: 4/4  independent  *decreased selective attention evidenced, however, all errors self corrected without prompting  *utilization of self talk throughout session with fair  success  *SIGNIFICANT time needed to complete task (>20 minutes)    Check [place] Task  Organization: 1/1  independent  *patient with at least  moderate cuing to assist  with task analysis to initiate task  *plans to complete  in subsequent  therapy session    Goal 3: Patient will complete immediate and delayed recall tasks with 80% accuracy and moderate cuing in order to improve retention of novel information. Goal 4: Patient will complete mildly complex sustained, selective, alternating, and divided attention tasks with no more than three errors/redirections in a five minute/one task in order to allow for safe return to driving and PLOF. Goal 5: Patient will safely consume regular diet with thin liquids without overt s/s of airway invasion  in order to assist with meeting nutrition/hydration measures. Goal 6: Complete MBS to further evaluate oropharyngeal swallow function. INTERVENTIONS: ST with provision of EXTENSIVE education re: current admitting diagnosis, MRI results, identified cognitive and swallowing impairments, rationale for ST services, implications of therapeutic interventions on return to PLOF, home safety, and overall benefit of IPR  stay. Patient  with LIMITED receptiveness noted with consistent verbalization, \"I have been doing things independently for a long time and  taking care of my husbands. \" Patient with LIMITED insight  into cognitive deficits and overall recommendations for continued IPR stay despite EXTENSIVE education. ST with SIGNIFICANT concerns for return to independent living with independent completion  of  ADLs/IADLs given deficits and poor insight. Patient  will continue to benefit from [facility] and IPR  stay to improve potential safe return to PLOF.     LONG TERM GOALS:  Long Term Goals  Time Frame for Long Term Goals: 2 weeks  Goal 1: Patient will improve cognitive-linguistic skills to a min assist or greater in order to make safe return home with least amount of supervision/assistance  Goal 2: Patient will safely  consume regular diet with thin  liquids without overt s/s  of airway invasion  in  order to assist with meeting nutrition/hydration standards.       Melissa Yusuf M.S., [facility]

## 2023-02-11 NOTE — PROGRESS NOTES
Advance Care Planning     Advance Care Planning Inpatient Note  [facility] Department    Today's Date: 2023  [facility] [address]    Received request from patient. Upon review of chart and communication with care team, patient's decision making abilities are not in question. . Patient was/were present in the room during visit. Goals of ACP Conversation:  Discuss advance care planning documents    Health Care Decision Makers:       Primary Decision Maker: Susie Garcia Child - [phone]    Secondary Decision Maker: Joe Bullock - Spouse - [phone]    Supplemental (Other) Decision Maker: Mariluz Calvert Child - [phone]  Summary:  Verified Documents  Completed [address]    Advance Care Planning Documents (Patient Wishes):  Healthcare Power of /Advance Directive Appointment of Health Care Agent  Living Will/Advance Directive     Assessment:  Celina Camilo is sitting in her chair and wanted to review her AD that was signed on . We reviewed new address for her son, and the one witness has , but this  signed for the changes that were made. Interventions:  Assisted in the completion of documents according to patient's wishes at this time  Encouraged ongoing ACP conversation with future decision makers and loved ones  Reviewed but did not complete ACP document    Care Preferences Communicated:   No    Outcomes/Plan:  Existing advance directive reviewed with patient; no changes to patient's previously recorded wishes. Returned original document(s) to patient, as well as copies for distribution to appointed agents  Copy of advance directive given to staff to scan into medical record.     Electronically signed by Felisha Lawson, [email or web address] on 2023 at 3:35 PM

## 2023-02-11 NOTE — PROGRESS NOTES
[facility]  INPATIENT PHYSICAL THERAPY  Daily Note  [address] [facility] - [id number]    Time In: 0830  Time Out: 930  Timed Code Treatment Minutes: 60 Minutes  Minutes: 60          Date: 2023  Patient Name: Mark Carbajal,  Gender:  female        MRN: [id number]  : 1935  (81 y.o.)     Referring Practitioner: Rosalina Cooper MD  Diagnosis: Vertigo as late effect of stroke  Additional Pertinent Hx: Mark Carbajal is a 81 y.o. female who presents to Emergency Department with Fall and Other (Feels like something is stuck in throat). Patient is brought in by EMS for evaluation of fall x2 today. Patient says she fell in the kitchen and again in the dining room. Lives at home by herself. Patient states she had no idea why and how she fell. MRI shows small acute infarct in the right posterior frontal/anterior parietal cortex. To  rehab . Prior Level of Function:  Lives With: Alone  Type of Home: House  Home Layout: One level, Laundry in basement  Home Access: Stairs to enter with rails  Entrance Stairs - Number of Steps: 4  Entrance Stairs - Rails: Right  Home Equipment: Walker, rolling, Cane, Rollator, Tammy Pontiff   Bathroom Shower/Tub: Tub/Shower unit  Bathroom Toilet: Standard  Bathroom Equipment: Tub transfer bench, Grab bars in shower, Toilet raiser  Bathroom Accessibility: Accessible    Receives Help From: Family (check in on patient)  ADL Assistance: [address] [place] [address] Avenue: Independent  Ambulation Assistance: Independent  Transfer Assistance: Independent  Active : Yes  Additional Comments: Patient was independent with self care, driving, getting her own groceries, managing finances without AD. Pt has 3 children that can assist as needed. Restrictions/Precautions:  Restrictions/Precautions: Fall Risk, General Precautions     SUBJECTIVE: Pt. Seated on her BS chair and pleasantly agrees to therapy session.  Patient voicing frustrations in regards to schedule due to having to get up early for therapy and patient reporting she does not typically get up until 9. PT educated patient on spacing out schedule for rest breaks but able to adjust schedule going forward as needed. Patient reporting \"I do not plan to be here; I can get more rest at home\". PT provided active listening and education on the need for PT for safety and patient pleasant and agreeable to therapy. PAIN: 0/10    Vitals:   Patient Vitals for the past 8 hrs:   BP Patient Position Temp Temp src Pulse Resp SpO2 O2 Device   02/11/23 0932 (!) 100/51 Sitting 97.3 °F (36.3 °C) Oral 67 17 98 % None (Room air)        OBJECTIVE:  Bed Mobility:  Not Tested    Transfers:  Sit to Stand: Supervision, Stand By Assistance  Stand to [address] [place] [address], Stand By Assistance  *Patient instructed in sit to stand transfers from various surfaces including: recliner, mat table and standard chair with arm rests without AD. Ambulation:  Stand By Assistance  Distance: 150 feet x 2   Surface: Level Tile  Device: No Device  Gait Deviations: Forward Flexed Posture, Slow Susu, Decreased Step Length Bilaterally, Decreased Arm Swing, Decreased Gait Speed, Decreased Heel Strike Bilaterally, Narrow Base of Support, Mild Path Deviations, and Unsteady Gait    Stairs:  Stairs: 6\" steps X 4 using One Handrail and Stand By Assistance. *Patient required cueing for safety awareness at times due to impulsivity. Balance:  Dynamic Standing Balance: Stand By Assistance, Contact Guard Assistance  *Patient instructed in standing on airex pad and reaching outside FRANKIE for rings and then patient requesting to trial picking up rings from floor after tossing. Patient demonstrated slight unsteadiness however no formal LOB. Patient required cueing for posture throughout. Patient instructed in standing dynamic balance in order to assist with safety with  functional mobility.       Neuromuscular Re-education  *Patient instructed in pod tapping at [address] steps initially with BUE support and progressed to no UE support with CGA to SBA. Patient demonstrated slight unsteadiness with tapping pods on 2nd step with cueing for maintaining normalized FRANKIE. Patient instructed in pod tapping in order to assist with weight shifting, proprioception and SL balance. *Patient instructed in directional stepping with PT instruction over fly swatters with CGA. Patient had increased difficulty with foot placement when stepping over fly swatters with cueing for increased step length and height in order to clear fly swatters and allow for BLE foot placement. Patient instructed in directional stepping in order to assist with coordination, balance and proprioception. Exercise:  *Patient instructed in 6inch forward and lateral step ups x10 reps each direction. Patient instructed in step ups in order to assist with BLE strengthening for increased functional mobility. Functional Outcome Measures:   Not completed    ASSESSMENT:  Assessment: Patient progressing toward established goals. Activity Tolerance:  Patient tolerance of  treatment: good.      Equipment Recommendations:Equipment Needed: No  Discharge Recommendations: Continue to assess pending progress Continue to assess pending progress and Patient would benefit from continued PT at discharge    Plan: Current Treatment Recommendations: Strengthening, Balance training, Functional mobility training, Transfer training, Gait training, Stair training, Neuromuscular re-education, Patient/Caregiver education & training, Safety education & training, Therapeutic activities, Endurance training, Home exercise program  General Plan:  (5x/wk 60 mins)    Patient Education  Patient Education: Plan of Care, Functional Mobility, Reviewed Prior Education, Health Promotion and Wellness Education, Safety,  - Patient Verbalized Understanding, - Patient Requires Continued Education    Goals:  Patient Goals : to go home  Short Term Goals  Time Frame for Short Term Goals: 1 week  Short Term Goal 1: Pt to transfer supine <--> sit mod I to enable pt to get in/out of bed. Short Term Goal 2: Pt to transfer sit <--> stand mod I from various height surfaces for increased functional mobility. Short Term Goal 3: Pt to ambulate >100 feet without AD SBA for household ambulation. Short Term Goal 4: Pt to perform car transfer mod I to enable pt to get in/out of the car. Long Term Goals  Time Frame for Long Term Goals : 2 weeks  Long Term Goal 1: Pt to ascend/descend 15 steps while carrying laundry basket to enable pt to safely perform laundry tasks at home  Long Term Goal 2: Pt to ambulate >500 feet without AD mod I for community re-entry. Long Term Goal 3: Pt to score >41/56 on the Adam balance test to indicate low fall risk. Long Term Goal 4: Pt to perform TUG test in <11 seconds to indicate low fall risk and improved gait speed. Following session, patient left in safe position with all fall risk precautions in place.

## 2023-02-11 NOTE — PLAN OF CARE
Problem: Neurosensory - Adult  Goal: Achieves stable or improved neurological status  Recent Flowsheet Documentation  Taken 2/10/2023 2051 by Mary Martinez RN  Achieves stable or improved neurological status: Assess for and report changes in neurological status     Problem: Skin/Tissue Integrity - Adult  Goal: Skin integrity remains intact  Recent Flowsheet Documentation  Taken 2/10/2023 2051 by Mary Martinez RN  Skin Integrity Remains Intact: Monitor for areas of redness and/or skin breakdown

## 2023-02-11 NOTE — PROGRESS NOTES
Physical Medicine & Rehabilitation Progress Note    Chief Complaint: Stroke    Subjective:    Katia Frye is a 81 y.o. 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 The patient says she began feeling something stuck in her throat on 2/1/2023 night. On 2/2/2023 while she was walking in kitchen to try to get something to help him the throat discomfort sensation, she suddenly fell for no reason. She denies having weakness, pain, syncope or fainting prior to the fall. She struggled to get up but fell again after few step into her dining room. At that time she felt the need for bowel movement so she crawled to bathroom and had diarrhea like bowel movement. She did not call her daughter who called 911. The patient was sent to [address] [place] [address] ER for evaluation on 2./2/2023. She was found to have tenderness at the her left hip area. She also complains of feeling dizzy with room spinning sensation when she says suddenly sitting up from supine position. Her blood tests and urine test showed no significant abnormality. X-ray of left hip and chest revealed no acute abnormality. CT of the head without contrast done on 2/2/2023 showed small lacunar infarction at the right insula. CT of cervical spine done on 2/2/2023 was reported to show no acute process. CTA of head and neck done on 2/2/2023 show no significant hemodynamic stenosis in bilateral common and internal carotid arteries, and hypoplastic P1 segment of the right posterior cerebral artery.   MRI of brain was also performed on 2/2/2023 and revealed a small acute right posterior frontal/anterior parietal cortex infarct stroke, and mild white matter atrophy. The patient was started on aspirin 81 mg.  Echocardiogram was performed on 2/3/2023 revealed only grade 1 left ventricle diastolic dysfunction with ejection fraction of 60%. Neurology service was consulted on 2/3/2023. Aspirin 81 mg daily was continued and Plavix 75 mg daily for 21 days was added. PM&R was consulted on 2/2/2023. On 2/8/2023 the patient had an episode of tachycardia. Cardiology was consulted and was diagnosed with new onset paroxysmal atrial fibrillation with intermittent short run rapid ventricular response. The patient was started on Xarelto and continue her daily baby aspirin. Plavix was discontinued. The patient was put on 30-day event monitor on 2/9/2023. The patient says her knees start to hurt especially on the right side because she had not been given glucosamine she usually take at home for arthritis. She denies having any dizziness, lightheadedness, blurry vision, weakness, or numbness or tingling sensation. She said her sleep is better last night with 1 mg of clonazepam given. She is tolerating the intensive inpatient rehabilitation treatment. She expresses wish to go home early next week. Rehabilitation:  PT: Reviewed. Bed Mobility:  Rolling to Right: Stand By Assistance, X 1, with head of bed flat, without rail   Supine to Sit: Stand By Assistance, X 1, with head of bed flat, without rail, with increased time for completion  Sit to Supine: Stand By Assistance, X 1, with head of bed flat, without rail, with increased time for completion      Transfers:  Sit to Stand: Supervision, Stand By Assistance  Stand to [address] [place] [address], Stand By Assistance  *Patient instructed in sit to stand transfers from various surfaces including: recliner, mat table and standard chair with arm rests without AD.    To/From Bed and Chair: Contact Guard Assistance, X 1, use of UE's to assist  Car:Contact Guard Assistance, X 1, cues for technique, to/from 's side    Ambulation:  Contact Guard Assistance, X 1  Distance: 40 feet x 3, 10 feet on uneven surfaces, small bouts in gym  Surface: Level Tile, Uneven Surface, and Carpet  Device:No Device  Gait Deviations:  Slow Susu, Decreased Step Length Bilaterally, Decreased Arm Swing, Decreased Trunk Rotation, Decreased Gait Speed, and Decreased Heel Strike Bilaterally  **Verbal cues for increased heel strike and wider FRANKIE, pt stating it's her shoes, consistent verbal cues provided    Stand By Assistance  Distance: 150 feet x 2   Surface: Level Tile  Device: No Device  Gait Deviations: Forward Flexed Posture, Slow Susu, Decreased Step Length Bilaterally, Decreased Arm Swing, Decreased Gait Speed, Decreased Heel Strike Bilaterally, Narrow Base of Support, Mild Path Deviations, and Unsteady Gait     Stairs:  Contact Guard Assistance, X 1, with cues for safety  Number of Steps: 16  Height: 6\" step with Bilateral Handrails  **Pt impulsive, cues to slow down, pt lacks insight to current impairments    Contact Guard Assistance, X 1  Number of Steps: 1  Height: 6\" step with No Device, curb step    Stairs: 6\" steps X 4 using One Handrail and Stand By Assistance. *Patient required cueing for safety awareness at times due to impulsivity. Wheelchair Mobility:  Stand By Assistance, X 1  Extremities Used: Bilateral Upper Extremities and Bilateral Lower Extremities  Type of Chair:Manual  Surface: Level Tile  Distance: 50 feet  Quality: Slow Velocity and Decreased Fluidity       Balance:  Static Sitting Balance:  Independent  Static Standing Balance: Contact Guard Assistance, X 1  Dynamic Standing Balance: Stand By Assistance, Contact Guard Assistance  *Patient instructed in standing on airex pad and reaching outside FRANKIE for rings and then patient requesting to trial picking up rings from floor after tossing. Patient demonstrated slight unsteadiness however no formal LOB. Patient required cueing for posture throughout.  Patient instructed in standing dynamic balance in order to assist with safety with  functional mobility. Neuromuscular Re-education  *Patient instructed in pod tapping at 6 inch steps initially with BUE support and progressed to no UE support with CGA to SBA. Patient demonstrated slight unsteadiness with tapping pods on 2nd step with cueing for maintaining normalized FRANKIE. Patient instructed in pod tapping in order to assist with weight shifting, proprioception and SL balance. *Patient instructed in directional stepping with PT instruction over fly swatters with CGA. Patient had increased difficulty with foot placement when stepping over fly swatters with cueing for increased step length and height in order to clear fly swatters and allow for BLE foot placement. Patient instructed in directional stepping in order to assist with coordination, balance and proprioception. OT: Reviewed. ADL:   EATING:Setup or clean-up assistance. Karen Gupta CARE Score: 5. ORAL HYGIENE:Supervision or touching assistance. SBA standing at sink. CARE Score: 4. TOILETING HYGIENE:Supervision or touching assistance. SBA. CARE Score: 4. SHOWERING/BATHING:Supervision or touching assistance. SBA in shower, completed half in sitting and half standing. Pt used grab bars and hand held shower with increased time to recall placement of hand held shower on stand. Pt demonstrated impaired thought organization and divided attention throughout shower. \"I can't be talking now, I need to focus on what body parts I have washed or not. \". CARE Score: 4.     UPPER BODY DRESSING:Partial/moderate assistance. Min A to fasten bra,   Min A to don long sleeve shirt with min A to re-orient her shirt. CARE Score: 3. LOWER BODY DRESSING:Partial/moderate assistance. Karen Gupta CARE Score: 3. Footwear Management: Independent. Don B socks and shoes . TOILET TRANSFER: Supervision or touching assistance. SBA. CARE Score: 4.      SHOWER TRANSFER: stand by assistance     BALANCE:  Sitting Balance:  Modified Independent. Standing Balance: Stand By Assistance. Slight unsteadiness with increased fatigue, 0 LOB standing unsupported     BED MOBILITY:  Supine to Sit: Stand By Assistance      TRANSFERS:  Sit to Stand:  Stand By Assistance. Stand to Sit: Stand By Assistance. FUNCTIONAL MOBILITY:  Assistive Device: None  Assist Level:  Stand By Assistance. Distance: To and from therapy apartment and home distance for over [id number] with 0 RB, followed by RB. Demo'd 1 LOB during a descent on ramp self corrected with hand rail. Pt states \"I walk better If I was more awake. \"     Assistive Device: None  Assist Level:  Stand By Assistance. Distance: To and from therapy gym  0 LOB, slight unsteadiness with increased fatigue. ADDITIONAL ACTIVITIES:  CHACON fabricated unsafe and hazardous situations in kitchen this date to facilitate kitchen safety awareness and problem solving skills needed for IADLs; situations were graded ranging from obvious hazards to more discrete to challenge safety awareness. Pt was able to accurately identify 6/7 hazards, requiring mod cue'ing for 1/7 but required mod cue'ing for all hazards to explain in detail, as pt would often state, \"I don't know, that wouldn't happen at home\". Pt completed graded sequencing cards demo'ing 100% accuracy with min increased time. Pt did second guess self on virous details, however when talked details out loud, pt demo appropriate insight into sustaining attention and no cue'ing required. Completed seated BUE ex's 1x15 at elbow and wrist joints in all planes with 2#DB. RB between [address] set. Pt performed 1#DB BUE ex's 1x15 ea in all shoulder planes of motion with min RB between ea set to increase UB strength for self care tasks and transfers. ST: Reviewed. DIAGNOSTIC IMPRESSIONS:   Patient presents with [facility] cognitive processing evidenced by a 26/30 on the [address] [place] [address].  However, patient demonstrating deficits in executive functioning skills such as math computation/reasoning, functional recall, and thought organization. Slowed processing noted throughout evaluation. Additionally patient with tangential thoughts, requiring re-direction back to task. Patient also demonstrating poor insight to deficits, denying difficulties with cognition. Speech and voice appear to be [facility] [place]. No presence of dysarthria, dysphonia, or aphonia. Expressive/receptive languages skills WFL. Patient currently on regular diet with thin liquids. Patient stating swallowing as an increased difficulty since stroke however not causing concerns. Skilled ST services are recommended to address the above deficits listed to improve potential return to PLOF. Anticipate need for supervision with ADL/IADLs. Driving evaluation to be completed upon discharge. Review of Systems:  Review of Systems   Constitutional:  Positive for fatigue. Negative for chills, diaphoresis and fever. HENT:  Negative for hearing loss, rhinorrhea, sneezing, sore throat and trouble swallowing. Eyes:  Negative for visual disturbance. Respiratory:  Negative for cough, shortness of breath and wheezing. Cardiovascular:  Negative for chest pain and palpitations. Gastrointestinal:  Negative for abdominal pain, constipation, diarrhea, nausea and vomiting. Genitourinary:  Negative for dysuria. Musculoskeletal:  Negative for arthralgias, back pain, gait problem, myalgias and neck pain. Skin:  Negative for rash. Neurological:  Positive for weakness (Generalized). Negative for dizziness, tremors, facial asymmetry, speech difficulty, light-headedness, numbness and headaches. Psychiatric/Behavioral:  Positive for sleep disturbance. Negative for dysphoric mood and hallucinations. The patient is not nervous/anxious.          Objective:  BP (!) 100/51   Pulse 67   Temp 97.3 °F (36.3 °C) (Oral)   Resp 17   Ht 4' 10\" (1.473 m)   Wt 123 lb 9.6 oz (56.1 kg)   SpO2 98%   BMI 25.83 kg/m²   Physical Exam   General:  well-developed, well nourished  female; in no acute distress ; appropriate affect & mood; sitting on reclining chair comfortably  Eyes: pupil equally round ; extra-ocular motion intact bilaterally  Head, Ear, Nose, Mouth & Throat : normocephalic ; no discharge from ears or nose ; no deformity ; no facial swelling ; oral mucosa pink   Neck :  supple ; no tenderness ; mild muscle spasm at the bilateral cervical paraspinal muscle and bilateral upper trapezius muscles  Cardiovascular : regular rate & rhythm ; normal S1 & S2 heart sound ; no murmur ; normal peripheral pulse at the bilateral upper and lower extremities  Pulmonary : Breath sounds present at bilateral lung fields; no wheezing ; no rale; no crackle  Gastrointestinal : soft, flat abdomen; no tenderness; normal bowel sound present   Back : no tenderness; no muscle spasm  Skin: no skin lesion or rash ; no pitting edema at all 4 extremities  Musculoskeletal : no limb asymmetry; no limb deformity; mild tenderness at area superior to knee patella on both side; no tenderness at bilateral upper extremities & the rest of lower extremities; no palpable mass at limbs ; no joints laxity or crepitation ; shoulder flexion and abduction passive ROM reaching 170 degrees; hip flexion passive ROM reaching 120 degrees bilaterally; ankle dorsiflexion passive ROM reaching 10 degrees on right side and 10 degrees on left side; normal functional joints ROM at the rest of bilateral upper & lower extremities  Cerebral :  alert ; awake ; oriented to place, person and time; follow one-step verbal command  Cerebellum : no dysmetria with bilateral finger-to-nose test ; mild dysmetria with bilateral heel-to-shin test  Cranial Nerves :  grossly intact CN II to XII function  Sensory : intact light touch and pin prick sensation at bilateral upper & lower extremities  Motor : normal tone at bilateral upper & lower extremities ; 4+/5 muscle strength at bilateral handgrips; 4+/5 muscle strength at right hip flexion; 4+/5 to 5/5 muscle strength at left knee flexion; normal 5/5 muscle strength at the rest of bilateral upper & lower extremities  Reflex : 1+ bilateral biceps, bilateral brachioradialis and bilateral knees reflexes  Pathological Reflex :  No Cliff's sign ; no ankle clonus  Gait : Not assessed      Diagnostics:   No results found for this or any previous visit (from the past 24 hour(s)). Impression:  Right posterior frontal/anterior parietal cortex small acute infarct stroke causing dizziness, vertigo, and frequent fall  Cognitive impairment  Left hip contusion secondary to fall (symptom resolved)  New onset paroxysmal atrial fibrillation with intermittent rapid ventricular response  History of bilateral glaucoma requiring eye stent placement  History of osteoporosis  Bilateral knee pain due to osteoarthritis  History of anxiety disorder     The patient's condition remains stable. I will prescribe glucosamine for the patient. Since our hospital does not carry glucosamine, the patient can take her own medication instead. The patient's sleep is better now. She still has fatigue. She is tolerating the intensive inpatient rehabilitation treatment. Her function is improving. Plan:  Continues intensive PT/OT/SLP/RT inpatient rehabilitation program at least 3 hours per day, 5 days per week in order to improve functional status prior to discharge. Family education and training will be completed. Equipment evaluations and recommendations will be completed as appropriate. Rehabilitation nursing continues to be involved for bowel, bladder, skin, and pain management. Nursing will also provide education and training to patient and family. Prophylaxis:  DVT: Patient on Xarelto, JUANCHO stocking, intermittent pneumatic compression device.   GI: Colace, Dulcolax as needed, milk of magnesium as needed, Senokot as needed, GlycoLax as needed. Pain: Tylenol as needed  Continue aspirin, Crestor for CVA  Continues Xarelto for paroxysmal atrial fibrillation  Continue Toprol XL for blood pressure control and atrial fibrillation with rapid ventricular response  Continues Flonase usage  Continues vitamin D and multivitamins supplement  Continue clonazepam to 1 mg at bedtime for insomnia  Continues melatonin and continue trazodone as needed for insomnia  Resume home glucosamine usage  Nutrition: Continue current diet.     Bladder: Monitoring signs or symptoms of UTI  Bowel: Monitoring signs or symptoms of constipation   and case management for coordination of care and discharge planning      Missed Therapy Time:  None      Edison Novoa MD

## 2023-02-11 NOTE — PLAN OF CARE
Problem: Discharge Planning  Goal: Discharge to home or other facility with appropriate resources  Outcome: Progressing  Flowsheets (Taken 2/11/2023 0932)  Discharge to home or other facility with appropriate resources:   Identify barriers to discharge with patient and caregiver   Arrange for needed discharge resources and transportation as appropriate   Identify discharge learning needs (meds, wound care, etc)     Problem: Neurosensory - Adult  Goal: Achieves stable or improved neurological status  Outcome: Progressing  Flowsheets (Taken 2/11/2023 0932)  Achieves stable or improved neurological status:   Assess for and report changes in neurological status   Maintain blood pressure and fluid volume within ordered parameters to optimize cerebral perfusion and minimize risk of hemorrhage     Problem: Skin/Tissue Integrity - Adult  Goal: Skin integrity remains intact  Outcome: Progressing  Flowsheets  Taken 2/11/2023 1044 by Tremayne Jones RN  Skin Integrity Remains Intact:   Monitor for areas of redness and/or skin breakdown   Assess vascular access sites hourly  Taken 2/11/2023 0932 by Tremayne Jones RN  Skin Integrity Remains Intact:   Monitor for areas of redness and/or skin breakdown   Assess vascular access sites hourly  Taken 2/11/2023 0216 by Aminata Dias RN  Skin Integrity Remains Intact: Monitor for areas of redness and/or skin breakdown     Problem: Musculoskeletal - Adult  Goal: Return mobility to safest level of function  Outcome: Progressing  Flowsheets (Taken 2/11/2023 0932)  Return Mobility to Safest Level of Function:   Assess patient stability and activity tolerance for standing, transferring and ambulating with or without assistive devices   Assist with transfers and ambulation using safe patient handling equipment as needed     Problem: Safety - Adult  Goal: Free from fall injury  Outcome: Progressing  Falling star prevention in place. Bed and chair alarms in use.  Call light in reach. Purposeful hourly rounding. Problem: Skin/Tissue Integrity  Goal: Absence of new skin breakdown  Description: 1. Monitor for areas of redness and/or skin breakdown  2. Assess vascular access sites hourly  3. Every 4-6 hours minimum:  Change oxygen saturation probe site  4. Every 4-6 hours:  If on nasal continuous positive airway pressure, respiratory therapy assess nares and determine need for appliance change or resting period. Outcome: Progressing     Problem: Pain  Goal: Verbalizes/displays adequate comfort level or baseline comfort level  Outcome: Progressing  Flowsheets (Taken 2/11/2023 0932)  Verbalizes/displays adequate comfort level or baseline comfort level:   Encourage patient to monitor pain and request assistance   Assess pain using appropriate pain scale   Implement non-pharmacological measures as appropriate and evaluate response   Administer analgesics based on type and severity of pain and evaluate response     Problem: Chronic Conditions and Co-morbidities  Goal: Patient's chronic conditions and co-morbidity symptoms are monitored and maintained or improved  Outcome: Progressing  Flowsheets (Taken 2/11/2023 0932)  Care Plan - Patient's Chronic Conditions and Co-Morbidity Symptoms are Monitored and Maintained or Improved:   Monitor and assess patient's chronic conditions and comorbid symptoms for stability, deterioration, or improvement   Collaborate with multidisciplinary team to address chronic and comorbid conditions and prevent exacerbation or deterioration   Update acute care plan with appropriate goals if chronic or comorbid symptoms are exacerbated and prevent overall improvement and discharge     Problem: ABCDS Injury Assessment  Goal: Absence of physical injury  Outcome: Progressing  No physical injury noted this shift. Problem: Nutrition Deficit:  Goal: Optimize nutritional status  Outcome: Progressing  Tolerating current diet and po fluids well.

## 2023-02-11 NOTE — PROGRESS NOTES
[address]  Occupational Therapy  Daily Note  Time:   Time In: 0489  Time Out: 0360  Timed Code Treatment Minutes: 60 Minutes  Minutes: 60          Date: 2023  Patient Name: Scheryl Seip,   Gender: female      Room: [facility]72/072-A  MRN: [id number]  : 1935  (81 y.o.)  Referring Practitioner: Stephen Henriquez MD  Diagnosis: Vertigo as late effect of stroke  Additional Pertinent Hx: Scheryl Seip is a 81 y.o. 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 MRI of brain was also performed on 2023 and revealed a small acute right posterior frontal/anterior parietal cortex infarct stroke, and mild white matter atrophy. Restrictions/Precautions:  Restrictions/Precautions: Fall Risk, General Precautions     SUBJECTIVE: Pt demo'd frustration with POC. Pt states \"I'm having a meeting with whomever on Tuesday because I  don't need this and can do better at home by myself. \" Pt educated on benefits of skilled OT, educated on goals and POC. Pt compliant with Tx, but demo'd frustration during the first half of Tx. PAIN: not stated    Vitals: Vitals not assessed per clinical judgement, see nursing flowsheet    COGNITION: Decreased Insight    ADL:   Footwear Management: Independent. Don B socks and shoes . BALANCE:  Standing: demo'd good static and dynamic balance during IADL task standing supported and unsupported for over x30min with 0 RB, reaching in all planes with (I), demo'd slight unsteadiness, 0 LOB followed by RB. Educated the patient goals relate to cooking task.  Staff Provided familiarity to kitchen set up. Pt demo'd item retrieval of egg, skillet, utensils, plate, bowel, etc., and demo'd good use of kitchen and kitchen/stove top safety with 0 vc's. Once egg was done cooking, pt demo'd kitchen clean up with good awareness of stove safety and 0 vc's to complete task. BED MOBILITY:  Not Tested    TRANSFERS:  Sit to Stand:  Supervision. Stand to Sit: Supervision. FUNCTIONAL MOBILITY:  Assistive Device: None  Assist Level:  Stand By Assistance. Distance: To and from therapy apartment and home distance for over [id number] with 0 RB, followed by RB. Demo'd 1 LOB during a descent on ramp self corrected with hand rail. Pt states \"I walk better If I was more awake. \"        ASSESSMENT:     Activity Tolerance:  Patient tolerance of  treatment: good. Discharge Recommendations: Home with assist as needed  Equipment Recommendations: Other: Pt has a tub tranfer bench, toilet raiser, and grab bars. No further AE recommended. Plan: Times Per Week: 5x/wk for 90 min  Current Treatment Recommendations: Strengthening, Balance training, Functional mobility training, Endurance training, Safety education & training, Neuromuscular re-education, Patient/Caregiver education & training, Self-Care / ADL, Coordination training, Equipment evaluation, education, & procurement, Cognitive/Perceptual training    Patient Education  Patient Education: Role of OT, Plan of Care, ADL's, IADL's, Home Safety, and Importance of Increasing Activity     Goals  Short Term Goals  Time Frame for Short Term Goals: 1 week  Short Term Goal 1: Pt will complete tub/ shower transfers with SBA to improve indep with showering at home. Short Term Goal 2: Pt will complete dressing tasks with SBA and 0 vcs for orientation of clothing to improve indep with self care. Short Term Goal 3: Pt will plan and sequence BADL routine with min vcs for thought organization to improve indep wtih self care routine at home.   Short Term Goal 4: Pt will plan and execute a simple meal prep task with SBA and min vcs for problem solving/ safety to improve indep with preparing meals for herself. Short Term Goal 5: Pt will complete multiple step IADL tasks with SBA for balance and min vcs for recall to improve indep with getting groceries at home. Long Term Goals  Time Frame for Long Term Goals : 2 weeks  Long Term Goal 1: Pt will complete BADL routine with modified independence and 0 vcs for safety to improve indep with self care. Long Term Goal 2: Pt will complete a light IADL task with supervision to improve indep and safety within her home. Long Term Goal 3: Pt will demonstrate 10 min of dynamic standing balance with mod I to improve indep with showering and standing IADL tasks. Following session, patient left in safe position with all fall risk precautions in place.

## 2023-02-11 NOTE — FLOWSHEET NOTE
02/11/23 0950   Treatment Team Notification   Reason for Communication Evaluate  (Give Metoprolol XR for /50, HR 67?)   Team Member Name Dr. Bobby Ashish Provider   Method of Communication Secure Message   Response Other (Comment)  (Stated to hold medication)

## 2023-02-12 PROCEDURE — 6370000000 HC RX 637 (ALT 250 FOR IP): Performed by: PHYSICAL MEDICINE & REHABILITATION

## 2023-02-12 PROCEDURE — 1180000000 HC REHAB R&B

## 2023-02-12 PROCEDURE — 6370000000 HC RX 637 (ALT 250 FOR IP): Performed by: INTERNAL MEDICINE

## 2023-02-12 RX ADMIN — Medication 1 TABLET: at 09:09

## 2023-02-12 RX ADMIN — Medication 6 MG: at 21:15

## 2023-02-12 RX ADMIN — Medication 500 UNITS: at 09:09

## 2023-02-12 RX ADMIN — ASPIRIN 81 MG: 81 TABLET, COATED ORAL at 09:09

## 2023-02-12 RX ADMIN — Medication 1 TABLET: at 09:10

## 2023-02-12 RX ADMIN — METOPROLOL SUCCINATE 25 MG: 25 TABLET, EXTENDED RELEASE ORAL at 09:09

## 2023-02-12 RX ADMIN — CLONAZEPAM 1 MG: 0.5 TABLET ORAL at 21:15

## 2023-02-12 RX ADMIN — FLUTICASONE PROPIONATE 1 SPRAY: 50 SPRAY, METERED NASAL at 09:10

## 2023-02-12 RX ADMIN — ROSUVASTATIN CALCIUM 40 MG: 20 TABLET, FILM COATED ORAL at 21:15

## 2023-02-12 RX ADMIN — RIVAROXABAN 20 MG: 20 TABLET, FILM COATED ORAL at 17:35

## 2023-02-12 RX ADMIN — TRAZODONE HYDROCHLORIDE 50 MG: 50 TABLET ORAL at 21:15

## 2023-02-12 RX ADMIN — Medication 1 TABLET: at 21:18

## 2023-02-12 RX ADMIN — DOCUSATE SODIUM 100 MG: 100 CAPSULE, LIQUID FILLED ORAL at 21:16

## 2023-02-12 RX ADMIN — DOCUSATE SODIUM 100 MG: 100 CAPSULE, LIQUID FILLED ORAL at 09:09

## 2023-02-12 ASSESSMENT — ENCOUNTER SYMPTOMS
NAUSEA: 0
VOMITING: 0
DIARRHEA: 0
RHINORRHEA: 0
SORE THROAT: 0
BACK PAIN: 0
WHEEZING: 0
SHORTNESS OF BREATH: 0
TROUBLE SWALLOWING: 0
CONSTIPATION: 0
COUGH: 0
ABDOMINAL PAIN: 0

## 2023-02-12 ASSESSMENT — PAIN SCALES - GENERAL
PAINLEVEL_OUTOF10: 0
PAINLEVEL_OUTOF10: 0

## 2023-02-12 NOTE — PLAN OF CARE
Problem: Discharge Planning  Goal: Discharge to home or other facility with appropriate resources  2/12/2023 1008 by Ander Garvey RN  Outcome: Progressing  Flowsheets (Taken 2/12/2023 0683)  Discharge to home or other facility with appropriate resources:   Identify barriers to discharge with patient and caregiver   Arrange for needed discharge resources and transportation as appropriate   Identify discharge learning needs (meds, wound care, etc)     Problem: Neurosensory - Adult  Goal: Achieves stable or improved neurological status  2/12/2023 1008 by Ander Garvey RN  Outcome: Progressing  Flowsheets (Taken 2/12/2023 0905)  Achieves stable or improved neurological status:   Assess for and report changes in neurological status   Maintain blood pressure and fluid volume within ordered parameters to optimize cerebral perfusion and minimize risk of hemorrhage     Problem: Skin/Tissue Integrity - Adult  Goal: Skin integrity remains intact  2/12/2023 1008 by Ander Garvey RN  Outcome: Progressing  Flowsheets  Taken 2/12/2023 1008  Skin Integrity Remains Intact:   Monitor for areas of redness and/or skin breakdown   Assess vascular access sites hourly  Taken 2/12/2023 0905  Skin Integrity Remains Intact:   Monitor for areas of redness and/or skin breakdown   Assess vascular access sites hourly     Problem: Musculoskeletal - Adult  Goal: Return mobility to safest level of function  2/12/2023 1008 by Ander Garvey RN  Outcome: Progressing  Flowsheets (Taken 2/12/2023 0905)  Return Mobility to Safest Level of Function:   Assess patient stability and activity tolerance for standing, transferring and ambulating with or without assistive devices   Assist with transfers and ambulation using safe patient handling equipment as needed   Ensure adequate protection for wounds/incisions during mobilization     Problem: Safety - Adult  Goal: Free from fall injury  2/12/2023 1008 by Ander Garvey RN  Outcome: Progressing  Falling star prevention in place. Bed and chair alarms in use. Call light in reach. Purposeful hourly rounding. Problem: Skin/Tissue Integrity  Goal: Absence of new skin breakdown  Description: 1. Monitor for areas of redness and/or skin breakdown  2. Assess vascular access sites hourly  3. Every 4-6 hours minimum:  Change oxygen saturation probe site  4. Every 4-6 hours:  If on nasal continuous positive airway pressure, respiratory therapy assess nares and determine need for appliance change or resting period.   2/12/2023 1008 by Shey Barnes RN  Outcome: Progressing     Problem: Pain  Goal: Verbalizes/displays adequate comfort level or baseline comfort level  2/12/2023 1008 by Shey Barnes RN  Outcome: Progressing  Flowsheets (Taken 2/12/2023 0905)  Verbalizes/displays adequate comfort level or baseline comfort level:   Encourage patient to monitor pain and request assistance   Assess pain using appropriate pain scale   Implement non-pharmacological measures as appropriate and evaluate response   Administer analgesics based on type and severity of pain and evaluate response     Problem: Chronic Conditions and Co-morbidities  Goal: Patient's chronic conditions and co-morbidity symptoms are monitored and maintained or improved  Outcome: Progressing  Flowsheets (Taken 2/12/2023 0905)  Care Plan - Patient's Chronic Conditions and Co-Morbidity Symptoms are Monitored and Maintained or Improved:   Monitor and assess patient's chronic conditions and comorbid symptoms for stability, deterioration, or improvement   Collaborate with multidisciplinary team to address chronic and comorbid conditions and prevent exacerbation or deterioration   Update acute care plan with appropriate goals if chronic or comorbid symptoms are exacerbated and prevent overall improvement and discharge     Problem: ABCDS Injury Assessment  Goal: Absence of physical injury  Outcome: Progressing  No physical injury noted this shift. Problem: Nutrition Deficit:  Goal: Optimize nutritional status  Outcome: Progressing  Tolerating current diet and po fluids well.

## 2023-02-12 NOTE — PROGRESS NOTES
Physical Medicine & Rehabilitation Progress Note    Chief Complaint: Stroke    Subjective:    Sherwin Blandon is a 81 y.o. 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 The patient says she began feeling something stuck in her throat on 2/1/2023 night. On 2/2/2023 while she was walking in kitchen to try to get something to help him the throat discomfort sensation, she suddenly fell for no reason. She denies having weakness, pain, syncope or fainting prior to the fall. She struggled to get up but fell again after few step into her dining room. At that time she felt the need for bowel movement so she crawled to bathroom and had diarrhea like bowel movement. She did not call her daughter who called 911. The patient was sent to [address] [place] [address] ER for evaluation on 2./2/2023. She was found to have tenderness at the her left hip area. She also complains of feeling dizzy with room spinning sensation when she says suddenly sitting up from supine position. Her blood tests and urine test showed no significant abnormality. X-ray of left hip and chest revealed no acute abnormality. CT of the head without contrast done on 2/2/2023 showed small lacunar infarction at the right insula. CT of cervical spine done on 2/2/2023 was reported to show no acute process. CTA of head and neck done on 2/2/2023 show no significant hemodynamic stenosis in bilateral common and internal carotid arteries, and hypoplastic P1 segment of the right posterior cerebral artery.   MRI of brain was also performed on 2/2/2023 and revealed a small acute right posterior frontal/anterior parietal cortex infarct stroke, and mild white matter atrophy. The patient was started on aspirin 81 mg.  Echocardiogram was performed on 2/3/2023 revealed only grade 1 left ventricle diastolic dysfunction with ejection fraction of 60%. Neurology service was consulted on 2/3/2023. Aspirin 81 mg daily was continued and Plavix 75 mg daily for 21 days was added. PM&R was consulted on 2/2/2023. On 2/8/2023 the patient had an episode of tachycardia. Cardiology was consulted and was diagnosed with new onset paroxysmal atrial fibrillation with intermittent short run rapid ventricular response. The patient was started on Xarelto and continue her daily baby aspirin. Plavix was discontinued. The patient was put on 30-day event monitor on 2/9/2023. The patient says she had good sleep last night but still feels somewhat sleepy this morning. She is still feel tired but denies having focal weakness. She denies having numbness or tingling feeling, headache, dizziness, blurry vision, double vision, nausea vomiting. Her knees are not painful now. She tolerated the intensive inpatient rehabilitation treatment well over the weekend. She expresses wish to go home early this week. Rehabilitation:  PT: Reviewed. Transfers:  Sit to Stand: Supervision, Stand By Assistance  Stand to [address] [place] [address], Stand By Assistance  *Patient instructed in sit to stand transfers from various surfaces including: recliner, mat table and standard chair with arm rests without AD. Ambulation:  Stand By Assistance  Distance: 150 feet x 2   Surface: Level Tile  Device: No Device  Gait Deviations: Forward Flexed Posture, Slow Susu, Decreased Step Length Bilaterally, Decreased Arm Swing, Decreased Gait Speed, Decreased Heel Strike Bilaterally, Narrow Base of Support, Mild Path Deviations, and Unsteady Gait     Stairs:  Stairs: 6\" steps X 4 using One Handrail and Stand By Assistance. *Patient required cueing for safety awareness at times due to impulsivity. Balance:  Dynamic Standing Balance: Stand By Assistance, Contact Guard Assistance  *Patient instructed in standing on airex pad and reaching outside FRANKIE for rings and then patient requesting to trial picking up rings from floor after tossing. Patient demonstrated slight unsteadiness however no formal LOB. Patient required cueing for posture throughout. Patient instructed in standing dynamic balance in order to assist with safety with  functional mobility. Neuromuscular Re-education  *Patient instructed in pod tapping at 6 inch steps initially with BUE support and progressed to no UE support with CGA to SBA. Patient demonstrated slight unsteadiness with tapping pods on 2nd step with cueing for maintaining normalized FRANKIE. Patient instructed in pod tapping in order to assist with weight shifting, proprioception and SL balance. *Patient instructed in directional stepping with PT instruction over fly swatters with CGA. Patient had increased difficulty with foot placement when stepping over fly swatters with cueing for increased step length and height in order to clear fly swatters and allow for BLE foot placement. Patient instructed in directional stepping in order to assist with coordination, balance and proprioception. OT: Reviewed. ADL:   Footwear Management: Independent. Don B socks and shoes . BALANCE:  Sitting Balance:  Modified Independent. Standing Balance: Stand By Assistance. Slight unsteadiness with increased fatigue, 0 LOB standing unsupported      TRANSFERS:  Sit to Stand:  Stand By Assistance. Stand to Sit: Stand By Assistance. FUNCTIONAL MOBILITY:  Assistive Device: None  Assist Level:  Stand By Assistance. Distance: To and from therapy apartment and home distance for over [id number] with 0 RB, followed by RB. Demo'd 1 LOB during a descent on ramp self corrected with hand rail. Pt states \"I walk better If I was more awake. \"     Assistive Device: None  Assist Level:  Stand By Assistance. Distance: To and from therapy gym  0 LOB, slight unsteadiness with increased fatigue. ADDITIONAL ACTIVITIES:  Completed seated BUE ex's 1x15 at elbow and wrist joints in all planes with 2#DB. RB between [address] set. Pt performed 1#DB BUE ex's 1x15 ea in all shoulder planes of motion with min RB between ea set to increase UB strength for self care tasks and transfers. ST: Reviewed. Impressions:   Patient presents with oral phase of swallow function that is essentially [facility] with inability to fully discern potential presence of pharyngeal phase deficits without formal instrumentation. All labial/lingual structures intact and appear to be functioning appropriately at bedside. Oral phase highly unremarkable during consumption of hard/textured solids with patient demonstrating adequate mastication pattern for textural breakdown, cohesive bolus formation, and manipulation. Thin liquids consumed without overt difficulty and with suspected control/containment of fluid bolus. Patient with x1 throat clear to follow thin  liquids via straw (large bolus), however, dry vocal quality to follow. NO additional overt s/s aspiration exhibited across all consistencies/trials consumed, certainly not able to exclude pharyngeal phase dysfunction and/or airway invasion events in its entirety at bedside alone. ST with chart review with observation patient lung sounds clear  and  diminished  with no overt concerns reported by nursing staff. Patient's swallow physiology does appear appropriate to support PO intake without distress with recommendations for completion of instrumental evaluation 2/13/2023;  patient in agreement with plan given extensive  provision of rationale  by  ST. Recommend continuation of regular diet with thin liquids with dysphagia management to be provided to determine  safety with current diet recommendations.      Diet Recommendations:  Regular Diet with Thin Liquids      Review of Systems:  Review of Systems   Constitutional:  Positive for fatigue. Negative for chills, diaphoresis and fever.   HENT:  Negative for hearing loss, rhinorrhea, sneezing, sore throat and trouble swallowing.    Eyes:  Negative for visual disturbance.   Respiratory:  Negative for cough, shortness of breath and wheezing.    Cardiovascular:  Negative for chest pain and palpitations.   Gastrointestinal:  Negative for abdominal pain, constipation, diarrhea, nausea and vomiting.   Genitourinary:  Negative for dysuria.   Musculoskeletal:  Negative for arthralgias, back pain, gait problem, myalgias and neck pain.   Skin:  Negative for rash.   Neurological:  Positive for weakness (Generalized). Negative for dizziness, tremors, facial asymmetry, speech difficulty, light-headedness, numbness and headaches.   Psychiatric/Behavioral:  Negative for dysphoric mood, hallucinations and sleep disturbance. The patient is not nervous/anxious.         Objective:  /72   Pulse 66   Temp 98.6 °F (37 °C) (Oral)   Resp 18   Ht 4' 10\" (1.473 m)   Wt 123 lb 9.6 oz (56.1 kg)   SpO2 96%   BMI 25.83 kg/m²   Physical Exam   General:  well-developed, well nourished  female; in no acute distress ; appropriate affect & mood; sitting on reclining chair comfortably  Eyes: pupil equally round ; extra-ocular motion intact bilaterally  Head, Ear, Nose, Mouth & Throat : normocephalic ; no discharge from ears or nose ; no deformity ; no facial swelling ; oral mucosa pink   Neck :  supple ; no tenderness ; mild muscle spasm at the bilateral cervical paraspinal muscle and bilateral upper trapezius muscles  Cardiovascular : regular rate & rhythm ; normal S1 & S2 heart sound ; no murmur ; normal peripheral pulse at the bilateral upper and lower extremities  Pulmonary : Breath sounds present at bilateral lung fields; no wheezing ; no rale; no crackle  Gastrointestinal : soft, flat abdomen; no tenderness; normal bowel sound present   Back :  no tenderness; no muscle spasm  Skin: no skin lesion or rash ; no pitting edema at all 4 extremities  Musculoskeletal : no limb asymmetry; no limb deformity; mild tenderness at area superior to knee patella on both side; no tenderness at bilateral upper extremities & the rest of lower extremities; no palpable mass at limbs ; no joints laxity or crepitation ; shoulder flexion and abduction passive ROM reaching 170 degrees; hip flexion passive ROM reaching 120 degrees bilaterally; ankle dorsiflexion passive ROM reaching 10 degrees on right side and 10 degrees on left side; normal functional joints ROM at the rest of bilateral upper & lower extremities  Cerebral :  alert ; awake ; oriented to place, person and time; follow one-step verbal command  Cerebellum : no dysmetria with bilateral finger-to-nose test ; mild dysmetria with bilateral heel-to-shin test  Cranial Nerves :  grossly intact CN II to XII function  Sensory : intact light touch and pin prick sensation at bilateral upper & lower extremities  Motor : normal tone at bilateral upper & lower extremities ; 4+/5 muscle strength at bilateral handgrips; 4+/5 to 5/5 muscle strength at right hip flexion; normal 5/5 muscle strength at the rest of bilateral upper & lower extremities  Reflex : 1+ bilateral biceps, bilateral brachioradialis and bilateral knees reflexes  Pathological Reflex :  No Cliff's sign ; no ankle clonus  Gait : Not assessed      Diagnostics:   No results found for this or any previous visit (from the past 24 hour(s)).       Impression:  Right posterior frontal/anterior parietal cortex small acute infarct stroke causing dizziness, vertigo, and frequent fall  Cognitive impairment  Probable dysphagia  Left hip contusion secondary to fall (symptom resolved)  New onset paroxysmal atrial fibrillation with intermittent rapid ventricular response  History of bilateral glaucoma requiring eye stent placement  History of osteoporosis  Bilateral knee pain due to osteoarthritis  History of anxiety disorder     The patient's condition remains stable. She still had fatigue with slight generalized weakness. She tolerated the intensive inpatient rehabilitation treatment over the weekend. Her function is improving slowly. Plan:  Continues intensive PT/OT/SLP/RT inpatient rehabilitation program at least 3 hours per day, 5 days per week in order to improve functional status prior to discharge. Family education and training will be completed. Equipment evaluations and recommendations will be completed as appropriate. Rehabilitation nursing continues to be involved for bowel, bladder, skin, and pain management. Nursing will also provide education and training to patient and family. Prophylaxis:  DVT: Patient on Xarelto, JUANCHO stocking, intermittent pneumatic compression device. GI: Colace, Dulcolax as needed, milk of magnesium as needed, Senokot as needed, GlycoLax as needed. Pain: Tylenol as needed  Continue aspirin, Crestor for CVA  Continues Xarelto for paroxysmal atrial fibrillation  Continue Toprol XL for blood pressure control and atrial fibrillation with rapid ventricular response  Continues Flonase usage  Continues vitamin D and multivitamins supplement  Continue clonazepam to 1 mg at bedtime for insomnia  Continues melatonin and continue trazodone as needed for insomnia  Continue home glucosamine usage  Nutrition: Continue current diet.     Bladder: Monitoring signs or symptoms of UTI  Bowel: Monitoring signs or symptoms of constipation   and case management for coordination of care and discharge planning      Missed Therapy Time:  None      Dion Guzman MD

## 2023-02-13 ENCOUNTER — APPOINTMENT (OUTPATIENT)
Dept: GENERAL RADIOLOGY | Age: 88
End: 2023-02-13
Attending: PHYSICAL MEDICINE & REHABILITATION
Payer: MEDICARE

## 2023-02-13 PROCEDURE — 97535 SELF CARE MNGMENT TRAINING: CPT

## 2023-02-13 PROCEDURE — 97110 THERAPEUTIC EXERCISES: CPT

## 2023-02-13 PROCEDURE — 92611 MOTION FLUOROSCOPY/SWALLOW: CPT

## 2023-02-13 PROCEDURE — 74230 X-RAY XM SWLNG FUNCJ C+: CPT

## 2023-02-13 PROCEDURE — 99232 SBSQ HOSP IP/OBS MODERATE 35: CPT | Performed by: PHYSICAL MEDICINE & REHABILITATION

## 2023-02-13 PROCEDURE — 94760 N-INVAS EAR/PLS OXIMETRY 1: CPT

## 2023-02-13 PROCEDURE — 6370000000 HC RX 637 (ALT 250 FOR IP): Performed by: INTERNAL MEDICINE

## 2023-02-13 PROCEDURE — 6360000004 HC RX CONTRAST MEDICATION: Performed by: PHYSICAL MEDICINE & REHABILITATION

## 2023-02-13 PROCEDURE — 97112 NEUROMUSCULAR REEDUCATION: CPT

## 2023-02-13 PROCEDURE — 97530 THERAPEUTIC ACTIVITIES: CPT

## 2023-02-13 PROCEDURE — 92526 ORAL FUNCTION THERAPY: CPT

## 2023-02-13 PROCEDURE — 97116 GAIT TRAINING THERAPY: CPT

## 2023-02-13 PROCEDURE — 97129 THER IVNTJ 1ST 15 MIN: CPT

## 2023-02-13 PROCEDURE — 2500000003 HC RX 250 WO HCPCS: Performed by: PHYSICAL MEDICINE & REHABILITATION

## 2023-02-13 PROCEDURE — 6370000000 HC RX 637 (ALT 250 FOR IP): Performed by: PHYSICAL MEDICINE & REHABILITATION

## 2023-02-13 PROCEDURE — A4641 RADIOPHARM DX AGENT NOC: HCPCS | Performed by: PHYSICAL MEDICINE & REHABILITATION

## 2023-02-13 PROCEDURE — 1180000000 HC REHAB R&B

## 2023-02-13 RX ADMIN — RIVAROXABAN 20 MG: 20 TABLET, FILM COATED ORAL at 18:09

## 2023-02-13 RX ADMIN — CLONAZEPAM 1 MG: 0.5 TABLET ORAL at 22:36

## 2023-02-13 RX ADMIN — METOPROLOL SUCCINATE 25 MG: 25 TABLET, EXTENDED RELEASE ORAL at 09:52

## 2023-02-13 RX ADMIN — BARIUM SULFATE 20 ML: 400 PASTE ORAL at 08:52

## 2023-02-13 RX ADMIN — BARIUM SULFATE 20 ML: 0.81 POWDER, FOR SUSPENSION ORAL at 08:52

## 2023-02-13 RX ADMIN — ROSUVASTATIN CALCIUM 40 MG: 20 TABLET, FILM COATED ORAL at 22:51

## 2023-02-13 RX ADMIN — DOCUSATE SODIUM 100 MG: 100 CAPSULE, LIQUID FILLED ORAL at 09:52

## 2023-02-13 RX ADMIN — Medication 1 TABLET: at 22:52

## 2023-02-13 RX ADMIN — Medication 1 TABLET: at 09:52

## 2023-02-13 RX ADMIN — Medication 500 UNITS: at 09:52

## 2023-02-13 RX ADMIN — FLUTICASONE PROPIONATE 1 SPRAY: 50 SPRAY, METERED NASAL at 09:51

## 2023-02-13 RX ADMIN — ASPIRIN 81 MG: 81 TABLET, COATED ORAL at 09:52

## 2023-02-13 RX ADMIN — DOCUSATE SODIUM 100 MG: 100 CAPSULE, LIQUID FILLED ORAL at 22:53

## 2023-02-13 RX ADMIN — BARIUM SULFATE 1 TABLET: 700 TABLET ORAL at 08:53

## 2023-02-13 ASSESSMENT — PAIN SCALES - GENERAL
PAINLEVEL_OUTOF10: 0

## 2023-02-13 ASSESSMENT — ENCOUNTER SYMPTOMS
COUGH: 0
DIARRHEA: 0
NAUSEA: 0
CONSTIPATION: 0
SORE THROAT: 0
RHINORRHEA: 0
ABDOMINAL PAIN: 0
BACK PAIN: 0
WHEEZING: 0
TROUBLE SWALLOWING: 0
VOMITING: 0
SHORTNESS OF BREATH: 0

## 2023-02-13 NOTE — PROGRESS NOTES
[facility]  INPATIENT PHYSICAL THERAPY  Progress Note  [facility] - [id number]    Time In: 1400  Time Out: 1500  Timed Code Treatment Minutes: 60 Minutes  Minutes: 60          Date: 2023  Patient Name: Rosana Whitley,  Gender:  female        MRN: [id number]  : 1935  (81 y.o.)     Referring Practitioner: Naet Robb MD  Diagnosis: Vertigo as late effect of stroke  Additional Pertinent Hx: Rosana Whitley is a 81 y.o. female who presents to Emergency Department with Fall and Other (Feels like something is stuck in throat). Patient is brought in by EMS for evaluation of fall x2 today. Patient says she fell in the kitchen and again in the dining room. Lives at home by herself. Patient states she had no idea why and how she fell. MRI shows small acute infarct in the right posterior frontal/anterior parietal cortex. To IP rehab . Prior Level of Function:  Lives With: Alone  Type of Home: House  Home Layout: One level, Laundry in basement  Home Access: Stairs to enter with rails  Entrance Stairs - Number of Steps: 4  Entrance Stairs - Rails: Right  Home Equipment: Walker, rolling, Cane, Rollator, [employer]   Bathroom Shower/Tub: Tub/Shower unit  Bathroom Toilet: Standard  Bathroom Equipment: Tub transfer bench, Grab bars in shower, Toilet raiser  Bathroom Accessibility: Accessible    Receives Help From: Family (check in on patient)  ADL Assistance: [address] [place] [address] Avenue: Independent  Ambulation Assistance: Independent  Transfer Assistance: Independent  Active : Yes  Additional Comments: Patient was independent with self care, driving, getting her own groceries, managing finances without AD. Pt has 3 children that can assist as needed.     Restrictions/Precautions:  Restrictions/Precautions: Fall Risk, General Precautions     SUBJECTIVE: Pt. Seated in armchair in gym, just finished with OT right before, pleasantly agrees to therapy session. Poor insight into deficits and for safety noted during session. PAIN: No pain noted. Vitals:   Patient Vitals for the past 8 hrs:   BP Temp Temp src Pulse Resp SpO2 O2 Device   02/13/23 1106 -- -- -- -- -- 98 % None (Room air)   02/13/23 0810 110/68 98.3 °F (36.8 °C) Oral 68 16 98 % None (Room air)       OBJECTIVE:  Bed Mobility:  Not Tested    Transfers:  Sit to Stand: Supervision  Stand to Sit:Supervision    Ambulation:  Stand By Assistance  Distance: ~300 ft. X1; Short distances in therapy gym   Surface: Level Tile  Device: No Device  Gait Deviations: Forward Flexed Posture, Slow Susu, Decreased Step Length Bilaterally, Decreased Gait Speed, and Decreased Heel Strike Bilaterally. Easily distracted when ambulating. Stairs:  Stairs: 6\" steps X 12 using One Handrail and Stand By Assistance. -Increased verbal cues for descending steps with non-reciprocal technique for safety. Balance:  Pt. Completed standing dynamic balance activity: Completed dynamic reaching outside FRANKIE with Narrow FRANKIE, Normal FRANKIE on [employer] balance pad and No UE support on No Device with Stand By Assistance, [employer]. Activity completed to improve balance, enhance functional mobility, and reduce risk of falls. Neuromuscular Re-education  -Stepped forward/laterally over fly-swatters   -Stepped forward/laterally over hurdles   *Difficulty with reciprocal stepping over hurdles and had multiple LOB's that occurred. Also verbal cues for widening FRANKIE throughout to improve balance. Pt. Completed Stepping activities using No UE support on No Device to improve proprioception, coordination, gait mechanics, hip/quad stability, lateral weight shifting, and Environmental awareness for improved functional mobility. Exercise:  Patient was guided in 1 set(s) 10 reps of exercises: Fwd/lateral Step ups onto 6\".   Exercises were completed for increased independence with functional mobility. Functional Outcome Measures:   Not completed    ASSESSMENT:  Assessment: Patient progressing toward established goals. PT Assessment: Patient overall is making progress with skilled PT treatment has not met any goals due to short length stay. Patient requires supervision for sit to stand transfers with occasional cueing for safety awareness due to impulsivity. Patient scored a 31/56 on the Adam Balance indicating she is a moderate risk for falls. Patient is able to ambulate SBA up to 300 feet with decreased gait speed and easily distracted with ambulation. Patient is able to ascend/descend 12 steps with 1 handrail and SBA with cueing for safety with technique. Patient overall can continue to benefit from skilled PT treatment in order to assist with BLE strengthening, gait training, transfer training, bed mobility, core strengthening and dynamic balance for increased functional mobility. Activity Tolerance:  Patient tolerance of  treatment: good. Equipment Recommendations:Equipment Needed: No  Discharge Recommendations: Continue to assess pending progress Continue to assess pending progress    Plan: Current Treatment Recommendations: Strengthening, Balance training, Functional mobility training, Transfer training, Gait training, Stair training, Neuromuscular re-education, Patient/Caregiver education & training, Safety education & training, Therapeutic activities, Endurance training, Home exercise program  General Plan:  (5x/wk 60 mins)  Patient Education  Patient Education: Plan of Care, Functional Mobility, Reviewed Prior Education, Health Promotion and Wellness Education, Safety, Verbal Exercise Instruction    Goals:  Patient Goals : to go home    Short Term Goals  Time Frame for Short Term Goals: 1 week  Short Term Goal 1: Pt to transfer supine <--> sit mod I to enable pt to get in/out of bed.  GOAL NOT MET  Short Term Goal 2: Pt to transfer sit <--> stand mod I from various height surfaces for increased functional mobility. GOAL NOT MET  Short Term Goal 3: Pt to ambulate >100 feet without AD SBA for household ambulation. GOAL NOT MET  Short Term Goal 4: Pt to perform car transfer mod I to enable pt to get in/out of the car. GOAL NOT MET                          Long Term Goals  Time Frame for Long Term Goals : 2 weeks  Long Term Goal 1: Pt to ascend/descend 15 steps while carrying laundry basket to enable pt to safely perform laundry tasks at home GOAL NOT MET  Long Term Goal 2: Pt to ambulate >500 feet without AD mod I for community re-entry. GOAL NOT MET  Long Term Goal 3: Pt to score >41/56 on the Adam balance test to indicate low fall risk. GOAL NOT MET  Long Term Goal 4: Pt to perform TUG test in <11 seconds to indicate low fall risk and improved gait speed. GOAL NOT MET                                  Following session, patient left in safe position with all fall risk precautions in place.

## 2023-02-13 NOTE — PROGRESS NOTES
[facility]  PROGRESS NOTE    TIME   SLP Individual Minutes  Time In: 08  Time Out: 930  Minutes: 60  Timed Code Treatment Minutes: 12 Minutes   *Variance 8 minutes for completion of MBS. Date: 2023  Patient Name: Vipin Lovett      CSN: [id number]   : 1935  (81 y.o.)  Gender: female   Referring Physician:  Elvis Main MD  Diagnosis: Esophageal Dysphagia  Precautions: Fall Risk  Current Diet: Regular diet, thin liquids  Swallowing Strategies: Full Upright Position, Limit Distractions, and Monitor for Fatigue   Date of Last MBS/FEES:  MBS on 23    Pain:  No pain reported. Subjective: ST reviewed swallow assessment (MBS) with patient prior to initiation of evaluation. ST transported patient back to room following MBS. ST reviewed results of MBS. Patient cooperative however requiring support and engagement in ST interventions due to poor insight and low motivation. No family present. Short Term Goals  Time Frame for Short Term Goals: 1 weeks  Goal 1: Patient will complete verbal/visual reasoning and thought organization/workin memory tasks (i.e., calendar, scheduling, etc.) with 80% accuracy and moderate cuing in order to allow for safe return to completion of ADLs/IADLs. GOAL NOT YET ADDRESSED, CONTINUE  INTERVENTIONS: Patient demonstrating deficits in thought organization of safety during sequencing of transferring from bed to wheelchair. Patient also showing deficits in thought organization during finance task. Goal 2: Patient will complete problem solving and executive functioning tasks (i.e., medications, finances, etc.) with 80% accuracy and moderate cuing in order to improve completion of ADLs/IADLs.  GOAL NOT MET, CONTINUE  INTERVENTIONS:  Check [place] Task:  Organization: 1/2 indep, 1/2 incorrect   Math Computation (balance): 1/2 indep, 1/2 incorrect  *Cueing consisted of putting transaction in correct order according to date and math computation. *Deficits noted in thought organization and selective attention during task. *Recommendations for supervision of finances and medication management upon discharge. *Patient states that she uses a calculator for finances at home. Patient demonstrated difficulties in utilizing calculator for finance task, raising a concern for IADLs upon discharge. Previous session:   Check Writing Task: 4/4  independent  *decreased selective attention evidenced, however, all errors self corrected without prompting  *utilization of self talk throughout session with fair  success  *SIGNIFICANT time needed to complete task (>20 minutes)     Check [place] Task  Organization: 1/1  independent  *patient with at least  moderate cuing to assist  with task analysis to initiate task  *plans to complete  in subsequent  therapy session    Goal 3: Patient will complete immediate and delayed recall tasks with 80% accuracy and moderate cuing in order to improve retention of novel information. GOAL NOT YET ADDRESSED, CONTINUE  INTERVENTIONS: DNT due to focus on other STGs. Goal 4: Patient will complete mildly complex sustained, selective, alternating, and divided attention tasks with no more than three errors/redirections in a five minute/one task in order to allow for safe return to driving and PLOF. GOAL NOT MET, CONTINUE  INTERVENTIONS:  Deficits noted in sustained attention during meal. ST cued patient to eat meal rather than engage in discourse. Patient would often have tangential thoughts taking substantially longer time to finish meal minimizing therapy time.      *Deficits in complex attention would greatly affect many ADL/IADLs for patient including driving, cooking, finances, medication management, etc.    Goal 5: Patient will safely consume regular diet with thin liquids with implementation of safe swallow strategies (e.g.,upright positioning, multiple swallows) without overt s/s of airway invasion  in order to assist with meeting nutrition/hydration measures. GOAL NOT MET, CONTINUE  INTERVENTIONS:   Completed skilled dietary analysis with breakfast consisting of muffin, oatmeal, and coffee. Patient demonstrating no difficulties with PO intake. Patient stating that occasionally it feels like she gets food stuck in throat. ST reviewed double swallow strategy with patient and prompted her to utilize strategy during meal times. Patient with no anterior spillage and good bolus control. Appropriate mastication noted. X1 throat clear after sip of liquid however no coughs or change in vocal quality noted throughout meal. Overall good success during meal. Recommending continued regular diet with thin liquids. *Patient will continue to benefit from dysphagia management to ensure safety with implementations of safe swallow strategies. Goal 6: Patient will complete pharyngeal strengthening exercises (e.g., effortful swallow, denise, CTAR) x10 each with good success to improve strength of swallow function. GOAL NOT YET ADDRESSED, CONTINUE  INTERVENTIONS: DNT due to focus on other STGs. Long-Term Goals:  Long Term Goals  Time Frame for Long Term Goals: 2 weeks  Goal 1: Patient will improve cognitive-linguistic skills to a min assist or greater in order to make safe return home with least amount of supervision/assistance GOAL NOT MET, ONGOING    Goal 2: Patient will safely  consume regular diet with thin  liquids without overt s/s  of airway invasion  in  order to assist with meeting nutrition/hydration standards. GOAL NOT MET, ONGOING    EDUCATION:  Learner: Patient  Education:  Reviewed ST goals and Plan of Care, Reviewed recommendations for follow-up, and Education Related to [employer] and Wellness  Evaluation of Education: Verbalizes understanding and Family not present    ASSESSMENT/PLAN:  Summary: Patient has met 0/6 STGs and 0/2 LTGs for this reporting period.  Suspect limited progress due to short LOS and low motivation. Patient showing improvements in implementation of swallow strategies for meals with at least minimal cueing. Continued deficits in thought organization, complex attention, and executive functioning. Receptive/ expressive language WFL. Speech and voice WFL. Patient completed MBS on 2/13/23 with continued regular diet with thin liquids. Patient showing extremely poor insight to cognitive deficits. Significant concerns remaining for safety with ADL/IADL completion. Anticipate further progress with ongoing, skilled ST services via [facility] setting should motivation permit. WithOUT ongoing, skilled ST services, suspect patient may demonstrate difficulty making successful return to PLOF. Patient would benefit from skilled ST services with recommendations for OP ST at discharge, clearance from physician and/or driving evaluation prior to return to driving, and direct supervision with ADL/IADLs. Activity Tolerance:  Patient tolerance of  treatment: good. Assessment/Plan: Patient progressing toward established goals. Continues to require skilled care of licensed speech pathologist to progress toward achievement of established goals and plan of care. .     Plan for Next Session: Working memory, finances, attention  Discharge Recommendations: Outpatient Speech Therapy     James Rodas., Student Speech Intern

## 2023-02-13 NOTE — PROGRESS NOTES
Physical Medicine & Rehabilitation Progress Note    Chief Complaint: Stroke    Subjective:    Jacinda Jackson is a 81 y.o. 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 The patient says she began feeling something stuck in her throat on 2/1/2023 night. On 2/2/2023 while she was walking in kitchen to try to get something to help him the throat discomfort sensation, she suddenly fell for no reason. She denies having weakness, pain, syncope or fainting prior to the fall. She struggled to get up but fell again after few step into her dining room. At that time she felt the need for bowel movement so she crawled to bathroom and had diarrhea like bowel movement. She did not call her daughter who called 911. The patient was sent to [address] [place] [address] ER for evaluation on 2./2/2023. She was found to have tenderness at the her left hip area. She also complains of feeling dizzy with room spinning sensation when she says suddenly sitting up from supine position. Her blood tests and urine test showed no significant abnormality. X-ray of left hip and chest revealed no acute abnormality. CT of the head without contrast done on 2/2/2023 showed small lacunar infarction at the right insula. CT of cervical spine done on 2/2/2023 was reported to show no acute process. CTA of head and neck done on 2/2/2023 show no significant hemodynamic stenosis in bilateral common and internal carotid arteries, and hypoplastic P1 segment of the right posterior cerebral artery.   MRI of brain was also performed on 2/2/2023 and revealed a small acute right posterior frontal/anterior parietal cortex infarct stroke, and mild white matter atrophy. The patient was started on aspirin 81 mg.  Echocardiogram was performed on 2/3/2023 revealed only grade 1 left ventricle diastolic dysfunction with ejection fraction of 60%. Neurology service was consulted on 2/3/2023. Aspirin 81 mg daily was continued and Plavix 75 mg daily for 21 days was added. PM&R was consulted on 2/2/2023. On 2/8/2023 the patient had an episode of tachycardia. Cardiology was consulted and was diagnosed with new onset paroxysmal atrial fibrillation with intermittent short run rapid ventricular response. The patient was started on Xarelto and continue her daily baby aspirin. Plavix was discontinued. The patient was put on 30-day event monitor on 2/9/2023. The patient says she started having some burning sensation with voiding and urinary urgency. She thinks she is having UTI. She denies having focal weakness. She is still having easy fatigue. She denies having any numbness or tingling feeling, headache, dizziness, blurry vision, double vision, nausea or vomiting. She continues tolerating the intensive inpatient rehabilitation treatment. She still expresses wish to go home early this week. The patient's case is discussed in multidisciplinary meeting today. Currently the patient is projected to be ready for discharge on 2/18/2023. Rehabilitation:  PT: Reviewed. Transfers:  Sit to Stand: Supervision  Stand to Sit:Supervision     Ambulation:  Stand By Assistance  Distance: ~300 ft. X1; Short distances in therapy gym   Surface: Level Tile  Device: No Device  Gait Deviations: Forward Flexed Posture, Slow Susu, Decreased Step Length Bilaterally, Decreased Gait Speed, and Decreased Heel Strike Bilaterally. Easily distracted when ambulating. Supervision, Stand By Assistance  Distance: 150' x 1, 15' x 1, 500'+ x 1, multiple shorter distances.    Surface: Level Tile and Ramp  Device: No Device  Gait Deviations:  Slow Susu, Decreased Step Length Bilaterally, Decreased Gait Speed, and Mild Path Deviations at times     Stairs:  Stairs: 6\" steps X 12 using One Handrail and Stand By Assistance. -Increased verbal cues for descending steps with non-reciprocal technique for safety. Balance:  Pt. Completed standing dynamic balance activity: Completed dynamic reaching outside FRANKIE with Narrow FRANKIE, Normal FRANKIE on [employer] balance pad and No UE support on No Device with Stand By Assistance, [employer]. Activity completed to improve balance, enhance functional mobility, and reduce risk of falls. Neuromuscular Re-education  -Stepped forward/laterally over fly-swatters   -Stepped forward/laterally over hurdles   *Difficulty with reciprocal stepping over hurdles and had multiple LOB's that occurred. Also verbal cues for widening FRANKIE throughout to improve balance. Pt. Completed Stepping activities using No UE support on No Device to improve proprioception, coordination, gait mechanics, hip/quad stability, lateral weight shifting, and Environmental awareness for improved functional mobility. Pt. Completed standing, Stepping, and dynamic gait activities using Intermittent UE support to improve coordination, gait mechanics, hip/quad stability, and lateral weight shifting for improved functional mobility. OT: Reviewed. ADL:   Grooming: Supervision. Hand hygiene, x1 min, x2 events. Bathing: Stand By Assistance. Standing throughout most of shower, sat to wash her feet,    Upper Extremity Dressing: Stand By Assistance. Donning shirt and bra with SBA,  retrieved own clothing   Lower Extremity Dressing: Stand By Assistance. Footwear Management: Stand By Assistance. Toileting: Supervision. During clothing mgmt and hygiene  Toilet Transfer: Supervision. STS . No cues for sequencing rote toileting tasks. Tub Transfer: Stand By Assistance. Using grab bar   Patient cued to bring soap to shower, but forgot.  Pt required repetitive cues to operate the shower faucet, needing physical assistance to turn the shower off at the end of the shower. Once back in room, patient given weather report and retrieved correct clothing items for the day. Pt sequenced and dressed with SBA. BALANCE:  Sitting Balance:  Independent. Standing Balance: Supervision. TRANSFERS:  Sit to Stand:  Supervision. Recliner, couch. Stand to Sit: Supervision. FUNCTIONAL MOBILITY:  Assistive Device: None  Assist Level:  Stand By Assistance. Distance: To and from bathroom and To and from shower room with cues/repetition needed to recall instructions. Pt missed prompts and ambulated past her room     Assistive Device: None  Assist Level:  Stand By Assistance. Distance: To and from therapy apartment  Cues for path finding / attn as pt would be distracted with conversation      ADDITIONAL ACTIVITIES:  Patient completed IADL meal prep task of cooking a simple sandwich on the stove. Pt was able to sequence task appropriately and demo appropriate attn to stovetop mgmt for on/off and correct settings. Pt did leave sandwich on turned off burner instead of removing to put on a plate, however given context (pt wasn't planning on eating the sandwich), was not a significant concern although the sandwich was continuing to cook/burn. Pt was able to complete physical portions of task with supervision. No seated rest breaks during all IADL tasks. Patient completed IADL task of washing dishes, with supervision, no LOB. Appropriate sequencing of task. Overall IADL endurance 25 min. ST: Reviewed. DIAGNOSTIC IMPRESSIONS:    Patient presents with mod-I oral phase of swallow and mild pharyngeal dysphagia as evidenced by the skilled findings outlined above.  Oral phase of swallow revealed functional mastication with impaired bolus control and diminished tongue based retraction resulting in premature spillage to the lateral channels with  mild residue to valleculae which cleared spontaneously with subsequent swallows with thin liquid trials and mixed consistencies (barium tablet and thin barium); considered to be [facility] with considerations to patient's age. Patient demonstrated mildly reduced hyolaryngeal elevation with functional excursion. Diminished thyrohyoid approximation with mildly impaired pharyngeal constriction observed resulting in mild pharyngeal residue; however, cleared spontaneously with multiple swallows. Transient laryngeal penetration occurred with thin liquid and mixed consistency (barium tablet and thin barium) during the swallow - cleared spontaneously; however no evidence of deep laryngeal penetration or tracheal aspiration present with additional PO trials. With regard to patient age, swallow integrity largely intact. ST recommends continuation of regular diet and thin liquids with skilled ST intervention for duration of hospitalization to optimize swallow strength and ensure safety with PO diet recommendations and will also include cognitive intervention to assist in building insight and recall regarding compensatory strategies. Diet Recommendations:  Regular and thin     Patient showing deficits in thought organization during finance task. Check [place] Task:  Organization: 1/2 indep, 1/2 incorrect   Math Computation (balance): 1/2 indep, 1/2 incorrect  *Cueing consisted of putting transaction in correct order according to date and math computation. *Deficits noted in thought organization and selective attention during task. *Recommendations for supervision of finances and medication management upon discharge. *Patient states that she uses a calculator for finances at home. Patient demonstrated difficulties in utilizing calculator for finance task, raising a concern for IADLs upon discharge. Deficits noted in sustained attention during meal. ST cued patient to eat meal rather than engage in discourse.  Patient would often have tangential thoughts taking substantially longer time to finish meal minimizing therapy time. *Deficits in complex attention would greatly affect many ADL/IADLs for patient including driving, cooking, finances, medication management, etc.    Completed skilled dietary analysis with breakfast consisting of muffin, oatmeal, and coffee. Patient demonstrating no difficulties with PO intake. Patient stating that occasionally it feels like she gets food stuck in throat. ST reviewed double swallow strategy with patient and prompted her to utilize strategy during meal times. Patient with no anterior spillage and good bolus control. Appropriate mastication noted. X1 throat clear after sip of liquid however no coughs or change in vocal quality noted throughout meal. Overall good success during meal. Recommending continued regular diet with thin liquids. *Patient will continue to benefit from dysphagia management to ensure safety with implementations of safe swallow strategies. Review of Systems:  Review of Systems   Constitutional:  Positive for fatigue. Negative for chills, diaphoresis and fever. HENT:  Negative for hearing loss, rhinorrhea, sneezing, sore throat and trouble swallowing. Eyes:  Negative for visual disturbance. Respiratory:  Negative for cough, shortness of breath and wheezing. Cardiovascular:  Negative for chest pain and palpitations. Gastrointestinal:  Negative for abdominal pain, constipation, diarrhea, nausea and vomiting. Genitourinary:  Positive for dysuria and urgency. Negative for difficulty urinating. Musculoskeletal:  Negative for arthralgias, back pain, gait problem, myalgias and neck pain. Skin:  Negative for rash. Neurological:  Positive for weakness (Generalized). Negative for dizziness, tremors, facial asymmetry, speech difficulty, light-headedness, numbness and headaches.    Psychiatric/Behavioral:  Negative for dysphoric mood, hallucinations and sleep disturbance. The patient is not nervous/anxious.          Objective:  /64   Pulse 70   Temp 98.1 °F (36.7 °C) (Oral)   Resp 18   Ht 4' 10\" (1.473 m)   Wt 123 lb 9.6 oz (56.1 kg)   SpO2 98%   BMI 25.83 kg/m²   Physical Exam   General:  well-developed, well nourished  female; in no acute distress ; appropriate affect & mood; sitting on reclining chair comfortably  Eyes: pupil equally round ; extra-ocular motion intact bilaterally  Head, Ear, Nose, Mouth & Throat : normocephalic ; no discharge from ears or nose ; no deformity ; no facial swelling ; oral mucosa pink   Neck :  supple ; no tenderness ; mild muscle spasm at the bilateral cervical paraspinal muscle and bilateral upper trapezius muscles  Cardiovascular : regular rate & rhythm ; normal S1 & S2 heart sound ; no murmur ; normal peripheral pulse at the bilateral upper and lower extremities  Pulmonary : Breath sounds present at bilateral lung fields; no wheezing ; no rale; no crackle  Gastrointestinal : soft, flat abdomen; mild tenderness at suprapubic area without rebound tenderness; normal bowel sound present   Back : no tenderness; no muscle spasm  Skin: no skin lesion or rash ; no pitting edema at all 4 extremities  Musculoskeletal : no limb asymmetry; no limb deformity; mild tenderness at area superior to knee patella on both side; no tenderness at bilateral upper extremities & the rest of lower extremities; no palpable mass at limbs ; no joints laxity or crepitation ; shoulder flexion and abduction passive ROM reaching 170 degrees; hip flexion passive ROM reaching 120 degrees bilaterally; ankle dorsiflexion passive ROM reaching 10 degrees on right side and 10 degrees on left side; normal functional joints ROM at the rest of bilateral upper & lower extremities  Cerebral :  alert ; awake ; oriented to place, person and time; follow one-step verbal command  Cerebellum : no dysmetria with bilateral finger-to-nose test ; mild dysmetria with bilateral heel-to-shin test  Cranial Nerves :  grossly intact CN II to XII function  Sensory : intact light touch and pin prick sensation at bilateral upper & lower extremities  Motor : normal tone at bilateral upper & lower extremities ; 4+/5 muscle strength at bilateral handgrips; normal 5/5 muscle strength at the rest of bilateral upper & lower extremities  Reflex : 1+ bilateral biceps, bilateral brachioradialis and bilateral knees reflexes  Pathological Reflex :  No Cliff's sign ; no ankle clonus  Gait : Not assessed      Diagnostics:   No results found for this or any previous visit (from the past 24 hour(s)). Impression:  Right posterior frontal/anterior parietal cortex small acute infarct stroke causing dizziness, vertigo, and frequent fall  Cognitive impairment  Probable dysphagia  Left hip contusion secondary to fall (symptom resolved)  New onset paroxysmal atrial fibrillation with intermittent rapid ventricular response  Dysuria with urgency possibly due to UTI  History of bilateral glaucoma requiring eye stent placement  History of osteoporosis  Bilateral knee pain due to osteoarthritis  History of anxiety disorder     The patient's condition remains stable. She is complaining of symptoms suggestive of UTI. I will order urinalysis reflex to culture. If there is a evidence of possible UTI on urinalysis, then I will start her on antibiotic. She says she had history of UTI and was treated with Cipro in the past.  She still has fatigue with slight generalized weakness. She continues tolerating the intensive inpatient rehabilitation treatment. Her function is improving slowly. The patient is scheduled to be discharged on 2/18/2023. Plan:  Continues intensive PT/OT/SLP/RT inpatient rehabilitation program at least 3 hours per day, 5 days per week in order to improve functional status prior to discharge. Family education and training will be completed.   Equipment evaluations and recommendations will be completed as appropriate. Rehabilitation nursing continues to be involved for bowel, bladder, skin, and pain management. Nursing will also provide education and training to patient and family. Prophylaxis:  DVT: Patient on Xarelto, JUANCHO stocking, intermittent pneumatic compression device. GI: Colace, Dulcolax as needed, milk of magnesium as needed, Senokot as needed, GlycoLax as needed. Pain: Tylenol as needed  Urinalysis reflex to culture to rule out UTI ; may consider starting antibiotic treatment if urinalysis is positive  Continue aspirin, Crestor for CVA  Continues Xarelto for paroxysmal atrial fibrillation  Continue Toprol XL for blood pressure control and atrial fibrillation with rapid ventricular response  Continues Flonase usage  Continues vitamin D and multivitamins supplement  Continue clonazepam to 1 mg at bedtime for insomnia  Continues melatonin and continue trazodone as needed for insomnia  Continue home glucosamine usage  Nutrition: Continue current diet.     Bladder: Monitoring signs or symptoms of UTI  Bowel: Monitoring signs or symptoms of constipation   and case management for coordination of care and discharge planning      Missed Therapy Time:  None      Uziel Saucedo MD

## 2023-02-13 NOTE — PROGRESS NOTES
[address]  Occupational Therapy  Daily Note  Time:   Time In: 1330  Time Out: 1400  Timed Code Treatment Minutes: 30 Minutes  Minutes: 30    Date: 2023  Patient Name: Kelli Rodas,   Gender: female      Room: [facility]/072-A  MRN: [id number]  : 1935  (81 y.o.)  Referring Practitioner: Jodi Wolf MD  Diagnosis: Vertigo as late effect of stroke  Additional Pertinent Hx: Kelli Rodas is a 81 y.o. 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 MRI of brain was also performed on 2023 and revealed a small acute right posterior frontal/anterior parietal cortex infarct stroke, and mild white matter atrophy. Restrictions/Precautions:  Restrictions/Precautions: Fall Risk, General Precautions     SUBJECTIVE: Patient in recliner upon arrival, pleasant and cooperative. Agreeable to OT. PAIN: Denies     Vitals: Vitals not assessed per clinical judgement, see nursing flowsheet    COGNITION: Impaired Attention, Impaired problem solving. ADL:   Grooming: Supervision. Hand hygiene, x1 min, x2 events. Toileting: Supervision. During clothing mgmt and hygiene  Toilet Transfer: Supervision. STS . No cues for sequencing rote toileting tasks. BALANCE:  Sitting Balance:  Independent. Standing Balance: Supervision. BED MOBILITY:  Not Tested    TRANSFERS:  Sit to Stand:  Supervision. Recliner, couch. Stand to Sit: Supervision. FUNCTIONAL MOBILITY:  Assistive Device: None  Assist Level:  Stand By Assistance. Distance:  To and from therapy apartment  Cues for path finding / attn as pt would be distracted with conversation     ADDITIONAL ACTIVITIES:  Patient completed IADL meal prep task of cooking a simple sandwich on the stove. Pt was able to sequence task appropriately and demo appropriate attn to stovetop mgmt for on/off and correct settings. Pt did leave sandwich on turned off burner instead of removing to put on a plate, however given context (pt wasn't planning on eating the sandwich), was not a significant concern although the sandwich was continuing to cook/burn. Pt was able to complete physical portions of task with supervision. No seated rest breaks during all IADL tasks. Patient completed IADL task of washing dishes, with supervision, no LOB. Appropriate sequencing of task. Overall IADL endurance 25 min. ASSESSMENT:  Assessment: Francisco Javier Cowart is making good progress towards her goals, meeting 2/5 STGs since [facility][note 3] established 2/10. Pt demonstrates decreased balance, impaired thought organization, divided attention and functional cognition skills impacting patient's ability to complete self care at prior level of functioning. Pt has improved to completing her ADL routine with SBA and cues for problem solving and thought organization. She requires less cues to intiate tasks and organize her thoughts. Pt demonstrates difficulty with divided attention tasks. Previous to her stroke, she was indep with all self care, IADLs and driving tasks while home alone since her  passed 3 months ago. Due to patient's performance deficits, patient would greatly benefit from continued occupational therapy for ADL remediation, endurance building, strengthening and neuro re-education to return to prior level of functioning and safe return to home environment. Activity Tolerance:  Patient tolerance of  treatment: good. Discharge Recommendations: Home with Outpatient OT if pt has adequate transportation, vs , recommend driving eval if pt plans to drive in the future.    Equipment Recommendations: Other: Pt has a tub tranfer bench, toilet raiser, and grab bars. Recommend an additional grab bar in her shower with a shower seat to use for safety. Plan: Times Per Week: 5x/wk for 90 min  Current Treatment Recommendations: Strengthening, Balance training, Functional mobility training, Endurance training, Safety education & training, Neuromuscular re-education, Patient/Caregiver education & training, Self-Care / ADL, Coordination training, Equipment evaluation, education, & procurement, Cognitive/Perceptual training    Patient Education  Patient Education: IADL's    Goals  Short Term Goals  Time Frame for Short Term Goals: 1 week  Short Term Goal 1: Pt will complete tub/ shower transfers with supervision to improve indep with showering at home. Short Term Goal 2: Pt will complete dressing tasks with supervision and 0 vcs for orientation of clothing to improve indep with self care. Short Term Goal 3: Pt will plan and sequence BADL routine with min vcs for thought organization to improve indep wtih self care routine at home. Short Term Goal 4: Pt will plan and execute a simple meal prep task with SBA and min vcs for problem solving/ safety to improve indep with preparing meals for herself. Short Term Goal 5: Pt will complete multiple step IADL tasks with SBA for balance and min vcs for recall to improve indep with getting groceries at home. Long Term Goals  Time Frame for Long Term Goals : 2 weeks from IPR evaluation  Long Term Goal 1: Pt will complete BADL routine with modified independence and 0 vcs for safety to improve indep with self care. Long Term Goal 2: Pt will complete a light IADL task with supervision to improve indep and safety within her home. Long Term Goal 3: Pt will demonstrate 10 min of dynamic standing balance with mod I to improve indep with showering and standing IADL tasks.     Following session, patient left in safe position with all fall risk precautions in place.

## 2023-02-13 NOTE — PROGRESS NOTES
[address] NOTE    Conference Date: 2023  Admit Date:  2023  4:16 PM  Patient Name: Mark Carbajal    MRN: [id number]    : 1935  (81 y.o.)  Rehabilitation Admitting Diagnosis:  Vertigo as late effect of stroke [I69.398, R42]  Referring Practitioner: Rosalina Cooper MD      CASE MANAGEMENT  Current issues/needs regarding patient and family discharge status: Prior to admission, patient was living alone. Patient was independent at home. Patient was completing her ADLs, housekeeping, meal prep, errands, finances and driving. Patient is a retired teacher. Patient reports that she was providing full-time care to her  who passed away in 2022. Patient's main support is her daughter, Almaz Araiza. Almaz Araiza works full-time and has limited availability. Patient's family physician is [facility], APRN-CNP. Patient prefers [facility]. Patient reports having walkers, wheelchairs, shower bench and bedside commode. Patient is motivated to participate in therapy. PHYSICAL THERAPY  Patient overall is making progress with skilled PT treatment has not met any goals due to short length stay. Patient requires supervision for sit to stand transfers with occasional cueing for safety awareness due to impulsivity. Patient scored a 31/56 on the Adam Balance indicating she is a moderate risk for falls. Patient is able to ambulate SBA up to 300 feet with decreased gait speed and easily distracted with ambulation. Patient is able to ascend/descend 12 steps with 1 handrail and SBA with cueing for safety with technique. Patient overall can continue to benefit from skilled PT treatment in order to assist with BLE strengthening, gait training, transfer training, bed mobility, core strengthening and dynamic balance for increased functional mobility. Equipment Needed: No    SPEECH THERAPY  Patient has met 0/6 STGs and 0/2 LTGs for this reporting period. Suspect limited progress due to short LOS and low motivation. Patient showing improvements in implementation of swallow strategies for meals with at least minimal cueing. Continued deficits in thought organization, complex attention, and executive functioning. Receptive/ expressive language WFL. Speech and voice WFL. Patient completed MBS on 2/13/23 with continued regular diet with thin liquids. Patient showing extremely poor insight to cognitive deficits. Significant concerns remaining for safety with ADL/IADL completion. Anticipate further progress with ongoing, skilled ST services via IPR setting should motivation permit. WithOUT ongoing, skilled ST services, suspect patient may demonstrate difficulty making successful return to PLOF. Patient would benefit from skilled ST services with recommendations for OP ST at discharge, clearance from physician and/or driving evaluation prior to return to driving, and direct supervision with ADL/IADLs. Krissy Aponte is making good progress towards her goals, meeting 2/5 STGs since POC established 2/10. Pt demonstrates decreased balance, impaired thought organization, divided attention and functional cognition skills impacting patient's ability to complete self care at prior level of functioning. Pt has improved to completing her ADL routine with SBA and cues for problem solving and thought organization. She requires less cues to intiate tasks and organize her thoughts. Pt demonstrates difficulty with divided attention tasks. Previous to her stroke, she was indep with all self care, IADLs and driving tasks while home alone since her  passed 3 months ago. Due to patient's performance deficits, patient would greatly benefit from continued occupational therapy for ADL remediation, endurance building, strengthening and neuro re-education to return to prior level of functioning and safe return to home environment.   Other: Pt has a tub tranfer bench, toilet raiser, and grab bars. Recommend an additional grab bar in her shower with a shower seat to use for safety. RECREATIONAL THERAPY  Patient has been offered participation in recreational therapy activities and participates as able. Pt states she likes to read and has devotions and the newspaper in her free time-she is visiting with her brother at this time and very pleasant and social-she is in hopes to be leaving early next week -has a supportive family-no leisure needs at this time-pt needs reminders not to get up by herself      NUTRITION  Weight: 123 lb 9.6 oz (56.1 kg) / Body mass index is 25.83 kg/m². Current diet: ADULT DIET; Regular; Low Sodium (2 gm)  Please see nutrition note for details. NURSING  Continent of Bowel: Yes. Frequency: Daily. Management: Colace, senna. Continent of Bladder: Yes. Frequency: 4-6 times a day. Management:  . Pain is Managed:  Yes. Management:  . Frequency of Intervention:  . Adequately Controlled: Yes  Sleep: Adequate  Signs and Symptoms of Infection:  No.   Signs and Symptoms of Skin Breakdown:  No.   Injury and/or Falls during Inpatient Rehabilitation Admission: No  Anticoagulants: ASA, Xarelto  Diabetic: No  Consultations/Labs/X-rays:   Oxygen while on IP Rehab:  No Currently using  liters per   . Home oxygen: No  Patient/Family Education Focus:    Barriers to Education:     No results for input(s): POCGLU in the last 72 hours. Lab Results   Component Value Date    LDLCALC 80 02/03/2023         Vitals:    02/12/23 2110 02/13/23 0810 02/13/23 1106 02/13/23 2230   BP: 115/72 110/68  120/67   Pulse: 66 68  66   Resp: 18 16  16   Temp: 98.6 °F (37 °C) 98.3 °F (36.8 °C)  97.9 °F (36.6 °C)   TempSrc: Oral Oral  Oral   SpO2: 96% 98% 98% 97%   Weight:       Height:              Family Education: Family available and participating in education   Fall Risk:  Falling star program initiated  Is the patient appropriate for a stay in the functional apartment? no    Discharge Plan   Estimated Discharge Date:  2/18/2023    Destination: discharge home with supervision  Services at Discharge: Outpatient  Physical Therapy, Occupational Therapy and speech therapy 3x week  Is patient appropriate for an outpatient driving evaluation? Yes. Later as outpatient  Equipment at Discharge: Other: Pt has a tub tranfer bench, toilet raiser, and grab bars. Recommend an additional grab bar in her shower with a shower seat to use for safety. Factors facilitating achievement of predicted outcomes: Family support, Cooperative, and Pleasant  Barriers to the achievement of predicted outcomes: Cognitive deficit, Impulsivity, Limited safety awareness, Limited insight into deficits, Unrealistic expectations, and Decreased endurance  Follow up with physiatrist? no  If yes, what timeframe? N/A    Team Members Present at Conference:  Matt Juarez, LSW, MSW   Occupational Therapist:Jama Rdz, OTR/L [id number]  Physical Therapist:Cheryl Villar, [facility]  [address], [address], [address]  Nurse:Germaine Duarte RN  Psychologist: Leigh Hines, PhD.    I approve the established interdisciplinary plan of care as documented within the medical record of Kelli Rodas.     Rachelle Singh MD

## 2023-02-13 NOTE — PROGRESS NOTES
[address] [address]  Inpatient Rehabilitation  Occupational Therapy  Progress Note  Time:  Time In: 1030  Time Out: 1130  Timed Code Treatment Minutes: 60 Minutes  Minutes: 60          Date: 2023  Patient Name: Zara Schmidt,   Gender: female      Room: [facility]/072-A  MRN: [id number]  : 1935  ([address] y.o.)  Referring Practitioner: Amy Guevara MD  Diagnosis: Vertigo as late effect of stroke  Additional Pertinent Hx: Zara Schmidt is a [address] y.o. 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 MRI of brain was also performed on 2023 and revealed a small acute right posterior frontal/anterior parietal cortex infarct stroke, and mild white matter atrophy. Restrictions/Precautions:  Restrictions/Precautions: Fall Risk, General Precautions    SUBJECTIVE: Pt pleasant and cooperative and agreeable to a shower. PAIN:  None /10:       Vitals:   Vitals:    23 1106   BP:    Pulse:    Resp:    Temp:    SpO2: 98%        COGNITION: Slow Processing, Decreased Insight, and Decreased thought organization     ADL:   Grooming: Stand By Assistance. Standing at sink to complete oral care, hand hygiene   Bathing: Stand By Assistance. Standing throughout most of shower, sat to wash her feet,    Upper Extremity Dressing: Stand By Assistance. Donning shirt and bra with SBA,  retrieved own clothing   Lower Extremity Dressing: Stand By Assistance. Footwear Management: Stand By Assistance. Toileting: Stand By Assistance. Toilet Transfer: Stand By Assistance. Tub Transfer: Stand By Assistance.   Using grab bar   Patient cued to bring soap to shower, but forgot. Pt required repetitive cues to operate the shower faucet, needing physical assistance to turn the shower off at the end of the shower. Once back in room, patient given weather report and retrieved correct clothing items for the day. Pt sequenced and dressed with SBA. BALANCE:  Sitting Balance:  Independent. Standing Balance: Stand By Assistance. TRANSFERS:  Sit to Stand:  Stand By Assistance. Stand to Sit: Stand By Assistance. FUNCTIONAL MOBILITY:  Assistive Device: None  Assist Level:  Stand By Assistance. Distance: To and from bathroom and To and from shower room with cues/repetition needed to recall instructions. Pt missed prompts and ambulated past her room     ASSESSMENT:  Activity Tolerance:  Patient tolerance of  treatment: good. Assessment: Yassine Mandel is making good progress towards her goals, meeting 2/5 STGs since [facility] established 2/10. Pt demonstrates decreased balance, impaired thought organization, divided attention and functional cognition skills impacting patient's ability to complete self care at prior level of functioning. Pt has improved to completing her ADL routine with SBA and cues for problem solving and thought organization. She requires less cues to intiate tasks and organize her thoughts. Pt demonstrates difficulty with divided attention tasks. Previous to her stroke, she was indep with all self care, IADLs and driving tasks while home alone since her  passed 3 months ago. Due to patient's performance deficits, patient would greatly benefit from continued occupational therapy for ADL remediation, endurance building, strengthening and neuro re-education to return to prior level of functioning and safe return to home environment. Discharge Recommendations: Home with Home health OT  Equipment Recommendations: Other: Pt has a tub tranfer bench, toilet raiser, and grab bars.   Recommend an additional grab bar in her shower with a shower seat to use for safety. Plan: Times Per Week: 5x/wk for 90 min  Current Treatment Recommendations: Strengthening, Balance training, Functional mobility training, Endurance training, Safety education & training, Neuromuscular re-education, Patient/Caregiver education & training, Self-Care / ADL, Coordination training, Equipment evaluation, education, & procurement, Cognitive/Perceptual training    Patient Education  Patient Education: Role of OT, Plan of Care, ADL's, and Equipment Education    Goals  Short Term Goals  Time Frame for Short Term Goals: 1 week  Short Term Goal 1: Pt will complete tub/ shower transfers with SBA to improve indep with showering at home. MET, REVISE   Short Term Goal 2: Pt will complete dressing tasks with SBA and 0 vcs for orientation of clothing to improve indep with self care. MET REVISE   Short Term Goal 3: Pt will plan and sequence BADL routine with min vcs for thought organization to improve indep wtih self care routine at home. NOT MET, CONTINUE   Short Term Goal 4: Pt will plan and execute a simple meal prep task with SBA and min vcs for problem solving/ safety to improve indep with preparing meals for herself. NOT MET CONTINUE   Short Term Goal 5: Pt will complete multiple step IADL tasks with SBA for balance and min vcs for recall to improve indep with getting groceries at home. NOT MET CONTINUE   Long Term Goals  Time Frame for Long Term Goals : 2 weeks  Long Term Goal 1: Pt will complete BADL routine with modified independence and 0 vcs for safety to improve indep with self care. NOT MET CONTINUE   Long Term Goal 2: Pt will complete a light IADL task with supervision to improve indep and safety within her home. NOT MET CONTINUE   Long Term Goal 3: Pt will demonstrate 10 min of dynamic standing balance with mod I to improve indep with showering and standing IADL tasks.  NOT MET CONTINUE     Revised Short-Term Goals  Short Term Goals  Time Frame for Short Term Goals: 1 week  Short Term Goal 1: Pt will complete tub/ shower transfers with supervision to improve indep with showering at home. Short Term Goal 2: Pt will complete dressing tasks with supervision and 0 vcs for orientation of clothing to improve indep with self care. Short Term Goal 3: Pt will plan and sequence BADL routine with min vcs for thought organization to improve indep wtih self care routine at home. Short Term Goal 4: Pt will plan and execute a simple meal prep task with SBA and min vcs for problem solving/ safety to improve indep with preparing meals for herself. Short Term Goal 5: Pt will complete multiple step IADL tasks with SBA for balance and min vcs for recall to improve indep with getting groceries at home. Long Term Goals  Time Frame for Long Term Goals : 2 weeks from IPR evaluation  Long Term Goal 1: Pt will complete BADL routine with modified independence and 0 vcs for safety to improve indep with self care. Long Term Goal 2: Pt will complete a light IADL task with supervision to improve indep and safety within her home. Long Term Goal 3: Pt will demonstrate 10 min of dynamic standing balance with mod I to improve indep with showering and standing IADL tasks. Following session, patient left in safe position with all fall risk precautions in place.

## 2023-02-13 NOTE — PLAN OF CARE
Problem: Discharge Planning  Goal: Discharge to home or other facility with appropriate resources  Outcome: Progressing     Problem: Neurosensory - Adult  Goal: Achieves stable or improved neurological status  Outcome: Progressing     Problem: Skin/Tissue Integrity - Adult  Goal: Skin integrity remains intact  Outcome: Progressing     Problem: Musculoskeletal - Adult  Goal: Return mobility to safest level of function  Outcome: Progressing     Problem: Safety - Adult  Goal: Free from fall injury  Outcome: Progressing     Problem: Skin/Tissue Integrity  Goal: Absence of new skin breakdown  Description: 1. Monitor for areas of redness and/or skin breakdown  2. Assess vascular access sites hourly  3. Every 4-6 hours minimum:  Change oxygen saturation probe site  4. Every 4-6 hours:  If on nasal continuous positive airway pressure, respiratory therapy assess nares and determine need for appliance change or resting period.   Outcome: Progressing     Problem: Pain  Goal: Verbalizes/displays adequate comfort level or baseline comfort level  Outcome: Progressing     Problem: Chronic Conditions and Co-morbidities  Goal: Patient's chronic conditions and co-morbidity symptoms are monitored and maintained or improved  Outcome: Progressing     Problem: ABCDS Injury Assessment  Goal: Absence of physical injury  Outcome: Progressing     Problem: Nutrition Deficit:  Goal: Optimize nutritional status  Outcome: Progressing

## 2023-02-13 NOTE — PROGRESS NOTES
Raymundo Bernard  Modified Barium Swallow    SLP Individual Minutes  Time In: 8356  Time Out: 3275  Minutes: 8  Timed Code Treatment Minutes: 0 Minutes       Date: 2023  Patient Name: Eric Ramírez      CSN: [id number]   : 1935  (81 y.o.)  Gender: female   Referring Physician:  Eden Dunaway MD  Diagnosis: Esophageal Dysphagia and Vertigo as late effect of stroke  Precautions: Fall risk, aspiration  History of Present Illness/Injury: Patient admitted to [facility] with above diagnosis; please see physician H&P for full report. Per chart review, \"Summer Carolina is a 81 y.o. 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 The patient says she began feeling something stuck in her throat last night, 2023. Today, 2023, while she was walking in kitchen to try to get something to help him the throat discomfort sensation, she suddenly fell for no reason. She denies having weakness, pain, syncope or fainting prior to the fall. She struggled to get up but fell again after few step into her dining room. At that time she felt the need for bowel movement so she crawled to bathroom and had diarrhea like bowel movement. She did not call her daughter who called 911. The patient was sent to [address] [place] [address] for evaluation on . She was found to have tenderness at the her left hip area. She also complains of feeling dizzy with room spinning sensation when she says suddenly sitting up from supine position. Her blood tests and urine test showed no significant abnormality. X-ray of left hip and chest revealed no acute abnormality.   CT of the head without contrast done on 2/2/2023 showed small lacunar infarction at the right insula. CT of cervical spine done on 2/2/2023 was reported to show no acute process. CTA of head and neck done on 2/2/2023 show no significant hemodynamic stenosis in bilateral common and internal carotid arteries, and hypoplastic P1 segment of the right posterior cerebral artery. MRI of brain was also performed on 2/2/2023 and revealed a small acute right posterior frontal/anterior parietal cortex infarct stroke, and mild white matter atrophy. Neurology consultation was requested. The patient was started on aspirin 81 mg.\"     Per qchrbz-[id number]-[id number] evaluation completed by ST 2/10, \"Patient currently on regular diet with thin liquids. Patient stating swallowing as an increased difficulty since stroke\" CSE completed on 02/11/2023 with recommendations to complete skilled instrumental assessment via MBS to determine swallow integrity and least restrictive dietary level. Please see medical history questionnaire for information related to prior medical history, allergies and medications  Current Diet: Regular and thin    Pain: No pain reported. SUBJECTIVE:  Patient sitting upright in wheelchair in fluro suite. Patient pleasant and cooperative with ST evaluation. OBJECTIVE:    Respiratory Status:  Room Air    Behavioral Observation:  Alert and Oriented    PATIENT WAS EVALUATED USING:  Barium: Thin Liquids, Puree, Soft Solids, Coarse Solids, Barium Tablet, and Mixed Consistency    ORAL PHASE CLAYTON SCORE: (Dysphagia outcome and severity scale)  6 = WFL/Modified Independent - Normal Diet - May have mild oral delay    PHARYNGEAL PHASE CLAYTON SCORE: (Dysphagia outcome and severity scale)  5 = Mild Dysphagia - may need one consistency restricted - May have one or more of the following: Aspiration with thin - cough to clear, Airway penetration midway to the vocal cords with one or more consistency or to the vocal folds with one consistency, but clears spontaneously - Residue in the pharynx clears spontaneously    EVIDENCE FOR LARYNGEAL PENETRATION AND/OR ASPIRATION:  Laryngeal penetration evident with thin and mixed consistency    PENETRATION-ASPIRATION SCALE (PAS):  Thin Liquids via cup: 2 = Material enters the airway, remains above vocal folds, and is ejected from the airway  Thin liquids via straw 1: Material does not enter the airway  Puree:  1 = Material does not enter the airway  Soft Solid:  1 = Material does not enter the airway  Mixed Consistencies (peaches 1= material does not enter the airway)   Mixed Consistencies (barium tablet with thin barium via cup): 2 = Material enters the airway, remains above vocal folds, and is ejected from the airway  Hard Solid: 1 = Material does not enter the airway  Barium Tablet: 1 = Material does not enter the airway    ESOPHAGEAL PHASE:   No significant findings    ATTEMPTED TECHNIQUES:  Small Bolus Size Effective    Straw Effective    Cup Effective    Large Drinks Effective    Consecutive Drinks Effective    Chin Tuck Not Attempted    Head Turn Not Attempted    Spoon Presentations Effective    Volitional Cough Not Attempted    Spontaneous Cough Not Attempted           DIAGNOSTIC IMPRESSIONS:  Patient presents with mod-I oral phase of swallow and mild pharyngeal dysphagia as evidenced by the skilled findings outlined above. Oral phase of swallow revealed functional mastication with impaired bolus control and diminished tongue based retraction resulting in premature spillage to the lateral channels with  mild residue to valleculae which cleared spontaneously with subsequent swallows with thin liquid trials and mixed consistencies (barium tablet and thin barium); considered to be WFL with considerations to patient's age. Patient demonstrated mildly reduced hyolaryngeal elevation with functional excursion. Diminished thyrohyoid approximation with mildly impaired pharyngeal constriction observed resulting in  mild pharyngeal residue; however, cleared spontaneously with multiple swallows. Transient laryngeal penetration occurred with thin liquid and mixed consistency (barium tablet and thin barium) during the swallow - cleared spontaneously; however no evidence of deep laryngeal penetration or tracheal aspiration present with additional PO trials. With regard to patient age, swallow integrity largely intact. ST recommends continuation of regular diet and thin liquids with skilled ST intervention for duration of hospitalization to optimize swallow strength and ensure safety with PO diet recommendations and will also include cognitive intervention to assist in building insight and recall regarding compensatory strategies. Diet Recommendations:  Regular and thin   Strategies:  Full Upright Position, Small Bite/Sip, Multiple Swallow, Pulmonary Monitoring, Limit Distractions, and Monitor for Fatigue   Rehabilitation Potential: excellent  Discharge Recommendations: Continue to Assess Pending Progress    EDUCATION:  Learner: Patient  Education:  Reviewed results and recommendations of this evaluation, Reviewed diet and strategies, Reviewed signs, symptoms and risks of aspiration, Reviewed ST goals and Plan of Care, and Reviewed recommendations for follow-up  Evaluation of Education: Verbalizes understanding    PLAN:  Skilled SLP intervention on IP Rehab 60 minutes per day, 5 days per week. Specific interventions for next session may include: pharyngeal exercises, cognitive intervention    PATIENT GOAL:    Did not state. Will further assess during treatment. Short Term Goals  Time Frame for Short Term Goals: 1 weeks  Goal 1: Patient will complete verbal/visual reasoning and thought organization/workin memory tasks (i.e., calendar, scheduling, etc.) with 80% accuracy and moderate cuing in order to allow for safe return to completion of ADLs/IADLs.   Goal 2: Patient will complete problem solving and executive functioning tasks (i.e., medications, finances, etc.) with 80% accuracy and moderate cuing in order to improve completion of ADLs/IADLs. Goal 3: Patient will complete immediate and delayed recall tasks with 80% accuracy and moderate cuing in order to improve retention of novel information. Goal 4: Patient will complete mildly complex sustained, selective, alternating, and divided attention tasks with no more than three errors/redirections in a five minute/one task in order to allow for safe return to driving and PLOF. Goal 5: Patient will safely consume regular diet with thin liquids with implementation of safe swallow strategies (e.g.,upright positioning, multiple swallows) without overt s/s of airway invasion  in order to assist with meeting nutrition/hydration measures. Goal 6: Patient will complete pharyngeal strengthening exercises (e.g., effortful swallow, denise, CTAR) x10 each with good success to improve strength of swallow function      LONG TERM GOALS:  Long Term Goals  Time Frame for Long Term Goals: 2 weeks  Goal 1: Patient will improve cognitive-linguistic skills to a min assist or greater in order to make safe return home with least amount of supervision/assistance  Goal 2: Patient will safely  consume regular diet with thin  liquids without overt s/s  of airway invasion  in  order to assist with meeting nutrition/hydration standards.     Miguel Felix, [facility], [address] [place] [address]

## 2023-02-14 LAB
BACTERIA URNS QL MICRO: ABNORMAL /HPF
BILIRUB UR QL STRIP.AUTO: NEGATIVE
CASTS #/AREA URNS LPF: ABNORMAL /LPF
CASTS 2: ABNORMAL /LPF
CHARACTER UR: ABNORMAL
COLOR: YELLOW
CRYSTALS URNS MICRO: ABNORMAL
EPITHELIAL CELLS, UA: ABNORMAL /HPF
GLUCOSE UR QL STRIP.AUTO: NEGATIVE MG/DL
HGB UR QL STRIP.AUTO: ABNORMAL
KETONES UR QL STRIP.AUTO: NEGATIVE
MISCELLANEOUS 2: ABNORMAL
NITRITE UR QL STRIP: NEGATIVE
PH UR STRIP.AUTO: 7.5 [PH] (ref 5–9)
PROT UR STRIP.AUTO-MCNC: 100 MG/DL
RBC URINE: > 200 /HPF
RENAL EPI CELLS #/AREA URNS HPF: ABNORMAL /[HPF]
SP GR UR REFRACT.AUTO: 1.02 (ref 1–1.03)
UROBILINOGEN, URINE: 0.2 EU/DL (ref 0–1)
WBC #/AREA URNS HPF: > 200 /HPF
WBC #/AREA URNS HPF: ABNORMAL /[HPF]
YEAST LIKE FUNGI URNS QL MICRO: ABNORMAL

## 2023-02-14 PROCEDURE — 97110 THERAPEUTIC EXERCISES: CPT

## 2023-02-14 PROCEDURE — 6370000000 HC RX 637 (ALT 250 FOR IP): Performed by: PHYSICAL MEDICINE & REHABILITATION

## 2023-02-14 PROCEDURE — 99232 SBSQ HOSP IP/OBS MODERATE 35: CPT | Performed by: PHYSICAL MEDICINE & REHABILITATION

## 2023-02-14 PROCEDURE — 97130 THER IVNTJ EA ADDL 15 MIN: CPT

## 2023-02-14 PROCEDURE — 87086 URINE CULTURE/COLONY COUNT: CPT

## 2023-02-14 PROCEDURE — 97535 SELF CARE MNGMENT TRAINING: CPT

## 2023-02-14 PROCEDURE — 81001 URINALYSIS AUTO W/SCOPE: CPT

## 2023-02-14 PROCEDURE — 97530 THERAPEUTIC ACTIVITIES: CPT

## 2023-02-14 PROCEDURE — 6370000000 HC RX 637 (ALT 250 FOR IP): Performed by: INTERNAL MEDICINE

## 2023-02-14 PROCEDURE — 87077 CULTURE AEROBIC IDENTIFY: CPT

## 2023-02-14 PROCEDURE — 97116 GAIT TRAINING THERAPY: CPT

## 2023-02-14 PROCEDURE — 87186 SC STD MICRODIL/AGAR DIL: CPT

## 2023-02-14 PROCEDURE — 97112 NEUROMUSCULAR REEDUCATION: CPT

## 2023-02-14 PROCEDURE — 97129 THER IVNTJ 1ST 15 MIN: CPT

## 2023-02-14 PROCEDURE — 1180000000 HC REHAB R&B

## 2023-02-14 RX ORDER — NITROFURANTOIN 25; 75 MG/1; MG/1
100 CAPSULE ORAL 2 TIMES DAILY
Status: DISCONTINUED | OUTPATIENT
Start: 2023-02-14 | End: 2023-02-18 | Stop reason: HOSPADM

## 2023-02-14 RX ADMIN — ROSUVASTATIN CALCIUM 40 MG: 20 TABLET, FILM COATED ORAL at 20:45

## 2023-02-14 RX ADMIN — DOCUSATE SODIUM 100 MG: 100 CAPSULE, LIQUID FILLED ORAL at 11:57

## 2023-02-14 RX ADMIN — METOPROLOL SUCCINATE 25 MG: 25 TABLET, EXTENDED RELEASE ORAL at 11:57

## 2023-02-14 RX ADMIN — CLONAZEPAM 1 MG: 0.5 TABLET ORAL at 21:53

## 2023-02-14 RX ADMIN — FLUTICASONE PROPIONATE 1 SPRAY: 50 SPRAY, METERED NASAL at 11:59

## 2023-02-14 RX ADMIN — NITROFURANTOIN MONOHYDRATE/MACROCRYSTALLINE 100 MG: 25; 75 CAPSULE ORAL at 13:12

## 2023-02-14 RX ADMIN — Medication 1 TABLET: at 20:54

## 2023-02-14 RX ADMIN — ASPIRIN 81 MG: 81 TABLET, COATED ORAL at 11:56

## 2023-02-14 RX ADMIN — Medication 1 TABLET: at 11:59

## 2023-02-14 RX ADMIN — ACETAMINOPHEN 650 MG: 325 TABLET ORAL at 11:57

## 2023-02-14 RX ADMIN — RIVAROXABAN 20 MG: 20 TABLET, FILM COATED ORAL at 17:22

## 2023-02-14 RX ADMIN — Medication 1 TABLET: at 11:56

## 2023-02-14 RX ADMIN — Medication 500 UNITS: at 11:56

## 2023-02-14 RX ADMIN — DOCUSATE SODIUM 100 MG: 100 CAPSULE, LIQUID FILLED ORAL at 20:45

## 2023-02-14 RX ADMIN — NITROFURANTOIN MONOHYDRATE/MACROCRYSTALLINE 100 MG: 25; 75 CAPSULE ORAL at 20:45

## 2023-02-14 ASSESSMENT — ENCOUNTER SYMPTOMS
DIARRHEA: 0
COUGH: 0
RHINORRHEA: 0
SORE THROAT: 0
WHEEZING: 0
ABDOMINAL PAIN: 0
SHORTNESS OF BREATH: 0
NAUSEA: 0
TROUBLE SWALLOWING: 0
CONSTIPATION: 0
VOMITING: 0
BACK PAIN: 0

## 2023-02-14 ASSESSMENT — PAIN SCALES - GENERAL
PAINLEVEL_OUTOF10: 0

## 2023-02-14 NOTE — FLOWSHEET NOTE
Family education provided to daughter Kacie Spence to assist with mobility with patient. Kacie Spence verbalized understanding with use of gait belt and how to properly mobilize patient.

## 2023-02-14 NOTE — PROGRESS NOTES
Raymundo Bernard  DAILY NOTE    TIME   SLP Individual Minutes  Time In: 1330  Time Out: 1430  Minutes: 60  Timed Code Treatment Minutes: 60 Minutes       Date: 2023  Patient Name: Sonam Coates      CSN: [id number]   : 1935  (81 y.o.)  Gender: female   Referring Physician:  Will Sethi MD  Diagnosis: Esophageal Dysphagia  Precautions: Fall Risk  Current Diet: Regular diet, thin liquids  Swallowing Strategies: Full Upright Position, Limit Distractions, and Monitor for Fatigue   Date of Last MBS/FEES:  MBS on 23    Pain:  No pain reported. Subjective: Patient sitting upright in chair upon SLP arrival. ST transferred patient to wheelchair for therapy task. Cooperative to ST interventions, however requiring mod cueing for motivation during tasks. Sisters present in room during beginning of session. Short Term Goals  Time Frame for Short Term Goals: 1 weeks  Goal 1: Patient will complete verbal/visual reasoning and thought organization/workin memory tasks (i.e., calendar, scheduling, etc.) with 80% accuracy and moderate cuing in order to allow for safe return to completion of ADLs/IADLs. INTERVENTIONS: DNT due to focus on other STGs. Goal 2: Patient will complete problem solving and executive functioning tasks (i.e., medications, finances, etc.) with 80% accuracy and moderate cuing in order to improve completion of ADLs/IADLs. INTERVENTIONS:  Check [place] Task:  Organization: 2/3 min cueing, 1/3 incorrect   Math Computation (balance):  2/3 mod cueing, 1/3 incorrect  *Max cueing required for putting transaction in correct order according to date and math computation. *Decreased thought organization and selective attention during task. *Significant difficulties with using calculator during task. *Slow processing during task.      Goal 3: Patient will complete immediate and delayed recall tasks with 80% accuracy and moderate cuing in order to improve retention of novel information.   INTERVENTIONS:   Recall of Navigation Task (5 units):  4/5 indep, 1/5 min cue    Goal 4: Patient will complete mildly complex sustained, selective, alternating, and divided attention tasks with no more than three errors/redirections in a five minute/one task in order to allow for safe return to driving and PLOF.   INTERVENTIONS:   Navigation Task (7E):  12/17 indep, 4/17 min cue, 1/17 max cue  *Cueing required for finding correct price of grocery item and reading all pertinent information on questions (deficits in selective attention).   *Good success in orientation throughout task.     Deficits also noted in selective attention and thought organization during balancing checking account activity (unable to put dates in correct order).     Goal 5: Patient will safely consume regular diet with thin liquids with implementation of safe swallow strategies (e.g.,upright positioning, multiple swallows) without overt s/s of airway invasion  in order to assist with meeting nutrition/hydration measures.   INTERVENTIONS: DNT due to focus on other STGs.    Goal 6: Patient will complete pharyngeal strengthening exercises (e.g., effortful swallow, denise, CTAR) x10 each with good success to improve strength of swallow function.   INTERVENTIONS: DNT due to focus on other STGs.    Long-Term Goals:  Long Term Goals  Time Frame for Long Term Goals: 2 weeks  Goal 1: Patient will improve cognitive-linguistic skills to a min assist or greater in order to make safe return home with least amount of supervision/assistance     Goal 2: Patient will safely  consume regular diet with thin  liquids without overt s/s  of airway invasion  in  order to assist with meeting nutrition/hydration standards.     Comprehension: 4 - Patient understands basic needs 75-90%+ of the time  Expression: 5 - Expresses basic ideas/needs only (hungry/hot/pain)  Social Interaction: 3 - Patient  appropriate  50%-74% of the time  Problem Solving: 3 - Patient solves simple/routine tasks 50%-74%  Memory: 3 - Patient remembers 50%-74% of the time     EDUCATION:  Learner: Patient  Education:  Reviewed ST goals and Plan of Care, Reviewed recommendations for follow-up, and Education Related to [employer] [facility]  Evaluation of Education: Verbalizes understanding and Family not present    ASSESSMENT/PLAN:    Activity Tolerance:  Patient tolerance of  treatment: good. Assessment/Plan: Patient progressing toward established goals. Continues to require skilled care of licensed speech pathologist to progress toward achievement of established goals and plan of care. .     Plan for Next Session: Working memory, finances, attention  Discharge Recommendations: Outpatient Speech Therapy     Larry Hernandez., Student Speech Intern

## 2023-02-14 NOTE — PROGRESS NOTES
[facility] [address]  [address] [place] [address]  Occupational Therapy  Daily Note  Time:   Time In: 3060  Time Out: 3703  Timed Code Treatment Minutes: 30 Minutes  Minutes: 30          Date: 2023  Patient Name: Lenin Nicole,   Gender: female      Room: [facility]72/072-A  MRN: [id number]  : 1935  (81 y.o.)  Referring Practitioner: Christia Homans, MD  Diagnosis: Vertigo as late effect of stroke  Additional Pertinent Hx: Lenin Nicole is a 81 y.o. 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 MRI of brain was also performed on 2023 and revealed a small acute right posterior frontal/anterior parietal cortex infarct stroke, and mild white matter atrophy. Restrictions/Precautions:  Restrictions/Precautions: Fall Risk, General Precautions     SUBJECTIVE: Patient seated in standard chair upon arrival. Agreeable to OT session. Daughter Macho Bustos present during session. PAIN: Denies    Vitals: Vitals not assessed per clinical judgement, see nursing flowsheet    COGNITION: Decreased Insight    ADL:   Upper Extremity Dressing: Supervision. Standing to doff/don winter jacket with no LOB noted . BALANCE:  Sitting Balance:  Independent. Standing Balance: Stand By Assistance. - supervision    BED MOBILITY:  Not Tested    TRANSFERS:  Sit to Stand:  Supervision. From various surfaces  Stand to Sit: Supervision. To various surfaces    FUNCTIONAL MOBILITY:  Assistive Device: None  Assist Level:  Stand By Assistance. Distance: To/from 1st floor of hospital, approx 200 ft outside managing curbs/ramps  Fair pace, no LOB noted.  Minimal fatigue noted    Patient able to locate gift shop after completing mobility from outside of hospital with 1 cue for path finding. Patient then able to locate 7th floor/room while managing elevators/doors with no difficulty noted. Patient required 1 cue for path finding way back to room     ADDITIONAL ACTIVITIES:  Patient completed IADL community re-integration task of going to hospital gift shop that was facilitated to challenge: recall, problem solving, safety awareness. Patient completed functional mobility around gift shop with SBA-supervision for balance with no LOB noted. Pt required 1 cue for recall, able to locate 2/3 items without difficulty. Patient required 1 cue for last item, after cue provided patient able to recall 3rd items. Pt demo'ing fair tolerance and exhibiting minimal fatigue. ASSESSMENT:     Activity Tolerance:  Patient tolerance of  treatment: fair. Discharge Recommendations: Home with Outpatient OT  Equipment Recommendations: Other: Pt has a tub tranfer bench, toilet raiser, and grab bars. Recommend an additional grab bar in her shower with a shower seat to use for safety. Plan: Times Per Week: 5x/wk for 90 min  Current Treatment Recommendations: Strengthening, Balance training, Functional mobility training, Endurance training, Safety education & training, Neuromuscular re-education, Patient/Caregiver education & training, Self-Care / ADL, Coordination training, Equipment evaluation, education, & procurement, Cognitive/Perceptual training    Patient Education  Patient Education:  safety with transfers and mobility, IADl strategies     Goals  Short Term Goals  Time Frame for Short Term Goals: 1 week  Short Term Goal 1: Pt will complete tub/ shower transfers with supervision to improve indep with showering at home. Short Term Goal 2: Pt will complete dressing tasks with supervision and 0 vcs for orientation of clothing to improve indep with self care.   Short Term Goal 3: Pt will plan and sequence BADL routine with min vcs for thought organization to improve indep wtih self care routine at home. Short Term Goal 4: Pt will plan and execute a simple meal prep task with SBA and min vcs for problem solving/ safety to improve indep with preparing meals for herself. Short Term Goal 5: Pt will complete multiple step IADL tasks with SBA for balance and min vcs for recall to improve indep with getting groceries at home. Long Term Goals  Time Frame for Long Term Goals : 2 weeks from IPR evaluation  Long Term Goal 1: Pt will complete BADL routine with modified independence and 0 vcs for safety to improve indep with self care. Long Term Goal 2: Pt will complete a light IADL task with supervision to improve indep and safety within her home. Long Term Goal 3: Pt will demonstrate 10 min of dynamic standing balance with mod I to improve indep with showering and standing IADL tasks. Following session, patient left in safe position with all fall risk precautions in place.

## 2023-02-14 NOTE — PROGRESS NOTES
[address]  Occupational Therapy  Daily Note  Time:   Time In: 1030  Time Out: 1133  Timed Code Treatment Minutes: 63 Minutes  Minutes: 63          Date: 2023  Patient Name: Rosa Isela Martinez,   Gender: female      Room: [facility]/072-A  MRN: [id number]  : 1935  (81 y.o.)  Referring Practitioner: Annie Monrdagon MD  Diagnosis: Vertigo as late effect of stroke  Additional Pertinent Hx: Rosa Isela Martinez is a 81 y.o. 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 MRI of brain was also performed on 2023 and revealed a small acute right posterior frontal/anterior parietal cortex infarct stroke, and mild white matter atrophy. Restrictions/Precautions:  Restrictions/Precautions: Fall Risk, General Precautions     SUBJECTIVE: Patient seated in bedside chair upon arrival. Agreeable to OT session. Daughter Timo Mai present during session  Patient complaining of increased frequency/burning during urination. Patient thinks she has the start of a UTI and is requesting medication. Nurse and doctor notified. PAIN: Complains of pressure in lower abdomen while seated     Vitals: Vitals not assessed per clinical judgement, see nursing flowsheet    COGNITION: Decreased Insight and Decreased thought organization     ADL:   Grooming: Supervision.   Standing sinkside to wash hands after toileting tasks x3 trials  Lower Extremity Dressing: Supervision to doff/don underwear/pants in seated position, able to pull up over hips with no LOB noted  Footwear Management: Supervision to doff/don shoes seated on STS  Toileting: Supervision. For hygiene and clothing management x3 trials  Toilet Transfer: Supervision. To/from STS using grab bar as needed (multiple trials) . BALANCE:  Sitting Balance:  Modified Independent. Standing Balance: Stand By Assistance. -supervision  Patient completed dynamic standing task while frosting cookies for approx 15 minutes requiring supervision for balance with no LOB noted. Instructed patient to make white frosting, red. Patient had difficulty problem solving what to utilize to make frosting red. Therapist set 3 items in front of patient (2 sets of sprinkles and food coloring). Patient unable to problem solve which item would be needed to complete task, max cues provided. BED MOBILITY:  Not Tested    TRANSFERS:  Sit to Stand:  Supervision. From various surfaces  Stand to Sit: Supervision. To various surfaces    FUNCTIONAL MOBILITY:  Assistive Device: None  Assist Level:  Stand By Assistance. Distance: To and from bathroom (multiple trials), to/from therapy apartment, to/from grocery store  No LOB noted, min fatigue noted     ADDITIONAL ACTIVITIES:  Patient completed IADL community re-integration task of going to grocery store that was facilitated to challenge: recall, problem solving. Patient stated 5 items she wanted to purchase from grocery store. Patient completed functional mobility around grocery store with SBA for balance with no LOB noted, located 5/5 items without cues provided for recall. After all items were retrieved patient stood at countertop to pay. Therapist gave total of $33.67. Patient paid with $33.70. Patient was able to verbalize how much change she would receive without difficulty. Therapist then asked if patient paid with $40.00 what would change be. Patient had min difficulty with problem solving amount. Within time, patient able to verbalize correct amount. Patient demo'ing fair tolerance with minimal fatigue noted.        ASSESSMENT:     Activity Tolerance:  Patient tolerance of  treatment: fair. Discharge Recommendations: Home with Outpatient OT  Equipment Recommendations: Other: Pt has a tub tranfer bench, toilet raiser, and grab bars. Recommend an additional grab bar in her shower with a shower seat to use for safety. Plan: Times Per Week: 5x/wk for 90 min  Current Treatment Recommendations: Strengthening, Balance training, Functional mobility training, Endurance training, Safety education & training, Neuromuscular re-education, Patient/Caregiver education & training, Self-Care / ADL, Coordination training, Equipment evaluation, education, & procurement, Cognitive/Perceptual training    Patient Education  Patient Education:  safety with transfers and mobility, IADL strategies     Goals  Short Term Goals  Time Frame for Short Term Goals: 1 week  Short Term Goal 1: Pt will complete tub/ shower transfers with supervision to improve indep with showering at home. Short Term Goal 2: Pt will complete dressing tasks with supervision and 0 vcs for orientation of clothing to improve indep with self care. Short Term Goal 3: Pt will plan and sequence BADL routine with min vcs for thought organization to improve indep wtih self care routine at home. Short Term Goal 4: Pt will plan and execute a simple meal prep task with SBA and min vcs for problem solving/ safety to improve indep with preparing meals for herself. Short Term Goal 5: Pt will complete multiple step IADL tasks with SBA for balance and min vcs for recall to improve indep with getting groceries at home. Long Term Goals  Time Frame for Long Term Goals : 2 weeks from IPR evaluation  Long Term Goal 1: Pt will complete BADL routine with modified independence and 0 vcs for safety to improve indep with self care. Long Term Goal 2: Pt will complete a light IADL task with supervision to improve indep and safety within her home.   Long Term Goal 3: Pt will demonstrate 10 min of dynamic standing balance with mod I to improve indep with showering and standing IADL tasks. Following session, patient left in safe position with all fall risk precautions in place.

## 2023-02-14 NOTE — PLAN OF CARE
Problem: Discharge Planning  Goal: Discharge to home or other facility with appropriate resources  Note: Team conference held Tuesday, 2/14. Recommendations of the team were explained to the patient and her daughter, Jed Jorgensen by Dr Rhea Donahue and SW. Team is recommending that patient continue on acute inpatient rehab for PT, OT and ST, with expected discharge date of Saturday, 2/18. Following discharge, team is recommending Outpatient PT, OT and ST. Care plan reviewed with patient and Cherrya Ranks. Patient and Cherrya Ranks verbalized understanding of the plan of care and contributed to goal setting. Cherrya Ranks reported wanting patient to use [facility] for therapy. Family education scheduled for Saturday, 2/18 at 10 AM. SW to follow and maintain involvement in discharge planning.

## 2023-02-14 NOTE — PROGRESS NOTES
[facility]  INPATIENT PHYSICAL THERAPY  Daily Note  [address] [facility] - 7E-72/072-A    Time In: 0900  Time Out: 1000  Timed Code Treatment Minutes: 60 Minutes  Minutes: 60          Date: 2023  Patient Name: Davida Farfan,  Gender:  female        MRN: [id number]  : 1935  (81 y.o.)     Referring Practitioner: Willie Hardin MD  Diagnosis: Vertigo as late effect of stroke  Additional Pertinent Hx: Davida Farfan is a 81 y.o. female who presents to Emergency Department with Fall and Other (Feels like something is stuck in throat). Patient is brought in by EMS for evaluation of fall x2 today. Patient says she fell in the kitchen and again in the dining room. Lives at home by herself. Patient states she had no idea why and how she fell. MRI shows small acute infarct in the right posterior frontal/anterior parietal cortex. To  rehab . Prior Level of Function:  Lives With: Alone  Type of Home: House  Home Layout: One level, Laundry in basement  Home Access: Stairs to enter with rails  Entrance Stairs - Number of Steps: 4  Entrance Stairs - Rails: Right  Home Equipment: Walker, rolling, Cane, Rollator, [employer]   Bathroom Shower/Tub: Tub/Shower unit  Bathroom Toilet: Standard  Bathroom Equipment: Tub transfer bench, Grab bars in shower, Toilet raiser  Bathroom Accessibility: Accessible    Receives Help From: Family (check in on patient)  ADL Assistance: [address] [place] [address] Avenue: Independent  Ambulation Assistance: Independent  Transfer Assistance: Independent  Active : Yes  Additional Comments: Patient was independent with self care, driving, getting her own groceries, managing finances without AD. Pt has 3 children that can assist as needed. Restrictions/Precautions:  Restrictions/Precautions: Fall Risk, General Precautions     SUBJECTIVE: Pt. Seated on her BS chair and pleasantly agrees to therapy session.  Pt. Requests to use restroom at beginning of session. PAIN: None indicated    Vitals:   No data found. OBJECTIVE:  Bed Mobility:  Not Tested    Transfers:  Sit to Stand: Supervision  Stand to Sit:Supervision    Ambulation:  Supervision, Stand By Assistance  Distance: 150' x 1, 15' x 1, 500'+ x 1, multiple shorter distances. Surface: Level Tile and Ramp  Device: No Device  Gait Deviations:  Slow Susu, Decreased Step Length Bilaterally, Decreased Gait Speed, and Mild Path Deviations at times    Stairs:  None    Balance:  None    Neuromuscular Re-education  Pt. Completed standing, Stepping, and dynamic gait activities using Intermittent UE support to improve coordination, gait mechanics, hip/quad stability, and lateral weight shifting for improved functional mobility. Exercise:  Patient was guided in 1 set(s) 10 reps of exercises: Standing heel/toe raises, Standing marches, Standing hip abduction/adduction, Standing hip extension, Standing hamstring curls, Mini squats. Exercises were completed for increased independence with functional mobility. Pt. Completed 8 minutes on Nu-Step machine on L2 utilizing Bilateral Upper Extremities and Bilateral Lower Extremities to improve strength and endurance for improved functional mobility. Pt. Easily fatigues with exercises requiring occasional rest breaks with cues for deep breathing. Functional Outcome Measures:   Not completed    ASSESSMENT:  Assessment: Patient progressing toward established goals. she continues to demonstrate deficits in Endurance and Safety awareness and would benefit from continued skilled PT to address these impairments and return to PLOF. Activity Tolerance:  Patient tolerance of  treatment: good.      Equipment Recommendations:Equipment Needed: No  Discharge Recommendations: Continue to assess pending progress     Plan: Current Treatment Recommendations: Strengthening, Balance training, Functional mobility training, Transfer training, Gait training, Stair training, Neuromuscular re-education, Patient/Caregiver education & training, Safety education & training, Therapeutic activities, Endurance training, Home exercise program  General Plan:  (5x/wk 60 mins)    Patient Education  Patient Education: Plan of Care, Functional Mobility, Reviewed Prior Education, Health Promotion and Wellness Education, Safety, Verbal Exercise Instruction    Goals:  Patient Goals : to go home  Short Term Goals  Time Frame for Short Term Goals: 1 week  Short Term Goal 1: Pt to transfer supine <--> sit mod I to enable pt to get in/out of bed. Short Term Goal 2: Pt to transfer sit <--> stand mod I from various height surfaces for increased functional mobility. Short Term Goal 3: Pt to ambulate >100 feet without AD SBA for household ambulation. Short Term Goal 4: Pt to perform car transfer mod I to enable pt to get in/out of the car. Long Term Goals  Time Frame for Long Term Goals : 2 weeks  Long Term Goal 1: Pt to ascend/descend 15 steps while carrying laundry basket to enable pt to safely perform laundry tasks at home  Long Term Goal 2: Pt to ambulate >500 feet without AD mod I for community re-entry. Long Term Goal 3: Pt to score >41/56 on the Adam balance test to indicate low fall risk. Long Term Goal 4: Pt to perform TUG test in <11 seconds to indicate low fall risk and improved gait speed. Following session, patient left in safe position with all fall risk precautions in place.

## 2023-02-14 NOTE — CONSULTS
[address] CONSULTATION REPORT    Merrill Hanna MD  PATIENT: Giselle Night  : 1935   MR NUMBER: [id number]  FROM: Dustin Haddad, Ph. D.   DATE: 2023      REPORT OF CONSULTATION: FINDINGS, OPINIONS and RECOMMENDATIONS     REASON FOR REFERRAL: The patient was referred for evaluation due to concerns about emotional status and coping in the wake of recent admission. This occurred on 2023 during the night, when she suddenly fell for no reason. She denies having weakness, pain, syncope or fainting prior to the fall. She struggled to get up but fell again after another few steps. She crawled to the bathroom and called her daughter. MRI revealed a small acute right posterior frontal/anterior parietal cortex infarct stroke, and mild white matter atrophy    Patient presents with the following presenting problems:   Patient Active Problem List   Diagnosis    Acute lacunar infarction ([address]    Cerebral infarct ([address]    Pure hypercholesterolemia    Atrial fibrillation, transient ([address]    Vertigo as late effect of stroke    Primary insomnia    Anxiety    Impaired cognition       BASIS OF EVALUATION:   Clinical assessment of the patient, review of medical records, consultation with Nursing, Physical Therapy, Occupational Therapy, Speech Language Pathology, and Medical Social Work and with attending physician. I also spoke at some length with daughter Vanessa Mera. BEHAVIORAL OBSERVATIONS: The patient presents as a 81y.o.-year-old female of  descent. Grooming was better than average for a hospital patient. Interpersonally, the patient was WNL. Cooperation with assessment was WNL. Level of consciousness was alert. Affect was mood congruent, a little irritable. Mood was anxious, somewhat irritable, though patient was polite and cooperative. Memory is generally WNL. Receptive language was intact, and expressive language was intact. Attention/concentration was somewhat reduced, as are higher level cognitive domains, in general Judgment and problem solving were WNL. Frustration tolerance was fair. PRESENTING PROBLEM: The patient acknowledged having difficulty with accepting limitations of this stroke. She is grieving the recent loss in November 2022 of her  of ten years (he was her second ). Primary coping strategies involve pushing herself and keeping busy, her Rastafari keny, and family. Support systems are family and Yazidi friends. 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  Reports close family ties, family members mostly teachers and musicians and her daughter Angelo Duran is a nurse     Social Determinants of Health     Financial Resource Strain: Not on file   Food Insecurity: Not on file   Transportation Needs: Not on file   Physical Activity: Not on file   Stress: Not on file   Social Connections: Not on file   Intimate Partner Violence: Not on file   Housing Stability: Not on file        IMPRESSIONS:   Cognitively, the patient is doing well. She has some higher level deficits that she is not entirely aware of  Presumed etiology is the stroke and some gradual age-related decline  The patient's emotional state is anxious and somewhat irritable. She is grieving the loss of her , and also the loss of her independence at this time. She is at risk of doing too much too soon, for example, wanting to resume her walks when she picked up trash and recyclables on rough terrain  She is grieving the loss of her , though glad that he was able to die at home and with her care, as she had promised him  She is also wanting to go home so badly that she tends to overlook her own limitations, since the stroke. Diagnosis: adjustment disorder with anxiety. Likely also PTSD, from daughter's report, and psychophysiological insomnia  Factors that may impede progress are overdoing it, getting injured from doing things that overtax her capabilities  Patient strengths and resources include highly motivated, good support, strong Worship keny    RECOMMENDATIONS:   I will follow patient via Rehab Team, and individually as needed during the hospital stay. Medication recommendations: none at present. She is committed to keeping the clonazepam, which she has been on for years and which she thinks is the best thing, since her doctor recommended it. She was not interested in education about the downsides of this medication, especially in the aging patient and during rehab. Bright light phototherapy would normally be recommended to stimulate brain recovery and improve mood and energy. However, patient is very photosensitive. I educated patient about this, and we decided on keeping her blinds open wide whenever possible during the day, since her room gets a lot of light. We discussed plans on rehab for patient to gain insight into her residual deficits, so she can make the most informed decisions about her actions after discharge.      Time spent in evaluating patient, including face to face time with patient, review of records, consultation with staff, and documentation:  85 minutes        Thank you for the opportunity to participate in the care of this patient.      Ruby Rose, Ph. D.

## 2023-02-14 NOTE — PROGRESS NOTES
[facility]  Diagnosis List for Inpatient Rehab facility (IRF) - Patient Assessment Instrument (ANISHA)    Patient Name: Jd Beltre        MRN: [id number]    : 1935  (81 y.o.)  Gender: female     Primary impairment requiring rehabilitation: 1.1 CVA, Left body involvement      Etiologic Diagnosis that led to the condition: Right posterior frontal/anterior parietal cortex small acute infarct stroke    Comorbid conditions affecting rehabilitation:  Right posterior frontal/anterior parietal cortex small acute infarct stroke causing dizziness, vertigo, and frequent fall  Cognitive impairment  Left hip contusion secondary to fall (symptom resolved)  New onset paroxysmal atrial fibrillation with intermittent rapid ventricular response  History of bilateral glaucoma requiring eye stent placement  History of osteoporosis  Bilateral knee pain due to osteoarthritis  History of anxiety disorder     Albert Walter MD

## 2023-02-15 PROCEDURE — 97129 THER IVNTJ 1ST 15 MIN: CPT

## 2023-02-15 PROCEDURE — 97116 GAIT TRAINING THERAPY: CPT

## 2023-02-15 PROCEDURE — 97530 THERAPEUTIC ACTIVITIES: CPT

## 2023-02-15 PROCEDURE — 6370000000 HC RX 637 (ALT 250 FOR IP): Performed by: PHYSICAL MEDICINE & REHABILITATION

## 2023-02-15 PROCEDURE — 99232 SBSQ HOSP IP/OBS MODERATE 35: CPT | Performed by: PHYSICAL MEDICINE & REHABILITATION

## 2023-02-15 PROCEDURE — 6370000000 HC RX 637 (ALT 250 FOR IP): Performed by: INTERNAL MEDICINE

## 2023-02-15 PROCEDURE — 97130 THER IVNTJ EA ADDL 15 MIN: CPT

## 2023-02-15 PROCEDURE — 97110 THERAPEUTIC EXERCISES: CPT

## 2023-02-15 PROCEDURE — 97535 SELF CARE MNGMENT TRAINING: CPT

## 2023-02-15 PROCEDURE — 1180000000 HC REHAB R&B

## 2023-02-15 RX ADMIN — METOPROLOL SUCCINATE 25 MG: 25 TABLET, EXTENDED RELEASE ORAL at 11:09

## 2023-02-15 RX ADMIN — Medication 1 TABLET: at 22:34

## 2023-02-15 RX ADMIN — CLONAZEPAM 1 MG: 0.5 TABLET ORAL at 22:10

## 2023-02-15 RX ADMIN — ROSUVASTATIN CALCIUM 40 MG: 20 TABLET, FILM COATED ORAL at 22:11

## 2023-02-15 RX ADMIN — ASPIRIN 81 MG: 81 TABLET, COATED ORAL at 11:04

## 2023-02-15 RX ADMIN — Medication 1 TABLET: at 11:06

## 2023-02-15 RX ADMIN — BISACODYL 5 MG: 5 TABLET, COATED ORAL at 23:11

## 2023-02-15 RX ADMIN — Medication 500 UNITS: at 11:04

## 2023-02-15 RX ADMIN — Medication 1 TABLET: at 11:08

## 2023-02-15 RX ADMIN — NITROFURANTOIN MONOHYDRATE/MACROCRYSTALLINE 100 MG: 25; 75 CAPSULE ORAL at 11:04

## 2023-02-15 RX ADMIN — FLUTICASONE PROPIONATE 1 SPRAY: 50 SPRAY, METERED NASAL at 11:06

## 2023-02-15 RX ADMIN — DOCUSATE SODIUM 100 MG: 100 CAPSULE, LIQUID FILLED ORAL at 11:08

## 2023-02-15 RX ADMIN — NITROFURANTOIN MONOHYDRATE/MACROCRYSTALLINE 100 MG: 25; 75 CAPSULE ORAL at 22:11

## 2023-02-15 RX ADMIN — RIVAROXABAN 20 MG: 20 TABLET, FILM COATED ORAL at 22:11

## 2023-02-15 RX ADMIN — DOCUSATE SODIUM 100 MG: 100 CAPSULE, LIQUID FILLED ORAL at 22:11

## 2023-02-15 ASSESSMENT — ENCOUNTER SYMPTOMS
TROUBLE SWALLOWING: 0
DIARRHEA: 0
ABDOMINAL PAIN: 0
COUGH: 0
SHORTNESS OF BREATH: 0
BACK PAIN: 0
VOMITING: 0
SORE THROAT: 0
NAUSEA: 0
CONSTIPATION: 0
RHINORRHEA: 0
WHEEZING: 0

## 2023-02-15 ASSESSMENT — PAIN SCALES - GENERAL
PAINLEVEL_OUTOF10: 0

## 2023-02-15 ASSESSMENT — PAIN SCALES - WONG BAKER: WONGBAKER_NUMERICALRESPONSE: 0

## 2023-02-15 NOTE — PROGRESS NOTES
[facility]  DAILY NOTE    TIME   SLP Individual Minutes  Time In: 1330  Time Out: 1430  Minutes: 60  Timed Code Treatment Minutes: 60 Minutes       Date: 2/15/2023  Patient Name: Joanne Garcia      CSN: [id number]   : 1935  (81 y.o.)  Gender: female   Referring Physician:  Camila Biswas MD  Diagnosis: Esophageal Dysphagia  Precautions: Fall Risk  Current Diet: Regular diet, thin liquids  Swallowing Strategies: Full Upright Position, Limit Distractions, and Monitor for Fatigue   Date of Last MBS/FEES:  MBS on 23    Pain:  No pain reported. Subjective: Upon arrival, patient sitting upright in recliner. Agreeable and alert to ST interventions. Daughter Glenys Ramesh present in room during end of session. Sister requested for HEP of time word problems for patient to complete. Short Term Goals  Time Frame for Short Term Goals: 1 weeks  Goal 1: Patient will complete verbal/visual reasoning and thought organization/workin memory tasks (i.e., calendar, scheduling, etc.) with 80% accuracy and moderate cuing in order to allow for safe return to completion of ADLs/IADLs. INTERVENTIONS:   Calendar Organization (weekly planner):    indep  *Good success with thought organization within task. Word Problems (temporal orientation):  1/3 indep, 1/3 mod cue, 1/3 incorrect  *Noted difficulties within task. Mild improvement in success when patient visualized problem on clock. Patient stated that writing out problem might also be beneficial.     Grocery Ad Analyzation:   indep  *Good success with thought organization within task. Goal 2: Patient will complete problem solving and executive functioning tasks (i.e., medications, finances, etc.) with 80% accuracy and moderate cuing in order to improve completion of ADLs/IADLs. INTERVENTIONS:  Writing out a check:  Patient able to indep and successfully write out a check.     Goal 3: Patient will complete immediate and delayed recall tasks with 80% accuracy and moderate cuing in order to improve retention of novel information. INTERVENTIONS:   Recall of Navigation Task (4 units):  2/4 indep, 2/4 max cue    Recall of Appt. (April 9th, 2:30, Dr. Sadaf Matias, check-up):  Immediate recall: indep able to recall written aid; 5/5     Recall of WRAP (write, repeat, associate, picture):  0/4 indep able to recall; ST reviewed strategy and wrote on white board for reference. Goal 4: Patient will complete mildly complex sustained, selective, alternating, and divided attention tasks with no more than three errors/redirections in a five minute/one task in order to allow for safe return to driving and PLOF. INTERVENTIONS:   Navigation Task ([facility]):  16/17 indep, 1/17 min cue  *Cueing required for reading sign to find 7E. *Good success in orientation throughout task. *Mild breakdown in thought organization, however still able to successfully navigate with mildly delayed processing time. Goal 5: Patient will safely consume regular diet with thin liquids with implementation of safe swallow strategies (e.g.,upright positioning, multiple swallows) without overt s/s of airway invasion  in order to assist with meeting nutrition/hydration measures. INTERVENTIONS: DNT due to focus on other STGs. Goal 6: Patient will complete pharyngeal strengthening exercises (e.g., effortful swallow, denise, CTAR) x10 each with good success to improve strength of swallow function. INTERVENTIONS: DNT due to focus on other STGs.     Long-Term Goals:  Long Term Goals  Time Frame for Long Term Goals: 2 weeks  Goal 1: Patient will improve cognitive-linguistic skills to a min assist or greater in order to make safe return home with least amount of supervision/assistance     Goal 2: Patient will safely  consume regular diet with thin  liquids without overt s/s  of airway invasion  in  order to assist with meeting nutrition/hydration standards. Comprehension: 4 - Patient understands basic needs 75-90%+ of the time  Expression: 5 - Expresses basic ideas/needs only (hungry/hot/pain)  Social Interaction: 3 - Patient appropriate  50%-74% of the time  Problem Solving: 3 - Patient solves simple/routine tasks 50%-74%  Memory: 3 - Patient remembers 50%-74% of the time     EDUCATION:  Learner: Patient  Education:  Reviewed ST goals and Plan of Care, Reviewed recommendations for follow-up, and Education Related to [employer] and Wellness  Evaluation of Education: Verbalizes understanding and Family not present    ASSESSMENT/PLAN:  Activity Tolerance:  Patient tolerance of  treatment: good. Assessment/Plan: Patient progressing toward established goals. Continues to require skilled care of licensed speech pathologist to progress toward achievement of established goals and plan of care. .     Plan for Next Session: Working memory, selective attention, pharyngeal strengthening exercises  Discharge Recommendations: Outpatient Speech Therapy     Karly Eng., Student Speech Intern

## 2023-02-15 NOTE — PROGRESS NOTES
[address]  Occupational Therapy  Daily Note  Time:   Time In: 0830  Time Out: 0900  Timed Code Treatment Minutes: 30 Minutes  Minutes: 30      Date: 2/15/2023  Patient Name: Gabe Rivas,   Gender: female      Room: [facility]/072-A  MRN: [id number]  : 1935  (81 y.o.)  Referring Practitioner: Vernell Mayo MD  Diagnosis: Vertigo as late effect of stroke  Additional Pertinent Hx: Gabe Rivas is a 81 y.o. 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 MRI of brain was also performed on 2023 and revealed a small acute right posterior frontal/anterior parietal cortex infarct stroke, and mild white matter atrophy. Restrictions/Precautions:  Restrictions/Precautions: Fall Risk, General Precautions     SUBJECTIVE: Patient pleasant and cooperative. Agreeable to OT     PAIN: Denies, reports her pain from her UTI is better today but still prefers to stand/walk vs sit. Vitals: Vitals not assessed per clinical judgement, see nursing flowsheet    COGNITION: Decreased Recall and Impaired Attention ; however is appropriate for rote ADLs. ADL:   Grooming: Supervision. Oral care, hair care, hand hygiene, make-up application - no LOB, no cues for sequencing or attention. Upper Extremity Dressing: Supervision. During retrieval, donned without issue. Did have to return to closet as she forgot her shirt the first event. Lower Extremity Dressing: Supervision. During retrieval and donning, no LOB. Footwear Management: Supervision.   During retrieval, donned without issue   Toileting: Supervision. No LOB. Toilet Transfer: Supervision. STS . BALANCE:  Sitting Balance:  Independent. Standing Balance: Supervision. BED MOBILITY:  Not Tested    TRANSFERS:  Sit to Stand:  Supervision. Recliner, STS/   Stand to Sit: Supervision. FUNCTIONAL MOBILITY:  Assistive Device: None  Assist Level:  Supervision. Distance: To and from bathroom  No LOB        ASSESSMENT:     Activity Tolerance:  Patient tolerance of  treatment: good. Discharge Recommendations: Home with Outpatient OT  Equipment Recommendations: Other: Pt has a tub tranfer bench, toilet raiser, and grab bars. Recommend an additional grab bar in her shower with a shower seat to use for safety. Plan: Times Per Week: 5x/wk for 90 min  Current Treatment Recommendations: Strengthening, Balance training, Functional mobility training, Endurance training, Safety education & training, Neuromuscular re-education, Patient/Caregiver education & training, Self-Care / ADL, Coordination training, Equipment evaluation, education, & procurement, Cognitive/Perceptual training    Patient Education  Patient Education: ADL's    Goals  Short Term Goals  Time Frame for Short Term Goals: 1 week  Short Term Goal 1: Pt will complete tub/ shower transfers with supervision to improve indep with showering at home. Short Term Goal 2: Pt will complete dressing tasks with supervision and 0 vcs for orientation of clothing to improve indep with self care. Short Term Goal 3: Pt will plan and sequence BADL routine with min vcs for thought organization to improve indep wtih self care routine at home. Short Term Goal 4: Pt will plan and execute a simple meal prep task with SBA and min vcs for problem solving/ safety to improve indep with preparing meals for herself. Short Term Goal 5: Pt will complete multiple step IADL tasks with SBA for balance and min vcs for recall to improve indep with getting groceries at home.   Long Term Goals  Time Frame for Long Term Goals : 2 weeks from IPR evaluation  Long Term Goal 1: Pt will complete BADL routine with modified independence and 0 vcs for safety to improve indep with self care. Long Term Goal 2: Pt will complete a light IADL task with supervision to improve indep and safety within her home. Long Term Goal 3: Pt will demonstrate 10 min of dynamic standing balance with mod I to improve indep with showering and standing IADL tasks. Following session, patient left in safe position with all fall risk precautions in place.

## 2023-02-15 NOTE — PLAN OF CARE
Problem: Skin/Tissue Integrity - Adult  Goal: Skin integrity remains intact  Outcome: Progressing     Problem: Musculoskeletal - Adult  Goal: Return mobility to safest level of function  Outcome: Progressing  Flowsheets (Taken 2/14/2023 2055)  Return Mobility to Safest Level of Function:   Assess patient stability and activity tolerance for standing, transferring and ambulating with or without assistive devices   Assist with transfers and ambulation using safe patient handling equipment as needed   Ensure adequate protection for wounds/incisions during mobilization     Problem: Safety - Adult  Goal: Free from fall injury  Outcome: Progressing     Problem: Pain  Goal: Verbalizes/displays adequate comfort level or baseline comfort level  Outcome: Progressing  Flowsheets (Taken 2/14/2023 2042)  Verbalizes/displays adequate comfort level or baseline comfort level:   Encourage patient to monitor pain and request assistance   Assess pain using appropriate pain scale   Administer analgesics based on type and severity of pain and evaluate response

## 2023-02-15 NOTE — PROGRESS NOTES
Physical Medicine & Rehabilitation Progress Note    Chief Complaint: Stroke    Subjective:    Kelli Rodas is a 81 y.o. 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 The patient says she began feeling something stuck in her throat on 2/1/2023 night. On 2/2/2023 while she was walking in kitchen to try to get something to help him the throat discomfort sensation, she suddenly fell for no reason. She denies having weakness, pain, syncope or fainting prior to the fall. She struggled to get up but fell again after few step into her dining room. At that time she felt the need for bowel movement so she crawled to bathroom and had diarrhea like bowel movement. She did not call her daughter who called 911. The patient was sent to [address] [place] [address] ER for evaluation on 2./2/2023. She was found to have tenderness at the her left hip area. She also complains of feeling dizzy with room spinning sensation when she says suddenly sitting up from supine position. Her blood tests and urine test showed no significant abnormality. X-ray of left hip and chest revealed no acute abnormality. CT of the head without contrast done on 2/2/2023 showed small lacunar infarction at the right insula. CT of cervical spine done on 2/2/2023 was reported to show no acute process. CTA of head and neck done on 2/2/2023 show no significant hemodynamic stenosis in bilateral common and internal carotid arteries, and hypoplastic P1 segment of the right posterior cerebral artery.   MRI of brain was also performed on 2/2/2023 and revealed a small acute right posterior frontal/anterior parietal cortex infarct stroke, and mild white matter atrophy. The patient was started on aspirin 81 mg.  Echocardiogram was performed on 2/3/2023 revealed only grade 1 left ventricle diastolic dysfunction with ejection fraction of 60%. Neurology service was consulted on 2/3/2023. Aspirin 81 mg daily was continued and Plavix 75 mg daily for 21 days was added. PM&R was consulted on 2/2/2023. On 2/8/2023 the patient had an episode of tachycardia. Cardiology was consulted and was diagnosed with new onset paroxysmal atrial fibrillation with intermittent short run rapid ventricular response. The patient was started on Xarelto and continue her daily baby aspirin. Plavix was discontinued. The patient was put on 30-day event monitor on 2/9/2023. The patient says she feels well. She states she had good sleep last night. She said the burning sensation with voiding and urinary urgency is improving but not completely resolved. She now is down Macrobid for UTI. She denies having any painful symptom. She also denies having any numbness or tingling feeling. She says that she still feels tired and fatigue. She denies having any focal weakness. She continues tolerating the intensive inpatient rehabilitation treatment. Today she also disclosed history of intermittent skin itchiness at her bilateral legs without skin redness. However she denies having skin itchiness at the present time. The patient is projected to be ready for discharge on 2/18/2023. Rehabilitation:  PT: Reviewed. Transfers:  Sit to Stand: Supervision  Stand to Sit:Supervision     Ambulation:  Supervision, Stand By Assistance  Distance: 150' x 1, 15' x 1, 500'+ x 1, multiple shorter distances. Surface: Level Tile and Ramp  Device: No Device  Gait Deviations:  Slow Susu, Decreased Step Length Bilaterally, Decreased Gait Speed, and Mild Path Deviations at times     Neuromuscular Re-education  Pt.  Completed standing, Stepping, and dynamic gait activities using Intermittent UE support to improve coordination, gait mechanics, hip/quad stability, and lateral weight shifting for improved functional mobility. OT: Reviewed. ADL:   Grooming: Supervision. Oral care, hair care, hand hygiene, make-up application - no LOB, no cues for sequencing or attention. Upper Extremity Dressing: Supervision. During retrieval, donned without issue. Did have to return to closet as she forgot her shirt the first event. Lower Extremity Dressing: Supervision. During retrieval and donning, no LOB. Footwear Management: Supervision. During retrieval, donned without issue   Toileting: Supervision. No LOB. Toilet Transfer: Supervision. STS . BALANCE:  Sitting Balance:  Independent. Standing Balance: Supervision. BED MOBILITY:  Not Tested     TRANSFERS:  Sit to Stand:  Supervision. Recliner, STS/   Stand to Sit: Supervision. FUNCTIONAL MOBILITY:  Assistive Device: None  Assist Level:  Stand By Assistance. Distance: To and from bathroom (multiple trials), to/from therapy apartment, to/from grocery store  No LOB noted, min fatigue noted     Assistive Device: None  Assist Level:  Stand By Assistance. Distance: To/from 1st floor of hospital, approx 200 ft outside managing curbs/ramps  Fair pace, no LOB noted. Minimal fatigue noted    Patient able to locate gift shop after completing mobility from outside of hospital with 1 cue for path finding. Patient then able to locate 7th floor/room while managing elevators/doors with no difficulty noted. Patient required 1 cue for path finding way back to room     Assistive Device: None  Assist Level:  Supervision. Distance: To and from bathroom  No LOB       ADDITIONAL ACTIVITIES:  Patient completed IADL community re-integration task of going to grocery store that was facilitated to challenge: recall, problem solving. Patient stated 5 items she wanted to purchase from grocery store.  Patient completed functional mobility around grocery store with SBA for balance with no LOB noted, located 5/5 items without cues provided for recall. After all items were retrieved patient stood at countertop to pay. Therapist gave total of $33.67. Patient paid with $33.70. Patient was able to verbalize how much change she would receive without difficulty. Therapist then asked if patient paid with $40.00 what would change be. Patient had min difficulty with problem solving amount. Within time, patient able to verbalize correct amount. Patient demo'ing fair tolerance with minimal fatigue noted. Patient completed IADL community re-integration task of going to hospital gift shop that was facilitated to challenge: recall, problem solving, safety awareness. Patient completed functional mobility around gift shop with SBA-supervision for balance with no LOB noted. Pt required 1 cue for recall, able to locate 2/3 items without difficulty. Patient required 1 cue for last item, after cue provided patient able to recall 3rd items. Pt demo'ing fair tolerance and exhibiting minimal fatigue. ST: Reviewed. Check Felt Task:  Organization: 2/3 min cueing, 1/3 incorrect   Math Computation (balance):  2/3 mod cueing, 1/3 incorrect  *Max cueing required for putting transaction in correct order according to date and math computation. *Decreased thought organization and selective attention during task. *Significant difficulties with using calculator during task. *Slow processing during task. Goal 3: Patient will complete immediate and delayed recall tasks with 80% accuracy and moderate cuing in order to improve retention of novel information.    INTERVENTIONS:   Recall of Navigation Task (5 units):  4/5 indep, 1/5 min cue     Goal 4: Patient will complete mildly complex sustained, selective, alternating, and divided attention tasks with no more than three errors/redirections in a five minute/one task in order to allow for safe return to driving and PLOF.   INTERVENTIONS:   Navigation Task ([facility]):  12/17 indep, 4/17 min cue, 1/17 max cue  *Cueing required for finding correct price of grocery item and reading all pertinent information on questions (deficits in selective attention). *Good success in orientation throughout task. Deficits also noted in selective attention and thought organization during balancing checking account activity (unable to put dates in correct order). Goal 5: Patient will safely consume regular diet with thin liquids with implementation of safe swallow strategies (e.g.,upright positioning, multiple swallows) without overt s/s of airway invasion  in order to assist with meeting nutrition/hydration measures. INTERVENTIONS: DNT due to focus on other STGs. Goal 6: Patient will complete pharyngeal strengthening exercises (e.g., effortful swallow, denise, CTAR) x10 each with good success to improve strength of swallow function. INTERVENTIONS: DNT due to focus on other STGs. Review of Systems:  Review of Systems   Constitutional:  Positive for fatigue. Negative for chills, diaphoresis and fever. HENT:  Negative for hearing loss, rhinorrhea, sneezing, sore throat and trouble swallowing. Eyes:  Negative for visual disturbance. Respiratory:  Negative for cough, shortness of breath and wheezing. Cardiovascular:  Negative for chest pain and palpitations. Gastrointestinal:  Negative for abdominal pain, constipation, diarrhea, nausea and vomiting. Genitourinary:  Positive for dysuria and urgency. Negative for difficulty urinating. Musculoskeletal:  Negative for arthralgias, back pain, gait problem, myalgias and neck pain. Skin:  Negative for rash. Neurological:  Positive for weakness (Generalized). Negative for dizziness, tremors, facial asymmetry, speech difficulty, light-headedness, numbness and headaches. Psychiatric/Behavioral:  Negative for dysphoric mood, hallucinations and sleep disturbance.  The patient is not nervous/anxious.          Objective:  /62   Pulse 65   Temp 97.9 °F (36.6 °C) (Oral)   Resp 17   Ht 4' 10\" (1.473 m)   Wt 123 lb 9.6 oz (56.1 kg)   SpO2 95%   BMI 25.83 kg/m²   Physical Exam   General:  well-developed, well nourished  female; in no acute distress ; appropriate affect & mood; sitting on reclining chair comfortably  Eyes: pupil equally round ; extra-ocular motion intact bilaterally  Head, Ear, Nose, Mouth & Throat : normocephalic ; no discharge from ears or nose ; no deformity ; no facial swelling ; oral mucosa pink   Neck :  supple ; no tenderness ; mild muscle spasm at the bilateral cervical paraspinal muscle and bilateral upper trapezius muscles  Cardiovascular : regular rate & rhythm ; normal S1 & S2 heart sound ; no murmur ; normal peripheral pulse at the bilateral upper and lower extremities  Pulmonary : Breath sounds present at bilateral lung fields; no wheezing ; no rale; no crackle  Gastrointestinal : soft, flat abdomen; mild tenderness at suprapubic area without rebound tenderness; normal bowel sound present   Back : no tenderness; no muscle spasm  Skin: no skin lesion or rash ; no pitting edema at all 4 extremities  Musculoskeletal : no limb asymmetry; no limb deformity; mild tenderness at area superior to knee patella on both side; no tenderness at bilateral upper extremities & the rest of lower extremities; no palpable mass at limbs ; no joints laxity or crepitation ; shoulder flexion and abduction passive ROM reaching 170 degrees; hip flexion passive ROM reaching 120 degrees bilaterally; ankle dorsiflexion passive ROM reaching 10 degrees on right side and 10 degrees on left side; normal functional joints ROM at the rest of bilateral upper & lower extremities  Cerebral :  alert ; awake ; oriented to place, person and time; follow one-step verbal command  Cerebellum : no dysmetria with bilateral finger-to-nose test ; mild dysmetria with bilateral heel-to-shin test  Cranial Nerves :  grossly intact CN II to XII function  Sensory : intact light touch and pin prick sensation at bilateral upper & lower extremities  Motor : normal tone at bilateral upper & lower extremities ; 4+/5 muscle strength at bilateral handgrips; normal 5/5 muscle strength at the rest of bilateral upper & lower extremities  Reflex : 1+ bilateral biceps, bilateral brachioradialis and bilateral knees reflexes  Pathological Reflex :  No Cliff's sign ; no ankle clonus      Diagnostics:   Recent Results (from the past 24 hour(s))   Urine with Reflexed Micro    Collection Time: 02/14/23 11:10 AM   Result Value Ref Range    Glucose, Ur NEGATIVE NEGATIVE mg/dl    Bilirubin Urine NEGATIVE NEGATIVE    Ketones, Urine NEGATIVE NEGATIVE    Specific Gravity, Urine 1.020 1.002 - 1.030    Blood, Urine LARGE (A) NEGATIVE    pH, UA 7.5 5.0 - 9.0    Protein,  (A) NEGATIVE    Urobilinogen, Urine 0.2 0.0 - 1.0 eu/dl    Nitrite, Urine NEGATIVE NEGATIVE    Leukocyte Esterase, Urine LARGE (A) NEGATIVE    Color, UA YELLOW STRAW-YELLOW    Character, Urine TURBID (A) CLEAR-SL CLOUD    RBC, UA > 200 0-2/hpf /hpf    WBC, UA > 200 0-4/hpf /hpf    Epithelial Cells, UA 0-2 3-5/hpf /hpf    Bacteria, UA NONE SEEN FEW/NONE SEEN /hpf    Casts UA 8-15 HYALINE NONE SEEN /lpf    Crystals, UA NONE SEEN NONE SEEN    Renal Epithelial, UA NONE SEEN NONE SEEN    Yeast, UA NONE SEEN NONE SEEN    CASTS 2 NONE SEEN NONE SEEN /lpf    MISCELLANEOUS 2 NONE SEEN    Culture, Reflexed, Urine    Collection Time: 02/14/23 11:10 AM    Specimen: Urine   Result Value Ref Range    Organism gram positive cocci (A)     Urine Culture Reflex [place] count: >100,000 CFU/mL        Urine culture (2/14/2023) :  Component 2/14/23 1110    Organism gram positive cocci Abnormal  P    Urine Culture Reflex [place] count: >100,000 CFU/mL P          Impression:  Right posterior frontal/anterior parietal cortex small acute infarct stroke causing dizziness, vertigo, and frequent fall  Cognitive impairment  Probable dysphagia  Left hip contusion secondary to fall (symptom resolved)  New onset paroxysmal atrial fibrillation with intermittent rapid ventricular response  Dysuria with urgency possibly due to UTI  History of bilateral glaucoma requiring eye stent placement  History of osteoporosis  Bilateral knee pain due to osteoarthritis  History of anxiety disorder     The patient's condition remains stable. Her urine culture now show gram-positive cocci. I will continue Drusilla Sol until the final urine culture result is available. The patient continued to have fatigue. She is tolerating the intensive inpatient rehabilitation treatment well. Her function continues to improve slowly. The patient is scheduled to be discharged on 2/18/2023. Plan:  Continues intensive PT/OT/SLP/RT inpatient rehabilitation program at least 3 hours per day, 5 days per week in order to improve functional status prior to discharge. Family education and training will be completed. Equipment evaluations and recommendations will be completed as appropriate. Rehabilitation nursing continues to be involved for bowel, bladder, skin, and pain management. Nursing will also provide education and training to patient and family. Prophylaxis:  DVT: Patient on Xarelto, JUANCHO stocking, intermittent pneumatic compression device. GI: Colace, Dulcolax as needed, milk of magnesium as needed, Senokot as needed, GlycoLax as needed.   Pain: Tylenol as needed  Continue Macrobid for UTI  Continue aspirin, Crestor for CVA  Continues Xarelto for paroxysmal atrial fibrillation  Continue Toprol XL for blood pressure control and atrial fibrillation with rapid ventricular response  Continues Flonase usage  Continues vitamin D and multivitamins supplement  Continue clonazepam to 1 mg at bedtime for insomnia  Continues melatonin and continue trazodone as needed for insomnia  Continue home glucosamine usage  Nutrition: Continue current diet.     Bladder: Monitoring signs or symptoms of UTI  Bowel: Monitoring signs or symptoms of constipation   and case management for coordination of care and discharge planning      Missed Therapy Time:  None      Edison Novoa MD

## 2023-02-15 NOTE — CONSULTS
Department of Family Practice  Consult Note        Reason for Consult:  Medical management while on the Inpatient Rehab unit. Requesting Physician:  Dr Jordy Reaves:   The need to continue the intensive time with therapies following the acute hospital stay. History Obtained From:  patient, EMR    HISTORY OF PRESENT ILLNESS:              The patient is a 81 y.o. female with significant past medical history of       Diagnosis Date    Anxiety     Glaucoma, bilateral     Diagnosed in 2000's    Osteopenia     Osteoporosis     Diagnosed in 1990s      who presents with a series of falls that were found to be related to an acute CVA. She was evaluated by Medicine and Neurology and did not require any acute intervention. She remained medically stable and so has come to the Inpatient Rehab Unit to continue the time with therapies prior to a discharge disposition being made.       Past Medical History:        Diagnosis Date    Anxiety     Glaucoma, bilateral     Diagnosed in 2000's    Osteopenia     Osteoporosis     Diagnosed in 1990s     Past Surgical History:        Procedure Laterality Date    [address]      In 1990s    EYE SURGERY Bilateral 2018    Eye stent placement for glaucoma    TONSILLECTOMY       Current Medications:   Current Facility-Administered Medications: nitrofurantoin (macrocrystal-monohydrate) (MACROBID) capsule 100 mg, 100 mg, Oral, BID  glucosamine sulfate TABS 1 tablet (Patient Supplied), 1 tablet, Oral, BID  diclofenac sodium (VOLTAREN) 1 % gel 2 g, 2 g, Topical, 4x Daily PRN  clonazePAM (KLONOPIN) tablet 1 mg, 1 mg, Oral, Nightly  ondansetron (ZOFRAN-ODT) disintegrating tablet 4 mg, 4 mg, Oral, Q8H PRN  acetaminophen (TYLENOL) tablet 650 mg, 650 mg, Oral, Q4H PRN  magnesium hydroxide (MILK OF MAGNESIA) 400 MG/5ML suspension 15 mL, 15 mL, Oral, Daily PRN  senna (SENOKOT) tablet 8.6 mg, 1 tablet, Oral, Nightly PRN  fluticasone (FLONASE) 50 MCG/ACT nasal spray 1 spray, 1 spray, Nasal, Daily  polyethylene glycol (GLYCOLAX) packet 17 g, 17 g, Oral, Daily PRN  aspirin EC tablet 81 mg, 81 mg, Oral, Daily  bisacodyl (DULCOLAX) EC tablet 5 mg, 5 mg, Oral, Daily PRN  docusate sodium (COLACE) capsule 100 mg, 100 mg, Oral, BID  metoprolol succinate (TOPROL XL) extended release tablet 25 mg, 25 mg, Oral, Daily  multivitamin 1 tablet, 1 tablet, Oral, Daily  polyvinyl alcohol (LIQUIFILM TEARS) 1.4 % ophthalmic solution 1 drop, 1 drop, Both Eyes, PRN  rivaroxaban (XARELTO) tablet 20 mg, 20 mg, Oral, Daily  rosuvastatin (CRESTOR) tablet 40 mg, 40 mg, Oral, Nightly  traZODone (DESYREL) tablet 50 mg, 50 mg, Oral, Nightly  Vitamin D (CHOLECALCIFEROL) tablet 500 Units, 500 Units, Oral, Daily  melatonin tablet 6 mg, 6 mg, Oral, Nightly  Allergies:  Chocolate, Sulfa antibiotics, and Augmentin [amoxicillin-pot clavulanate]    Social History:   MARITAL STATUS:      Family History:       Problem Relation Age of Onset    Heart Failure Mother     Diabetes Mother     Heart Disease Father     Bipolar Disorder Father     Personality Disorder Father     Anxiety Disorder Father     Anxiety Disorder Sister     Blindness Sister     Depression Sister     Anxiety Disorder Sister     Vision Loss Sister     Other Brother         Rheumatic fever    Heart Disease Brother     COPD Brother     Alcohol Abuse Maternal Grandfather     Alcohol Abuse Paternal Grandfather      REVIEW OF SYSTEMS:    CONSTITUTIONAL:  positive for  fatigue  EYES:  negative for  eye discharge  HEENT:  negative for  epistaxis  RESPIRATORY:  negative for  dyspnea  CARDIOVASCULAR:  negative for  chest pain  GASTROINTESTINAL:  negative for diarrhea  GENITOURINARY:  negative for dysuria  INTEGUMENT/BREAST:  negative for rash  HEMATOLOGIC/LYMPHATIC:  negative for bleeding  ALLERGIC/IMMUNOLOGIC:  negative for anaphylaxis  ENDOCRINE:  negative for diabetic symptoms including polyuria  MUSCULOSKELETAL:  positive for  myalgias, arthralgias, and muscle weakness  NEUROLOGICAL:  positive for coordination problems, gait problems, and weakness  BEHAVIOR/PSYCH:  negative for increased agitation  PHYSICAL EXAM:      Vitals:    /62   Pulse 65   Temp 97.9 °F (36.6 °C) (Oral)   Resp 17   Ht 4' 10\" (1.473 m)   Wt 123 lb 9.6 oz (56.1 kg)   SpO2 95%   BMI 25.83 kg/m²     Well developed well nourished white female who is awake alert and cooperative  Skin atrophic  Membranes moist  Head normocephalic  Neck without mass  Chest symmetrical expansion  Heart S1S2 without murmur  Lungs CTA  Abd soft, non tender, normoactive BS and no mass  Ext without edema  Neuro weak  Psy pleasant      IMPRESSION/RECOMMENDATIONS:      Active Hospital Problems    Diagnosis Date Noted    Vertigo as late effect of stroke [I69.398, R42] 02/09/2023     Priority: Medium    Primary insomnia [F51.01] 02/09/2023     Priority: Medium    Anxiety [F41.9] 02/09/2023     Priority: Medium    Impaired cognition [R41.89] 02/09/2023     Priority: Medium    Atrial fibrillation, transient (HCC) [I48.91] 02/08/2023     Priority: Medium    Cerebral infarct (HCC) [I63.9] 02/08/2023     Priority: Medium    Pure hypercholesterolemia [E78.00] 02/08/2023     Priority: Medium

## 2023-02-15 NOTE — DISCHARGE INSTR - COC
Continuity of Care Form    Patient Name: Sj Miller   :  1935  MRN:  [id number]    Admit date:  2023  Discharge date:  ***    Code Status Order: Full Code   Advance Directives:     Admitting Physician:  Stacy Mandel MD  PCP: EDUARDO Gonzalez CNP    Discharging Nurse: [facility] Unit/Room#: 8E-73/1-A  Discharging Unit Phone Number: ***    Emergency Contact:   Extended Emergency Contact Information  Primary Emergency Contact: [address] Phone: [phone]  Relation: Child  Hard of hearing? Yes   needed?  No  Secondary Emergency Contact: Tracy Welsh  Address: [address] [place] [address]           Addy AKINS [address] Phone: [phone]  Relation: Spouse    Past Surgical History:  Past Surgical History:   Procedure Laterality Date    APPENDECTOMY      BLADDER SUSPENSION      In     EYE SURGERY Bilateral 2018    Eye stent placement for glaucoma    TONSILLECTOMY         Immunization History:   Immunization History   Administered Date(s) Administered    COVID-19, PFIZER PURPLE top, DILUTE for use, (age 15 y+), 30mcg/0.3mL 2021, 2021       Active Problems:  Patient Active Problem List   Diagnosis Code    Acute lacunar infarction [facility]) I63.81    Cerebral infarct ([facility] [address] I63.9    Pure hypercholesterolemia E78.00    Atrial fibrillation, transient ([address] I48.91    Vertigo as late effect of stroke I69.398, R42    Primary insomnia F51.01    Anxiety F41.9    Impaired cognition R41.89       Isolation/Infection:   Isolation            No Isolation          Patient Infection Status       Infection Onset Added Last Indicated Last Indicated By Review Planned Expiration Resolved Resolved By    None active    Resolved    COVID-19 (Rule Out) 23 COVID-19 & Influenza Combo (Ordered)   23 Rule-Out Test Resulted            Nurse Assessment:  Last Vital Signs: /62   Pulse 65 Temp 97.9 °F (36.6 °C) (Oral)   Resp 17   Ht 4' 10\" (1.473 m)   Wt 123 lb 9.6 oz (56.1 kg)   SpO2 95%   BMI 25.83 kg/m²     Last documented pain score (0-10 scale): Pain Level: 0  Last Weight:   Wt Readings from Last 1 Encounters:   23 123 lb 9.6 oz (56.1 kg)     Mental Status:  {IP PT MENTAL STATUS:16458}    IV Access:  { CHRISTIANA IV ACCESS:455167187}    Nursing Mobility/ADLs:  Walking   {CHP DME QRBQ:309453302}  Transfer  {CHP DME RVUF:710880013}  Bathing  {CHP DME GKVN:706432247}  Dressing  {CHP DME EVTF:984140580}  Toileting  {CHP DME IGCB:703942785}  Feeding  {CHP DME ATEW:156976639}  Med Admin  {P DME HGR}  Med Delivery   { CHRISTIANA MED Delivery:683209959}    Wound Care Documentation and Therapy:        Elimination:  Continence: Bowel: {YES / DC:08968}  Bladder: {YES / RJ:50823}  Urinary Catheter: {Urinary Catheter:846664746}   Colostomy/Ileostomy/Ileal Conduit: {YES / SH:44555}       Date of Last BM: ***    Intake/Output Summary (Last 24 hours) at 2/15/2023 0855  Last data filed at 2023 1827  Gross per 24 hour   Intake 920 ml   Output --   Net 920 ml     I/O last 3 completed shifts:   In: 36 [P.O.:920]  Out: -     Safety Concerns:     [employer] Safety Concerns:540987810}    Impairments/Disabilities:      [employer] Impairments/Disabilities:448776337}    Nutrition Therapy:  Current Nutrition Therapy:   [employer] Diet List:261717096}    Routes of Feeding: {CHP DME Other Feedings:241810512}  Liquids: {Slp liquid thickness:84890}  Daily Fluid Restriction: {CHP DME Yes amt example:357458311}  Last Modified Barium Swallow with Video (Video Swallowing Test): {Done Not Done YPIW:050708223}    Treatments at the Time of Hospital Discharge:   Respiratory Treatments: ***  Oxygen Therapy:  {Therapy; copd oxygen:77506}  Ventilator:    {SHERRIE ORTEZ Vent GSGE:798006980}    Rehab Therapies: {THERAPEUTIC INTERVENTION:1099639269}  Weight Bearing Status/Restrictions: {SHERRIE ORTEZ Weight Bearin}  Other Medical Equipment (for information only, NOT a DME order):  {EQUIPMENT:300629193}  Other Treatments: ***    Patient's personal belongings (please select all that are sent with patient):  {P DME Belongings:856867518}    RN SIGNATURE:  {Esignature:947470933}    PHYSICIAN SECTION    Prognosis: {Prognosis:1853855303}    Condition at Discharge: [facility] Patient Condition:755500772}    Rehab Potential (if transferring to Rehab): {Prognosis:3376819542}    Recommended Labs or Other Treatments After Discharge: ***    Physician Certification: I certify the above information and transfer of Tristan Douglas  is necessary for the continuing treatment of the diagnosis listed and that she requires {Admit to Appropriate Level of Care:08135} for {GREATER/LESS:090464291} 30 days.      Update Admission H&P: {CHP DME Changes in JNEUW:288340473}    PHYSICIAN SIGNATURE:  {Esignature:832632690}
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
## 2023-02-15 NOTE — PROGRESS NOTES
[address] [address]  INPATIENT PHYSICAL THERAPY  Daily Note  [facility] - 7E-72/072-A    Time In: 0900  Time Out: 1000  Timed Code Treatment Minutes: 60 Minutes  Minutes: 60          Date: 2/15/2023  Patient Name: Jessy Aponte,  Gender:  female        MRN: [id number]  : 1935  (81 y.o.)     Referring Practitioner: Jerica Lundy MD  Diagnosis: Vertigo as late effect of stroke  Additional Pertinent Hx: Jessy Aponte is a 81 y.o. female who presents to Emergency Department with Fall and Other (Feels like something is stuck in throat). Patient is brought in by EMS for evaluation of fall x2 today. Patient says she fell in the kitchen and again in the dining room. Lives at home by herself. Patient states she had no idea why and how she fell. MRI shows small acute infarct in the right posterior frontal/anterior parietal cortex. To  rehab . Prior Level of Function:  Lives With: Alone  Type of Home: House  Home Layout: One level, Laundry in basement  Home Access: Stairs to enter with rails  Entrance Stairs - Number of Steps: 4  Entrance Stairs - Rails: Right  Home Equipment: Walker, rolling, Cane, Rollator, [employer]   Bathroom Shower/Tub: Tub/Shower unit  Bathroom Toilet: Standard  Bathroom Equipment: Tub transfer bench, Grab bars in shower, Toilet raiser  Bathroom Accessibility: Accessible    Receives Help From: Family (check in on patient)  ADL Assistance: [address] [place] [address] Avenue: Independent  Ambulation Assistance: Independent  Transfer Assistance: Independent  Active : Yes  Additional Comments: Patient was independent with self care, driving, getting her own groceries, managing finances without AD. Pt has 3 children that can assist as needed. Restrictions/Precautions:  Restrictions/Precautions: Fall Risk, General Precautions     SUBJECTIVE: Pt. Seated on her BS chair and pleasantly agrees to therapy session.  Patient finishing with OT session upon arrival.    PAIN: 0/10    Vitals:   Patient Vitals for the past 8 hrs:   BP Patient Position Temp Temp src Pulse Resp SpO2   02/15/23 1109 (!) 102/52 -- -- -- 67 -- --   02/15/23 1030 (!) 102/52 Sitting;Up in chair 98.1 °F (36.7 °C) Oral 67 18 96 %     *Vitals may reflect data entered into the flowsheet prior to or after PT session was completed. OBJECTIVE:  Bed Mobility:  Not Tested    Transfers:  Sit to Stand: Supervision  Stand to [address] [place] [address]  *Patient instructed in sit to stand  transfers from various surfaces including: recliner and standard chair with arm rests without AD. Ambulation:  Supervision, Stand By Assistance  Distance: Community Distances   Surface: Level Tile, Uneven Surface, and Ramp  Device: No Device  Gait Deviations:  Slow Susu, Decreased Step Length Bilaterally, Decreased Gait Speed, and Mild Path Deviations  *Patient instructed in ambulation from 7E to main lobby and outside and then around main lobby and back to  rehab gym over various surfaces including: sidewalk, brick path, and ramp. Patient demonstrated occasional path deviations however able to self correct. Stairs:  Stairs: 6\" steps X 18 using One Handrail and Stand By Assistance. *Patient instructed in ascending/descending stairs with 1handrail on the left and carrying laundry basket in the other to simulate home environment. Patient required cueing for slowed speed in order to assist with safety. Balance:  Dynamic Standing Balance: Stand By Assistance  *Patient instructed in picking up cones placed around garden area over gravel path at various heights including: floor and overhead with occasional cueing for safety awareness due to unsteadiness with floor transitions however no formal LOB.  Patient instructed in standing dynamic balance in order to assist with safety with functional mobility    Neuromuscular Re-education  None    Exercise:  Patient was guided in 1 set(s) 15 reps of exercises: Standing heel/toe raises, Standing marches, Standing hip abduction/adduction, Standing hip extension, Standing hamstring curls, Mini squats with 1lb ankle weights. Exercises were completed for increased independence with functional mobility. Functional Outcome Measures:   Not completed    ASSESSMENT:  Assessment: Patient progressing toward established goals. Activity Tolerance:  Patient tolerance of  treatment: good. Equipment Recommendations:Equipment Needed: No  Discharge Recommendations: Continue to assess pending progress Home with Outpatient PT    Plan: Current Treatment Recommendations: Strengthening, Balance training, Functional mobility training, Transfer training, Gait training, Stair training, Neuromuscular re-education, Patient/Caregiver education & training, Safety education & training, Therapeutic activities, Endurance training, Home exercise program  General Plan:  (5x/wk 60 mins)    Patient Education  Patient Education: Plan of Care, Functional Mobility, Reviewed Prior Education, Health Promotion and Wellness Education, Safety, Verbal Exercise Instruction,  - Patient Verbalized Understanding, - Patient Requires Continued Education    Goals:  Patient Goals : to go home  Short Term Goals  Time Frame for Short Term Goals: 1 week  Short Term Goal 1: Pt to transfer supine <--> sit mod I to enable pt to get in/out of bed. Short Term Goal 2: Pt to transfer sit <--> stand mod I from various height surfaces for increased functional mobility. Short Term Goal 3: Pt to ambulate >100 feet without AD SBA for household ambulation. Short Term Goal 4: Pt to perform car transfer mod I to enable pt to get in/out of the car.   Long Term Goals  Time Frame for Long Term Goals : 2 weeks  Long Term Goal 1: Pt to ascend/descend 15 steps while carrying laundry basket to enable pt to safely perform laundry tasks at home  Long Term Goal 2: Pt to ambulate >500 feet without AD mod I for community re-entry. Long Term Goal 3: Pt to score >41/56 on the Adam balance test to indicate low fall risk. Long Term Goal 4: Pt to perform TUG test in <11 seconds to indicate low fall risk and improved gait speed. Following session, patient left in safe position with all fall risk precautions in place.

## 2023-02-15 NOTE — DISCHARGE INSTRUCTIONS
NO DRIVING UNTIL OUTPATIENT DRIVING EVALUATION COMPLETED. OUTPATIENT DRIVING EVALUATION AVAILABLE THROUGH [facility] OUTPATIENT [facility] ([address] [facility], [address], [place] [address]. [facility]: [phone]). Contact Melissa Bennett for assistance with transportation to your therapy appointments at [phone].

## 2023-02-15 NOTE — PROGRESS NOTES
[facility]  Recreational Therapy  Daily Note  [address]    Time Spent with Patient: 10 minutes    Date:  2/15/2023       Patient Name: Ryanne Reina      MRN: [id number]      YOB: 1935 (81 y.o.)       Gender: female  Diagnosis: Vertigo as late effect of stroke  Referring Practitioner: Jenifer Lyn MD    RESTRICTIONS/PRECAUTIONS:  Restrictions/Precautions: Fall Risk, General Precautions     Hearing: Within functional limits    PAIN: 0    SUBJECTIVE:  Yes    OBJECTIVE:  Pt is looking forward to getting her hair washed and styled by our beautician tomorrow -appreciative          Patient Education  New Education Provided: Importance of Leisure,     Electronically signed by: JAYLA Seymour  Date: 2/15/2023

## 2023-02-16 LAB
BACTERIA UR CULT: ABNORMAL
ORGANISM: ABNORMAL

## 2023-02-16 PROCEDURE — 6370000000 HC RX 637 (ALT 250 FOR IP): Performed by: PHYSICAL MEDICINE & REHABILITATION

## 2023-02-16 PROCEDURE — 6370000000 HC RX 637 (ALT 250 FOR IP): Performed by: INTERNAL MEDICINE

## 2023-02-16 PROCEDURE — 97530 THERAPEUTIC ACTIVITIES: CPT

## 2023-02-16 PROCEDURE — 97535 SELF CARE MNGMENT TRAINING: CPT

## 2023-02-16 PROCEDURE — 97130 THER IVNTJ EA ADDL 15 MIN: CPT

## 2023-02-16 PROCEDURE — 97110 THERAPEUTIC EXERCISES: CPT

## 2023-02-16 PROCEDURE — 97129 THER IVNTJ 1ST 15 MIN: CPT

## 2023-02-16 PROCEDURE — 1180000000 HC REHAB R&B

## 2023-02-16 PROCEDURE — 97116 GAIT TRAINING THERAPY: CPT

## 2023-02-16 PROCEDURE — 99232 SBSQ HOSP IP/OBS MODERATE 35: CPT | Performed by: PHYSICAL MEDICINE & REHABILITATION

## 2023-02-16 RX ADMIN — Medication 1 TABLET: at 22:48

## 2023-02-16 RX ADMIN — Medication 1 TABLET: at 09:54

## 2023-02-16 RX ADMIN — Medication 500 UNITS: at 09:53

## 2023-02-16 RX ADMIN — ROSUVASTATIN CALCIUM 40 MG: 20 TABLET, FILM COATED ORAL at 21:49

## 2023-02-16 RX ADMIN — FLUTICASONE PROPIONATE 1 SPRAY: 50 SPRAY, METERED NASAL at 09:57

## 2023-02-16 RX ADMIN — BISACODYL 5 MG: 5 TABLET, COATED ORAL at 09:53

## 2023-02-16 RX ADMIN — ACETAMINOPHEN 650 MG: 325 TABLET ORAL at 09:54

## 2023-02-16 RX ADMIN — ASPIRIN 81 MG: 81 TABLET, COATED ORAL at 09:54

## 2023-02-16 RX ADMIN — Medication 1 TABLET: at 09:57

## 2023-02-16 RX ADMIN — CLONAZEPAM 1 MG: 0.5 TABLET ORAL at 21:49

## 2023-02-16 RX ADMIN — DOCUSATE SODIUM 100 MG: 100 CAPSULE, LIQUID FILLED ORAL at 21:49

## 2023-02-16 RX ADMIN — METOPROLOL SUCCINATE 25 MG: 25 TABLET, EXTENDED RELEASE ORAL at 09:53

## 2023-02-16 RX ADMIN — NITROFURANTOIN MONOHYDRATE/MACROCRYSTALLINE 100 MG: 25; 75 CAPSULE ORAL at 21:49

## 2023-02-16 RX ADMIN — RIVAROXABAN 20 MG: 20 TABLET, FILM COATED ORAL at 17:46

## 2023-02-16 RX ADMIN — DOCUSATE SODIUM 100 MG: 100 CAPSULE, LIQUID FILLED ORAL at 09:54

## 2023-02-16 RX ADMIN — NITROFURANTOIN MONOHYDRATE/MACROCRYSTALLINE 100 MG: 25; 75 CAPSULE ORAL at 09:54

## 2023-02-16 ASSESSMENT — PAIN DESCRIPTION - LOCATION
LOCATION: HEAD
LOCATION: HEAD

## 2023-02-16 ASSESSMENT — PAIN SCALES - GENERAL
PAINLEVEL_OUTOF10: 4
PAINLEVEL_OUTOF10: 0
PAINLEVEL_OUTOF10: 3
PAINLEVEL_OUTOF10: 0
PAINLEVEL_OUTOF10: 1

## 2023-02-16 ASSESSMENT — ENCOUNTER SYMPTOMS
SHORTNESS OF BREATH: 0
COUGH: 0
WHEEZING: 0
RHINORRHEA: 0
TROUBLE SWALLOWING: 0
ABDOMINAL PAIN: 0
VOMITING: 0
DIARRHEA: 0
BACK PAIN: 0
NAUSEA: 0
CONSTIPATION: 0
SORE THROAT: 0

## 2023-02-16 ASSESSMENT — PAIN DESCRIPTION - DESCRIPTORS
DESCRIPTORS: ACHING
DESCRIPTORS: ACHING

## 2023-02-16 NOTE — PLAN OF CARE
Problem: Skin/Tissue Integrity - Adult  Goal: Skin integrity remains intact  Outcome: Progressing     Problem: Safety - Adult  Goal: Free from fall injury  Outcome: Progressing     Problem: Pain  Goal: Verbalizes/displays adequate comfort level or baseline comfort level  Outcome: Progressing  Flowsheets (Taken 2/15/2023 2207)  Verbalizes/displays adequate comfort level or baseline comfort level:   Encourage patient to monitor pain and request assistance   Assess pain using appropriate pain scale   Administer analgesics based on type and severity of pain and evaluate response  Note:  Patient denies any pain at this time.      Problem: Chronic Conditions and Co-morbidities  Goal: Patient's chronic conditions and co-morbidity symptoms are monitored and maintained or improved  Outcome: Progressing

## 2023-02-16 NOTE — PROGRESS NOTES
[address]  Occupational Therapy  Daily Note  Time:    Time In: 1440  Time Out: 1510  Timed Code Treatment Minutes: 30 Minutes  Minutes: 30          Date: 2023  Patient Name: Satish Jean,   Gender: female      Room: [facility]72/072-A  MRN: [id number]  : 1935  (81 y.o.)  Referring Practitioner: Noelle Bell MD  Diagnosis: Vertigo as late effect of stroke  Additional Pertinent Hx: Satish Jean is a 81 y.o. 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 MRI of brain was also performed on 2023 and revealed a small acute right posterior frontal/anterior parietal cortex infarct stroke, and mild white matter atrophy. Restrictions/Precautions:  Restrictions/Precautions: Fall Risk, General Precautions      SUBJECTIVE: Pt cooperative, pleasant. PAIN: Denies     Vitals: Vitals not assessed per clinical judgement, see nursing flowsheet    COGNITION: Decreased Insight    IADL:   Grooming: Independent. Toileting: Independent. Toilet Transfer: Independent. Jody Persaud BALANCE:  Standing Balance: Supervision. TRANSFERS:  Sit to Stand:  Supervision. Stand to Sit: Supervision. FUNCTIONAL MOBILITY:  Assistive Device: None  Assist Level:  Supervision. Distance:  household distances around unit and 7K while dividing attention between conversation and pathfinding. Pt required cues to recall path to return to [facility].         ASSESSMENT:  Activity Tolerance:  Patient tolerance of  treatment: Good treatment tolerance       Discharge Recommendations: Home with Outpatient OT and Driving Evaluation  Equipment Recommendations: Other: Pt has a tub tranfer bench, toilet raiser, and grab bars. Recommend an additional grab bar in her shower with a shower seat to use for safety. Plan: Times Per Week: 5x/wk for 90 min  Current Treatment Recommendations: Strengthening, Balance training, Functional mobility training, Endurance training, Safety education & training, Neuromuscular re-education, Patient/Caregiver education & training, Self-Care / ADL, Coordination training, Equipment evaluation, education, & procurement, Cognitive/Perceptual training    Patient Education  Patient Education: IADL's and Home Safety     Goals  Short Term Goals  Time Frame for Short Term Goals: 1 week  Short Term Goal 1: Pt will complete tub/ shower transfers with supervision to improve indep with showering at home. Short Term Goal 2: Pt will complete dressing tasks with supervision and 0 vcs for orientation of clothing to improve indep with self care. Short Term Goal 3: Pt will plan and sequence BADL routine with min vcs for thought organization to improve indep wtih self care routine at home. Short Term Goal 4: Pt will plan and execute a simple meal prep task with SBA and min vcs for problem solving/ safety to improve indep with preparing meals for herself. Short Term Goal 5: Pt will complete multiple step IADL tasks with SBA for balance and min vcs for recall to improve indep with getting groceries at home. Long Term Goals  Time Frame for Long Term Goals : 2 weeks from IPR evaluation  Long Term Goal 1: Pt will complete BADL routine with modified independence and 0 vcs for safety to improve indep with self care. Long Term Goal 2: Pt will complete a light IADL task with supervision to improve indep and safety within her home. Long Term Goal 3: Pt will demonstrate 10 min of dynamic standing balance with mod I to improve indep with showering and standing IADL tasks.     Following session, patient left in safe position with all fall risk precautions in place.

## 2023-02-16 NOTE — PROGRESS NOTES
Patient calm, alert, oriented to person, place and time. DAPHNIE 4mm-3mm, brisk. Mucous membranes pink and moist. Smile symmetrical. Tongue symmetrical. Skin warm, dry. Denies difficulty swallowing. Lungs clear anterior, posterior, lateral.Respirations unlabored, regular. No cough noted. Heart sounds regular strong. Bowel sounds active in all four quadrants. Abdomen soft, non-tender to palpation. Last BM 2-16-23 . Radial pulses strong bilaterally. Hand grasp strong bilaterally. Arm drift negative bilaterally. Capillary refill less than 3 seconds. Skin turgor brisk. No edema noted. Pedal pulses strong bilaterally. Pedal push and pull moderately strong bilaterally. Buck signs negative bilaterally. Leg lift bilateral moderately strong. Denies numbness or tingling. Chair wheels locked. Call light with in reach. Over bed table within reach. Denies further needs at this time.

## 2023-02-16 NOTE — PROGRESS NOTES
Comprehensive Nutrition Assessment    Type and Reason for Visit:  Reassess    Nutrition Recommendations/Plan:   Recommend diet as tolerated. Recommend continue MVI. Encouraged po, good nutrition at best efforts for healing. Malnutrition Assessment:  Malnutrition Status: At risk for malnutrition (Comment) (02/10/23 1222)    Context:  Acute Illness     Findings of the 6 clinical characteristics of malnutrition:  Energy Intake:  Mild decrease in energy intake (Comment)  Weight Loss:  No significant weight loss     Body Fat Loss:  No significant body fat loss     Muscle Mass Loss:  No significant muscle mass loss (however difficult to evaluate w/ advanced age)    Fluid Accumulation:  Unable to assess     Strength:  Not Performed    Nutrition Assessment:     At risk for  nutrition compromise r/t admit w/ CVA, sensation of food stuck in her throat, advanced age and underlying medical condition (hx osteoporosis). Nutrition Related Findings:      Wound Type: None     Pt. Report/Treatments/Miscellaneous: pt. Seen while getting her hair done; reports eating very well; states eating \"too much\"; denies any trouble tolerating diet; SLP following  GI Status: BM 2/16  Pertinent Labs: no new labs  Pertinent Meds: MVI, Crestor, Vitamin D, Colace, Glycolax, Senokot      Current Nutrition Intake & Therapies:    Average Meal Intake: %     ADULT DIET; Regular; Low Sodium (2 gm)    Anthropometric Measures:  Height: 4' 10\" (147.3 cm)  Ideal Body Weight (IBW): 90 lbs (41 kg)    Admission Body Weight: 123 lb 9.6 oz (56.1 kg) (2/9 no edema)  Current Body Weight: 123 lb 9.6 oz (56.1 kg) (2/9 no edema),      Current BMI (kg/m2): 25.8  Usual Body Weight:  (per pt. 118-120#; per EMR: 12/8/21: 130# (? actual vs stated); 2/6/23: 121# 7.6 oz)                       BMI Categories: Overweight (BMI 25.0-29. 9)    Estimated Daily Nutrient Needs:  Energy Requirements Based On: Kcal/kg  Weight Used for Energy Requirements: Other (Comment) (56)  Energy (kcal/day): 6271-9802 kcals (20-25)  Weight Used for Protein Requirements: Ideal (41)  Protein (g/day): 53+ grams (1.3+)       Nutrition Diagnosis:   Inadequate oral intake related to inadequate protein-energy intake as evidenced by poor intake prior to admission    Nutrition Interventions:   Food and/or Nutrient Delivery: Continue Current Diet  Nutrition Education/Counseling: Education initiated (2/10 Stressed importance of po, good nutrition at best efforts for healing.)  Coordination of Nutrition Care: Continue to monitor while inpatient       Goals:  Previous Goal Met: Progressing toward Goal(s)  Goals: PO intake 75% or greater, by next RD assessment       Nutrition Monitoring and Evaluation:      Food/Nutrient Intake Outcomes: Diet Advancement/Tolerance, Food and Nutrient Intake  Physical Signs/Symptoms Outcomes: Biochemical Data, Chewing or Swallowing, GI Status, Fluid Status or Edema, Nutrition Focused Physical Findings, Skin, Weight    Discharge Planning:     Too soon to determine     Jett AGNIESZKA Bills, LD  Contact: [phone]

## 2023-02-16 NOTE — PROGRESS NOTES
Patient calm, alert, oriented to person, place and time. DAPHNIE 4mm-3mm, brisk. Mucous membranes pink and moist. Smile symmetrical. Tongue symmetrical. Skin warm, dry. Denies difficulty swallowing. Lungs clear anterior, posterior, lateral.Respirations unlabored, regular. No cough noted. Heart sounds regular strong. Bowel sounds active in all four quadrants. Abdomen soft, non-tender to palpation. Last BM 2-12-23 . Radial pulses strong bilaterally. Hand grasp strong bilaterally. Arm drift negative bilaterally. Capillary refill less than 3 seconds. Skin turgor brisk. No edema noted. Pedal pulses strong bilaterally. Pedal push and pull moderately strong bilaterally. Buck signs negative bilaterally. Leg lift bilateral moderately strong. Denies numbness or tingling. Chair wheels locked. Call light with in reach. Over bed table within reach. Denies further needs at this time.

## 2023-02-16 NOTE — PROGRESS NOTES
[address] [address]  INPATIENT PHYSICAL THERAPY  Daily Note  [facility] - [id number]    Time In: 0830  Time Out: 0930  Timed Code Treatment Minutes: 60 Minutes  Minutes: 60          Date: 2023  Patient Name: Rosa Isela Martinez,  Gender:  female        MRN: [id number]  : 1935  (81 y.o.)     Referring Practitioner: Annie Mondragon MD  Diagnosis: Vertigo as late effect of stroke  Additional Pertinent Hx: Rosa Isela Martinez is a 81 y.o. female who presents to Emergency Department with Fall and Other (Feels like something is stuck in throat). Patient is brought in by EMS for evaluation of fall x2 today. Patient says she fell in the kitchen and again in the dining room. Lives at home by herself. Patient states she had no idea why and how she fell. MRI shows small acute infarct in the right posterior frontal/anterior parietal cortex. To  rehab . Prior Level of Function:  Lives With: Alone  Type of Home: House  Home Layout: One level, Laundry in basement  Home Access: Stairs to enter with rails  Entrance Stairs - Number of Steps: 4  Entrance Stairs - Rails: Right  Home Equipment: Walker, rolling, Cane, Rollator, [place]   Bathroom Shower/Tub: Tub/Shower unit  Bathroom Toilet: Standard  Bathroom Equipment: Tub transfer bench, Grab bars in shower, Toilet raiser  Bathroom Accessibility: Accessible    Receives Help From: Family (check in on patient)  ADL Assistance: [address] [place] [address] Avenue: Independent  Ambulation Assistance: Independent  Transfer Assistance: Independent  Active : Yes  Additional Comments: Patient was independent with self care, driving, getting her own groceries, managing finances without AD. Pt has 3 children that can assist as needed.     Restrictions/Precautions:  Restrictions/Precautions: Fall Risk, General Precautions     SUBJECTIVE: Patient in bathroom upon arrival finishing with getting dressed and washing face. Patient required education on safety awareness due to standing in bathroom without socks or shoes and PT provided education on something with  while standing in order to prevent falls. PT provided patient with HEP and educated over during session. Patient requesting to use bathroom during session. Pain: 0/10    Vitals:   Patient Vitals for the past 8 hrs:   BP Patient Position Pulse Resp SpO2 O2 Device   02/16/23 0730 120/62 Supine 74 18 92 % None (Room air)     *Vitals may reflect data entered into the flowsheet prior to or after PT session was completed. OBJECTIVE:  Bed Mobility:  Not Tested    Transfers:  Sit to Stand: Modified Independent  Stand to Sit:Modified Independent  *Patient instructed in sit to stand transfers from various surfaces including: recliner, toilet, standard chair without AD. Floor:Stand By Assistance  *Patient instructed in floor transfer in apartment with PT educating patient on proper technique     Ambulation:  Supervision  Distance: 150 feet and 400 feet  Surface: Level Tile  Device: No Device  Gait Deviations:  Slow Susu, Decreased Arm Swing, Decreased Gait Speed, and Mild Path Deviations    Stairs:  Stairs: 6\" steps X 36 using One Handrail and Stand By Assistance. *Patient required cueing for slowed speed in order to assist with safety     Balance:  Dynamic Standing Balance: Stand By Assistance  *Patient stood in bathroom to perform toileting hygiene and donning and doffing pants without LOB or unsteadiness. *Patient stood in bathroom at beginning of session in order to assist with hand washing, face washing and clothing management without unsteadiness. Neuromuscular Re-education  None    Exercise:  Patient was guided in 1 set(s) 15 reps of exercises: Standing heel/toe raises, Standing marches, Standing hip abduction/adduction, Standing hip extension, Standing hamstring curls, Mini squats.   Exercises were completed for increased independence with functional mobility. *Patient given HEP and patient able to perform with 90% accuracy. Functional Outcome Measures:   Not completed    ASSESSMENT:  Assessment: Patient progressing toward established goals. Activity Tolerance:  Patient tolerance of  treatment: good. Equipment Recommendations:Equipment Needed: No  Discharge Recommendations: Continue to assess pending progress Home with Outpatient PT    PLAN: Current Treatment Recommendations: Strengthening, Balance training, Functional mobility training, Transfer training, Gait training, Stair training, Neuromuscular re-education, Patient/Caregiver education & training, Safety education & training, Therapeutic activities, Endurance training, Home exercise program  General Plan:  (5x/wk 60 mins)    Patient Education  Patient Education: Home Exercise Program, Stairs,  - Patient Verbalized Understanding, - Patient Requires Continued Education    Goals:  Patient Goals : to go home  Short Term Goals  Time Frame for Short Term Goals: 1 week  Short Term Goal 1: Pt to transfer supine <--> sit mod I to enable pt to get in/out of bed. Short Term Goal 2: Pt to transfer sit <--> stand mod I from various height surfaces for increased functional mobility. Short Term Goal 3: Pt to ambulate >100 feet without AD SBA for household ambulation. Short Term Goal 4: Pt to perform car transfer mod I to enable pt to get in/out of the car. Long Term Goals  Time Frame for Long Term Goals : 2 weeks  Long Term Goal 1: Pt to ascend/descend 15 steps while carrying laundry basket to enable pt to safely perform laundry tasks at home  Long Term Goal 2: Pt to ambulate >500 feet without AD mod I for community re-entry. Long Term Goal 3: Pt to score >41/56 on the Adam balance test to indicate low fall risk. Long Term Goal 4: Pt to perform TUG test in <11 seconds to indicate low fall risk and improved gait speed.     Following session, patient left in safe position with all fall risk precautions in place.

## 2023-02-16 NOTE — PROGRESS NOTES
Physical Medicine & Rehabilitation Progress Note    Chief Complaint: Stroke    Subjective:    Tristan Douglas is a 81 y.o. 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 The patient says she began feeling something stuck in her throat on 2/1/2023 night. On 2/2/2023 while she was walking in kitchen to try to get something to help him the throat discomfort sensation, she suddenly fell for no reason. She denies having weakness, pain, syncope or fainting prior to the fall. She struggled to get up but fell again after few step into her dining room. At that time she felt the need for bowel movement so she crawled to bathroom and had diarrhea like bowel movement. She did not call her daughter who called 911. The patient was sent to [address] [place] [address] ER for evaluation on 2./2/2023. She was found to have tenderness at the her left hip area. She also complains of feeling dizzy with room spinning sensation when she says suddenly sitting up from supine position. Her blood tests and urine test showed no significant abnormality. X-ray of left hip and chest revealed no acute abnormality. CT of the head without contrast done on 2/2/2023 showed small lacunar infarction at the right insula. CT of cervical spine done on 2/2/2023 was reported to show no acute process. CTA of head and neck done on 2/2/2023 show no significant hemodynamic stenosis in bilateral common and internal carotid arteries, and hypoplastic P1 segment of the right posterior cerebral artery.   MRI of brain was also performed on 2/2/2023 and revealed a small acute right posterior frontal/anterior parietal cortex infarct stroke, and mild white matter atrophy. The patient was started on aspirin 81 mg.  Echocardiogram was performed on 2/3/2023 revealed only grade 1 left ventricle diastolic dysfunction with ejection fraction of 60%. Neurology service was consulted on 2/3/2023. Aspirin 81 mg daily was continued and Plavix 75 mg daily for 21 days was added. PM&R was consulted on 2/2/2023. On 2/8/2023 the patient had an episode of tachycardia. Cardiology was consulted and was diagnosed with new onset paroxysmal atrial fibrillation with intermittent short run rapid ventricular response. The patient was started on Xarelto and continue her daily baby aspirin. Plavix was discontinued. The patient was put on 30-day event monitor on 2/9/2023. The patient says she feels well. She had good sleep last night. She denies feeling weak or having any painful symptom. She says she no numbness or tingling sensation. She denies having dizziness or lightheadedness. Her urinary urgency and dysuria has significantly improved. She continues tolerating the intensive inpatient rehabilitation treatment. The patient is scheduled to be discharged tomorrow, 2/18/2023. Rehabilitation:  PT: Reviewed.   Bed Mobility:  Rolling to Left: Modified Independent   Rolling to Right: Modified Independent   Supine to Sit: Modified Independent  Sit to Supine: Modified Independent   *Patient performed with HOB flat and no railings    Transfers:  Sit to Stand: Modified Independent  Stand to Sit:Modified Independent  To/From Bed and Chair: Modified Independent  Car:Modified Independent  Floor:Stand By Assistance  *Patient instructed in floor transfer in apartment with PT educating patient on proper technique     Ambulation:  Supervision  Distance: 150 feet and 400 feet  Surface: Level Tile  Device: No Device  Gait Deviations:  Slow Susu, Decreased Arm Swing, Decreased Gait Speed, and Mild Path Deviations    Modified Independent  Distance: 10 feet, 50 feet, 150 feet x 2 and 20 feet x 2  Surface: Level Tile and Uneven Surface  Device: No Device  Gait Deviations:  Slow Susu, Decreased Step Length Bilaterally, Decreased Gait Speed, and Mild Path Deviations    Modified Independent  Distance: 130 ft. X1; 300 ft. X1   Surface: Level Tile  Device: No Device  Gait Deviations:  Slow Susu, Decreased Step Length Bilaterally, Decreased Arm Swing, Decreased Gait Speed, and Mild Path Deviations     Stairs:  Stairs: 6\" steps X 36 using One Handrail and Stand By Assistance. *Patient required cueing for slowed speed in order to assist with safety     Stairs: 6\" steps X 1 using No Device and Modified Independent. Modified Independent  Number of Steps: 4  Height: 6\" step with One Handrail     Supervision  Number of Steps: 36  Height: 6\" step with One Handrail     Balance:  Static Standing Balance: Modified Independent  Dynamic Standing Balance: Modified Independent       OT: Reviewed. IADL:   Grooming: Independent. Toileting: Independent. Toilet Transfer: Independent. Jody Rasbirdie BALANCE:  Sitting Balance:  Independent. Standing Balance: Supervision. TRANSFERS:  Sit to Stand:  Supervision. Stand to Sit: Supervision. FUNCTIONAL MOBILITY:  Assistive Device: None  Assist Level:  Supervision. Distance: To and from therapy apartment  [address] pace, no LOB     Assistive Device: None  Assist Level:  Supervision. Distance:  household distances around unit and 7K while dividing attention between conversation and pathfinding. Pt required cues to recall path to return to [facility]. ADDITIONAL ACTIVITIES:  Patient completed IADL homemaking task this date of making pancakes and sausage - task was graded to challenge attn to task, safety, endurance, divided attn, problem solving, sequencing, and initiation. Patient was able to retrieve all items from graded heights with good recall of location and min VCs for safety during the retrieval and transportation of items.  Pt able to retrieve needed items after reading instructions on pancake box. Pt stood at stove top to make items. Pt chose correct burner to turn on for pancakes; however, turned it on high and left with cooking spray in it for ext period of time prior to mixing pancake batter. Pt recognized burnt oil, but unable to problem solve turning burner down. While both burners turned on high, pt retrieved sausage and microwaved for ~2min and then placed them in skillet on burner that was not turned on. Pt provided with cueing to direct attn to which burners were turned on; however, difficulty noticing issue. Once pt's friend arrived, pt became increasingly distracted, completing pancake task successfully, but forgetting to turn burner off and removing food from burner. Pt did report that she was going to turn the sausage burner down while the pancakes finished, but instead turned the burner up. Pt provided VC throughout for problem solving and attn to task. Pt frequently reporting that she is unfamiliar with the stovetop and how to operate it. Pt frustrated throughout. Pt completed with Supervision for balance with standing endurance >40 min and VC for cognition throughout. Patient completed IADL homemaking task this date of doing dishes - task was graded to challenge balance, safety, and cognition. Patient was able to retrieve all items from meal prep area and transport them to sink. Pt tolerated standing sinkside with B hand release to wash dishes. Pt completed with Supervision and standing endurance x10min. ST: Reviewed. Time Word Problems:  4/7 indep, 2/7 min cue, 1/7 incorrect  *Utilized clock as reference, with minimal success. *Breakdowns in math computation with slow processing speed. Basic Finances with bills and coins:  Locating coins and bills for amount given: 4/4 indep  Making change: 2/4 indep, 2/4 max cue  *Patient showing deficits in working memory within task.  Patient would forget the amount, stating \"I hear it and then lose it\". ST prompted patient to use memory strategy of writing or repeating to assist, showing success. 2:30, Dr. Nahed Cedeño, check up): Able to recall written aid, 5/5     Recall of card game (trash) (~30 mins):  10 cards: indep  Nica Rudder as wild: indep  Hemphill/Ankit as trash: indep  Cards in ascending order: indep  Goal of game: indep  5/5 indep, good success recall and thought organization during game. Selective Attention (placing target suit in ascending order from deck):  Errors: 0; Time: 2:37  *Good success in selective attention and time processing within task. Working Memory Card Game (patient verbalizes suits as hearts-1, diamonds-2, clubs-3, spades-4 while going through deck of cards):  Errors: x1  *Good success in recall and time processing within task. Review of Systems:  Review of Systems   Constitutional:  Positive for fatigue. Negative for chills, diaphoresis and fever. HENT:  Negative for hearing loss, rhinorrhea, sneezing, sore throat and trouble swallowing. Eyes:  Negative for visual disturbance. Respiratory:  Negative for cough, shortness of breath and wheezing. Cardiovascular:  Negative for chest pain and palpitations. Gastrointestinal:  Negative for abdominal pain, constipation, diarrhea, nausea and vomiting. Genitourinary:  Negative for difficulty urinating, dysuria and urgency. Musculoskeletal:  Negative for arthralgias, back pain, gait problem, myalgias and neck pain. Skin:  Negative for rash. Neurological:  Negative for dizziness, tremors, facial asymmetry, speech difficulty, weakness, light-headedness, numbness and headaches. Psychiatric/Behavioral:  Negative for dysphoric mood, hallucinations and sleep disturbance. The patient is not nervous/anxious.          Objective:  /63   Pulse 68   Temp 97.7 °F (36.5 °C) (Oral)   Resp 16   Ht 4' 10\" (1.473 m)   Wt 123 lb 9.6 oz (56.1 kg)   SpO2 98%   BMI 25.83 kg/m² Physical Exam   General:  well-developed, well nourished  female; in no acute distress ; appropriate affect & mood; sitting on reclining chair comfortably  Eyes: pupil equally round ; extra-ocular motion intact bilaterally  Head, Ear, Nose, Mouth & Throat : normocephalic ; no discharge from ears or nose ; no deformity ; no facial swelling ; oral mucosa pink   Neck :  supple ; no tenderness ; mild muscle spasm at the bilateral cervical paraspinal muscle and bilateral upper trapezius muscles  Cardiovascular : regular rate & rhythm ; normal S1 & S2 heart sound ; no murmur ; normal peripheral pulse at the bilateral upper and lower extremities  Pulmonary : Breath sounds present at bilateral lung fields; no wheezing ; no rale; no crackle  Gastrointestinal : soft, flat abdomen; mild tenderness at suprapubic area without rebound tenderness; normal bowel sound present   Back : no tenderness; no muscle spasm  Skin: no skin lesion or rash ; no pitting edema at all 4 extremities  Musculoskeletal : no limb asymmetry; no limb deformity; mild tenderness at area superior to knee patella on both side; no tenderness at bilateral upper extremities & the rest of lower extremities; no palpable mass at limbs ; no joints laxity or crepitation ; shoulder flexion and abduction passive ROM reaching 170 degrees; hip flexion passive ROM reaching 120 degrees bilaterally; ankle dorsiflexion passive ROM reaching 10 degrees on right side and 10 degrees on left side; normal functional joints ROM at the rest of bilateral upper & lower extremities  Cerebral :  alert ; awake ; oriented to place, person and time; follow one-step verbal command  Cerebellum : no dysmetria with bilateral finger-to-nose test ; mild dysmetria with bilateral heel-to-shin test  Cranial Nerves :  grossly intact CN II to XII function  Sensory : intact light touch and pin prick sensation at bilateral upper & lower extremities  Motor : normal tone at bilateral upper & lower extremities ; 4+/5 to 5/5 muscle strength at bilateral handgrips; normal 5/5 muscle strength at the rest of bilateral upper & lower extremities  Reflex : 1+ bilateral biceps, bilateral brachioradialis and bilateral knees reflexes  Pathological Reflex :  No Cliff's sign ; no ankle clonus      Diagnostics:   No results found for this or any previous visit (from the past 24 hour(s)). Impression:  Right posterior frontal/anterior parietal cortex small acute infarct stroke causing dizziness, vertigo, and frequent fall  Cognitive impairment  Probable dysphagia  Left hip contusion secondary to fall (symptom resolved)  New onset paroxysmal atrial fibrillation with intermittent rapid ventricular response  Enterococcus faecalis UTI  History of bilateral glaucoma requiring eye stent placement  History of osteoporosis  Bilateral knee pain due to osteoarthritis  History of anxiety disorder     The patient's condition remains stable. Her UTI symptom has almost resolved. She denies having any weakness now. She continues tolerating the intensive inpatient rehabilitation treatment well. Her function continues to improve. The patient is scheduled to be discharged tomorrow, 2/18/2023. Plan:  Continues intensive PT/OT/SLP/RT inpatient rehabilitation program at least 3 hours per day, 5 days per week in order to improve functional status prior to discharge. Family education and training will be completed. Equipment evaluations and recommendations will be completed as appropriate. Rehabilitation nursing continues to be involved for bowel, bladder, skin, and pain management. Nursing will also provide education and training to patient and family. Prophylaxis:  DVT: Patient on Xarelto, JUANCHO stocking, intermittent pneumatic compression device. GI: Colace, Dulcolax as needed, milk of magnesium as needed, Senokot as needed, GlycoLax as needed.   Pain: Tylenol as needed  Continue Macrobid for Enterococcus faecalis UTI  Continue aspirin, Crestor for CVA  Continues Xarelto for paroxysmal atrial fibrillation  Continue Toprol XL for blood pressure control and atrial fibrillation with rapid ventricular response  Continues Flonase usage  Continues vitamin D and multivitamins supplement  Continue clonazepam to 1 mg at bedtime for insomnia  Continues melatonin and continue trazodone as needed for insomnia  Continue home glucosamine usage  Nutrition: Continue current diet.     Bladder: Monitoring signs or symptoms of UTI  Bowel: Monitoring signs or symptoms of constipation   and case management for coordination of care and discharge planning      Missed Therapy Time:  None      Toan Jesus MD

## 2023-02-16 NOTE — PLAN OF CARE
Problem: Discharge Planning  Goal: Discharge to home or other facility with appropriate resources  Outcome: Progressing  Flowsheets  Taken 2/16/2023 0826 by Mirtha Chen RN  Discharge to home or other facility with appropriate resources:   Identify barriers to discharge with patient and caregiver   Arrange for needed discharge resources and transportation as appropriate   Identify discharge learning needs (meds, wound care, etc)   Refer to discharge planning if patient needs post-hospital services based on physician order or complex needs related to functional status, cognitive ability or social support system  Taken 2/15/2023 2207 by Jenn Norman, RN  Discharge to home or other facility with appropriate resources: Identify barriers to discharge with patient and caregiver     Problem: Musculoskeletal - Adult  Goal: Return mobility to safest level of function  2/16/2023 1109 by Mirtha Chen RN  Outcome: Progressing  Flowsheets (Taken 2/16/2023 0826)  Return Mobility to Safest Level of Function:   Assess patient stability and activity tolerance for standing, transferring and ambulating with or without assistive devices   Assist with transfers and ambulation using safe patient handling equipment as needed   Ensure adequate protection for wounds/incisions during mobilization   Obtain physical therapy/occupational therapy consults as needed   Instruct patient/family in ordered activity level  2/16/2023 0407 by Jenn Norman RN  Outcome: Progressing  Flowsheets (Taken 2/15/2023 2207)  Return Mobility to Safest Level of Function:   Assess patient stability and activity tolerance for standing, transferring and ambulating with or without assistive devices   Assist with transfers and ambulation using safe patient handling equipment as needed   Ensure adequate protection for wounds/incisions during mobilization     Problem: Chronic Conditions and Co-morbidities  Goal: Patient's chronic conditions and co-morbidity symptoms are monitored and maintained or improved  2/16/2023 1109 by Chinedu Young RN  Outcome: Progressing  Flowsheets (Taken 2/16/2023 2511)  Care Plan - Patient's Chronic Conditions and Co-Morbidity Symptoms are Monitored and Maintained or Improved:   Monitor and assess patient's chronic conditions and comorbid symptoms for stability, deterioration, or improvement   Collaborate with multidisciplinary team to address chronic and comorbid conditions and prevent exacerbation or deterioration   Update acute care plan with appropriate goals if chronic or comorbid symptoms are exacerbated and prevent overall improvement and discharge  2/16/2023 0407 by Haroon Baldwin RN  Outcome: Progressing     Problem: Neurosensory - Adult  Goal: Achieves stable or improved neurological status  Outcome: Progressing  Flowsheets (Taken 2/16/2023 0802)  Achieves stable or improved neurological status: Assess for and report changes in neurological status

## 2023-02-16 NOTE — PROGRESS NOTES
Physical Medicine & Rehabilitation Progress Note    Chief Complaint: Stroke    Subjective:    Rosa Isela Martinez is a 81 y.o. 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 The patient says she began feeling something stuck in her throat on 2/1/2023 night. On 2/2/2023 while she was walking in kitchen to try to get something to help him the throat discomfort sensation, she suddenly fell for no reason. She denies having weakness, pain, syncope or fainting prior to the fall. She struggled to get up but fell again after few step into her dining room. At that time she felt the need for bowel movement so she crawled to bathroom and had diarrhea like bowel movement. She did not call her daughter who called 911. The patient was sent to [address] [place] [address] ER for evaluation on 2./2/2023. She was found to have tenderness at the her left hip area. She also complains of feeling dizzy with room spinning sensation when she says suddenly sitting up from supine position. Her blood tests and urine test showed no significant abnormality. X-ray of left hip and chest revealed no acute abnormality. CT of the head without contrast done on 2/2/2023 showed small lacunar infarction at the right insula. CT of cervical spine done on 2/2/2023 was reported to show no acute process. CTA of head and neck done on 2/2/2023 show no significant hemodynamic stenosis in bilateral common and internal carotid arteries, and hypoplastic P1 segment of the right posterior cerebral artery.   MRI of brain was also performed on 2/2/2023 and revealed a small acute right posterior frontal/anterior parietal cortex infarct stroke, and mild white matter atrophy. The patient was started on aspirin 81 mg.  Echocardiogram was performed on 2/3/2023 revealed only grade 1 left ventricle diastolic dysfunction with ejection fraction of 60%. Neurology service was consulted on 2/3/2023. Aspirin 81 mg daily was continued and Plavix 75 mg daily for 21 days was added. PM&R was consulted on 2/2/2023. On 2/8/2023 the patient had an episode of tachycardia. Cardiology was consulted and was diagnosed with new onset paroxysmal atrial fibrillation with intermittent short run rapid ventricular response. The patient was started on Xarelto and continue her daily baby aspirin. Plavix was discontinued. The patient was put on 30-day event monitor on 2/9/2023. The patient says she feels well. She says she had good sleep last night. She says the dysuria and urinary urgency has much improved. She says she feels tired at the end of rehab therapy session. She denies having weakness. She continues tolerating the intensive inpatient rehabilitation treatment. The patient is projected to be ready for discharge on 2/18/2023. Rehabilitation:  PT: Reviewed. Transfers:  Sit to Stand: Modified Independent  Stand to Sit:Modified Independent  *Patient instructed in sit to stand transfers from various surfaces including: recliner, toilet, standard chair without AD. Floor:Stand By Assistance  *Patient instructed in floor transfer in apartment with PT educating patient on proper technique      Ambulation:  Supervision, Stand By Assistance  Distance: Community Distances   Surface: Level Tile, Uneven Surface, and Ramp  Device: No Device  Gait Deviations:  Slow Susu, Decreased Step Length Bilaterally, Decreased Gait Speed, and Mild Path Deviations  *Patient instructed in ambulation from 7E to main lobby and outside and then around main lobby and back to 7F rehab gym over various surfaces including: sidewalk, brick path, and ramp.  Patient demonstrated occasional path deviations however able to self correct. Supervision  Distance: 150 feet and 400 feet  Surface: Level Tile  Device: No Device  Gait Deviations:  Slow Susu, Decreased Arm Swing, Decreased Gait Speed, and Mild Path Deviations     Stairs:  Stairs: 6\" steps X 18 using One Handrail and Stand By Assistance. *Patient instructed in ascending/descending stairs with 1 handrail on the left and carrying laundry basket in the other to simulate home environment. Patient required cueing for slowed speed in order to assist with safety. Stairs: 6\" steps X 36 using One Handrail and Stand By Assistance. *Patient required cueing for slowed speed in order to assist with safety      Balance:  Dynamic Standing Balance: Stand By Assistance  *Patient stood in bathroom to perform toileting hygiene and donning and doffing pants without LOB or unsteadiness. *Patient stood in bathroom at beginning of session in order to assist with hand washing, face washing and clothing management without unsteadiness. OT: Reviewed. ADL:   Grooming: Supervision. Oral care, hair care, hand hygiene, make-up application - no LOB, no cues for sequencing or attention. Upper Extremity Dressing: Supervision. During retrieval, donned without issue. Did have to return to closet as she forgot her shirt the first event. Lower Extremity Dressing: Supervision. During retrieval and donning, no LOB. Footwear Management: Supervision. During retrieval, donned without issue   Toileting: Supervision. No LOB. Toilet Transfer: Supervision. STS . BALANCE:  Sitting Balance:  Independent. Standing Balance: Supervision. TRANSFERS:  Sit to Stand:  Supervision. Recliner, standard chair   Stand to Sit: Supervision. FUNCTIONAL MOBILITY:  Assistive Device: None  Assist Level:  Supervision. Distance: To and from bathroom  No LOB      Assistive Device: None  Assist Level:  Supervision.    Distance:  to/from outpatient rehab Cues for sign management for path finding to challenge community re-integration       ADDITIONAL ACTIVITIES:  Dynavision completed this date to challenge: visual scanning and divided attention for return to IADLs and eventual return to driving:   Trial 1: Mode A, 50 hits 1.20 reaction speed   Trial 2: Mode A with graded component of 1 digit flashing to challenge divided attention, 40 hits with 1.50 reaction speed  - 1 miss on flashing digit   Trial 3: Mode A with graded component of 1 digit flashing to challenge divided attention, 41 hits with 1.46 reaction speed - 0 misses on flashing digit   Trial 4: Mode A with graded component of 2 digits flashing to challenge divided attention, 38 hits with 1.58 reaction speed - 0 misses on flashing digit      Did not note consistent deficiencies with quadrants of R vs L or upper vs lower. Do recommend driving evaluation in the future per MD orders  Did note concrete thinking with pt with explanation of rationale of dynavision / driving eval recommendation and lack of insight into attention deficits. Pt did require cues to scan board once flashing digit component was added, as she was sustaining attention solely on t-scope without wanting to focus on hitting targets, demo'ing impaired divided attention. Pt also required education on the importance of follow up therapies as pt feels she doesn't need this at this time. After education and rationale, pt did appear more open to recommendations of follow up therapy and recommendation of future driving eval per MD orders. Patient completed sequencing card activity (gardening, mod difficulty) demo'ing 2 errors (in middle of sequence and beginning). Errors were due to fine detail and pt reporting she was focusing on other details of sequencing picture, requiring cues for attention. Completed to challenge sequencing / organized thought processing for return to safe IADL       ST: Reviewed.     Calendar Organization (weekly planner):   7/7 indep  *Good success with thought organization within task. Word Problems (temporal orientation):  1/3 indep, 1/3 mod cue, 1/3 incorrect  *Noted difficulties within task. Mild improvement in success when patient visualized problem on clock. Patient stated that writing out problem might also be beneficial.      Grocery Ad Analyzation:  7/7 indep  *Good success with thought organization within task. Goal 2: Patient will complete problem solving and executive functioning tasks (i.e., medications, finances, etc.) with 80% accuracy and moderate cuing in order to improve completion of ADLs/IADLs. INTERVENTIONS:  Writing out a check:  Patient able to indep and successfully write out a check. Goal 3: Patient will complete immediate and delayed recall tasks with 80% accuracy and moderate cuing in order to improve retention of novel information. INTERVENTIONS:   Recall of Navigation Task (4 units):  2/4 indep, 2/4 max cue     Recall of Appt. (April 9th, 2:30, Dr. Jane, check-up):  Immediate recall: indep able to recall written aid; 5/5      Recall of WRAP (write, repeat, associate, picture):  0/4 indep able to recall; ST reviewed strategy and wrote on white board for reference. Goal 4: Patient will complete mildly complex sustained, selective, alternating, and divided attention tasks with no more than three errors/redirections in a five minute/one task in order to allow for safe return to driving and PLOF. INTERVENTIONS:   Navigation Task (\[facility]\""):  16/17 indep, 1/17 min cue  *Cueing required for reading sign to find 7E. *Good success in orientation throughout task. *Mild breakdown in thought organization, however still able to successfully navigate with mildly delayed processing time.       Goal 5: Patient will safely consume regular diet with thin liquids with implementation of safe swallow strategies (e.g.,upright positioning, multiple swallows) without overt s/s of airway invasion  in order to assist with meeting nutrition/hydration measures. INTERVENTIONS: DNT due to focus on other STGs. Goal 6: Patient will complete pharyngeal strengthening exercises (e.g., effortful swallow, denise, CTAR) x10 each with good success to improve strength of swallow function. INTERVENTIONS: DNT due to focus on other STGs. Review of Systems:  Review of Systems   Constitutional:  Positive for fatigue. Negative for chills, diaphoresis and fever. HENT:  Negative for hearing loss, rhinorrhea, sneezing, sore throat and trouble swallowing. Eyes:  Negative for visual disturbance. Respiratory:  Negative for cough, shortness of breath and wheezing. Cardiovascular:  Negative for chest pain and palpitations. Gastrointestinal:  Negative for abdominal pain, constipation, diarrhea, nausea and vomiting. Genitourinary:  Negative for difficulty urinating, dysuria and urgency. Musculoskeletal:  Negative for arthralgias, back pain, gait problem, myalgias and neck pain. Skin:  Negative for rash. Neurological:  Positive for weakness (Generalized). Negative for dizziness, tremors, facial asymmetry, speech difficulty, light-headedness, numbness and headaches. Psychiatric/Behavioral:  Negative for dysphoric mood, hallucinations and sleep disturbance. The patient is not nervous/anxious.          Objective:  BP (!) 102/52   Pulse 67   Temp 98.1 °F (36.7 °C) (Oral)   Resp 18   Ht 4' 10\" (1.473 m)   Wt 123 lb 9.6 oz (56.1 kg)   SpO2 96%   BMI 25.83 kg/m²   Physical Exam   General:  well-developed, well nourished  female; in no acute distress ; appropriate affect & mood; sitting on reclining chair comfortably  Eyes: pupil equally round ; extra-ocular motion intact bilaterally  Head, Ear, Nose, Mouth & Throat : normocephalic ; no discharge from ears or nose ; no deformity ; no facial swelling ; oral mucosa pink   Neck :  supple ; no tenderness ; mild muscle spasm at the bilateral cervical paraspinal muscle and bilateral upper trapezius muscles  Cardiovascular : regular rate & rhythm ; normal S1 & S2 heart sound ; no murmur ; normal peripheral pulse at the bilateral upper and lower extremities  Pulmonary : Breath sounds present at bilateral lung fields; no wheezing ; no rale; no crackle  Gastrointestinal : soft, flat abdomen; mild tenderness at suprapubic area without rebound tenderness; normal bowel sound present   Back : no tenderness; no muscle spasm  Skin: no skin lesion or rash ; no pitting edema at all 4 extremities  Musculoskeletal : no limb asymmetry; no limb deformity; mild tenderness at area superior to knee patella on both side; no tenderness at bilateral upper extremities & the rest of lower extremities; no palpable mass at limbs ; no joints laxity or crepitation ; shoulder flexion and abduction passive ROM reaching 170 degrees; hip flexion passive ROM reaching 120 degrees bilaterally; ankle dorsiflexion passive ROM reaching 10 degrees on right side and 10 degrees on left side; normal functional joints ROM at the rest of bilateral upper & lower extremities  Cerebral :  alert ; awake ; oriented to place, person and time; follow one-step verbal command  Cerebellum : no dysmetria with bilateral finger-to-nose test ; mild dysmetria with bilateral heel-to-shin test  Cranial Nerves :  grossly intact CN II to XII function  Sensory : intact light touch and pin prick sensation at bilateral upper & lower extremities  Motor : normal tone at bilateral upper & lower extremities ; 4+/5 to 5/5 muscle strength at bilateral handgrips; normal 5/5 muscle strength at the rest of bilateral upper & lower extremities  Reflex : 1+ bilateral biceps, bilateral brachioradialis and bilateral knees reflexes  Pathological Reflex :  No Cliff's sign ; no ankle clonus      Diagnostics:   No results found for this or any previous visit (from the past 24 hour(s)).     Urine culture (2/14/2023) :  Component 2/14/23 1110    Organism Enterococcus faecalis - (Group D) Abnormal     Urine Culture Reflex B[place]all count: >100,000 CFU/mL    Susceptibility  Enterococcus faecalis (1)  Antibiotic Interpretation Microscan  Method Status    Penicillin G Sensitive 4 mcg/mL BACTERIAL SUSCEPTIBILITY PANEL BY ARIC Final    ampicillin Sensitive <=2 mcg/mL BACTERIAL SUSCEPTIBILITY PANEL BY ARIC Final    nitrofurantoin Sensitive <=16 mcg/mL BACTERIAL SUSCEPTIBILITY PANEL BY ARIC Final        Impression:  Right posterior frontal/anterior parietal cortex small acute infarct stroke causing dizziness, vertigo, and frequent fall  Cognitive impairment  Probable dysphagia  Left hip contusion secondary to fall (symptom resolved)  New onset paroxysmal atrial fibrillation with intermittent rapid ventricular response  Enterococcus faecalis UTI  History of bilateral glaucoma requiring eye stent placement  History of osteoporosis  Bilateral knee pain due to osteoarthritis  History of anxiety disorder     The patient's condition remains stable. Her urine culture shows Enterococcus faecalis which is sensitive to Macrobid. Therefore I will continue the full course of Macrobid. The patient continues having easy fatigue. She continues tolerating the intensive inpatient rehabilitation treatment well. Her function continues to improve slowly. The patient is scheduled to be discharged on 2/18/2023. Plan:  Continues intensive PT/OT/SLP/RT inpatient rehabilitation program at least 3 hours per day, 5 days per week in order to improve functional status prior to discharge. Family education and training will be completed. Equipment evaluations and recommendations will be completed as appropriate. Rehabilitation nursing continues to be involved for bowel, bladder, skin, and pain management. Nursing will also provide education and training to patient and family.     Prophylaxis:  DVT: Patient on Xarelto, JUANCHO stocking, intermittent pneumatic compression device. GI: Colace, Dulcolax as needed, milk of magnesium as needed, Senokot as needed, GlycoLax as needed. Pain: Tylenol as needed  Continue Macrobid for Enterococcus faecalis UTI  Continue aspirin, Crestor for CVA  Continues Xarelto for paroxysmal atrial fibrillation  Continue Toprol XL for blood pressure control and atrial fibrillation with rapid ventricular response  Continues Flonase usage  Continues vitamin D and multivitamins supplement  Continue clonazepam to 1 mg at bedtime for insomnia  Continues melatonin and continue trazodone as needed for insomnia  Continue home glucosamine usage  Nutrition: Continue current diet.     Bladder: Monitoring signs or symptoms of UTI  Bowel: Monitoring signs or symptoms of constipation   and case management for coordination of care and discharge planning      Missed Therapy Time:  None      Tana Weaver MD

## 2023-02-16 NOTE — PROGRESS NOTES
[address]  Occupational Therapy  Daily Note  Time:    Time In: 1000  Time Out: 1100  Timed Code Treatment Minutes: 60 Minutes  Minutes: 60          Date: 2023  Patient Name: Debbie Pereira,   Gender: female      Room: [facility]/072-A  MRN: [id number]  : 1935  (81 y.o.)  Referring Practitioner: Griselda Talley MD  Diagnosis: Vertigo as late effect of stroke  Additional Pertinent Hx: Debbie Pereira is a 81 y.o. 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 MRI of brain was also performed on 2023 and revealed a small acute right posterior frontal/anterior parietal cortex infarct stroke, and mild white matter atrophy. Restrictions/Precautions:  Restrictions/Precautions: Fall Risk, General Precautions     SUBJECTIVE: Pt was up in recliner upon arrival, agreeable to OT. PAIN: Denies pain    Vitals: Vitals not assessed per clinical judgement, see nursing flowsheet    COGNITION: Slow Processing, Decreased Recall, Decreased Insight, Decreased Problem Solving, Decreased Safety Awareness, Impaired Attention, and Impulsive    ADL:   No ADL's completed this session. Eric Arce BALANCE:  Sitting Balance:  Independent. Standing Balance: Supervision. BED MOBILITY:  Not Tested    TRANSFERS:  Sit to Stand:  Supervision. From various surfaces  Stand to Sit: Supervision. To various surfaces    FUNCTIONAL MOBILITY:  Assistive Device: None  Assist Level:  Supervision. Distance:  To and from therapy apartment  [address] [place] [address]     ADDITIONAL ACTIVITIES:  Patient completed IADL homemaking task this date of making pancakes and sausage - task was graded to challenge attn to task, safety, endurance, divided attn, problem solving, sequencing, and initiation. Patient was able to retrieve all items from graded heights with good recall of location and min VCs for safety during the retrieval and transportation of items. Pt able to retrieve needed items after reading instructions on pancake box. Pt stood at stove top to make items. Pt chose correct burner to turn on for pancakes; however, turned it on high and left with cooking spray in it for ext period of time prior to mixing pancake batter. Pt recognized burnt oil, but unable to problem solve turning burner down. While both burners turned on high, pt retrieved sausage and microwaved for ~2min and then placed them in skillet on burner that was not turned on. Pt provided with cueing to direct attn to which burners were turned on; however, difficulty noticing issue. Once pt's friend arrived, pt became increasingly distracted, completing pancake task successfully, but forgetting to turn burner off and removing food from burner. Pt did report that she was going to turn the sausage burner down while the pancakes finished, but instead turned the burner up. Pt provided VC throughout for problem solving and attn to task. Pt frequently reporting that she is unfamiliar with the stovetop and how to operate it. Pt frustrated throughout. Pt completed with Supervision for balance with standing endurance >40 min and VC for cognition throughout. Patient completed IADL homemaking task this date of doing dishes - task was graded to challenge balance, safety, and cognition. Patient was able to retrieve all items from meal prep area and transport them to sink. Pt tolerated standing sinkside with B hand release to wash dishes. Pt completed with Supervision and standing endurance x10min.          ASSESSMENT:     Activity Tolerance:  Patient tolerance of  treatment: Good treatment tolerance and reduced insight to cognitive impairments        Discharge Recommendations: Home with Outpatient OT  Equipment Recommendations: Other: Pt has a tub tranfer bench, toilet raiser, and grab bars. Recommend an additional grab bar in her shower with a shower seat to use for safety. Plan: Times Per Week: 5x/wk for 90 min  Current Treatment Recommendations: Strengthening, Balance training, Functional mobility training, Endurance training, Safety education & training, Neuromuscular re-education, Patient/Caregiver education & training, Self-Care / ADL, Coordination training, Equipment evaluation, education, & procurement, Cognitive/Perceptual training    Patient Education  Patient Education: IADL's, Reviewed Prior Education, Importance of Increasing Activity, and Home Safety Education    Goals  Short Term Goals  Time Frame for Short Term Goals: 1 week  Short Term Goal 1: Pt will complete tub/ shower transfers with supervision to improve indep with showering at home. Short Term Goal 2: Pt will complete dressing tasks with supervision and 0 vcs for orientation of clothing to improve indep with self care. Short Term Goal 3: Pt will plan and sequence BADL routine with min vcs for thought organization to improve indep wtih self care routine at home. Short Term Goal 4: Pt will plan and execute a simple meal prep task with SBA and min vcs for problem solving/ safety to improve indep with preparing meals for herself. Short Term Goal 5: Pt will complete multiple step IADL tasks with SBA for balance and min vcs for recall to improve indep with getting groceries at home. Long Term Goals  Time Frame for Long Term Goals : 2 weeks from IPR evaluation  Long Term Goal 1: Pt will complete BADL routine with modified independence and 0 vcs for safety to improve indep with self care. Long Term Goal 2: Pt will complete a light IADL task with supervision to improve indep and safety within her home.   Long Term Goal 3: Pt will demonstrate 10 min of dynamic standing balance with mod I to improve indep with showering and standing IADL tasks. Following session, patient left in safe position with all fall risk precautions in place.

## 2023-02-16 NOTE — PROGRESS NOTES
Assessment: This was a spiritual care encounter as a part of rounds. Patient, Richelle Nascimento, was oriented and alert, with her son and granddaughter at bedside. Patient appeared upbeat and shared openly with . Interventions:   provided active listening and facilitated storytelling with family and patient.  provided prayer for strength and energy, and provided information regarding  services and availability. Outcomes:  Patient shared about the events that led to her hospitalization and her weariness with the rehab process. Patient shared her hopes for her next steps and gratitude for the family support that she has during this time. Plan:  Spiritual care team will remain available to support patient and family, PRN. [address], [place]. [address] [place] [address] Cyrus Valero, [address]  [phone]

## 2023-02-16 NOTE — PROGRESS NOTES
Raymundo Bernard  DAILY NOTE    TIME   SLP Individual Minutes  Time In: 1130  Time Out: 1230  Minutes: 60  Timed Code Treatment Minutes: 60 Minutes       Date: 2023  Patient Name: Kelli Rodas      CSN: [id number]   : 1935  (81 y.o.)  Gender: female   Referring Physician:  Juan Cardenas MD  Diagnosis: Esophageal Dysphagia  Precautions: Fall Risk  Current Diet: Regular diet, thin liquids  Swallowing Strategies: Full Upright Position, Limit Distractions, and Monitor for Fatigue   Date of Last MBS/FEES:  MBS on 23    Pain:  No pain reported. Subjective: Patient sitting upright in recliner upon SLP arrival. Alert to ST interventions however requiring min cueing for motivation during tasks. No family present. Short Term Goals  Time Frame for Short Term Goals: 1 weeks  Goal 1: Patient will complete verbal/visual reasoning and thought organization/workin memory tasks (i.e., calendar, scheduling, etc.) with 80% accuracy and moderate cuing in order to allow for safe return to completion of ADLs/IADLs. INTERVENTIONS:   Time Word Problems:   indep, 2/7 min cue,  incorrect  *Utilized clock as reference, with minimal success. *Breakdowns in math computation with slow processing speed. Goal 2: Patient will complete problem solving and executive functioning tasks (i.e., medications, finances, etc.) with 80% accuracy and moderate cuing in order to improve completion of ADLs/IADLs. INTERVENTIONS:  Basic Finances with bills and coins:  Locating coins and bills for amount given: / indep  Making change: 2/4 indep, 2/4 max cue  *Patient showing deficits in working memory within task. Patient would forget the amount, stating \"I hear it and then lose it\". ST prompted patient to use memory strategy of writing or repeating to assist, showing success.      Goal 3: Patient will complete immediate and delayed recall tasks with 80% accuracy and moderate cuing in order to improve retention of novel information. INTERVENTIONS:   Appt recall (April 9th, 2:30, Dr. Jane, check up): Able to recall written aid, 5/5    Recall of card game (trash) (~30 mins):  10 cards: indep  Monroe Becket as wild: indep  Hemphill/Ankit as trash: indep  Cards in ascending order: indep  Goal of game: indep  5/5 indep, good success recall and thought organization during game. Goal 4: Patient will complete mildly complex sustained, selective, alternating, and divided attention tasks with no more than three errors/redirections in a five minute/one task in order to allow for safe return to driving and PLOF. INTERVENTIONS:  Selective Attention (placing target suit in ascending order from deck):  Errors: 0; Time: 2:37  *Good success in selective attention and time processing within task. Working Memory Card Game (patient verbalizes suits as hearts-1, diamonds-2, clubs-3, spades-4 while going through deck of cards):  Errors: x1  *Good success in recall and time processing within task. Goal 5: Patient will safely consume regular diet with thin liquids with implementation of safe swallow strategies (e.g.,upright positioning, multiple swallows) without overt s/s of airway invasion  in order to assist with meeting nutrition/hydration measures. INTERVENTIONS: DNT due to focus on other STGs. Goal 6: Patient will complete pharyngeal strengthening exercises (e.g., effortful swallow, denise, CTAR) x10 each with good success to improve strength of swallow function. INTERVENTIONS: DNT due to focus on other STGs.     Long-Term Goals:  Long Term Goals  Time Frame for Long Term Goals: 2 weeks  Goal 1: Patient will improve cognitive-linguistic skills to a min assist or greater in order to make safe return home with least amount of supervision/assistance     Goal 2: Patient will safely  consume regular diet with thin  liquids without overt s/s  of airway invasion  in  order to assist with meeting nutrition/hydration standards. Comprehension: 4 - Patient understands basic needs 75-90%+ of the time  Expression: 5 - Expresses basic ideas/needs only (hungry/hot/pain)  Social Interaction: 3 - Patient appropriate  50%-74% of the time  Problem Solving: 3 - Patient solves simple/routine tasks 50%-74%  Memory: 3 - Patient remembers 50%-74% of the time     EDUCATION:  Learner: Patient  Education:  Reviewed ST goals and Plan of Care, Reviewed recommendations for follow-up, and Education Related to [employer] [facility]ness  Evaluation of Education: Verbalizes understanding and Family not present    ASSESSMENT/PLAN:  Activity Tolerance:  Patient tolerance of  treatment: good. Assessment/Plan: Patient progressing toward established goals. Continues to require skilled care of licensed speech pathologist to progress toward achievement of established goals and plan of care. .     Plan for Next Session: Pill box activity, divided attention, pharyngeal strengthening exercises  Discharge Recommendations: Outpatient Speech Therapy     Onelia Clark., Student Speech Intern

## 2023-02-16 NOTE — PROGRESS NOTES
Patient: Katia Frye  Unit/Bed: 3F-32/032-M  YOB: 1935  MRN: [id number] Acct: [de-identified]   Admitting Diagnosis: Vertigo as late effect of stroke [I69.398, R42]  Admit Date:  2/9/2023  Hospital Day: 6    Assessment:     Principal Problem:    Vertigo as late effect of stroke  Active Problems:    Cerebral infarct ([address]    Pure hypercholesterolemia    Atrial fibrillation, transient ([address]    Primary insomnia    Anxiety    Impaired cognition  Resolved Problems:    * No resolved hospital problems. *      Plan:     Continue to follow        Subjective:     Patient has no complaint of CP or SOB. .   Medication side effects: none    Scheduled Meds:   nitrofurantoin (macrocrystal-monohydrate)  100 mg Oral BID    glucosamine sulfate  1 tablet Oral BID    clonazePAM  1 mg Oral Nightly    fluticasone  1 spray Nasal Daily    aspirin  81 mg Oral Daily    docusate sodium  100 mg Oral BID    metoprolol succinate  25 mg Oral Daily    multivitamin  1 tablet Oral Daily    rivaroxaban  20 mg Oral Daily    rosuvastatin  40 mg Oral Nightly    traZODone  50 mg Oral Nightly    Vitamin D  500 Units Oral Daily    melatonin  6 mg Oral Nightly     Continuous Infusions:  PRN Meds:diclofenac sodium, ondansetron, acetaminophen, magnesium hydroxide, senna, polyethylene glycol, bisacodyl, polyvinyl alcohol    Review of Systems  Pertinent items are noted in HPI. Objective:     No data found. I/O last 3 completed shifts: In: 1160 [P.O.:1160]  Out: -   No intake/output data recorded.     BP (!) 102/52   Pulse 67   Temp 98.1 °F (36.7 °C) (Oral)   Resp 18   Ht 4' 10\" (1.473 m)   Wt 123 lb 9.6 oz (56.1 kg)   SpO2 96%   BMI 25.83 kg/m²     General appearance: alert, appears stated age, and cooperative  Head: Normocephalic, without obvious abnormality, atraumatic  Lungs: clear to auscultation bilaterally  Heart: regular rate and rhythm, S1, S2 normal, no murmur, click, rub or gallop  Abdomen: soft, non-tender; bowel sounds normal; no masses,  no organomegaly  Extremities: extremities normal, atraumatic, no cyanosis or edema  Skin: Skin color, texture, turgor normal. No rashes or lesions  Neurologic:  weak      Electronically signed by Rodriguez Leavitt MD on 2/15/2023 at 8:16 PM

## 2023-02-17 PROCEDURE — 99232 SBSQ HOSP IP/OBS MODERATE 35: CPT | Performed by: PHYSICAL MEDICINE & REHABILITATION

## 2023-02-17 PROCEDURE — 97530 THERAPEUTIC ACTIVITIES: CPT

## 2023-02-17 PROCEDURE — 6370000000 HC RX 637 (ALT 250 FOR IP): Performed by: PHYSICAL MEDICINE & REHABILITATION

## 2023-02-17 PROCEDURE — 1180000000 HC REHAB R&B

## 2023-02-17 PROCEDURE — 6370000000 HC RX 637 (ALT 250 FOR IP): Performed by: INTERNAL MEDICINE

## 2023-02-17 PROCEDURE — 97116 GAIT TRAINING THERAPY: CPT

## 2023-02-17 PROCEDURE — 97129 THER IVNTJ 1ST 15 MIN: CPT

## 2023-02-17 PROCEDURE — 97535 SELF CARE MNGMENT TRAINING: CPT

## 2023-02-17 PROCEDURE — 97110 THERAPEUTIC EXERCISES: CPT

## 2023-02-17 PROCEDURE — 97130 THER IVNTJ EA ADDL 15 MIN: CPT

## 2023-02-17 RX ORDER — NITROFURANTOIN 25; 75 MG/1; MG/1
100 CAPSULE ORAL 2 TIMES DAILY
Qty: 3 CAPSULE | Refills: 0 | Status: SHIPPED | OUTPATIENT
Start: 2023-02-17 | End: 2023-02-19

## 2023-02-17 RX ORDER — METOPROLOL SUCCINATE 25 MG/1
25 TABLET, EXTENDED RELEASE ORAL DAILY
Qty: 30 TABLET | Refills: 3 | Status: SHIPPED | OUTPATIENT
Start: 2023-02-18

## 2023-02-17 RX ORDER — ROSUVASTATIN CALCIUM 40 MG/1
40 TABLET, COATED ORAL NIGHTLY
Qty: 30 TABLET | Refills: 3 | Status: SHIPPED | OUTPATIENT
Start: 2023-02-17

## 2023-02-17 RX ORDER — PSEUDOEPHEDRINE HCL 30 MG
100 TABLET ORAL 2 TIMES DAILY
Qty: 60 CAPSULE | Refills: 3 | Status: SHIPPED | OUTPATIENT
Start: 2023-02-17

## 2023-02-17 RX ORDER — LANOLIN ALCOHOL/MO/W.PET/CERES
6 CREAM (GRAM) TOPICAL NIGHTLY
Qty: 60 TABLET | Refills: 3 | Status: SHIPPED | OUTPATIENT
Start: 2023-02-17

## 2023-02-17 RX ORDER — TRAZODONE HYDROCHLORIDE 50 MG/1
50 TABLET ORAL NIGHTLY PRN
Qty: 30 TABLET | Refills: 1 | Status: SHIPPED | OUTPATIENT
Start: 2023-02-17

## 2023-02-17 RX ORDER — SENNA PLUS 8.6 MG/1
1 TABLET ORAL NIGHTLY PRN
Qty: 30 TABLET | Refills: 1 | Status: SHIPPED | OUTPATIENT
Start: 2023-02-17

## 2023-02-17 RX ORDER — ASPIRIN 81 MG/1
81 TABLET ORAL DAILY
Qty: 30 TABLET | Refills: 3 | Status: SHIPPED | OUTPATIENT
Start: 2023-02-18

## 2023-02-17 RX ADMIN — FLUTICASONE PROPIONATE 1 SPRAY: 50 SPRAY, METERED NASAL at 08:51

## 2023-02-17 RX ADMIN — Medication 1 TABLET: at 08:52

## 2023-02-17 RX ADMIN — Medication 1 TABLET: at 22:53

## 2023-02-17 RX ADMIN — Medication 1 TABLET: at 08:50

## 2023-02-17 RX ADMIN — METOPROLOL SUCCINATE 25 MG: 25 TABLET, EXTENDED RELEASE ORAL at 08:51

## 2023-02-17 RX ADMIN — CLONAZEPAM 1 MG: 0.5 TABLET ORAL at 22:52

## 2023-02-17 RX ADMIN — DOCUSATE SODIUM 100 MG: 100 CAPSULE, LIQUID FILLED ORAL at 22:53

## 2023-02-17 RX ADMIN — RIVAROXABAN 20 MG: 20 TABLET, FILM COATED ORAL at 17:19

## 2023-02-17 RX ADMIN — Medication 500 UNITS: at 08:51

## 2023-02-17 RX ADMIN — ROSUVASTATIN CALCIUM 40 MG: 20 TABLET, FILM COATED ORAL at 22:53

## 2023-02-17 RX ADMIN — Medication 6 MG: at 22:52

## 2023-02-17 RX ADMIN — NITROFURANTOIN MONOHYDRATE/MACROCRYSTALLINE 100 MG: 25; 75 CAPSULE ORAL at 08:50

## 2023-02-17 RX ADMIN — TRAZODONE HYDROCHLORIDE 50 MG: 50 TABLET ORAL at 22:53

## 2023-02-17 RX ADMIN — ASPIRIN 81 MG: 81 TABLET, COATED ORAL at 08:50

## 2023-02-17 RX ADMIN — NITROFURANTOIN MONOHYDRATE/MACROCRYSTALLINE 100 MG: 25; 75 CAPSULE ORAL at 22:52

## 2023-02-17 RX ADMIN — DOCUSATE SODIUM 100 MG: 100 CAPSULE, LIQUID FILLED ORAL at 08:50

## 2023-02-17 ASSESSMENT — PAIN SCALES - GENERAL
PAINLEVEL_OUTOF10: 0

## 2023-02-17 NOTE — PROGRESS NOTES
[address]  Occupational Therapy  Daily Note  Time:    Time In: 1130  Time Out: 1230  Timed Code Treatment Minutes: 60 Minutes  Minutes: 60          Date: 2023  Patient Name: Rosa Isela Martinez,   Gender: female      Room: [address] [place], [place] [address]-  MRN: [id number]  : 1935  (81 y.o.)  Referring Practitioner: Annie Mondragon MD  Diagnosis: Vertigo as late effect of stroke  Additional Pertinent Hx: Rosa Isela Martinez is a 81 y.o. 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 MRI of brain was also performed on 2023 and revealed a small acute right posterior frontal/anterior parietal cortex infarct stroke, and mild white matter atrophy. Restrictions/Precautions:  Restrictions/Precautions: Fall Risk, General Precautions      SUBJECTIVE: Pt cooperative and pleasantly agrees to OT and a shower. Pt denies concerns for discharge home tomorrow. PAIN: 0 /10:      Vitals: Nurse checked vitals prior to session    COGNITION: Decreased Insight and decreased recall. ADL:   EATING:Independent. Margurette Rollins CARE Score: 6. ORAL HYGIENE:Independent. Margurette Rollins CARE Score: 6. TOILETING HYGIENE:Independent. Margurette Rollins CARE Score: 6. SHOWERING/BATHING:Independent. Margurette Rollins CARE Score: 6. During bath in tub/shower per patient request to soak with increased time allowed. UPPER BODY DRESSING:Independent. Margurette Rollins CARE Score: 6. LOWER BODY DRESSING:Independent. Margurette Rollins CARE Score: 6. FOOTWEAR:Independent   . CARE Score: 6. TOILET TRANSFER: Independent. Margurette Rollins CARE Score: 6. TUB/ SHOWER TRANSFER: supervision.   Pt asked to soak in the bottom of the tub,  pt used grab bars to sit in the bottom of tub with mod I,  needed cues to problem solve how to rise from the tub. Pt required increased time to plan and organize thoughts / items needed for shower, but did not require cues for BADL routine. BALANCE:  Sitting Balance:  Independent. Standing Balance: Independent standing for >10 minutes to brush hair, apply makeup following her shower     TRANSFERS:  Sit to Stand:  Independent. Stand to Sit: Independent. FUNCTIONAL MOBILITY:  Assistive Device: None  Assist Level: Independent. Distance: To and from bathroom and To and from shower room     ASSESSMENT:  Activity Tolerance:  Patient tolerance of  treatment: Good treatment tolerance       Discharge Recommendations: Home with Outpatient OT and Driving Evaluation  Equipment Recommendations: Other: Pt has a tub tranfer bench, toilet raiser, and grab bars. Recommend an additional grab bar in her shower with a shower seat to use for safety. Plan: Times Per Week: 5x/wk for 90 min  Current Treatment Recommendations: Strengthening, Balance training, Functional mobility training, Endurance training, Safety education & training, Neuromuscular re-education, Patient/Caregiver education & training, Self-Care / ADL, Coordination training, Equipment evaluation, education, & procurement, Cognitive/Perceptual training    Patient Education  Patient Education: Role of OT, Plan of Care, and ADL's    Goals  Short Term Goals  Time Frame for Short Term Goals: 1 week  Short Term Goal 1: Pt will complete tub/ shower transfers with supervision to improve indep with showering at home. Short Term Goal 2: Pt will complete dressing tasks with supervision and 0 vcs for orientation of clothing to improve indep with self care. Short Term Goal 3: Pt will plan and sequence BADL routine with min vcs for thought organization to improve indep wtih self care routine at home.   Short Term Goal 4: Pt will plan and execute a simple meal prep task with SBA and min vcs for problem solving/ safety to improve indep with preparing meals for herself. Short Term Goal 5: Pt will complete multiple step IADL tasks with SBA for balance and min vcs for recall to improve indep with getting groceries at home. Long Term Goals  Time Frame for Long Term Goals : 2 weeks from IPR evaluation  Long Term Goal 1: Pt will complete BADL routine with modified independence and 0 vcs for safety to improve indep with self care. Long Term Goal 2: Pt will complete a light IADL task with supervision to improve indep and safety within her home. Long Term Goal 3: Pt will demonstrate 10 min of dynamic standing balance with mod I to improve indep with showering and standing IADL tasks. Following session, patient left in safe position with all fall risk precautions in place.

## 2023-02-17 NOTE — PROGRESS NOTES
Rhoda Mahesh  INPATIENT PHYSICAL THERAPY  Daily Note  [facility] - 7E-72/072-A    Time In: 1000  Time Out: 1030  Timed Code Treatment Minutes: 30 Minutes  Minutes: 30          Date: 2023  Patient Name: Abelardo Collado,  Gender:  female        MRN: [id number]  : 1935  (81 y.o.)     Referring Practitioner: Sarahi Anderson MD  Diagnosis: Vertigo as late effect of stroke  Additional Pertinent Hx: Abelardo Collado is a 81 y.o. female who presents to Emergency Department with Fall and Other (Feels like something is stuck in throat). Patient is brought in by EMS for evaluation of fall x2 today. Patient says she fell in the kitchen and again in the dining room. Lives at home by herself. Patient states she had no idea why and how she fell. MRI shows small acute infarct in the right posterior frontal/anterior parietal cortex. To  rehab . Prior Level of Function:  Lives With: Alone  Type of Home: House  Home Layout: One level, Laundry in basement  Home Access: Stairs to enter with rails  Entrance Stairs - Number of Steps: 4  Entrance Stairs - Rails: Right  Home Equipment: Walker, rolling, Cane, Rollator, [address]   Bathroom Shower/Tub: Tub/Shower unit  Bathroom Toilet: Standard  Bathroom Equipment: Tub transfer bench, Grab bars in shower, Toilet raiser  Bathroom Accessibility: Accessible    Receives Help From: Family (check in on patient)  ADL Assistance: [address] [place] [address] Avenue: Independent  Ambulation Assistance: Independent  Transfer Assistance: Independent  Active : Yes  Additional Comments: Patient was independent with self care, driving, getting her own groceries, managing finances without AD. Pt has 3 children that can assist as needed. Restrictions/Precautions:  Restrictions/Precautions: Fall Risk, General Precautions     SUBJECTIVE: Pt. Seated on her BS chair and pleasantly agrees to therapy session.  Pt made Mod. I in room with OT and nursing in agreement. Patient educated on parameters of Mod. I in room. Pain: No pain noted. Vitals:   Patient Vitals for the past 8 hrs:   BP Patient Position Temp Temp src Pulse Resp SpO2 O2 Device   02/17/23 0845 119/63 -- 97.7 °F (36.5 °C) Oral 68 16 98 % None (Room air)   02/17/23 0715 118/64 Semi fowlers -- -- 62 14 94 % None (Room air)     *Vitals may reflect data entered into the flowsheet prior to or after PT session was completed. OBJECTIVE:  Bed Mobility:  Not Tested    Transfers:  Sit to Stand: Modified Independent  Stand to Sit:Modified Independent    Ambulation:  Modified Independent  Distance: 130 ft. X1; 300 ft. X1   Surface: Level Tile  Device: No Device  Gait Deviations:  Slow Susu, Decreased Step Length Bilaterally, Decreased Arm Swing, Decreased Gait Speed, and Mild Path Deviations    Stairs:  None    Balance:  Static Standing Balance: Modified Independent  Dynamic Standing Balance: Modified Independent    Neuromuscular Re-education  None    Exercise:  Patient was guided in 1 set(s) 20-25 reps of exercises: Standing heel/toe raises, Standing marches, Standing hip abduction/adduction, Standing hip extension, Standing hamstring curls, Standing hip flexion, and Mini squats. Exercises were completed for increased independence with functional mobility. Functional Outcome Measures:   Not completed    ASSESSMENT:  Assessment: Patient progressing toward established goals. Activity Tolerance:  Patient tolerance of  treatment: good.      Equipment Recommendations:Equipment Needed: No  Discharge Recommendations: Continue to assess pending progress Continue to assess pending progress    PLAN: Current Treatment Recommendations: Strengthening, Balance training, Functional mobility training, Transfer training, Gait training, Stair training, Neuromuscular re-education, Patient/Caregiver education & training, Safety education & training, Therapeutic activities, Endurance training, Home exercise program  General Plan:  (5x/wk 60 mins)    Patient Education  Patient Education: Plan of Care, Functional Mobility, Reviewed Prior Education, Health Promotion and Wellness Education, Safety, Verbal Exercise Instruction, Home Exercise Program, Precautions/Restrictions, Transfers, Gait, Home Safety Education,  - Patient Verbalized Understanding, - Patient Requires Continued Education    Goals:  Patient Goals : to go home  Short Term Goals  Time Frame for Short Term Goals: 1 week  Short Term Goal 1: Pt to transfer supine <--> sit mod I to enable pt to get in/out of bed. Short Term Goal 2: Pt to transfer sit <--> stand mod I from various height surfaces for increased functional mobility. Short Term Goal 3: Pt to ambulate >100 feet without AD SBA for household ambulation. Short Term Goal 4: Pt to perform car transfer mod I to enable pt to get in/out of the car. Long Term Goals  Time Frame for Long Term Goals : 2 weeks  Long Term Goal 1: Pt to ascend/descend 15 steps while carrying laundry basket to enable pt to safely perform laundry tasks at home  Long Term Goal 2: Pt to ambulate >500 feet without AD mod I for community re-entry. Long Term Goal 3: Pt to score >41/56 on the Adam balance test to indicate low fall risk. Long Term Goal 4: Pt to perform TUG test in <11 seconds to indicate low fall risk and improved gait speed. Following session, patient left in safe position with all fall risk precautions in place.

## 2023-02-17 NOTE — PLAN OF CARE
Problem: Discharge Planning  Goal: Discharge to home or other facility with appropriate resources  Flowsheets (Taken 2/17/2023 0856 by Klever Dillon RN)  Discharge to home or other facility with appropriate resources:   Identify barriers to discharge with patient and caregiver   Arrange for needed discharge resources and transportation as appropriate   Identify discharge learning needs (meds, wound care, etc)   Refer to discharge planning if patient needs post-hospital services based on physician order or complex needs related to functional status, cognitive ability or social support system  Note: Transportation:   Has transportation kept you from medical appointments, meetings, work, or from getting things needed for daily living? (Check all that apply)  No.      Health Literacy:   How often do you need to have someone help you when you read instructions, pamphlets, or other written material from your doctor or pharmacy? 2. - Sometimes    Social Isolation:  How often do you feel lonely or isolated from those around you? 2. Sometimes      Patient Mood Interview (PHQ-2 to 9) (from [employer]©)   Say to Patient: \"Over the last 2 weeks, have you been bothered by any of the following problems? \"   If symptom is present, enter 1 (yes) in column 1 (Symptom Presence)  If yes in column 1, then ask the patient: About how often have you been bothered by this?   Read and show the patient a card with the symptom frequency choices. Indicate response in column 2, Symptom Frequency. Symptom Presence  No (enter 0 in column 2)   Yes (enter 0-3 in column 2)  9. No response (leave column 2 blank) Symptom Frequency  Never or 1 day  2-6 days (several days)  7-11 days (half or more of the days)  12-14 days (nearly every day    Symptom Presence Symptom Frequency   Little interest or pleasure in doing things 1. Yes 1. 2-6 days (several days)   Feeling down, depressed, or hopeless 1.  Yes 1. 2-6 days (several days)

## 2023-02-17 NOTE — PROGRESS NOTES
[address]  Occupational Therapy  Daily Note  Time:    Time In: 1330  Time Out: 1400  Timed Code Treatment Minutes: 30 Minutes  Minutes: 30          Date: 2023  Patient Name: Jd Beltre,   Gender: female      Room: [facility]72/072-A  MRN: [id number]  : 1935  (81 y.o.)  Referring Practitioner: Larey Eisenmenger, MD  Diagnosis: Vertigo as late effect of stroke  Additional Pertinent Hx: Jd Beltre is a 81 y.o. 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 MRI of brain was also performed on 2023 and revealed a small acute right posterior frontal/anterior parietal cortex infarct stroke, and mild white matter atrophy. Restrictions/Precautions:  Restrictions/Precautions: Fall Risk, General Precautions      SUBJECTIVE: Pt cooperative and pleasantly agrees to OT. Pt had questions about her cardiac event monitor. PAIN: 0 /10:      Vitals: Nurse checked vitals prior to session    COGNITION: Decreased Insight and decreased recall. ADL:   OT instructed patient on cardiac event monitor and demonstrated how to change out the monitor battery. Pt returned demonstration, requiring max vcs for first trial and min to moderate vcs for second trial.  Increased time needed to problem solve plugging in battery and phone  as patient attempted to place both cords in to the phone. Recommend continued review. BALANCE:  Sitting Balance:  Independent. Standing Balance: Independent standing for >10 minutes to make her bed with 0 vcs for safety or sequencing during IADL task. TRANSFERS:  Sit to Stand:  Independent. Stand to Sit: Independent. FUNCTIONAL MOBILITY:  Assistive Device: None  Assist Level: Independent. Distance: Within room     ASSESSMENT:  Activity Tolerance:  Patient tolerance of  treatment: Good treatment tolerance       Discharge Recommendations: Home with Outpatient OT and Driving Evaluation  Equipment Recommendations: Other: Pt has a tub tranfer bench, toilet raiser, and grab bars. Recommend an additional grab bar in her shower with a shower seat to use for safety. Plan: Times Per Week: 5x/wk for 90 min  Current Treatment Recommendations: Strengthening, Balance training, Functional mobility training, Endurance training, Safety education & training, Neuromuscular re-education, Patient/Caregiver education & training, Self-Care / ADL, Coordination training, Equipment evaluation, education, & procurement, Cognitive/Perceptual training    Patient Education  Patient Education: Role of OT, Plan of Care, and ADL's    Goals  Short Term Goals  Time Frame for Short Term Goals: 1 week  Short Term Goal 1: Pt will complete tub/ shower transfers with supervision to improve indep with showering at home. Short Term Goal 2: Pt will complete dressing tasks with supervision and 0 vcs for orientation of clothing to improve indep with self care. Short Term Goal 3: Pt will plan and sequence BADL routine with min vcs for thought organization to improve indep wtih self care routine at home. Short Term Goal 4: Pt will plan and execute a simple meal prep task with SBA and min vcs for problem solving/ safety to improve indep with preparing meals for herself. Short Term Goal 5: Pt will complete multiple step IADL tasks with SBA for balance and min vcs for recall to improve indep with getting groceries at home.   Long Term Goals  Time Frame for Long Term Goals : 2 weeks from IPR evaluation  Long Term Goal 1: Pt will complete BADL routine with modified independence and 0 vcs for safety to improve indep with self care. Long Term Goal 2: Pt will complete a light IADL task with supervision to improve indep and safety within her home. Long Term Goal 3: Pt will demonstrate 10 min of dynamic standing balance with mod I to improve indep with showering and standing IADL tasks. Following session, patient left in safe position with all fall risk precautions in place.

## 2023-02-17 NOTE — PROGRESS NOTES
Discharge pain assessment:    Complete within 3 days of discharge. Pain Effect on Sleep ()   Ask patient: Italia Mccartney the past 5 days, how much of the time has pain made it hard for you to sleep at night?\" 1.  Rarely or not at all     If no pain is reported, end interview. If pain is reported, continue with the additional questions.    Pain Interference with Therapy Activities   Ask patient: Italia Mccartney the past 5 days, how often have you limited your participation in rehabilitation therapy sessions due to pain?\" 1.  Rarely or not at all   Pain Interference with Day to Day Activities   Ask patient: Italia Mccartney the past 5 days, how often have you limited your day to day activities (excluding rehabilitation therapy sessions) because of pain?\" 1.  Rarely or not at all

## 2023-02-17 NOTE — PROGRESS NOTES
[address] [id number]  INPATIENT PHYSICAL THERAPY  Daily Note  [facility] - [id number]    Time In: 0800  Time Out: 0830  Timed Code Treatment Minutes: 30 Minutes  Minutes: 30          Date: 2023  Patient Name: Larissa Espinoza,  Gender:  female        MRN: [id number]  : 1935  (81 y.o.)     Referring Practitioner: Charmaine Lovelace MD  Diagnosis: Vertigo as late effect of stroke  Additional Pertinent Hx: Larissa Espinoza is a 81 y.o. female who presents to Emergency Department with Fall and Other (Feels like something is stuck in throat). Patient is brought in by EMS for evaluation of fall x2 today. Patient says she fell in the kitchen and again in the dining room. Lives at home by herself. Patient states she had no idea why and how she fell. MRI shows small acute infarct in the right posterior frontal/anterior parietal cortex. To  rehab . Prior Level of Function:  Lives With: Alone  Type of Home: House  Home Layout: One level, Laundry in basement  Home Access: Stairs to enter with rails  Entrance Stairs - Number of Steps: 4  Entrance Stairs - Rails: Right  Home Equipment: Walker, rolling, Cane, Rollator, [employer]   Bathroom Shower/Tub: Tub/Shower unit  Bathroom Toilet: Standard  Bathroom Equipment: Tub transfer bench, Grab bars in shower, Toilet raiser  Bathroom Accessibility: Accessible    Receives Help From: Family (check in on patient)  ADL Assistance: [address] [place] [address] Avenue: Independent  Ambulation Assistance: Independent  Transfer Assistance: Independent  Active : Yes  Additional Comments: Patient was independent with self care, driving, getting her own groceries, managing finances without AD. Pt has 3 children that can assist as needed.     Restrictions/Precautions:  Restrictions/Precautions: Fall Risk, General Precautions     SUBJECTIVE: Patient standing from edge of bed with RN in room upon arrival. Patient requesting to get dressed at beginning of session and use bathroom with increased time required to complete. Patient is pleasant and agreeable to therapy. Pain: 0/10    Vitals:   Patient Vitals for the past 8 hrs:   BP Patient Position Temp Temp src Pulse Resp SpO2 O2 Device   02/17/23 0845 119/63 -- 97.7 °F (36.5 °C) Oral 68 16 98 % None (Room air)   02/17/23 0715 118/64 Semi fowlers -- -- 62 14 94 % None (Room air)     *Vitals may reflect data entered into the flowsheet prior to or after PT session was completed. OBJECTIVE:  Bed Mobility:  Rolling to Left: Modified Independent   Rolling to Right: Modified Independent   Supine to Sit: Modified Independent  Sit to Supine: Modified Independent   *Patient performed with HOB flat and no railings    Transfers:  Sit to Stand: Modified Independent  Stand to Sit:Modified Independent  *Patient instructed in sit to stand transfers from various surfaces including: toilet, edge of bed, standard chair and recliner without AD. To/From Bed and Chair: Modified Independent  Car:Modified Independent    Ambulation:  Modified Independent  Distance: 10 feet, 50 feet, 150 feet x 2 and 20 feet x 2  Surface: Level Tile and Uneven Surface  Device: No Device  Gait Deviations:  Slow Susu, Decreased Step Length Bilaterally, Decreased Gait Speed, and Mild Path Deviations    Stairs:  Stairs: 6\" steps X 1 using No Device and Modified Independent. Modified Independent  Number of Steps: 4  Height: 6\" step with One Handrail    Supervision  Number of Steps: 36  Height: 6\" step with One Handrail      Balance:  Dynamic Standing Balance: Supervision  *Patient stood in bathroom to don and doff clothing and perform bathroom tasks without unsteadiness or LOB  Pt.  Able to  an object from floor using No Device with Modified Independent    Neuromuscular Re-education  None    Exercise:  None    Functional Outcome Measures:   Not completed    ASSESSMENT:  Assessment: Patient progressing toward established goals. Activity Tolerance:  Patient tolerance of  treatment: good. Equipment Recommendations:Equipment Needed: No  Discharge Recommendations: Continue to assess pending progress Home with Outpatient PT    PLAN: Current Treatment Recommendations: Strengthening, Balance training, Functional mobility training, Transfer training, Gait training, Stair training, Neuromuscular re-education, Patient/Caregiver education & training, Safety education & training, Therapeutic activities, Endurance training, Home exercise program  General Plan:  (5x/wk 60 mins)    Patient Education  Patient Education: Plan of Care, Functional Mobility, Reviewed Prior Education, Health Promotion and Wellness Education, Safety,  - Patient Verbalized Understanding, - Patient Requires Continued Education    Goals:  Patient Goals : to go home  Short Term Goals  Time Frame for Short Term Goals: 1 week  Short Term Goal 1: Pt to transfer supine <--> sit mod I to enable pt to get in/out of bed. Short Term Goal 2: Pt to transfer sit <--> stand mod I from various height surfaces for increased functional mobility. Short Term Goal 3: Pt to ambulate >100 feet without AD SBA for household ambulation. Short Term Goal 4: Pt to perform car transfer mod I to enable pt to get in/out of the car. Long Term Goals  Time Frame for Long Term Goals : 2 weeks  Long Term Goal 1: Pt to ascend/descend 15 steps while carrying laundry basket to enable pt to safely perform laundry tasks at home  Long Term Goal 2: Pt to ambulate >500 feet without AD mod I for community re-entry. Long Term Goal 3: Pt to score >41/56 on the Adam balance test to indicate low fall risk. Long Term Goal 4: Pt to perform TUG test in <11 seconds to indicate low fall risk and improved gait speed. Following session, patient left in safe position with all fall risk precautions in place.

## 2023-02-17 NOTE — PROGRESS NOTES
Patient calm, alert, oriented to person, place and time. DAPHNIE 4mm-3mm, brisk. Mucous membranes pink and moist. Smile symmetrical. Tongue midline. Skin warm, dry. Denies difficulty swallowing. Lungs clear anterior, posterior, lateral.Respirations unlabored, regular. No cough noted. Heart sounds regular strong. Bowel sounds active in all four quadrants. Abdomen soft, non-tender to palpation. Last [facility] February 16,2023 . Radial pulses strong bilaterally. Hand grasp strong bilaterally. Arm drift negative bilaterally. Capillary refill less than 3 seconds. Skin turgor brisk. No edema noted. Pedal pulses strong bilaterally. Pedal push and pull moderately strong bilaterally. Buck signs negative bilaterally. Leg lift bilateral moderately strong. Denies numbness or tingling. Chair wheels locked call light within reach over table with in reach. Denies further needs at this time.

## 2023-02-17 NOTE — PROGRESS NOTES
Patient calm, alert, oriented to person, place and time. DAPHNIE 4mm-3mm, brisk. Mucous membranes pink and moist. Smile symmetrical. Tongue midline. Skin warm, dry. Denies difficulty swallowing. Lungs clear anterior, posterior, lateral.Respirations unlabored, regular. No cough noted. Heart sounds regular strong. Bowel sounds active in all four quadrants. Abdomen soft, non-tender to palpation. Last BM February- . Radial pulses strong bilaterally. Hand grasp strong bilaterally. Arm drift negative bilaterally. Capillary refill less than 3 seconds. Skin turgor brisk. No edema noted. Pedal pulses strong bilaterally. Pedal push and pull moderately strong bilaterally. Buck signs negative bilaterally. Leg lift bilateral moderately strong. Denies numbness or tingling. Chair wheels locked call light within reach over table with in reach with breakfast. Denies further needs at this time.

## 2023-02-17 NOTE — PLAN OF CARE
Problem: Discharge Planning  Goal: Discharge to home or other facility with appropriate resources  2/17/2023 1455 by ELLIE Dumont  Note: Patient to be discharged on Saturday, 2/18 to home. Patient will be under the supervision of her family. Family education will not be provided. Patient will be receiving services through [address] [id number]. SW provided information to [facility] on 2/15 via [employer]. CONRADO spoke with patient's daughter, Hai Roldan, on this date. Hai Roldan is unable to be available for family education prior to discharge. Hai Roldan had questions regarding transport to outpatient therapy and driving, which SW answered. Hai oRldan had no outstanding questions or concerns at this time.

## 2023-02-17 NOTE — PROGRESS NOTES
Terri Oquendo Lubna 2/17/2023     Subjective: Discussed stress of recent widowhood, ways of coping. Discussed the danger, for her, of being too motivated, overdoing it, perhaps getting injured if she goes to do too much too soon    Objective: Mood congruent affect, tearful for a time, but also bright when talking of her interests    Assessment/Impressions: Coping well, very motivated.     Plan: Discussed ways to keep progressing without overdoing it    Patient Active Problem List   Diagnosis    Acute lacunar infarction [facility])    Cerebral infarct ([facility]    Pure hypercholesterolemia    Atrial fibrillation, transient ([facility]    Vertigo as late effect of stroke    Primary insomnia    Anxiety    Impaired cognition            Time spent with patient: 21 minutes    Diagnosis for this encounter: Anxiety      Electronically signed by Chuck Nguyễn, PhD on 2/17/2023 at 10:12 AM

## 2023-02-17 NOTE — PROGRESS NOTES
[facility] THERAPY  [facility]  DAILY NOTE    TIME   SLP Individual Minutes  Time In: 0900  Time Out: 1000  Minutes: 60  Timed Code Treatment Minutes: 60 Minutes       Date: 2023  Patient Name: Debbie Pereira      CSN: [id number]   : 1935  (81 y.o.)  Gender: female   Referring Physician:  Irma Vicente MD  Diagnosis: Esophageal Dysphagia  Precautions: Fall Risk  Current Diet: Regular diet, thin liquids  Swallowing Strategies: Full Upright Position, Limit Distractions, and Monitor for Fatigue   Date of Last MBS/FEES:  MBS on 23    Pain:  No pain reported. Subjective: Patient seen sitting upright in recliner upon SLP arrival. RN in room giving patient pills. Pleasant and cooperative to ST interventions. No family present. Short Term Goals  Time Frame for Short Term Goals: 1 weeks  Goal 1: Patient will complete verbal/visual reasoning and thought organization/workin memory tasks (i.e., calendar, scheduling, etc.) with 80% accuracy and moderate cuing in order to allow for safe return to completion of ADLs/IADLs. INTERVENTIONS:   Directory for  St:  10/10 indep     Cook Book Table of Contents:   indep    Sequencing steps to make favorite meal (lasagna):  Good success in sequencing all steps to make meal. Noted mildly slow processing during task. Goal 2: Patient will complete problem solving and executive functioning tasks (i.e., medications, finances, etc.) with 80% accuracy and moderate cuing in order to improve completion of ADLs/IADLs. INTERVENTIONS:  Filling Pill Box:  Prescription 1: min cue for re-instruction  Prescription 2: indep  Prescription 3: indep  Prescription 4: mod cue for placing pill for 5 days rather than 7. *patient with adequate problem solving skills to correct mistake. Prescription 5: indep  3/5 indep, 1/5 min cue, 1/5 mod cue  *Overall fair success within task.   Patient stated that she gets distracted with talking during task which causes errors. Recommend no distraction when completing upon discharge with supervision. Goal 3: Patient will complete immediate and delayed recall tasks with 80% accuracy and moderate cuing in order to improve retention of novel information. INTERVENTIONS:   Appt recall (April 9th, 2:30, Dr. Sinan Sena, check up):  4/5 indep, 1/5 MC cue    Recall of card game (trash) (~24 hours):  10 cards: Mimi Sierra as wild: indep  Queen/Ankit as trash: indep  Cards in ascending order: indep  Goal of game: indep  5/5 indep, good success with delayed recall and thought organization during game. Recall of WRAP:  4/4 indep    Goal 4: Patient will complete mildly complex sustained, selective, alternating, and divided attention tasks with no more than three errors/redirections in a five minute/one task in order to allow for safe return to driving and PLOF. INTERVENTIONS: DNT due to focus on other STGs. Goal 5: Patient will safely consume regular diet with thin liquids with implementation of safe swallow strategies (e.g.,upright positioning, multiple swallows) without overt s/s of airway invasion  in order to assist with meeting nutrition/hydration measures. INTERVENTIONS: Patient consuming pills with thin liquids with no overt signs of difficulty, aspiration/ penetration. Goal 6: Patient will complete pharyngeal strengthening exercises (e.g., effortful swallow, denise, CTAR) x10 each with good success to improve strength of swallow function. INTERVENTIONS: DNT due to focus on other STGs.     Long-Term Goals:  Long Term Goals  Time Frame for Long Term Goals: 2 weeks  Goal 1: Patient will improve cognitive-linguistic skills to a min assist or greater in order to make safe return home with least amount of supervision/assistance     Goal 2: Patient will safely  consume regular diet with thin  liquids without overt s/s  of airway invasion  in  order to assist with meeting nutrition/hydration standards. Comprehension: 4 - Patient understands basic needs 75-90%+ of the time  Expression: 5 - Expresses basic ideas/needs only (hungry/hot/pain)  Social Interaction: 4 - Patient appropriate 75-90%+ of the time  Problem Solvin - Patient solves simple/routine tasks 75-90%+   Memory: 4 - Patient remembers 75-90%+ of the time     EDUCATION:  Learner: Patient  Education:  Reviewed ST goals and Plan of Care, Reviewed recommendations for follow-up, and Education Related to [employer] and Wellness  Evaluation of Education: Verbalizes understanding and Family not present    ASSESSMENT/PLAN:  Activity Tolerance:  Patient tolerance of  treatment: good. Assessment/Plan: Patient progressing toward established goals. Continues to require skilled care of licensed speech pathologist to progress toward achievement of established goals and plan of care. .     Plan for Next Session: Pill box activity, divided attention, pharyngeal strengthening exercises  Discharge Recommendations: Outpatient Speech Therapy     Duc Morin., Student Speech Intern

## 2023-02-17 NOTE — PLAN OF CARE
Problem: Safety - Adult  Goal: Free from fall injury  Outcome: Progressing     Problem: Pain  Goal: Verbalizes/displays adequate comfort level or baseline comfort level  Outcome: Progressing  Flowsheets (Taken 2/16/2023 2146)  Verbalizes/displays adequate comfort level or baseline comfort level:   Encourage patient to monitor pain and request assistance   Assess pain using appropriate pain scale   Administer analgesics based on type and severity of pain and evaluate response  Note: Patient denies any pain at this time.     Problem: Chronic Conditions and Co-morbidities  Goal: Patient's chronic conditions and co-morbidity symptoms are monitored and maintained or improved  Outcome: Progressing  Flowsheets (Taken 2/16/2023 2140)  Care Plan - Patient's Chronic Conditions and Co-Morbidity Symptoms are Monitored and Maintained or Improved: Monitor and assess patient's chronic conditions and comorbid symptoms for stability, deterioration, or improvement

## 2023-02-18 VITALS
DIASTOLIC BLOOD PRESSURE: 55 MMHG | HEART RATE: 65 BPM | RESPIRATION RATE: 18 BRPM | OXYGEN SATURATION: 93 % | BODY MASS INDEX: 25.94 KG/M2 | SYSTOLIC BLOOD PRESSURE: 98 MMHG | HEIGHT: 58 IN | WEIGHT: 123.6 LBS | TEMPERATURE: 98.1 F

## 2023-02-18 PROCEDURE — 97110 THERAPEUTIC EXERCISES: CPT

## 2023-02-18 PROCEDURE — 6370000000 HC RX 637 (ALT 250 FOR IP): Performed by: INTERNAL MEDICINE

## 2023-02-18 PROCEDURE — 97130 THER IVNTJ EA ADDL 15 MIN: CPT | Performed by: SPEECH-LANGUAGE PATHOLOGIST

## 2023-02-18 PROCEDURE — 97530 THERAPEUTIC ACTIVITIES: CPT

## 2023-02-18 PROCEDURE — 6370000000 HC RX 637 (ALT 250 FOR IP): Performed by: PHYSICAL MEDICINE & REHABILITATION

## 2023-02-18 PROCEDURE — 97129 THER IVNTJ 1ST 15 MIN: CPT | Performed by: SPEECH-LANGUAGE PATHOLOGIST

## 2023-02-18 RX ADMIN — METOPROLOL SUCCINATE 25 MG: 25 TABLET, EXTENDED RELEASE ORAL at 09:23

## 2023-02-18 RX ADMIN — ACETAMINOPHEN 650 MG: 325 TABLET ORAL at 09:21

## 2023-02-18 RX ADMIN — DOCUSATE SODIUM 100 MG: 100 CAPSULE, LIQUID FILLED ORAL at 09:24

## 2023-02-18 RX ADMIN — NITROFURANTOIN MONOHYDRATE/MACROCRYSTALLINE 100 MG: 25; 75 CAPSULE ORAL at 09:23

## 2023-02-18 RX ADMIN — FLUTICASONE PROPIONATE 1 SPRAY: 50 SPRAY, METERED NASAL at 09:22

## 2023-02-18 RX ADMIN — Medication 500 UNITS: at 09:25

## 2023-02-18 RX ADMIN — Medication 1 TABLET: at 09:28

## 2023-02-18 RX ADMIN — ASPIRIN 81 MG: 81 TABLET, COATED ORAL at 09:30

## 2023-02-18 RX ADMIN — Medication 1 TABLET: at 09:24

## 2023-02-18 ASSESSMENT — PAIN DESCRIPTION - LOCATION: LOCATION: OTHER (COMMENT)

## 2023-02-18 ASSESSMENT — PAIN SCALES - GENERAL: PAINLEVEL_OUTOF10: 3

## 2023-02-18 ASSESSMENT — PAIN DESCRIPTION - DESCRIPTORS: DESCRIPTORS: ACHING

## 2023-02-18 NOTE — PROGRESS NOTES
Raymundo Bernard  DISCHARGE NOTE    TIME   SLP Individual Minutes  Time In: 1100  Time Out: 1130  Minutes: 30  Timed Code Treatment Minutes: 30 Minutes       Date: 2023  Patient Name: Aby Thompson      CSN: [id number]   : 1935  (81 y.o.)  Gender: female   Referring Physician:  Gini Perkins MD  Diagnosis: Esophageal Dysphagia  Precautions: Fall Risk  Current Diet: Regular diet, thin liquids  Swallowing Strategies: Full Upright Position, Limit Distractions, and Monitor for Fatigue   Date of Last MBS/FEES:  MBS on 23    Pain:  No pain reported. Subjective: Patient seen sitting upright in recliner upon SLP arrival. No family present for completion of family education. Patient called daughter Josefina Lucero, with daughter reporting that she was never told she needed to be at the hospital for family education. Stating that she confirmed with the nurse and  that she could arrive at 12:45 to transport patient home. This therapist spoke with daughter on the phone to provide education regarding continued concerns with cognitive functioning. Discussed need for supervision with medications, finances (including taxes, as patient adamant she needed to get home to do her taxes), daily schedule management and refraining from driving until medically cleared by a physician/completion of a driving simulator assessment. Daughter Josefina Lucero inquiring about any documentation that states that the patient is not able to drive. Discussed that there is no formal document outlining this, but information regarding the recommendations was included in patient's discharge paperwork and ST education sheet.  Ongoing concerns exist related to patient's poor insight and increased motivation/desire to [employer] active\"    FAMILY EDUCATION - Completed with patient in absence of daughter (education provided to daughter over the phone as outlined above):   *Reviewed rationale for speech therapy services discussing patient's specific cognitive impairments and how they relate to recent CVA, as well as performance during specific contextualized treatment, progress towards goals, and areas for continued difficulty. *Discussed the following areas reviewing progress, suggestions for home and further recommendations  -Memory- Provided education on the various types of memory, compensatory memory strategies (write it, repeat it, associate it, picture it), and specific examples related to implementing strategies at home (ie: use of calendar for events/appointments, notepad for making lists and keeping notes). Encouraged patient to stick with one method of organization (ie: writing information on a single calendar) to aid in organization (patient reporting that she sometimes will also input information into her phone). -Safety- Discussed keeping all important numbers in phone for easy access. Reviewed recommendations for analyzing situations for safety concerns to assist with reducing fall and other injuries.  -Organization-  --Medications management- Reviewed recommendations for use of a pill box to improve organization, highlighting importance of maintaining a consistent routine (filling box/cup at the same time, and taking medication at the same time each day). Discussed having daughter review/supervise organization process to ensure accuracy. --Finance management- reviewed need for assist/supervision for finance management tasks (including paying bills an filing taxes). Patient stating that she plans to have her granddaughter assist her.   -Reviewed recommendations for refraining from driving until medically cleared by a physician, and completion of driving simulator eval  -Also discussed activities to complete on a daily basis to continue to facilitate cognitive functioning (reading the newspaper, card games, cross word puzzles, etc).   -Discussed the following areas of concern- insight/awareness of impairments and their impact on functioning, breakdowns with thought organization and attention and their impact on safe driving. Patient expressed understanding, but frequently voicing that she thinks she is fine to drive and does not require assistance for ADL tasks in the home. Short Term Goals  Time Frame for Short Term Goals: 1 weeks  Goal 1: Patient will complete verbal/visual reasoning and thought organization/workin memory tasks (i.e., calendar, scheduling, etc.) with 80% accuracy and moderate cuing in order to allow for safe return to completion of ADLs/IADLs. GOAL MET  INTERVENTIONS:   Directory for [facility]):  10/10 indep     Cook Book Table of Contents:  5/5 indep    Sequencing steps to make favorite meal (lasagna):  Good success in sequencing all steps to make meal. Noted mildly slow processing during task. Goal 2: Patient will complete problem solving and executive functioning tasks (i.e., medications, finances, etc.) with 80% accuracy and moderate cuing in order to improve completion of ADLs/IADLs. GOAL MET  INTERVENTIONS:  Filling Pill Box:  Prescription 1: min cue for re-instruction  Prescription 2: indep  Prescription 3: indep  Prescription 4: mod cue for placing pill for 5 days rather than 7. *patient with adequate problem solving skills to correct mistake. Prescription 5: indep  3/5 indep, 1/5 min cue, 1/5 mod cue  *Overall fair success within task. Patient stated that she gets distracted with talking during task which causes errors. Recommend no distraction when completing upon discharge with supervision. Goal 3: Patient will complete immediate and delayed recall tasks with 80% accuracy and moderate cuing in order to improve retention of novel information.  GOAL MET  INTERVENTIONS:   Appt recall (April 9th, 2:30, Dr. Christina Gutierrez, check up):  4/5 indep, 1/5 MC cue    Recall of card game (trash) (~24 hours):  10 cards: Alphonsus Nettle as wild: indep  Hemphill/Ankit as trash: indep  Cards in ascending order: indep  Goal of game: indep  5/5 indep, good success with delayed recall and thought organization during game. Recall of WRAP:  4/4 indep    Goal 4: Patient will complete mildly complex sustained, selective, alternating, and divided attention tasks with no more than three errors/redirections in a five minute/one task in order to allow for safe return to driving and PLOF. GOAL MET  INTERVENTIONS: DNT due to focus on other STGs. Goal 5: Patient will safely consume regular diet with thin liquids with implementation of safe swallow strategies (e.g.,upright positioning, multiple swallows) without overt s/s of airway invasion  in order to assist with meeting nutrition/hydration measures. GOAL MET  INTERVENTIONS: Patient consuming pills with thin liquids with no overt signs of difficulty, aspiration/ penetration. Goal 6: Patient will complete pharyngeal strengthening exercises (e.g., effortful swallow, denise, CTAR) x10 each with good success to improve strength of swallow function. GOAL MET  INTERVENTIONS: DNT due to focus on other STGs. Long-Term Goals:  Long Term Goals  Time Frame for Long Term Goals: 2 weeks  Goal 1: Patient will improve cognitive-linguistic skills to a min assist or greater in order to make safe return home with least amount of supervision/assistance GOAL MET    Goal 2: Patient will safely  consume regular diet with thin  liquids without overt s/s  of airway invasion  in  order to assist with meeting nutrition/hydration standards.  GOAL MET    Comprehension: 4 - Patient understands basic needs 75-90%+ of the time  Expression: 5 - Expresses basic ideas/needs only (hungry/hot/pain)  Social Interaction: 5 - Patient is appropriate with supervision/cues  Problem Solvin - Patient solves simple/routine tasks 75-90%+   Memory: 4 - Patient remembers 75-90%+ of the time     EDUCATION:  Learner: Patient and Daughter  Education:  Reviewed ST goals and Plan of Care, Reviewed recommendations for follow-up, and Education Related to [employer] [facility]ness- See above for details  Evaluation of Education: Verbalizes understanding and Family not present    ASSESSMENT/PLAN:  SUMMARY: Patient has met 6/6 STGs and 2/2 LTGs over the course of her rehab stay. Improvements with functional recall, including use of compensatory strategies, thought organization, and sustained and selective attention. Continued deficits in complex attention (divided), and higher level executive functioning, raising concern for success with finance and medication management, as well as return to driving. Patient tolerating a regular diet and thin liquids without difficulty. Reduced insight into cognitive impairments remains a concern for transition to home environment, however daughter is aware of this. Patient would benefit from skilled ST services with recommendations for OP ST at discharge, clearance from physician and/or driving evaluation prior to return to driving, and direct supervision with ADL/IADLs. Activity Tolerance:  Patient tolerance of  treatment: good. Assessment/Plan: Patient discharged from Speech Therapy at this time due to completion of IPR stay. Discharge Disposition: home with family support. Continued Speech Therapy Services recommended: Yes. Discharge Recommendations: Outpatient Speech Therapy     Chetna Agee.  [address], [address]

## 2023-02-18 NOTE — PLAN OF CARE
I have reviewed the patient's plan of care and based on this review, the patient treatment plan has been updated.    Problem: Discharge Planning  Goal: Discharge to home or other facility with appropriate resources  Outcome: Completed  Flowsheets  Taken 2/18/2023 1032 by Joanie Johns RN  Discharge to home or other facility with appropriate resources: Identify barriers to discharge with patient and caregiver  Taken 2/17/2023 2245 by Patricia Reinoso RN  Discharge to home or other facility with appropriate resources: Identify barriers to discharge with patient and caregiver  Note: Discharging to home today, with daughter to help and HH     Problem: Neurosensory - Adult  Goal: Achieves stable or improved neurological status  Outcome: Completed  Flowsheets (Taken 2/17/2023 2245 by Patricia Reinoso RN)  Achieves stable or improved neurological status: Assess for and report changes in neurological status     Problem: Skin/Tissue Integrity - Adult  Goal: Skin integrity remains intact  Outcome: Completed  Flowsheets  Taken 2/18/2023 1032 by Joanie Johns RN  Skin Integrity Remains Intact: Monitor for areas of redness and/or skin breakdown  Taken 2/18/2023 0438 by Patricia Reinoso RN  Skin Integrity Remains Intact: Monitor for areas of redness and/or skin breakdown  Taken 2/17/2023 2245 by Patricia Reinoso RN  Skin Integrity Remains Intact: Monitor for areas of redness and/or skin breakdown     Problem: Musculoskeletal - Adult  Goal: Return mobility to safest level of function  Outcome: Completed  Flowsheets  Taken 2/18/2023 1032 by Joanie Johns RN  Return Mobility to Safest Level of Function: Assess patient stability and activity tolerance for standing, transferring and ambulating with or without assistive devices  Taken 2/17/2023 2245 by Patricia Reinoso RN  Return Mobility to Safest Level of Function: Assess patient stability and activity tolerance for standing, transferring and ambulating with or without assistive devices     Problem: Safety - Adult  Goal: Free from fall injury  Outcome: Completed     Problem: Skin/Tissue Integrity  Goal: Absence of new skin breakdown  Description: 1. Monitor for areas of redness and/or skin breakdown  2. Assess vascular access sites hourly  3. Every 4-6 hours minimum:  Change oxygen saturation probe site  4. Every 4-6 hours:  If on nasal continuous positive airway pressure, respiratory therapy assess nares and determine need for appliance change or resting period.   Outcome: Completed     Problem: Pain  Goal: Verbalizes/displays adequate comfort level or baseline comfort level  Outcome: Completed  Flowsheets (Taken 2/17/2023 2245 by Coralie Crigler, RN)  Verbalizes/displays adequate comfort level or baseline comfort level: Encourage patient to monitor pain and request assistance     Problem: Chronic Conditions and Co-morbidities  Goal: Patient's chronic conditions and co-morbidity symptoms are monitored and maintained or improved  Outcome: Completed  Flowsheets  Taken 2/18/2023 1032 by Geraldo Blanchard 34 - Patient's Chronic Conditions and Co-Morbidity Symptoms are Monitored and Maintained or Improved: Monitor and assess patient's chronic conditions and comorbid symptoms for stability, deterioration, or improvement  Taken 2/17/2023 2245 by Coralie Crigler, RN  Care Plan - Patient's Chronic Conditions and Co-Morbidity Symptoms are Monitored and Maintained or Improved: Monitor and assess patient's chronic conditions and comorbid symptoms for stability, deterioration, or improvement     Problem: ABCDS Injury Assessment  Goal: Absence of physical injury  Outcome: Completed     Problem: ABCDS Injury Assessment  Goal: Absence of physical injury  Outcome: Completed     Problem: Nutrition Deficit:  Goal: Optimize nutritional status  Outcome: Completed

## 2023-02-18 NOTE — PROGRESS NOTES
[address] [id number]  Inpatient Rehabilitation  Occupational Therapy  Discharge Note  Time:  Time In: 1001  Time Out: 1029  Timed Code Treatment Minutes: 28 Minutes  Minutes: 28      Date: 2023  Patient Name: Larissa Espinoza,   Gender: female      Room: [facility]/2-  MRN: [id number]  : 1935  (81 y.o.)  Referring Practitioner: Charmaine Lovelace MD  Diagnosis: Vertigo as late effect of stroke  Additional Pertinent Hx: Larissa Espinoza is a 81 y.o. 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 MRI of brain was also performed on 2023 and revealed a small acute right posterior frontal/anterior parietal cortex infarct stroke, and mild white matter atrophy. Restrictions/Precautions:  Restrictions/Precautions: Fall Risk, General Precautions    SUBJECTIVE: Patient in room upon arrival, pleasant and excited for discharge today. Family education session scheduled, however family not present at this time, so normal session was then completed. PAIN: Denies     Vitals: Vitals not assessed per clinical judgement, see nursing flowsheet    COGNITION: Decreased Insight    ADL:   No ADL's completed this session. Chencho Silva BALANCE:  Sitting Balance:  Independent. Standing Balance: Independent. BED MOBILITY:  Not Tested    TRANSFERS:  Sit to Stand:  Independent. All surfaces  Stand to Sit: Independent. FUNCTIONAL MOBILITY:  Assistive Device: None  Assist Level: Independent. Distance:  within room, to/from gym      ADDITIONAL ACTIVITIES:  Pt completed simple IADL task of packing up room, completing task with independence and no LOB.  Good tolerance. Pt was able to dynamically reach in upper and lower places to retrieve items without concern. Endurance 6 min. Pt reports concerns about recommendation about driving evaluation. Completed direct education on the rationale of why the team is recommending driving evaluation, including numbers from [employer] and explaining pt's deficits with divided attention. Pt nodded in agreement, but continues to voice uncertain about rationale. Pt completed BUE dowel radha therex with 2# dowel radha, 10 reps 1 set in all joints and all planes to improve UE tolerance for ADLs     ASSESSMENT:  Activity Tolerance:  Patient tolerance of  treatment: Good treatment tolerance      Assessment: Gerard England has made good progress towards her goals. Gerard England has achieved an independent level with her functional transfers and basic mobility within ADLs and is able to complete her rote BADL routine with mod Fonnie  can also complete simple IADLs with mod I (microwave, light meal prep) and supervision for more dynamic tasks that require divided attn (stove top cooking) and multi-steps and cont to recommend supervision with occasionally cue'ing for high level IADLs at home. Pt demonstrates impaired thought organization, divided attention and functional cognition skills impacting patient's ability to complete self care at prior level of functioning and cont to recommend OP OT to focus on these skills. Previous to her stroke, she was indep with all self care, IADLs and driving tasks while home alone since her  passed 3 months ago. Due to patient's performance deficits, patient would greatly benefit from continued occupational therapy an OP basis and a recommendation for a driving evaluation in the future per MD orders. Discharge Recommendations: Home with OP OT, recommend driving eval   Equipment Recommendations: Other: Pt has a tub tranfer bench, toilet raiser, and grab bars.   Recommend an additional grab bar in her shower with a shower seat to use for safety. Plan:home with OP OT and driving evaluation recommendation     Patient Education  Patient Education: ADL's, IADL's, Home Exercise Program, Reviewed Prior Education, and Home Safety    Goals  Short Term Goals  Time Frame for Short Term Goals: 1 week  Short Term Goal 1: Pt will complete tub/ shower transfers with supervision to improve indep with showering at home. GOAL MET   Short Term Goal 2: Pt will complete dressing tasks with supervision and 0 vcs for orientation of clothing to improve indep with self care. GOAL MET   Short Term Goal 3: Pt will plan and sequence BADL routine with min vcs for thought organization to improve indep wtih self care routine at home. GOAL MET   Short Term Goal 4: Pt will plan and execute a simple meal prep task with SBA and min vcs for problem solving/ safety to improve indep with preparing meals for herself. GOAL MET   Short Term Goal 5: Pt will complete multiple step IADL tasks with SBA for balance and min vcs for recall to improve indep with getting groceries at home. GOAL MET   Long Term Goals  Time Frame for Long Term Goals : 2 weeks from IPR evaluation  Long Term Goal 1: Pt will complete BADL routine with modified independence and 0 vcs for safety to improve indep with self care. GOAL MET   Long Term Goal 2: Pt will complete a light IADL task with supervision to improve indep and safety within her home. GOAL MET   Long Term Goal 3: Pt will demonstrate 10 min of dynamic standing balance with mod I to improve indep with showering and standing IADL tasks. GOAL MET     Following session, patient left in safe position with all fall risk precautions in place.

## 2023-02-18 NOTE — PROGRESS NOTES
[address] [address]  INPATIENT PHYSICAL THERAPY  DAILY NOTE  [facility] - 7E-72/072-A    Time In: 1030  Time Out: 1045  Timed Code Treatment Minutes: 15 Minutes  Minutes: 15          Date: 2023  Patient Name: Scheryl Seip,  Gender:  female        MRN: [id number]  : 1935  (81 y.o.)     Referring Practitioner: Stephen Henriquez MD  Diagnosis: Vertigo as late effect of stroke  Additional Pertinent Hx: Scheryl Seip is a 81 y.o. female who presents to Emergency Department with Fall and Other (Feels like something is stuck in throat). Patient is brought in by EMS for evaluation of fall x2 today. Patient says she fell in the kitchen and again in the dining room. Lives at home by herself. Patient states she had no idea why and how she fell. MRI shows small acute infarct in the right posterior frontal/anterior parietal cortex. To  rehab . Prior Level of Function:  Lives With: Alone  Type of Home: House  Home Layout: One level, Laundry in basement  Home Access: Stairs to enter with rails  Entrance Stairs - Number of Steps: 4  Entrance Stairs - Rails: Right  Home Equipment: Walker, rolling, Cane, Rollator, [employer]   Bathroom Shower/Tub: Tub/Shower unit  Bathroom Toilet: Standard  Bathroom Equipment: Tub transfer bench, Grab bars in shower, Toilet raiser  Bathroom Accessibility: Accessible    Receives Help From: Family (check in on patient)  ADL Assistance: [address] [place] [address] Avenue: Independent  Ambulation Assistance: Independent  Transfer Assistance: Independent  Active : Yes  Additional Comments: Patient was independent with self care, driving, getting her own groceries, managing finances without AD. Pt has 3 children that can assist as needed. Restrictions/Precautions:  Restrictions/Precautions: Fall Risk, General Precautions     SUBJECTIVE: Patient in therapy gym just finished with OT. Patient agreeable to therapy. PAIN: denies    Vitals: Vitals not assessed per clinical judgement, see nursing flowsheet    OBJECTIVE:  Bed Mobility:  Not Tested    Transfers:  Sit to Stand: Modified Independent  Stand to Sit:Modified Independent    Ambulation:  Modified Independent  Distance: 30ft, 160ft  Surface: Level Tile  Device:No Device  Gait Deviations:  Decreased Gait Speed and Mild Path Deviations    Balance:    Timed up and Go Test (TUG)  10.94 seconds       Normative Reference Values  60-69 years:  8.1 seconds  70-79 years: 9.2 seconds  80-99 years: 11.3 seconds    < 10 seconds is normal, a score of > 14 seconds indicates high fall risk      TURNER BALANCE TEST    1. SITTING TO STANDIN - Able to stand without using hands and stabilize independently   2. STANDING UNSUPPORTED:  4 - Able to stand safely 2 minutes   3. SITTING UNSUPPORTED, FEET ON FLOOR:  4 - Able to sit safely and securely 2 minutes   4. STANDING TO SITTIN - Sits Safely with minimal use of hands   5. TRANSFERS:  4 - Able to transfer safely with minor use of hands   6. STANDING UNSUPPORTED WITH EYES CLOSED: 4 - Able to stand 10 seconds safely   7. STANDING UNSUPPORTED, FEET TOGETHER:   4 - Able to place feet together independently and stand 1 minute safely   8. REACHING FORWARD WITH OUTSTRETCHED ARM: 3 - Can reach forward >12.5 cm safely (5 inches)   9. PICK OBJECT UP FROM FLOOR: 4 - Able to  slipper safely and easily   10. TURN TO LOOK BEHIND/OVER LEFT AND RIGHT SHOULDERS:  4 - Looks behind from both sides and weight shifts well   11. TURN 360 DEGREES:  4 - Able to turn 360 degrees safely in 4 seconds or less   12. ALTERNATING STEPS ON STOOL:  4 - Able to stand independently and safely and complete 8 steps in 20 seconds   13. STANDING UNSUPPORTED, ONE FOOT IN FRONT (TANDEM STANCE):  0 - Loses balance while stepping or standing   14.   STAND ON ONE LE - Able to lift leg independently and hold = or > 3 seconds    TOTAL: 49/56     0 - 20 = wheelchair bound  21 - 40 = walking with assistance  41 - 56 = independent    TURNER BALANCE TEST SCORING   Score of < 45 indicates a greater risk of falling  41-56= low fall risk  21-40= medium fall risk (recommendation of walking with assist at all times)  0-20= high fall risk        Exercise:  None this session. Functional Outcome Measures: Completed   See above TUG and TURNER scores    ASSESSMENT:  Assessment: Patient progressing toward established goals. Activity Tolerance:  Patient tolerance of  treatment: good. Equipment Recommendations:Equipment Needed: No  Discharge Recommendations: Home with Outpatient PT  Plan: Current Treatment Recommendations: Strengthening, Balance training, Functional mobility training, Transfer training, Gait training, Stair training, Neuromuscular re-education, Patient/Caregiver education & training, Safety education & training, Therapeutic activities, Endurance training, Home exercise program  General Plan:  (5x/wk 60 mins)    Patient Education  Patient Education: Plan of Care, Transfers, Gait    Goals:  Patient Goals : to go home  Short Term Goals  Time Frame for Short Term Goals: 1 week  Short Term Goal 1: Pt to transfer supine <--> sit mod I to enable pt to get in/out of bed. Short Term Goal 2: Pt to transfer sit <--> stand mod I from various height surfaces for increased functional mobility. Short Term Goal 3: Pt to ambulate >100 feet without AD SBA for household ambulation. Short Term Goal 4: Pt to perform car transfer mod I to enable pt to get in/out of the car. Long Term Goals  Time Frame for Long Term Goals : 2 weeks  Long Term Goal 1: Pt to ascend/descend 15 steps while carrying laundry basket to enable pt to safely perform laundry tasks at home  Long Term Goal 2: Pt to ambulate >500 feet without AD mod I for community re-entry. Long Term Goal 3: Pt to score >41/56 on the Turner balance test to indicate low fall risk.   Long Term Goal 4: Pt to perform TUG test in <11 seconds to indicate low fall risk and improved gait speed. Following session, patient left in safe position with all fall risk precautions in place.

## 2023-02-18 NOTE — DISCHARGE SUMMARY
Physical Medicine & Rehabilitation   Discharge Summary     Patient Identification:  Ryanne Reina  : 1935  Admit date: 2023  Discharge date: 2023   Attending provider: No att. providers found        Primary care provider: EDUARDO Pappas CNP     Discharge Diagnoses:   Right posterior frontal/anterior parietal cortex small acute infarct stroke causing dizziness, vertigo, and frequent fall  Cognitive impairment  Probable dysphagia  Left hip contusion secondary to fall (symptom resolved)  New onset paroxysmal atrial fibrillation with intermittent rapid ventricular response  Enterococcus faecalis UTI  History of bilateral glaucoma requiring eye stent placement  History of osteoporosis  Bilateral knee pain due to osteoarthritis  History of anxiety disorder       Discharge Functional Status:    Physical therapy:  Bed Mobility:  Rolling to Left: Modified Independent   Rolling to Right: Modified Independent   Supine to Sit: Modified Independent  Sit to Supine: Modified Independent   *Patient performed with HOB flat and no railings    Transfers:  Sit to Stand: Modified Independent  Stand to Sit:Modified Independent  *Patient instructed in sit to stand transfers from various surfaces including: toilet, edge of bed, standard chair and recliner without AD. To/From Bed and Chair: Modified Independent  Car:Modified Independent    Ambulation:  Modified Independent  Distance: 10 feet, 50 feet, 150 feet x 2 and 20 feet x 2  Surface: Level Tile and Uneven Surface  Device: No Device  Gait Deviations:  Slow Susu, Decreased Step Length Bilaterally, Decreased Gait Speed, and Mild Path Deviations    Modified Independent  Distance: 130 ft. X1; 300 ft.  X1   Surface: Level Tile  Device: No Device  Gait Deviations:  Slow Susu, Decreased Step Length Bilaterally, Decreased Arm Swing, Decreased Gait Speed, and Mild Path Deviations     Stairs:  Stairs: 6\" steps X 1 using No Device and Modified Independent. Modified Independent  Number of Steps: 4  Height: 6\" step with One Handrail     Supervision  Number of Steps: 36  Height: 6\" step with One Handrail     Balance:  Static Standing Balance: Modified Independent  Dynamic Standing Balance: Modified Independent  *Patient stood in bathroom to don and doff clothing and perform bathroom tasks without unsteadiness or LOB  Pt. Able to  an object from floor using No Device with Modified Independent    PT Equipment Recommendations  Equipment Needed: No,   Assessment: Patient progressing toward established goals       Occupational therapy:  ADL:   EATING:Independent. Jody Rast CARE Score: 6. ORAL HYGIENE:Independent. Jody Rast CARE Score: 6. TOILETING HYGIENE:Independent. Jody Rast CARE Score: 6. SHOWERING/BATHING:Independent. Jody Rast CARE Score: 6. During bath in tub/shower per patient request to soak with increased time allowed. UPPER BODY DRESSING:Independent. Jody Rast CARE Score: 6. LOWER BODY DRESSING:Independent. Jody Rast CARE Score: 6. FOOTWEAR:Independent   . CARE Score: 6. TOILET TRANSFER: Independent. Jody Rast CARE Score: 6. TUB/ SHOWER TRANSFER: supervision. Pt asked to soak in the bottom of the tub,  pt used grab bars to sit in the bottom of tub with mod I,  needed cues to problem solve how to rise from the tub. Pt required increased time to plan and organize thoughts / items needed for shower, but did not require cues for BADL routine. OT instructed patient on cardiac event monitor and demonstrated how to change out the monitor battery. Pt returned demonstration, requiring max vcs for first trial and min to moderate vcs for second trial.  Increased time needed to problem solve plugging in battery and phone  as patient attempted to place both cords in to the phone. Recommend continued review. BALANCE:  Sitting Balance:  Independent.     Standing Balance: Independent standing for >10 minutes to make her bed with 0 vcs for safety or sequencing during IADL task. TRANSFERS:  Sit to Stand:  Independent. Stand to Sit: Independent. FUNCTIONAL MOBILITY:  Assistive Device: None  Assist Level: Independent. Distance: To and from bathroom and To and from shower room     Assistive Device: None  Assist Level: Independent. Distance: Within room     Equipment Recommendations: Other: Pt has a tub tranfer bench, toilet raiser, and grab bars. Recommend an additional grab bar in her shower with a shower seat to use for safety. Assessment: Pablo Pinedo is making good progress towards her goals, meeting 2/5 STGs since [facility] 2/10. Pt demonstrates decreased balance, impaired thought organization, divided attention and functional cognition skills impacting patient's ability to complete self care at prior level of functioning. Pt has improved to completing her ADL routine with SBA and cues for problem solving and thought organization. She requires less cues to intiate tasks and organize her thoughts. Pt demonstrates difficulty with divided attention tasks. Previous to her stroke, she was indep with all self care, IADLs and driving tasks while home alone since her  passed 3 months ago. Due to patient's performance deficits, patient would greatly benefit from continued occupational therapy for ADL remediation, endurance building, strengthening and neuro re-education to return to prior level of functioning and safe return to home environment. Speech therapy:  Directory for [facility]):  10/10 indep      Cook Book Table of Contents:  5/5 indep     Sequencing steps to make favorite meal (lasagna):  Good success in sequencing all steps to make meal. Noted mildly slow processing during task. Filling Pill Box:  Prescription 1: min cue for re-instruction  Prescription 2: indep  Prescription 3: indep  Prescription 4: mod cue for placing pill for 5 days rather than 7.    *patient with adequate problem solving skills to correct mistake. Prescription 5: indep  3/5 indep, 1/5 min cue, 1/5 mod cue  *Overall fair success within task. Patient stated that she gets distracted with talking during task which causes errors. Recommend no distraction when completing upon discharge with supervision. Appt recall (, 2:30, Dr. Chantell Farrell, check up):  4/5 indep, 1/5 MC cue     Recall of card game (trash) (~24 hours):  10 cards: Bernida Lard as wild: indep  Hemphill/Ankit as trash: indep  Cards in ascending order: indep  Goal of game: indep  5/5 indep, good success with delayed recall and thought organization during game. Recall of WRAP:   indep    Patient consuming pills with thin liquids with no overt signs of difficulty, aspiration/ penetration. Comprehension: 4 - Patient understands basic needs 75-90%+ of the time  Expression: 5 - Expresses basic ideas/needs only (hungry/hot/pain)  Social Interaction: 4 - Patient appropriate 75-90%+ of the time  Problem Solvin - Patient solves simple/routine tasks 75-90%+   Memory: 4 - Patient remembers 75-90%+ of the time      Inpatient Rehabilitation Course:   Leonardo Melissa is a 81 y.o. right-handed  female with history of bilateral eye glaucoma requiring eye stent placement, anxiety, osteoporosis, status post appendectomy, tonsillectomy and bladder suspension surgery, was admitted to inpatient rehabilitation on 2023 for intensive inpatient management of impairment & disability secondary to  recent stroke resulting fall accident on 2023. The patient participated in an aggressive multidisciplinary inpatient rehabilitation program involving 3 hours per day, 5 days per week of rehabilitation. Appropriate stroke prophylaxis was maintained throughout rehabilitation stay. Appropriate DVT prophylaxis options were considered throughout rehabilitation stay.     Dr Hilda Christianson followed during the IPR stay for medical management    The patient developed urinary urgency and dysuria on 2/14/2023. Urinalysis and urine culture were performed. The patient was placed on Macrobid for UTI. The urine culture later revealed Enterococcus faecalis sensitive to Macrobid. Her inpatient rehab course otherwise was uneventful. She tolerated the intensive inpatient rehabilitation treatment well. She gained functionally improvement in performing ADLs, transfer and ambulation with increasing independence and improving tolerance. Patient was discharged Home in Stable condition. She is referred for outpatient PT, OT and speech therapy after discharge.       Consults:   Psychology       Significant Diagnostics:   CBC with Differential:    Lab Results   Component Value Date/Time    WBC 5.4 02/10/2023 06:36 AM    RBC 3.52 02/10/2023 06:36 AM    HGB 11.5 02/10/2023 06:36 AM    HCT 33.1 02/10/2023 06:36 AM     02/10/2023 06:36 AM    MCV 94.0 02/10/2023 06:36 AM    MCH 32.7 02/10/2023 06:36 AM    MCHC 34.7 02/10/2023 06:36 AM    RDW 13.3 05/04/2017 11:40 PM    NRBC 0 02/10/2023 06:36 AM    SEGSPCT 49.1 02/10/2023 06:36 AM    MONOPCT 8.8 02/10/2023 06:36 AM    MONOSABS 0.5 02/10/2023 06:36 AM    LYMPHSABS 2.0 02/10/2023 06:36 AM    EOSABS 0.3 02/10/2023 06:36 AM    BASOSABS 0.0 02/10/2023 06:36 AM     CMP:    Lab Results   Component Value Date/Time     02/10/2023 06:36 AM    K 4.2 02/10/2023 06:36 AM     02/10/2023 06:36 AM    CO2 21 02/10/2023 06:36 AM    BUN 21 02/10/2023 06:36 AM    CREATININE 0.6 02/10/2023 06:36 AM    LABGLOM >60 02/10/2023 06:36 AM    GLUCOSE 101 02/10/2023 06:36 AM    PROT 6.4 02/10/2023 06:36 AM    LABALBU 3.9 02/10/2023 06:36 AM    CALCIUM 9.1 02/10/2023 06:36 AM    BILITOT 0.3 02/10/2023 06:36 AM    ALKPHOS 45 02/10/2023 06:36 AM    AST 21 02/10/2023 06:36 AM    ALT 28 02/10/2023 06:36 AM     BMP:    Lab Results   Component Value Date/Time     02/10/2023 06:36 AM    K 4.2 02/10/2023 06:36 AM     02/10/2023 06:36 AM    CO2 21 02/10/2023 06:36 AM    BUN 21 02/10/2023 06:36 AM    LABALBU 3.9 02/10/2023 06:36 AM    CREATININE 0.6 02/10/2023 06:36 AM    CALCIUM 9.1 02/10/2023 06:36 AM    LABGLOM >60 02/10/2023 06:36 AM    GLUCOSE 101 02/10/2023 06:36 AM        No results for input(s): POCGLU in the last 72 hours. Cholesterol Panel:   Results in Past 30 Days  Result Component Current Result Ref Range Previous Result Ref Range   Cholesterol, Total 154 (2/3/2023) 100 - 199 mg/dL Not in Time Range    HDL 48 (2/3/2023) mg/dL Not in Time Range    LDL Calculated 80 (2/3/2023) mg/dL Not in Time Range    Triglycerides 129 (2/3/2023) 0 - 199 mg/dL Not in Time Range        Urine culture (2/14/2023) :  Component 2/14/23 1110    Organism Enterococcus faecalis - (Group D) Abnormal     Urine Culture Reflex [place] count: >100,000 CFU/mL    Susceptibility  Enterococcus faecalis (1)  Antibiotic Interpretation Microscan   Method Status     Penicillin G Sensitive 4 mcg/mL BACTERIAL SUSCEPTIBILITY PANEL BY ARIC Final     ampicillin Sensitive <=2 mcg/mL BACTERIAL SUSCEPTIBILITY PANEL BY ARIC Final     nitrofurantoin Sensitive <=16 mcg/mL BACTERIAL SUSCEPTIBILITY PANEL BY ARIC Final         Patient Instructions:       Follow-up visits: See after visit summary from hospitalization         Follow up with Dr. Franklin Carrasquillo MD  Specialty: Neurology  office will call you with appointment [address] [phone]   Feb 22 OT EVAL 60 MIN with Eveline Dangelo, OT  Wednesday Feb 22, 2023 10:45  [facility]   [address]   [address]          Speech Therapy Eval with Heena Leigh, SLP  Wednesday Feb 22, 2023 11:45  [facility]   [address]   [facility]  [phone]          PT EVAL 60 MINUTES with Ben Hubbard, PT  Wednesday Feb 22, 2023 12:45 PM [place] [address] [place] [address] [address]   [facility]  [phone] Follow up with Antoni Benitez, APRN - TRAVIS  Wednesday Feb 22, 2023  Specialty: Nurse Practitioner Family  2/22 @ 2:[address] [address] [address] [facility] [address]   [place] [address]   Mar 22 Office Visit with Dr. Aleyda Jasso MD  Wednesday Mar 22, 2023 12:30 PM [address] [facility]  [address]   [place] [address]  [phone]          Follow up with Dr. Aleyda Jasso MD  Wednesday Mar 22, 2023  Specialty: Cardiology  3/22 @ 12:30 [address]   [place] [address]  [phone]   Apr 18 Follow Up Appointment with Dr. Sarahi Anderson MD  Tuesday Apr 18, 2023 2:30  [facility]  [address] [facility] [address]   [facility] [address] [phone]          Follow up with Dr. Sarahi Anderson MD  Tuesday Apr 18, 2023  Specialty: Physical Medicine and Rehab  4/18 @ 2:30 [address]       Discharge Medications:  Discharge Medication List as of 2/18/2023 12:34 PM             Details   aspirin 81 MG EC tablet Take 1 tablet by mouth daily, Disp-30 tablet, R-3Normal      rivaroxaban (XARELTO) 20 MG TABS tablet Take 1 tablet by mouth daily, Disp-30 tablet, R-3Normal      rosuvastatin (CRESTOR) 40 MG tablet Take 1 tablet by mouth nightly, Disp-30 tablet, R-3Normal      traZODone (DESYREL) 50 MG tablet Take 1 tablet by mouth nightly as needed for Sleep, Disp-30 tablet, R-1Normal      metoprolol succinate (TOPROL XL) 25 MG extended release tablet Take 1 tablet by mouth daily, Disp-30 tablet, R-3Normal      diclofenac sodium (VOLTAREN) 1 % GEL Apply 2 g topically 4 times daily as needed for Pain, Topical, 4 TIMES DAILY PRN Starting Fri 2/17/2023, Disp-150 g, R-3, Normal      bisacodyl (DULCOLAX) 5 MG EC tablet Take 1 tablet by mouth daily as needed for Constipation, Disp-30 tablet, R-2Normal      docusate sodium (COLACE, DULCOLAX) 100 MG CAPS Take 100 mg by mouth 2 times daily, Disp-60 capsule, R-3Normal      senna (SENOKOT) 8.6 MG tablet Take 1 tablet by mouth nightly as needed (constipation), Disp-30 tablet, R-1Normal      melatonin 3 MG TABS tablet Take 2 tablets by mouth at bedtime For insomnia, Disp-60 tablet, R-3Normal      nitrofurantoin, macrocrystal-monohydrate, (MACROBID) 100 MG capsule Take 1 capsule by mouth in the morning and at bedtime for 3 doses, Disp-3 capsule, R-0Normal                Details   polyvinyl alcohol (LIQUIFILM TEARS) 1.4 % ophthalmic solution 1 drop as neededHistorical Med      ibuprofen (ADVIL;MOTRIN) 400 MG tablet Take 1.5 tablets by mouth every 6 hours as needed for Pain, Disp-12 tablet, R-0Print      vitamin D (CHOLECALCIFEROL) 400 UNITS TABS tablet Take 400 Units by mouth daily. clonazePAM (KLONOPIN) 0.5 MG tablet Take 0.5 mg by mouth 2 times daily as needed. fluticasone (FLONASE) 50 MCG/ACT nasal spray 1 spray by Nasal route daily. Glucosamine Sulfate 750 MG TABS Take 1 tablet by mouth daily. therapeutic multivitamin-minerals (THERAGRAN-M) tablet Take 1 tablet by mouth daily. fish oil-omega-3 fatty acids 1000 MG capsule Take 2 g by mouth daily. magnesium 30 MG tablet Take 30 mg by mouth 2 times daily. polyethylene glycol (GLYCOLAX) packet Take 17 g by mouth daily as needed. Controlled substances monitoring: not applicable.      45 minutes spent preparing the patient for discharge      Fawad Carmona MD

## 2023-02-18 NOTE — PROGRESS NOTES
Patient: Grisel Cueva  Unit/Bed: 6U-88/022-P  YOB: 1935  MRN: [id number] Acct: [de-identified]   Admitting Diagnosis: Vertigo as late effect of stroke [I69.398, R42]  Admit Date:  2/9/2023  Hospital Day: 8    Assessment:     Principal Problem:    Vertigo as late effect of stroke  Active Problems:    Cerebral infarct ([address]    Pure hypercholesterolemia    Atrial fibrillation, transient ([address]    Primary insomnia    Anxiety    Impaired cognition  Resolved Problems:    * No resolved hospital problems. *      Plan:     Medically stable for discharge  tomorrow        Subjective:     Patient has no complaint of CP or SOB. .   Medication side effects: none    Scheduled Meds:   nitrofurantoin (macrocrystal-monohydrate)  100 mg Oral BID    glucosamine sulfate  1 tablet Oral BID    clonazePAM  1 mg Oral Nightly    fluticasone  1 spray Nasal Daily    aspirin  81 mg Oral Daily    docusate sodium  100 mg Oral BID    metoprolol succinate  25 mg Oral Daily    multivitamin  1 tablet Oral Daily    rivaroxaban  20 mg Oral Daily    rosuvastatin  40 mg Oral Nightly    traZODone  50 mg Oral Nightly    Vitamin D  500 Units Oral Daily    melatonin  6 mg Oral Nightly     Continuous Infusions:  PRN Meds:diclofenac sodium, ondansetron, acetaminophen, magnesium hydroxide, senna, polyethylene glycol, bisacodyl, polyvinyl alcohol    Review of Systems  Pertinent items are noted in HPI. Objective:     No data found. I/O last 3 completed shifts: In: 920 [P.O.:920]  Out: -   No intake/output data recorded.     BP (!) 118/58   Pulse 68   Temp 97.5 °F (36.4 °C) (Oral)   Resp 16   Ht 4' 10\" (1.473 m)   Wt 123 lb 9.6 oz (56.1 kg)   SpO2 98%   BMI 25.83 kg/m²     General appearance: alert, appears stated age, and cooperative  Head: Normocephalic, without obvious abnormality, atraumatic  Lungs: clear to auscultation bilaterally  Heart: regular rate and rhythm, S1, S2 normal, no murmur, click, rub or gallop  Abdomen: soft, non-tender; bowel sounds normal; no masses,  no organomegaly  Extremities: extremities normal, atraumatic, no cyanosis or edema  Skin: Skin color, texture, turgor normal. No rashes or lesions  Neurologic: Grossly normal    Electronically signed by Xochitl Ramos MD on 2/17/2023 at 8:47 PM

## 2023-02-20 NOTE — PROGRESS NOTES
[address] [facility]  Therapy Contact Note      Date: 2023  Patient Name: Zackary Garnett        MRN: [id number]    Account Number: [de-identified]  : 1935  (81 y.o.)  Gender: female       Set up [employer]) for transportation on 23 1030.  is 1000. Patient notified of arrangement.     Paris [employer], Rehab Tech

## 2023-02-21 NOTE — PROGRESS NOTES
Physician Progress Note      PATIENT:               Deepak Trivedi  CSN #:                  [id number]  :                       1935  ADMIT DATE:       2023 11:30 AM  [facility] [place] DATE:        2023 3:37 PM  RESPONDING  PROVIDER #:        Lakesha Anna MD          QUERY TEXT:    Dr Jacquelyn Ames,    Patient admitted with CVA, noted to have paroxysmal atrial fibrillation and   prescribed Xarelto. Chads score of 5 . If possible, please document in   progress notes and discharge summary if you are evaluating and/or treating any   of the following: The medical record reflects the following:  Risk Factors: AFIB  Clinical Indicators: [id number] score is 5, anticoagulation is recommended   for stroke prevention  Treatment: Xarelto  Options provided:  -- Secondary hypercoagulable state in a patient with atrial fibrillation  -- No Secondary hypercoagulable state in a patient with atrial fibrillation  -- Other - I will add my own diagnosis  -- Disagree - Not applicable / Not valid  -- Disagree - Clinically unable to determine / Unknown  -- Refer to Clinical Documentation Reviewer    PROVIDER RESPONSE TEXT:    This patient does not have secondary hypercoagulable state in a patient with   atrial fibrillation.     Query created by: Noa Landers on 2023 11:49 AM      Electronically signed by:  Lakesha Anna MD 2023 5:40 PM

## 2023-02-22 ENCOUNTER — HOSPITAL ENCOUNTER (OUTPATIENT)
Dept: OCCUPATIONAL THERAPY | Age: 88
Setting detail: THERAPIES SERIES
Discharge: HOME | End: 2023-02-22
Payer: MEDICARE

## 2023-02-22 ENCOUNTER — HOSPITAL ENCOUNTER (OUTPATIENT)
Dept: PHYSICAL THERAPY | Age: 88
Setting detail: THERAPIES SERIES
Discharge: HOME | End: 2023-02-22
Payer: MEDICARE

## 2023-02-22 ENCOUNTER — HOSPITAL ENCOUNTER (OUTPATIENT)
Dept: SPEECH THERAPY | Age: 88
Setting detail: THERAPIES SERIES
Discharge: HOME | End: 2023-02-22
Payer: MEDICARE

## 2023-02-22 PROCEDURE — 97110 THERAPEUTIC EXERCISES: CPT

## 2023-02-22 PROCEDURE — 92523 SPEECH SOUND LANG COMPREHEN: CPT

## 2023-02-22 PROCEDURE — 97166 OT EVAL MOD COMPLEX 45 MIN: CPT

## 2023-02-22 PROCEDURE — 97162 PT EVAL MOD COMPLEX 30 MIN: CPT

## 2023-02-22 NOTE — PROGRESS NOTES
** PLEASE SIGN, DATE AND TIME CERTIFICATION BELOW AND RETURN TO [facility] OUTPATIENT REHABILITATION (FAX #: [phone]). ATTEST/CO-SIGN IF ACCESSING VIA [employer]. THANK YOU.**    I certify that I have examined the patient below and determined that Physical Medicine and Rehabilitation service is necessary and that I approve the established plan of care for up to 90 days or as specifically noted. Attestation, signature or co-signature of physician indicates approval of certification requirements.    ________________________ ____________ __________  Physician Signature   Date   Time  [address] [facility]  PHYSICAL THERAPY  [x] EVALUATION  [] DAILY NOTE (LAND) [] DAILY NOTE (AQUATIC ) [] PROGRESS NOTE [] DISCHARGE NOTE    [x] [address] [facility]   [] [place] [address]    [] [address] [facility]   [] Noble Amaya    Date: 2023  Patient Name:  Tosha Berger  : 1935  MRN: [id number]  CSN: [id number]    Referring Practitioner EDUARDO Ulloa CNP   Diagnosis Vertigo as late effect of stroke [I69.398, R42]    Treatment Diagnosis Late effect CVA with decreased balance, episodes of vertigo   Date of Evaluation 23    Additional Pertinent History Anxiety, bilateral eyes glaucoma, osteoporosis, history of admission to [facility] s/p 2 falls, MRI revealed small acute infarct in right posterior frontal/anterior parietal cortex. IP rehab PMR admission on 2023.        Functional Outcome Measure Used TUG, TURNER   Functional Outcome Score TUG 11 seconds, Turner 51/56  (23)       Insurance: Primary: Payor: Bobby Manjarrez /  /  / ,   Secondary:    Authorization Information: PRE CERTIFICATION REQUIRED: NO  INSURANCE THERAPY BENEFIT:  UNLIMITED VISITS BASED ON MEDICAL NECESSITY   AQUATIC THERAPY COVERED: YES  MODALITIES COVERED:  YES, YES MASSAGE  TELEHEALTH COVERED: NA  REFERENCE NUMBER: [id number]   Visit # 1, 1/10 for progress note   Visits Allowed: Unlimited based on medical necessity   Recertification Date: 5/63/3715   Physician Follow-Up: 4/18/2023-Dr. Heidy Mcfarland   Physician Orders: PT evaluate and treat vertigo as late effect CVA   History of Present Illness: Inpatient Rehabilitation Course:   Edmond Mcburney is a 81 y.o. right-handed  female with history of bilateral eye glaucoma requiring eye stent placement, anxiety, osteoporosis, status post appendectomy, tonsillectomy and bladder suspension surgery, was admitted to inpatient rehabilitation on 2/9/2023 for intensive inpatient management of impairment & disability secondary to  recent stroke resulting fall accident on 2/2/2023. Additional Pertinent Hx: Edmond Mcburney is a 81 y.o. female who presents to Emergency Department with Fall and Other (Feels like something is stuck in throat). Patient is brought in by EMS for evaluation of fall x2 today. Patient says she fell in the kitchen and again in the dining room. Lives at home by herself. Patient states she had no idea why and how she fell. MRI shows small acute infarct in the right posterior frontal/anterior parietal cortex. To  rehab 2/9. SUBJECTIVE: Patient reports that her endurance and strength has been less since being in the hospital and following her stroke. Notes maybe slightly decreased strength Left arm/leg. Patient reports that she was just discharged on Saturday, 2/18/2023. I have 15 steps to my basement area where her laundry is and along with recreational items in basement like ping pong table. Patient did go downstairs to basement 1x to get blanket in laundry and was careful. Patient goals from PT is to improve her walking, balance. Note difficulty with getting out of bed in morning and has had vertigo (spinning) so has to move slower. Notes decreased balance when in dark room. Reports she was treated for vertigo in the hospital due to severe spinning/vertigo at time of testing.  Has had 3 incidences of the vertigo. Last episode of vertigo was on Saturday or Sunday. Patient reports getting along okay at home alone but grieving recent death of 2nd  who passed in November. Social/Functional History and Current Status:  Medications and Allergies have been reviewed and are listed on Medical History Questionnaire. Grisel Cueva lives alone in a single story home with stairs and a handrail to enter. 4 steps    Task Previous Current   ADLs  Independent Independent   IADL's Independent Modified Independent has roller walker, cane rollator, manual w/c, tub transfer bench, grab bars in shower, toilet raiser. Ambulation Independent Independent   Transfers [address] enjoys singing in choir, enjoys her grandchildren,riding bike, walking 1 mile daily depending on weather. Independent   Community Integration Independent Independent   Driving Active  Does not drive   Work Retired  Retired     OBJECTIVE:    Pain: 0/10 pain level. Observation Patient pleasant in session. Note independent with ambulation carrying coat and purse into clinic. Posture Mild forward head and shoulders. Range of Motion WNLS, LEs,  decreased thoracic extension with mild kyphosis,    Strength MMT LEs hip flexion, knee flexion and extesnsion 5/5    Coordination Intact/normal   Sensation intact   Bed Mobility Independent. At times when she rolls to left reports of spinning/vertigo. Moves slow and careful with position changes. Transfers independent   Ambulation Patient ambulates independently in clinic without assistance. Note patient able to carry items without loss of balance. Note shortened step length. Stairs Patient negotiates  4 steps  with single handrail support independently   Balance Adam BAlance 51/56, tandem stance 15 seconds r/l, l/r, single leg stance 4 seconds right/left.     Special Tests TUG test 12 sec, 11 sec, 11 sec (average 11 sec) , Adam Balance Scale 51/56. TREATMENT   Precautions: S/p CVA with vertigo present   Pain: 0/10    \"X in shaded column indicates activity completed today    *\" next to exercise/intervention indicates progression   Modalities Parameters/  Location  Notes                     Manual Therapy Time/Technique  Notes                     Exercise/  Intervention   Notes   Functional reach test  10 inches  x    TUG test  12, 11, 11 seconds  x    Adam Balance  51/56  x           Tandem stance r/l, l/r 15 seconds  x    SLS r,l 4 seconds  x    Gait rapid and slow walking in clinic independent  x No path deviation. No loss of balance                                 Specific Interventions Next Treatment: postural ex , posterior shoulder girdle, balance retraining, gait training, dynamic gait activities. Oculomotor testing, BPPV screening. Possible positive on left due to rolling to left causing symptoms of vertigo    Activity/Treatment Tolerance:  [x]  Patient tolerated treatment well  []  Patient limited by fatigue  []  Patient limited by pain   []  Patient limited by medical complications  []  Other:     Assessment: Patient referred to PT late effect CVA with vertigo. Patient demonstrates mild decreased balance with Adam balance scale testing 51/56., Functional reach WNLS, TUG test WNLS. Patient demonstrated  4/5 to 5/5 functional strength at LEs without deficits noted. Would like to screen for possible BPPV with oculomotor screening, and BPPV testing with Anthony Hallpike and Roll test. Patient reports she had this completed in hospital and created significant spinning/vertigo with the testing on left side. Will follow in PT for strengthening posterior shoulder girdle, increasing balance, and improving dynamic gait/balance activities 1-2x per week.    Body Structures/Functions/Activity Limitations: impaired activity tolerance, impaired balance, and abnormal posture  Prognosis: good    GOALS:  Patient Goal: To improve endurance, be able to walk further and improve balance. Resume a walking program,    Short Term Goals:  Time Frame: 8 weeks  TUG test from 11 seconds to 9-10 seconds. Adam Balance Scale 51/56 to 53/56  Patient to demonstrate tolerance of 45 minute session with 1-2 rest breaks with minimal perceived exertion from 4 to 2. Patient to demonstrate ability to negotiate 16 stair steps from single rail use in order to carry object up/down stairsteps. 5. Patient to demonstrate normal oculomotor testing and normal testing for [facility] left and right. (TO BE FURTHER ASSESSED)    Long Term Goals:  Time Frame: 12 weeks  Adam Balance Scale from 53/56 to 55/56. Patient to demonstrate progression in an HEP with independence in the ex with increased balance, statically/dynamically and increased endurance. Patient Education:   [x]  HEP/Education Completed: Plan of Care, Goals,   Medbridge Access Code:  []  No new Education completed  []  Reviewed Prior HEP      []  Patient verbalized and/or demonstrated understanding of education provided. []  Patient unable to verbalize and/or demonstrate understanding of education provided. Will continue education. [x]  Barriers to learning: none    PLAN:  Treatment Recommendations: Strengthening, Balance Training, Functional Mobility Training, Endurance Training, Gait Training, Neuromuscular Re-education, Home Exercise Program, Patient Education, and Vestibular Rehabilitation    [x]  Plan of care initiated. Plan to see patient 2 times per week for 8 weeks to address the treatment planned outlined above.   []  Continue with current plan of care  []  Modify plan of care as follows:    []  Hold pending physician visit  []  Discharge    Time In 1250   Time Out 1335   Timed Code Minutes: 15 min   Total Treatment Time: 45 min       Electronically Signed by: Laurel Hernández, PT

## 2023-02-22 NOTE — PROGRESS NOTES
** PLEASE SIGN, DATE AND TIME CERTIFICATION BELOW AND RETURN TO [facility] OUTPATIENT REHABILITATION (FAX #: [phone]). ATTEST/CO-SIGN IF ACCESSING VIA [employer]. THANK YOU.**    I certify that I have examined the patient below and determined that Physical Medicine and Rehabilitation service is necessary and that I approve the established plan of care for up to 90 days or as specifically noted. Attestation, signature or co-signature of physician indicates approval of certification requirements.    ________________________ ____________ __________  Physician Signature   Date   Time   [address] THERAPY  [x] EVALUATION  [] DAILY NOTE (LAND) [] DAILY NOTE (AQUATIC ) [] PROGRESS NOTE [] DISCHARGE NOTE    [x] [facility]   [] [facility] 90    [] [facility] [facility]   [] Tiffany Hernandez    Date: 2023  Patient Name:  Zita Willingham  : 1935  MRN: [id number]  CSN: [id number]    Referring Practitioner EDUARDO Harper CNP   Diagnosis Vertigo as late effect of stroke [I69.398, R42]    Treatment Diagnosis Decreased strength and ADL tasks   Date of Evaluation 23      Functional Outcome Measure Used UEFI   Functional Outcome Score 53/80 (23)       Insurance: Primary: Payor: [address] [place] [address] /  /  / ,   Secondary:    Authorization Information: PRE CERTIFICATION REQUIRED: NO  INSURANCE THERAPY BENEFIT:  UNLIMITED VISITS BASED ON MEDICAL NECESSITY   AQUATIC THERAPY COVERED: YES  MODALITIES COVERED:  YES, YES MASSAGE  TELEHEALTH COVERED: NA  REFERENCE NUMBER: [id number]   Visit # 1, 1/10 for progress note   Visits Allowed: unlimited   Recertification Date: 16   Physician Follow-Up: 23 with Dr. Ben Pacheco   Physician Orders: Irma Copeland and treat, driving eval    Pertinent History: On 23, pt stating she collapsed at home with no initial symptoms.  Pt stating after she got to the hospital, she started having increased dizziness. MRI + showing   Small acute infarct in the right posterior frontal/anterior parietal cortex   2. Mild atrophy and probable ischemic changes in the white matter. 3. Mild inflammatory changes in ethmoid air cells bilaterally and left mastoid tip. Pt transitioned to inpatient rehab on 2/9/-2/18/23. She was discharged home alone with family checking in approx 1x day. SUBJECTIVE: Pt stating she has been doing pretty well at home with family checking in on her throughout day. Pt stating she didn't not sleep well last night only having about 6 hours of slep when she is use to getting 8 hours. Social/Functional History:  Medications and Allergies have been reviewed and are listed on the [place] lives alone in a single story home with stairs and a handrail to enter. .    Task Prior Level of Function  (current level of function addressed below)   ADLs  Independent including homemaking tasks    Ambulation Independent   Transfers Independent   Hobbies Singing with the [employer]   Driving Active    Work Retired     OBJECTIVE:  Hand Dominance right handed       Emotional  Pt reporting her spouse passed away in 11/22 and she is still grieving and has a difficult time at times. Strength Pt verbalizing she isn't as strong that she was and has noticed decreased endurance as well which pt relates to sitting around in the hospital with little activity    Cognition  Pt slow to respond at times with seemingly increased confusion as times requiring repitition of directions. Pt seeminlgy to have increased anxiousness as well. Vision  Pt reporting she wears her glasses at all times which she has had for approx 1 year. Pt reporting noted increased \"blurriness\" since her CVA. Pt with h/o glaucoma and has a stent in each eye. ADL's Pt stating she is currently able to complete all her ADL tasks including showering/bathing with no difficulty.  Pt stating she has been completing simple meals using microwave mostly which she did prior to CVA    Bed Mobility  No difficulties   Transfers No difficulties, pt does have grab bars around her toilet to assist with transfers   Balance Pt stating when she first gets up in the morning her balance is a little off but improves throughout day. Sensation Right Upper Extremity: Intact. Coordination Pt stating picking up small items is a challenge            Bilateral UE RANGE OF MOTION    AROM PROM COMMENTS         Shoulder Flexion      Shoulder Extension      Shoulder Abduction      Shoulder Adduction      Shoulder External Rotation      Shoulder Internal Rotation      Shoulder Range of Motion is [facility] [place]  [x]      Elbow Flexion      Elbow Extension      Forearm Pronation      Forearm Supination      Elbow Range of Motion is [facility] [place]  [x]      Wrist Flexion      Wrist Extension      Wrist Radial Deviation      Wrist Ulnar Deviation      Wrist Range of Motion is [facility]  [x]   If no measurement is recorded, no formal assessment was completed for that motion. LEFT UE STRENGTH    Strength Rating Comments   Shoulder Flexion 4-/5 Right UE WFL at all joints   Shoulder Extension     Shoulder Abduction 4-/5    Shoulder Adduction     Shoulder External Rotation     Shoulder Internal Rotation     []  Shoulder Strength is grossly WFL. Elbow Flexion 4/5    Elbow Extension 4/5    Forearm Pronation     Forearm Supination     [] Elbow Strength is grossly WFL. Wrist Flexion     Wrist Extension     Wrist Radial Deviation     Wrist Ulnar Deviation     [x]  Wrist Strength is grossly WFL. Right Left    Strength Settin 39   Pinch Strength Tip Pinch:      Lateral Pinch     9 hole peg test  26 sec 36 sec   If no ratings are recorded, no formal assessment was completed.      TREATMENT   Precautions: fall risk, decreased vision     Pain: 0/10     X in shaded column indicates Activity Completed Today   Modalities Parameters/  Location  Notes/Comments                     Manual Therapy Time/  Technique  Notes/Comments                     Exercises   Sets/  Sec Reps  Notes/Comments   Orange theraband  1 5 x Chest press, flexion, horiz abd/add, bicep curls, tricep ext                                              Activities Time    Notes/Comments   Dynavision  2 trials x 41 hits, 51 hits                  Specific Interventions Next Treatment: UB strengthening/endurance tasks, standing balance, core strength, homemaking tasks such as baking requiring multiple steps, eventual driving eval.     Activity/Treatment Tolerance:  [x]  Patient tolerated treatment well  []  Patient limited by fatigue  []  Patient limited by pain   []  Patient limited by other medical complications  []  Other:     Assessment: Pt presented to [facility] on 2/2/23 after collapsing at home on 2 occasions. Upon presenting to ED pt with then increased vertigo. AFter further testing including MRI, pt noted to be + for CVA. Pt demo deficits in UB and core strength/endurance, decreased balance during ADL tasks and homemaking tasks which she is not completing at home at this time and her ability to drive.  Pt requiring further skilled OT SErvices to improve her strength and endurance as well as balance with eventual completion of drivign eval for increased indep at home   Areas for Improvement: impaired activity tolerance, impaired balance, impaired cognition, impaired endurance, impaired safety awareness, and impaired strength  Prognosis: good    GOALS:  Patient Goal: get stronger and be indep including with driving     Short Term Goals:  Time Frame: 10 visits   Pt to complete simple kitchen task including completing baking tasks with multiple steps with S and requiring no cues for safety or direction to tasks   Pt to demo dynamic standing balance > 15 min with no UE support while reaching outside base of [facility] completing simple homemaking tasks such a sweeping/vacuuming with no difficulty, need for rest break for LOB during   Pt to increase UB strength of left UE especially to 4+/5 to ease of carrying and lifting objects needed during simple homemaking tasks   Pt to be indep with completing UE strenghtening HEP 2x day with use of handouts for increased strength and endurance for ADLs and homemaking tasks     Long Term Goals:  Time Frame: 8   Pt to complete all ADL tasks and homemaking tasks indep with no LOB and good safety awareness       Patient Education:   [x]  HEP/Education Completed: Plan of Care, Goals,   Orange theraband ex in chest press, shoulder flexion, horiz abd/add, tricep ext and bicep curls   []  No new Education completed  []  Reviewed Prior HEP      [x]  Patient verbalized and/or demonstrated understanding of education provided. []  Patient unable to verbalize and/or demonstrate understanding of education provided. Will continue education. [x]  Barriers to learning: none     PLAN:  Treatment Recommendations: Strengthening, Balance Training, Endurance Training, Home Exercise Program, Patient Education, Safety Education and Training, Self-Care Education and Training, and driving eval eventually     [x]  Plan of care initiated. Plan to see patient 2 times per week for 8 weeks to address the treatment planned outlined above.   []  Continue with current plan of care  []  Modify plan of care as follows:    []  Hold pending physician visit  []  Discharge    Time In 1045   Time Out 1145   Timed Code Minutes: 20 min   Total Treatment Time: 40 min       Electronically Signed by: Linda Johnson, OT

## 2023-02-22 NOTE — PROGRESS NOTES
** PLEASE SIGN, DATE AND TIME CERTIFICATION BELOW AND RETURN TO [facility] OUTPATIENT REHABILITATION (FAX #: [phone]). ATTEST/CO-SIGN IF ACCESSING VIA [employer]NA[employer]play. THANK YOU.**    I certify that I have examined the patient below and determined that Physical Medicine and Rehabilitation service is necessary and that I approve the established plan of care for up to 90 days or as specifically noted. Attestation, signature or co-signature of physician indicates approval of certification requirements.    ________________________ ____________ __________  Physician Signature   Date   Time   10[facility] THERAPY  [x] SPEECH LANGUAGE COGNITIVE EVALUATION  [] DAILY NOTE   [] PROGRESS NOTE [] DISCHARGE NOTE    [x] [facility]   [] [facility]    [] [facility]   [] [facility]    Date: 2023  Patient Name:  Franky Redding  : 1935  MRN: [id number]  CSN: [id number]    Referring Practitioner Marcso Millan, APRN - CNP   Diagnosis Vertigo as late effect of stroke [I69.398, R42]    Treatment Diagnosis Cognitive deficits   Date of Evaluation 23      Functional Outcome Measure Used MOCA    Functional Outcome Score 23, (23)       Insurance: Primary: Payor: Asia Augustine /  /  / ,   Secondary:    Authorization Information: No precert required    Visit # 1, 1/10 for progress note   Visits Allowed: Unlimited visits based on medical necessity    Recertification Date:    Physician Follow-Up: 3/28/23 with Dr. Courtney Perez, 2023   Physician Orders: Sandra Inman and treat   Pertinent History: Patient was admitted to [facility] on 23 after 2 falls at home. Per chart review \"she began feeling something stuck in her throat last night, 2023. Today, 2023, while she was walking in kitchen to try to get something to help him the throat discomfort sensation, she suddenly fell for no reason.   She denies having weakness, pain, syncope or fainting prior to the fall. She struggled to get up but fell again after few step into her dining room. At that time she felt the need for bowel movement so she crawled to bathroom and had diarrhea like bowel movement. She did not call her daughter who called 911. The patient was sent to [facility] for evaluation on 2./2/2023. She was found to have tenderness at the her left hip area. She also complains of feeling dizzy with room spinning sensation when she says suddenly sitting up from supine position. Her blood tests and urine test showed no significant abnormality. X-ray of left hip and chest revealed no acute abnormality. CT of the head without contrast done on 2/2/2023 showed small lacunar infarction at the right insula. CT of cervical spine done on 2/2/2023 was reported to show no acute process. CTA of head and neck done on 2/2/2023 show no significant hemodynamic stenosis in bilateral common and internal carotid arteries, and hypoplastic P1 segment of the right posterior cerebral artery. MRI of brain was also performed on 2/2/2023 and revealed a small acute right posterior frontal/anterior parietal cortex infarct stroke, and mild white matter atrophy. \" She received skilled speech therapy during her hospital stay to target visual/verbal reasoning, thought organization/working memory, problem solving, executive function tasks, and pharyngeal strengthening exercises. Patient did meet her dysphagia goals. She denies any difficulty with swallowing at this time. Does notice to be having more word-finding difficulties. MOCA 7.2 completed 2/10/23 : 26/30. MOCA 7.1 completed 2/3/23 : 22/30. SUBJECTIVE: Patient arrived to evaluation alone. Very cooperative and pleasant. Social/Functional History:  Electronic Medical Record reviewed and up to date.      Concha Simms lives alone in a single story home with a basement where she does laundry with stairs and a handrail to enter. .    Task Prior Level of Function Current Level of Function   ADLs  Independent Independent. Daughter is assisting in some cleaning tasks    Finance Management Independent Independent   Medication Management Independent. Just left her supplements in the bottle and would take daily as she did not have many prescribed medications. Modified Independent. Daughter who is an RN organizes her medicines into a pill box for patient to take. Educational Level Bachelor degree in Education  Bachelor degree in Education    Driving Active  Does not drive. An order for a driving evaluation is in the system. Per ST discussion with OT, plan to schedule this evaluation when patient is demonstrating improved skills within attention, memory and vision. Work Retired Retired. Would like to volunteer for reading remediation at [employer] in [employer], gardening, mowing the [employer] in [employer], gardening, mowing the lawn   Vision Status Corrected Corrected. Hearing Status WNL WNL   Diet Regular diet with thin liquids  Regular diet with thin liquids        ORAL MOTOR:  Oral-Motor Assessment WNL    SPEECH / VOICE:  Speech and Voice appear to be grossly intact for basic and complex daily communication    LANGUAGE:  Receptive:  Receptive language skills appear to be grossly intact for basic and complex daily communication. Expressive:  Expressive language skills appear to be grossly intact for basic and complex daily communication. COGNITION:  [place] Cognitive Assessment [facility]) version 7.3 completed. Pt scored 23/30. Normal is greater than or equal to 26/30.     Visuospatial/Executive 5/5   Naming 3/3   Memory   Immediate Trial 1: 5/5, Trial 2: 5/5   Short-Term 3/5 indep, 2/5 category cue   Attention   Digit Repetition 0/2   Sustained/Selective 1/1   Mental Math 1/3 **slow processing speed   Language   Sentence Repetition 2/2   Fluency Naming 16 items in 60 seconds   Abstraction 1/2   Orientation 6/6   Total: 23/30     **During completion of evaluation, patient able to hear a conversation with Katia Engle Dr colleague and another patient outside the door. Patient stating, Catherine Blind is that, it is very distracting\". Suspect at least mild divided attention deficits. Unable to complete informal higher level assessments this date due to time constraints. SWALLOWING:  Patient reports she is tolerating a regular diet with thin liquids without overt difficulty. Per chart review, patient's goals for tolerance of regular diet with thin liquids and pharyngeal strengthening exercises were met prior to discharge from therapy. Formal swallowing evaluation deferred this date, will continue to monitor and will evaluate should it become clinically indicated. IMPRESSIONS: Patient presents with a mild cognitive impairment as evidenced by deficits within delayed recall, working memory, sustained attention, math computation and verbal reasoning. Patient's score on the [address] was 3 points lower than previous evaluation in inpatient rehabilitation which indicates continued cognitive deficits. Upon discussing score and areas in need of intervention, patient demonstrated poor insight towards deficits. Education provided on the typical cognitive deficits observed within aging and the deficits observed this date and how these are specifically resulting from recent stroke. Patient lives alone in her home and manages her finances independently. Recommend patient receive supervision during financial tasks at home by a trusted family member or friend. Additionally, patient does currently have an order for completion of a driving evaluation. Patient expressed interest in completing this evaluation as soon as possible. ST explained to patient that at this time, it is recommended patient refrain from driving.  She would greatly benefit from skilled speech therapy and occupational therapy prior to address cognitive and visual deficits prior to completing driving evaluation. **Following discussion of scores and rationale for therapy, patient asked ST to view her heart monitor. Patient reports, Brian Cross gave me this whole book and it doesn't help at all to know how to work it\". Patient was not wearing her heart monitor in session and states the adhesive strip fell off earlier and she was unable to turn the monitor on. ST assisted patient and answered her x3 specific questions regarding the monitor. To note, questions asked by patient were quickly found within the manual provided by ST to answer without difficulty. Did strongly encourage patient to call her cardiologist's office to discuss her concerns. Assessment: Progressing towards goals  Areas for Improvement: Impaired cognition  Prognosis: good  Specific Interventions Next Treatment: delayed recall, working memory, attention, verbal/visual reasoning, executive function tasks     Activity/Treatment Tolerance:  [x]  Patient tolerated treatment well  []  Patient limited by fatigue  []  Patient limited by pain   []  Patient limited by other medical complications  []  Other:     Patient Education:   [x]  HEP/Education Completed: Plan of Care, Goals, Refrain from driving, Have family member assist with finances, Call cardiologist's office in regards to questions related to heart monitor  []  No new Education completed  [x]  Reviewed Prior HEP      [x]  Patient verbalized and/or demonstrated understanding of education provided. [x]  Patient unable to verbalize and/or demonstrate understanding of education provided. Will continue education.   [x]  Barriers to learning: insight towards deficits     GOALS:  Patient Goal: Return to PLOF    Short Term Goals:  Time Frame for Short-Term Goals: 4 weeks    SHORT TERM GOAL #1: Patient will complete delayed recall tasks of up to 5 units of information given a 10 minute delay with 80% accuracy without cues to promote retention of novel information. INTERVENTIONS: to be completed in subsequent sessions    SHORT TERM GOAL #2: Patient will complete working memory tasks with 80% accuracy without cues to improve mental flexibility. INTERVENTIONS: to be completed in subsequent sessions    SHORT TERM GOAL #3: Patient will complete attention tasks (sustained/selective/alternating/divided) with no more more than 1 error within a 5 minute task to promote potential readiness for completion of driving evaluation. INTERVENTIONS: to be completed in subsequent sessions    SHORT TERM GOAL #4: Patient will complete verbal/visual reasoning activities with 80% accuracy given min cues to support safe return to independent completion of ADLs/iADLs. INTERVENTIONS: to be completed in subsequent sessions    SHORT TERM GOAL #5: Patient will complete executive function tasks (math computation, time management, scheduling, finances, medications) with 80% accuracy given min cues to allow for safe return to independent completion of these tasks in home environment. INTERVENTIONS: to be completed in subsequent sessions    Long Term Goals:  Time Frame for Long-Term Goals: 8 weeks    LONG-TERM GOAL #1: Patient will improve score on the [address] [place] [address] by +3 points to indicate cognitive skills to be [facility] to indicate potential readiness for completion of a driving evaluation and/or return to independent completion of ADLs/iADLs. INTERVENTIONS: to be completed in subsequent sessions    PLAN:  Treatment Recommendations:     [x]  Plan of care initiated. Plan to see patient 2 times per week for 8 weeks to address the treatment planned outlined above.   []  Continue with current plan of care  []  Modify plan of care as follows:    []  Hold pending physician visit  []  Discharge    Time In 1145   Time Out 1247   Timed Code Minutes: 0 min   Total Treatment Time: 58 min     [address] [facility],[address] [facility], .[address]. [id number] [place], [place] [id number]

## 2023-02-27 NOTE — PROGRESS NOTES
[address] [place] [address]  Therapy Contact Note      Date: 2023  Patient Name: Erin Gore        MRN: [id number]    Account Number: [de-identified]  : 1935  (81 y.o.)  Gender: female       Set up [address] for the following dates:     23 1230  3/2/23  1430  3/7/23 1315  3/8/23 1315  3/14/23 1315  3/17/23 1345  3/21/23 1315  3/24/23 1315  3/28/23 1315  3/30/23 1315  23 1315  23 1315  23 1315  23 1315  23 1315  23 1315    Patient's daughter notified of this set up and that [facility]  [address] will come 30 minutes before listed appointment times.     Sylvester Cortez, Rehab Tech

## 2023-02-28 ENCOUNTER — HOSPITAL ENCOUNTER (OUTPATIENT)
Dept: PHYSICAL THERAPY | Age: 88
Setting detail: THERAPIES SERIES
Discharge: HOME | End: 2023-02-28
Payer: MEDICARE

## 2023-02-28 ENCOUNTER — HOSPITAL ENCOUNTER (OUTPATIENT)
Dept: SPEECH THERAPY | Age: 88
Setting detail: THERAPIES SERIES
Discharge: HOME | End: 2023-02-28
Payer: MEDICARE

## 2023-02-28 PROCEDURE — 97129 THER IVNTJ 1ST 15 MIN: CPT | Performed by: SPEECH-LANGUAGE PATHOLOGIST

## 2023-02-28 PROCEDURE — 97130 THER IVNTJ EA ADDL 15 MIN: CPT | Performed by: SPEECH-LANGUAGE PATHOLOGIST

## 2023-02-28 PROCEDURE — 97110 THERAPEUTIC EXERCISES: CPT

## 2023-02-28 NOTE — PROGRESS NOTES
1039 [facility]  [] SPEECH LANGUAGE COGNITIVE EVALUATION  [x] DAILY NOTE   [] PROGRESS NOTE [] DISCHARGE NOTE    [x] [facility] [place]   [] [place] [address]    [] [facility]   [] Matt Yoder    Date: 2023  Patient Name:  Link Arellano  : 1935  MRN: [id number]  CSN: [id number]    Referring Practitioner EDUARDO Johnson - CNP   Diagnosis Vertigo as late effect of stroke [I69.398, R42]    Treatment Diagnosis Cognitive deficits   Date of Evaluation 23      Functional Outcome Measure Used MOCA    Functional Outcome Score 23, (23)       Insurance: Primary: Payor: Joyce Riggs /  /  / ,   Secondary:    Authorization Information: No precert required    Visit # 2, 2/10 for progress note   Visits Allowed: Unlimited visits based on medical necessity    Recertification Date: 86   Physician Follow-Up: 3/28/23 with Dr. Deena Cosme, 2023   Physician Orders: Gabriel Hernandez and treat   Pertinent History: Patient was admitted to [facility] on 23 after 2 falls at home. Per chart review \"she began feeling something stuck in her throat last night, 2023. Today, 2023, while she was walking in kitchen to try to get something to help him the throat discomfort sensation, she suddenly fell for no reason. She denies having weakness, pain, syncope or fainting prior to the fall. She struggled to get up but fell again after few step into her dining room. At that time she felt the need for bowel movement so she crawled to bathroom and had diarrhea like bowel movement. She did not call her daughter who called 911. The patient was sent to [address] [place] [address] ER for evaluation on . She was found to have tenderness at the her left hip area. She also complains of feeling dizzy with room spinning sensation when she says suddenly sitting up from supine position.   Her blood tests and urine test showed no significant abnormality. X-ray of left hip and chest revealed no acute abnormality. CT of the head without contrast done on 2/2/2023 showed small lacunar infarction at the right insula. CT of cervical spine done on 2/2/2023 was reported to show no acute process. CTA of head and neck done on 2/2/2023 show no significant hemodynamic stenosis in bilateral common and internal carotid arteries, and hypoplastic P1 segment of the right posterior cerebral artery. MRI of brain was also performed on 2/2/2023 and revealed a small acute right posterior frontal/anterior parietal cortex infarct stroke, and mild white matter atrophy. \" She received skilled speech therapy during her hospital stay to target visual/verbal reasoning, thought organization/working memory, problem solving, executive function tasks, and pharyngeal strengthening exercises. Patient did meet her dysphagia goals. She denies any difficulty with swallowing at this time. Does notice to be having more word-finding difficulties. MOCA 7.2 completed 2/10/23 : 26/30. MOCA 7.1 completed 2/3/23 : 22/30. SUBJECTIVE: Patient cooperative. No family present during this session. **Educated the patient on the goals and rationale for each goal at the beginning of this session. GOALS:  Short Term Goals:  Time Frame for Short-Term Goals: 4 weeks    SHORT TERM GOAL #1: Patient will complete delayed recall tasks of up to 5 units of information given a 10 minute delay with 80% accuracy without cues to promote retention of novel information. INTERVENTIONS: Patient agreed that her memory is impaired, but reports it seemed to have been a little bit of a problem prior to her stroke. Education completed on memory strategies and patient provided with a written list of the strategies. Also discussed some examples of functional ways to incorporate some of the strategies. Patient verbalized understanding.     Patient was provided with a list of 4 functional errands with auditory provision and instructed to recall. Patient recalled the following:   - immediate recall: 3/4 independent, 1/4 min cues   - 10 minute delay: 4/4 independent    SHORT TERM GOAL #2: Patient will complete working memory tasks with 80% accuracy without cues to improve mental flexibility. INTERVENTIONS: did not test this date d/t focus on other goals. SHORT TERM GOAL #3: Patient will complete attention tasks (sustained/selective/alternating/divided) with no more more than 1 error within a 5 minute task to promote potential readiness for completion of driving evaluation. INTERVENTIONS: Patient was instructed to sort a deck of cards by suit while simultaneously flipping over the cards with the letter 't' in the spelling of their name. Patient sorted the cards by suit with 100% accuracy, however, did not flip over any cards. Patient was provided with the instruction for the task multiple times while the patient sorted the cards, however, she still did not initiate flipping over any of the cards correctly. Patient did attempt to flip over the \"hearts\" because of it having a 't' in it's name, however, the instructions to the patient were that the name was the number/letter on the card (ie: \"two,\" \"ace\", etc). **Following completion of this task, this ST reviewed how the patient did with completion of the task and the patient stated, \"there must have been a misunderstanding. \"    SHORT TERM GOAL #4: Patient will complete verbal/visual reasoning activities with 80% accuracy given min cues to support safe return to independent completion of ADLs/iADLs. INTERVENTIONS: Patient independently stated that she could refer to her pill box if she was unsure if she took her medications during the day.     SHORT TERM GOAL #5: Patient will complete executive function tasks (math computation, time management, scheduling, finances, medications) with 80% accuracy given min cues to allow for safe return to independent completion of these tasks in home environment. INTERVENTIONS: Patient reports she has been working on going through her income taxes at home. Reports her family is helping sort through receipts, but she plans to go through them and complete the math computation on her own before she brings the paperwork to her . Reading a prescription label:  Patient answered functional questions (ie: dosage, # of refills, etc) with reference to a prescription label with 6/7 completed independently, 1/7 with min cues. Patient reports her daughter is currently filling her pill box for her weekly, but she was taking her medications straight from each pill bottle prior to her stroke. Patient reports she would like to get back to managing her medications independently. Discussed the recommendation that she work towards using a pill box when she takes over her medication management. Patient reports she is remembering to take her medications throughout the day as needed.     Long Term Goals:  Time Frame for Long-Term Goals: 8 weeks  LONG-TERM GOAL #1: Patient will improve score on the [address] [place] [address] by +3 points to indicate cognitive skills to be [facility] to indicate potential readiness for completion of a driving evaluation and/or return to independent completion of ADLs/iADLs. - ONGOING      Assessment: Progressing towards goals  Areas for Improvement: Impaired cognition  Prognosis: good  Specific Interventions Next Treatment: delayed recall, working memory, attention, verbal/visual reasoning, executive function tasks     Activity/Treatment Tolerance:  [x]  Patient tolerated treatment well  []  Patient limited by fatigue  []  Patient limited by pain   []  Patient limited by other medical complications  []  Other:     Patient Education:   [x]  HEP/Education Completed: Reviewed goals and rationale for goals, memory strategies, medication management  []  No new Education completed  []  Reviewed Prior HEP      [x] Patient verbalized and/or demonstrated understanding of education provided. []  Patient unable to verbalize and/or demonstrate understanding of education provided. Will continue education. [x]  Barriers to learning: insight towards deficits       PLAN:  Treatment Recommendations:     []  Plan of care initiated. Plan to see patient 2 times per week for 8 weeks to address the treatment planned outlined above.   [x]  Continue with current plan of care  []  Modify plan of care as follows:    []  Hold pending physician visit  []  Discharge    Time In 1315   Time Out 1345   Timed Code Minutes: 30 min   Total Treatment Time: 30 min       BRANDIN Berman [id number]

## 2023-02-28 NOTE — PROGRESS NOTES
Date   Time  [facility]  PHYSICAL THERAPY  [] EVALUATION  [x] DAILY NOTE (LAND) [] DAILY NOTE (AQUATIC ) [] PROGRESS NOTE [] DISCHARGE NOTE    [x] OUTPATIENT REHABILITATION [facility]   [] [place] [address]    [] [facility]   [] Lyndsey Elizabeth    Date: 2023  Patient Name:  Suyapa Valerio  : 1935  MRN: [id number]  CSN: [id number]    Referring Practitioner EDUARDO Patton - CNP   Diagnosis Vertigo as late effect of stroke [I69.398, R42]    Treatment Diagnosis Late effect CVA with decreased balance, episodes of vertigo   Date of Evaluation 23    Additional Pertinent History Anxiety, bilateral eyes glaucoma, osteoporosis, history of admission to [facility] s/p 2 falls, MRI revealed small acute infarct in right posterior frontal/anterior parietal cortex. IP rehab PMR admission on 2023.        Functional Outcome Measure Used TUG, TURNER   Functional Outcome Score TUG 11 seconds, Turner 51/56  (23)       Insurance: Primary: Payor: Lalito Winters /  /  / ,   Secondary:    Authorization Information: PRE CERTIFICATION REQUIRED: NO  INSURANCE THERAPY BENEFIT:  UNLIMITED VISITS BASED ON MEDICAL NECESSITY   AQUATIC THERAPY COVERED: YES  MODALITIES COVERED:  YES, YES MASSAGE  TELEHEALTH COVERED: NA  REFERENCE NUMBER: [id number]   Visit # 2, 2/10 for progress note   Visits Allowed: Unlimited based on medical necessity   Recertification Date: 3/44/3683   Physician Follow-Up: 2023-Dr. Darren Odonnell   Physician Orders: PT evaluate and treat vertigo as late effect CVA   History of Present Illness: Inpatient Rehabilitation Course:   Suyapa Valerio is a 81 y.o. right-handed  female with history of bilateral eye glaucoma requiring eye stent placement, anxiety, osteoporosis, status post appendectomy, tonsillectomy and bladder suspension surgery, was admitted to inpatient rehabilitation on 2023 for intensive inpatient management of impairment & disability secondary to  recent stroke resulting fall accident on 2/2/2023. Additional Pertinent Hx: Homa Soler is a 81 y.o. female who presents to Emergency Department with Fall and Other (Feels like something is stuck in throat). Patient is brought in by EMS for evaluation of fall x2 today. Patient says she fell in the kitchen and again in the dining room. Lives at home by herself. Patient states she had no idea why and how she fell. MRI shows small acute infarct in the right posterior frontal/anterior parietal cortex. To  rehab 2/9. SUBJECTIVE: Pt reports no c/o pain or vertigo upon arrival. She states she has not had vertigo since she was in the hospital.    TREATMENT   Precautions: S/p CVA with vertigo present   Pain: 0/10    \"X in shaded column indicates activity completed today    *\" next to exercise/intervention indicates progression   Modalities Parameters/  Location  Notes                     Manual Therapy Time/Technique  Notes                     Exercise/  Intervention   Notes   Functional reach test  10 inches      TUG test  12, 12 13 seconds  x    Adam Balance  51/56             Tandem stance r/l, l/r on airex 1min*  x    Nbos/wbos on airex  1 min*  x           SLS r,l 4 seconds  x    Gait rapid and slow walking in clinic independent  x No path deviation. No loss of balance          Nustep arms 8 seat 5* 5 mins  x    Gait in // bars forward, retro, sidestep* 2 laps  x    Gait over hurdles in // bars* forward/lateral 2 laps  x U UE , NO UE,    Standing*       Marches  10x  x No UE support   HR/TR 10x  x \"        \"   Hip 3way 10x  x \"          \"   Mini squats              Standing shoulder retraction * 10x  x Back against wall   Stairs* 4 steps  x With and without UE support     Specific Interventions Next Treatment: postural ex , posterior shoulder girdle, balance retraining, gait training, dynamic gait activities. Oculomotor testing, BPPV screening.  Possible positive on left due to rolling to left causing symptoms of vertigo    Activity/Treatment Tolerance:  [x]  Patient tolerated treatment well  []  Patient limited by fatigue  []  Patient limited by pain   []  Patient limited by medical complications  []  Other:     Assessment: Pt tolerates treatment well this date. She required 1 short seated rest break. She was able to initiate standing balance and gait training without LOB this date. GOALS:  Patient Goal: To improve endurance, be able to walk further and improve balance. Resume a walking program,    Short Term Goals:  Time Frame: 8 weeks  TUG test from 11 seconds to 9-10 seconds. Adam Balance Scale 51/56 to 53/56  Patient to demonstrate tolerance of 45 minute session with 1-2 rest breaks with minimal perceived exertion from 4 to 2. Patient to demonstrate ability to negotiate 16 stair steps from single rail use in order to carry object up/down stairsteps. 5. Patient to demonstrate normal oculomotor testing and normal testing for THE [facility] left and right. (TO BE FURTHER ASSESSED)    Long Term Goals:  Time Frame: 12 weeks  Adam Balance Scale from 53/56 to 55/56. Patient to demonstrate progression in an HEP with independence in the ex with increased balance, statically/dynamically and increased endurance. Patient Education:   []  HEP/Education Completed: Plan of Care, Goals,   Medbridge Access Code:  []  No new Education completed  [x]  Reviewed Prior HEP      []  Patient verbalized and/or demonstrated understanding of education provided. []  Patient unable to verbalize and/or demonstrate understanding of education provided. Will continue education. []  Barriers to learning: none    PLAN:  Treatment Recommendations: Strengthening, Balance Training, Functional Mobility Training, Endurance Training, Gait Training, Neuromuscular Re-education, Home Exercise Program, Patient Education, and Vestibular Rehabilitation    []  Plan of care initiated.   Plan to see patient 2 times per week for 8 weeks to address the treatment planned outlined above.   [x]  Continue with current plan of care  []  Modify plan of care as follows:    []  Hold pending physician visit  []  Discharge    Time In 1225   Time Out 1310   Timed Code Minutes: 45 min   Total Treatment Time: 45 min       Electronically Signed by: Lars Cowan PTA

## 2023-03-02 ENCOUNTER — HOSPITAL ENCOUNTER (OUTPATIENT)
Dept: SPEECH THERAPY | Age: 88
Setting detail: THERAPIES SERIES
Discharge: HOME | End: 2023-03-02
Payer: MEDICARE

## 2023-03-02 ENCOUNTER — APPOINTMENT (OUTPATIENT)
Dept: SPEECH THERAPY | Age: 88
End: 2023-03-02
Payer: MEDICARE

## 2023-03-02 ENCOUNTER — HOSPITAL ENCOUNTER (OUTPATIENT)
Dept: PHYSICAL THERAPY | Age: 88
Setting detail: THERAPIES SERIES
Discharge: HOME | End: 2023-03-02
Payer: MEDICARE

## 2023-03-02 ENCOUNTER — HOSPITAL ENCOUNTER (OUTPATIENT)
Dept: OCCUPATIONAL THERAPY | Age: 88
Setting detail: THERAPIES SERIES
Discharge: HOME | End: 2023-03-02
Payer: MEDICARE

## 2023-03-02 PROCEDURE — 97110 THERAPEUTIC EXERCISES: CPT

## 2023-03-02 PROCEDURE — 97129 THER IVNTJ 1ST 15 MIN: CPT | Performed by: SPEECH-LANGUAGE PATHOLOGIST

## 2023-03-02 PROCEDURE — 97530 THERAPEUTIC ACTIVITIES: CPT

## 2023-03-02 PROCEDURE — 97130 THER IVNTJ EA ADDL 15 MIN: CPT | Performed by: SPEECH-LANGUAGE PATHOLOGIST

## 2023-03-02 NOTE — PROGRESS NOTES
[facility]  [] SPEECH LANGUAGE COGNITIVE EVALUATION  [x] DAILY NOTE   [] PROGRESS NOTE [] DISCHARGE NOTE    [x] [facility] [place]   [] [place] [address]    [] [facility]   [] Maywood Pi    Date: 3/2/2023  Patient Name:  Ryanne Marin  : 1935  MRN: [id number]  CSN: [id number]    Referring Practitioner EDUARDO Fenton CNP   Diagnosis Vertigo as late effect of stroke [I69.398, R42]    Treatment Diagnosis Cognitive deficits   Date of Evaluation 23      Functional Outcome Measure Used MOCA    Functional Outcome Score 23, (23)       Insurance: Primary: Payor: Gavin Boggs /  /  / ,   Secondary:    Authorization Information: No precert required    Visit # 3, 3/10 for progress note   Visits Allowed: Unlimited visits based on medical necessity    Recertification Date:    Physician Follow-Up: 3/28/23 with Dr. Glass [place], 2023   Physician Orders: Rubens Mora and treat   Pertinent History: Patient was admitted to [facility] on 23 after 2 falls at home. Per chart review \"she began feeling something stuck in her throat last night, 2023. Today, 2023, while she was walking in kitchen to try to get something to help him the throat discomfort sensation, she suddenly fell for no reason. She denies having weakness, pain, syncope or fainting prior to the fall. She struggled to get up but fell again after few step into her dining room. At that time she felt the need for bowel movement so she crawled to bathroom and had diarrhea like bowel movement. She did not call her daughter who called 911. The patient was sent to [address] [place] [address] for evaluation on . She was found to have tenderness at the her left hip area. She also complains of feeling dizzy with room spinning sensation when she says suddenly sitting up from supine position.   Her blood tests and urine test showed no significant abnormality. X-ray of left hip and chest revealed no acute abnormality. CT of the head without contrast done on 2/2/2023 showed small lacunar infarction at the right insula. CT of cervical spine done on 2/2/2023 was reported to show no acute process. CTA of head and neck done on 2/2/2023 show no significant hemodynamic stenosis in bilateral common and internal carotid arteries, and hypoplastic P1 segment of the right posterior cerebral artery. MRI of brain was also performed on 2/2/2023 and revealed a small acute right posterior frontal/anterior parietal cortex infarct stroke, and mild white matter atrophy. \" She received skilled speech therapy during her hospital stay to target visual/verbal reasoning, thought organization/working memory, problem solving, executive function tasks, and pharyngeal strengthening exercises. Patient did meet her dysphagia goals. She denies any difficulty with swallowing at this time. Does notice to be having more word-finding difficulties. MOCA 7.2 completed 2/10/23 : 26/30. MOCA 7.1 completed 2/3/23 : 22/30. SUBJECTIVE: Patient reports she is tired today because she was busy doing tasks around her house this morning. GOALS:  Short Term Goals:  Time Frame for Short-Term Goals: 4 weeks    SHORT TERM GOAL #1: Patient will complete delayed recall tasks of up to 5 units of information given a 10 minute delay with 80% accuracy without cues to promote retention of novel information. INTERVENTIONS: Patient independently recalled writing, repeating, associating and picture it as strategies to aid in recalling. Patient brought in a notebook that she had written 2 tasks that she needed to remember to do on. Patient reports she has started to write more things down recently.   Encouraged the patient to continue to do this even if she doesn't have to refer to her list.    Patient was provided with a list of 4 items to order at a local restaurant with auditory provision and instructed to recall. Patient recalled the following:   - immediate recall: 3/4 independent, 1/4 mod cues   - 10 minute delay: 3/4 independent, 1/4 min cues (recalled hamburger instead of cheeseburger)    Patient reports she was making some brownies at home yesterday and she sat down to read the newspaper while they were baking and she left them bake too long. Patient reports the recipe called for baking the brownies for a certain number of minutes depending on the type/shape of pan and she didn't use the pan it called for so she wasn't sure how long to keep it in. Patient reports she did not set a timer when doing this. Highly encouraged the patient to set a timer each time she is baking/cooking something. SHORT TERM GOAL #2: Patient will complete working memory tasks with 80% accuracy without cues to improve mental flexibility. INTERVENTIONS: Patient was provided with lists of 3 words with auditory provision and instructed to mentally manipulate the words into alphabetical order: 9/10 independent, 1/10 with min cues    SHORT TERM GOAL #3: Patient will complete attention tasks (sustained/selective/alternating/divided) with no more more than 1 error within a 5 minute task to promote potential readiness for completion of driving evaluation. INTERVENTIONS: did not test this date d/t focus on other goals. SHORT TERM GOAL #4: Patient will complete verbal/visual reasoning activities with 80% accuracy given min cues to support safe return to independent completion of ADLs/iADLs. INTERVENTIONS: did not test this date d/t focus on other goals. SHORT TERM GOAL #5: Patient will complete executive function tasks (math computation, time management, scheduling, finances, medications) with 80% accuracy given min cues to allow for safe return to independent completion of these tasks in home environment.    INTERVENTIONS: Patient wrote out 2 checks when provided with the person to write the check to and the amount that needs paid without cues, however, she wrote the incorrect date on both checks (wrote tomorrow's date). Patient then balanced the check ledger with the information from the checks she wrote with 100% accuracy. Patient utilized a calculator on a cell phone (new to the patient) to complete the math computation to balance the check ledger with min cues required on the steps to take to use the calculator successfully. Long Term Goals:  Time Frame for Long-Term Goals: 8 weeks  LONG-TERM GOAL #1: Patient will improve score on the [address] [place] [address] by +3 points to indicate cognitive skills to be [facility] to indicate potential readiness for completion of a driving evaluation and/or return to independent completion of ADLs/iADLs. - ONGOING      Assessment: Progressing towards goals  Areas for Improvement: Impaired cognition  Prognosis: good  Specific Interventions Next Treatment: delayed recall, working memory, attention, verbal/visual reasoning, executive function tasks     Activity/Treatment Tolerance:  [x]  Patient tolerated treatment well  []  Patient limited by fatigue  []  Patient limited by pain   []  Patient limited by other medical complications  []  Other:     Patient Education:   [x]  HEP/Education Completed: Reviewed goals and rationale for goals, memory strategies, medication management  []  No new Education completed  []  Reviewed Prior HEP      [x]  Patient verbalized and/or demonstrated understanding of education provided. []  Patient unable to verbalize and/or demonstrate understanding of education provided. Will continue education. [x]  Barriers to learning: insight towards deficits       PLAN:  Treatment Recommendations:     []  Plan of care initiated. Plan to see patient 2 times per week for 8 weeks to address the treatment planned outlined above.   [x]  Continue with current plan of care  []  Modify plan of care as follows:    []  Hold pending physician visit  []  Discharge    Time In 1488   Time Out 1632   Timed Code Minutes: 34 min   Total Treatment Time: 34 min       Hailey Miner M.S. [address] [place] [id number]

## 2023-03-02 NOTE — PROGRESS NOTES
[address] THERAPY  [] EVALUATION  [x] DAILY NOTE (LAND) [] DAILY NOTE (AQUATIC ) [] PROGRESS NOTE [] DISCHARGE NOTE    [x] OUTPATIENT REHABILITATION [facility]   [] [place] [address]    [] [facility]   [] Venecia Harrell    Date: 3/2/2023  Patient Name:  Frankey Coco  : 1935  MRN: [id number]  CSN: [id number]    Referring Practitioner EDUARDO Ballesteros - CNP   Diagnosis Vertigo as late effect of stroke [I69.398, R42]    Treatment Diagnosis Decreased strength and ADL tasks   Date of Evaluation 23      Functional Outcome Measure Used UEFI   Functional Outcome Score 53/80 (23)       Insurance: Primary: Payor: Kayy Delacruz /  /  / ,   Secondary:    Authorization Information: PRE CERTIFICATION REQUIRED: NO  INSURANCE THERAPY BENEFIT:  UNLIMITED VISITS BASED ON MEDICAL NECESSITY   AQUATIC THERAPY COVERED: YES  MODALITIES COVERED:  YES, YES MASSAGE  TELEHEALTH COVERED: NA  REFERENCE NUMBER: [id number]   Visit # 2, 2/10 for progress note   Visits Allowed: unlimited   Recertification Date:    Physician Follow-Up: 23 with Dr. Adis Ng   Physician Orders: Jena Burdick and treat, driving eval    Pertinent History: On 23, pt stating she collapsed at home with no initial symptoms. Pt stating after she got to the hospital, she started having increased dizziness. MRI + showing   Small acute infarct in the right posterior frontal/anterior parietal cortex   2. Mild atrophy and probable ischemic changes in the white matter. 3. Mild inflammatory changes in ethmoid air cells bilaterally and left mastoid tip. Pt transitioned to inpatient rehab on -23. She was discharged home alone with family checking in approx 1x day. SUBJECTIVE: Pt presenting to OT for first session since initial eval. Pt had PT prior this date.  Pt stating she was still having difficulty working the phone piece to her heart monitor with this therapist attempting to help pt problem solve. Pt staitng she baked 2 pans of brownies yesterday with neither turning out. One was over onde and the other under done. Pt with no answer to why they turned out differently. Pt stating she needed to change a appt for Wednesday 3/8/23 d/t having a scheduled appt with family practioner that same day. Pts appt cancelled for that day with Jeramie Mackey made aware d/t transportation as well. TREATMENT   Precautions: fall risk, decreased vision     Pain: 0/10     X in shaded column indicates Activity Completed Today   Modalities Parameters/  Location  Notes/Comments                     Manual Therapy Time/  Technique  Notes/Comments                     Exercises   Sets/  Sec Reps  Notes/Comments   Yazoo theraband  1 5  Chest press, flexion, horiz abd/add, bicep curls, tricep ext                                              Activities Time    Notes/Comments   Dynavision  2 trials  41 hits, 51 hits    Home making tasks  x Pt completed a simulated homemaking tasks of carrying laundry in basket up/down 5 steps. Pt educated on just allowing basket to slide down steps instead of carrying to decrease fall risk at this time. Pt then educated on taking each step at a time when coming back up the steps. Pt with reasons why she couldn't complete that way dmeo decreased insight. Pt then was required to locate 7 objects around area and obtain toplace in basket. Pt with difficulty finding last object requiring cues for her to just stop and stand to scan environment for object. Pt wanting to continue to just walk around clinic area. Completed a kitchen tasks placing/removing objects from various height including overhead. Pt educated on safety of not using step stool to place/obtain needed objects into cupboards after pt stating she climbs on stool on daily basis.  Pt educated on placing frequently used oject on counter or lower shelf to avoid standing on stool for increased fall risk Specific Interventions Next Treatment: UB strengthening/endurance tasks, standing balance, core strength, homemaking tasks such as baking requiring multiple steps, eventual driving eval.     Activity/Treatment Tolerance:  [x]  Patient tolerated treatment well  []  Patient limited by fatigue  []  Patient limited by pain   []  Patient limited by other medical complications  []  Other:     Assessment: Pt with decreased safety awareness when completing simulated homemaking task this date. Pt with difficulty problem solving on how to use phone for her heart monitor as well.    Areas for Improvement: impaired activity tolerance, impaired balance, impaired cognition, impaired endurance, impaired safety awareness, and impaired strength  Prognosis: good    GOALS:  Patient Goal: get stronger and be indep including with driving     Short Term Goals:  Time Frame: 10 visits   Pt to complete simple kitchen task including completing baking tasks with multiple steps with S and requiring no cues for safety or direction to tasks   Pt to demo dynamic standing balance > 15 min with no UE support while reaching outside base of uspport completing simple homemaking tasks such a sweeping/vacuuming with no difficulty, need for rest break for LOB during   Pt to increase UB strength of left UE especially to 4+/5 to ease of carrying and lifting objects needed during simple homemaking tasks   Pt to be indep with completing UE strenghtening HEP 2x day with use of handouts for increased strength and endurance for ADLs and homemaking tasks     Long Term Goals:  Time Frame: 8   Pt to complete all ADL tasks and homemaking tasks indep with no LOB and good safety awareness       Patient Education:   [x]  HEP/Education Completed: Plan of Care, Goals,   Orange theraband ex in chest press, shoulder flexion, horiz abd/add, tricep ext and bicep curls   []  No new Education completed  []  Reviewed Prior HEP      [x]  Patient verbalized and/or demonstrated understanding of education provided. []  Patient unable to verbalize and/or demonstrate understanding of education provided. Will continue education. [x]  Barriers to learning: none     PLAN:  Treatment Recommendations: Strengthening, Balance Training, Endurance Training, Home Exercise Program, Patient Education, Safety Education and Training, Self-Care Education and Training, and driving eval eventually     [x]  Plan of care initiated. Plan to see patient 2 times per week for 8 weeks to address the treatment planned outlined above.   []  Continue with current plan of care  []  Modify plan of care as follows:    []  Hold pending physician visit  []  Discharge    Time In 1515   Time Out 1545   Timed Code Minutes: 30 min   Total Treatment Time: 30 min       Mindi Zimmerman MOT OTR/L [id number]

## 2023-03-02 NOTE — PROGRESS NOTES
Date   Time  [facility]  PHYSICAL THERAPY  [] EVALUATION  [x] DAILY NOTE (LAND) [] DAILY NOTE (AQUATIC ) [] PROGRESS NOTE [] DISCHARGE NOTE    [x] OUTPATIENT REHABILITATION [facility]   [] [place] [address]    [] [facility]   [] [facility]    Date: 3/2/2023  Patient Name:  Rosalba Miramontes  : 1935  MRN: [id number]  CSN: [id number]    Referring Practitioner EDUARDO Ignacio - [facility]   Diagnosis Vertigo as late effect of stroke [I69.398, R42]    Treatment Diagnosis Late effect CVA with decreased balance, episodes of vertigo   Date of Evaluation 23    Additional Pertinent History Anxiety, bilateral eyes glaucoma, osteoporosis, history of admission to [facility] s/p 2 falls, MRI revealed small acute infarct in right posterior frontal/anterior parietal cortex. IP rehab PMR admission on 2023.        Functional Outcome Measure Used TUG, TURNER   Functional Outcome Score TUG 11 seconds, Turner 51/56  (23)       Insurance: Primary: Payor: Radha Richter /  /  / ,   Secondary:    Authorization Information: PRE CERTIFICATION REQUIRED: NO  INSURANCE THERAPY BENEFIT:  UNLIMITED VISITS BASED ON MEDICAL NECESSITY   AQUATIC THERAPY COVERED: YES  MODALITIES COVERED:  YES, YES MASSAGE  TELEHEALTH COVERED: NA  REFERENCE NUMBER: [id number]   Visit # 3, 3/10 for progress note   Visits Allowed: Unlimited based on medical necessity   Recertification Date:    Physician Follow-Up: 2023-Dr. Jo Ann Tai   Physician Orders: PT evaluate and treat vertigo as late effect CVA   History of Present Illness: Inpatient Rehabilitation Course:   Rosalba Miramontes is a 81 y.o. right-handed  female with history of bilateral eye glaucoma requiring eye stent placement, anxiety, osteoporosis, status post appendectomy, tonsillectomy and bladder suspension surgery, was admitted to inpatient rehabilitation on 2023 for intensive inpatient management of impairment & disability secondary to  recent stroke resulting fall accident on 2/2/2023. Additional Pertinent Hx: Elizabeth Zamudio is a 81 y.o. female who presents to Emergency Department with Fall and Other (Feels like something is stuck in throat). Patient is brought in by EMS for evaluation of fall x2 today. Patient says she fell in the kitchen and again in the dining room. Lives at home by herself. Patient states she had no idea why and how she fell. MRI shows small acute infarct in the right posterior frontal/anterior parietal cortex. To IP rehab 2/9. SUBJECTIVE: Pt arrives stating she has been a little \"out of it\" today noting she had a very busy evening with her [employer]. Denies any pain. TREATMENT   Precautions: S/p CVA with vertigo present   Pain: 0/10    \"X in shaded column indicates activity completed today    *\" next to exercise/intervention indicates progression   Modalities Parameters/  Location  Notes                     Manual Therapy Time/Technique  Notes                     Exercise/  Intervention   Notes   Functional reach test  10 inches      TUG test  12, 12 13 seconds      Adam Balance  51/56             NBOS on airex - EO/EC* 2x30\"  x    Tandem Stance - B 2x30\"  x    SLS - B 4 seconds  x    Foam Taps* 10x  x No UE support          Gait rapid and slow walking in clinic independent   No path deviation.  No loss of balance          Nustep arms 8 seat 5 5 mins  x    Gait in // bars forward, retro, sidestep 2 laps  x    Gait over hurdles in // bars forward/lateral 2 laps  x NO UE,    Samir Step Over and Back - B* 10x  x           Standing       Marches  10x  x No UE support   HR/TR 10x  x \"        \"   Hip 3way 10x  x \"          \"   Mini squats 10x  x           Standing shoulder retraction  10x  x Back against wall   Stairs 4 steps   With and without UE support     Specific Interventions Next Treatment: postural ex , posterior shoulder girdle, balance retraining, gait training, dynamic gait activities. Oculomotor testing, BPPV screening. Possible positive on left due to rolling to left causing symptoms of vertigo    Activity/Treatment Tolerance:  [x]  Patient tolerated treatment well  []  Patient limited by fatigue  []  Patient limited by pain   []  Patient limited by medical complications  []  Other:     Assessment: Addition of ronit step over/back, and foam tap exercises as noted above to continue to challenge balance. Cues throughout for pt to slow pace for improved exercise technique. Pt requiring one to no UE support on parallel bars for majority of balance activities. GOALS:  Patient Goal: To improve endurance, be able to walk further and improve balance. Resume a walking program,    Short Term Goals:  Time Frame: 8 weeks  TUG test from 11 seconds to 9-10 seconds. Adam Balance Scale 51/56 to 53/56  Patient to demonstrate tolerance of 45 minute session with 1-2 rest breaks with minimal perceived exertion from 4 to 2. Patient to demonstrate ability to negotiate 16 stair steps from single rail use in order to carry object up/down stairsteps. 5. Patient to demonstrate normal oculomotor testing and normal testing for [facility] left and right. (TO BE FURTHER ASSESSED)    Long Term Goals:  Time Frame: 12 weeks  Adma Balance Scale from 53/56 to 55/56. Patient to demonstrate progression in an HEP with independence in the ex with increased balance, statically/dynamically and increased endurance. Patient Education:   []  HEP/Education Completed:   [address] Access Code:  []  No new Education completed  [x]  Reviewed Prior HEP      []  Patient verbalized and/or demonstrated understanding of education provided. []  Patient unable to verbalize and/or demonstrate understanding of education provided. Will continue education.   []  Barriers to learning: none    PLAN:  Treatment Recommendations: Strengthening, Balance Training, Functional Mobility Training, Endurance Training, Gait Training, Neuromuscular Re-education, Home Exercise Program, Patient Education, and Vestibular Rehabilitation    []  Plan of care initiated. Plan to see patient 2 times per week for 8 weeks to address the treatment planned outlined above.   [x]  Continue with current plan of care  []  Modify plan of care as follows:    []  Hold pending physician visit  []  Discharge    Time In 1420   Time Out 1505   Timed Code Minutes: 45 min   Total Treatment Time: 45 min       Electronically Signed by: Dipika Khan PTA

## 2023-03-07 ENCOUNTER — APPOINTMENT (OUTPATIENT)
Dept: SPEECH THERAPY | Age: 88
End: 2023-03-07
Payer: MEDICARE

## 2023-03-07 ENCOUNTER — TELEPHONE (OUTPATIENT)
Dept: NEUROLOGY | Age: 88
End: 2023-03-07

## 2023-03-07 ENCOUNTER — HOSPITAL ENCOUNTER (OUTPATIENT)
Dept: OCCUPATIONAL THERAPY | Age: 88
Setting detail: THERAPIES SERIES
Discharge: HOME | End: 2023-03-07
Payer: MEDICARE

## 2023-03-07 ENCOUNTER — HOSPITAL ENCOUNTER (OUTPATIENT)
Dept: PHYSICAL THERAPY | Age: 88
Setting detail: THERAPIES SERIES
Discharge: HOME | End: 2023-03-07
Payer: MEDICARE

## 2023-03-07 PROCEDURE — 97110 THERAPEUTIC EXERCISES: CPT

## 2023-03-07 NOTE — PROGRESS NOTES
Date   Time  [address] [facility]  PHYSICAL THERAPY  [] EVALUATION  [x] DAILY NOTE (LAND) [] DAILY NOTE (AQUATIC ) [] PROGRESS NOTE [] DISCHARGE NOTE    [x] OUTPATIENT REHABILITATION [facility]   [] [place] [address]    [] [facility]   [] [place] Bro[facility]    Date: 3/7/2023  Patient Name:  Tang Zaidi  : 1935  MRN: [id number]  CSN: [id number]    Referring Practitioner EDUARDO Jimenez - CNP   Diagnosis Vertigo as late effect of stroke [I69.398, R42]    Treatment Diagnosis Late effect CVA with decreased balance, episodes of vertigo   Date of Evaluation 23    Additional Pertinent History Anxiety, bilateral eyes glaucoma, osteoporosis, history of admission to [facility] s/p 2 falls, MRI revealed small acute infarct in right posterior frontal/anterior parietal cortex. IP rehab PMR admission on 2023.        Functional Outcome Measure Used TUG, TURNER   Functional Outcome Score TUG 11 seconds, Turner 51/56  (23)       Insurance: Primary: Payor: Porsha Arteaga /  /  / ,   Secondary:    Authorization Information: PRE CERTIFICATION REQUIRED: NO  INSURANCE THERAPY BENEFIT:  UNLIMITED VISITS BASED ON MEDICAL NECESSITY   AQUATIC THERAPY COVERED: YES  MODALITIES COVERED:  YES, YES MASSAGE  TELEHEALTH COVERED: NA  REFERENCE NUMBER: [id number]   Visit # 4, 4/10 for progress note   Visits Allowed: Unlimited based on medical necessity   Recertification Date:    Physician Follow-Up: 2023-Dr. Tahira Capellan   Physician Orders: PT evaluate and treat vertigo as late effect CVA   History of Present Illness: Inpatient Rehabilitation Course:   Tang Zaidi is a 81 y.o. right-handed  female with history of bilateral eye glaucoma requiring eye stent placement, anxiety, osteoporosis, status post appendectomy, tonsillectomy and bladder suspension surgery, was admitted to inpatient rehabilitation on 2023 for intensive inpatient management of impairment & disability secondary to  recent stroke resulting fall accident on 2/2/2023. Additional Pertinent Hx: Katia Frye is a 81 y.o. female who presents to Emergency Department with Fall and Other (Feels like something is stuck in throat). Patient is brought in by EMS for evaluation of fall x2 today. Patient says she fell in the kitchen and again in the dining room. Lives at home by herself. Patient states she had no idea why and how she fell. MRI shows small acute infarct in the right posterior frontal/anterior parietal cortex. To  rehab 2/9. SUBJECTIVE: States per her goals of improved endurance and balance, both have improved since starting PT. Denies falls since starting PT. States sleep is generally good albeit she takes sleeping medications. TREATMENT   Precautions: S/p CVA with vertigo present   Pain: 0/10    \"X in shaded column indicates activity completed today    *\" next to exercise/intervention indicates progression   Modalities Parameters/  Location  Notes                     Manual Therapy Time/Technique  Notes                     Exercise/  Intervention   Notes   Functional reach test  10 inches      TUG test  12, 12 13 seconds      Adam Balance  51/56             NBOS on airex - EO/EC* 2x30\"  x    Tandem Stance - B 2x30\"  x    SLS - B 4 seconds  x    Foam Taps* 10x2  x No UE support          Gait rapid and slow walking in clinic independent   No path deviation.  No loss of balance          Nustep arms 8 seat 5 5 mins      Gait in // bars forward, retro, sidestep 2 laps  x    Gait over hurdles in // bars forward/lateral 2 laps  x NO UE,    Samir Step Over and Back - B* 10x  x           Standing       Marches  10x  x No UE support   HR/TR 10x  x \"        \"   Hip 3way 10x   \"          \"   Mini squats 10x  x           Standing shoulder retraction  10x   Back against wall   Stairs 4 steps   With and without UE support     Specific Interventions Next Treatment: postural ex , posterior shoulder girdle, balance retraining, gait training, dynamic gait activities. Oculomotor testing, BPPV screening. Possible positive on left due to rolling to left causing symptoms of vertigo    Activity/Treatment Tolerance:  [x]  Patient tolerated treatment well  []  Patient limited by fatigue  []  Patient limited by pain   []  Patient limited by medical complications  []  Other:     Assessment: Ivette Gomez continues to be challenged by the hurdles, and other exercise in the parallel bars and reports good exercise from this today. She is given verbal cues to move more slowly. GOALS:  Patient Goal: To improve endurance, be able to walk further and improve balance. Resume a walking program,    Short Term Goals:  Time Frame: 8 weeks  TUG test from 11 seconds to 9-10 seconds. Adam Balance Scale 51/56 to 53/56  Patient to demonstrate tolerance of 45 minute session with 1-2 rest breaks with minimal perceived exertion from 4 to 2. Patient to demonstrate ability to negotiate 16 stair steps from single rail use in order to carry object up/down stairsteps. 5. Patient to demonstrate normal oculomotor testing and normal testing for [facility] left and right. (TO BE FURTHER ASSESSED)    Long Term Goals:  Time Frame: 12 weeks  Adam Balance Scale from 53/56 to 55/56. Patient to demonstrate progression in an HEP with independence in the ex with increased balance, statically/dynamically and increased endurance. Patient Education:   []  HEP/Education Completed:   [facility] Access Code:  []  No new Education completed  [x]  Reviewed Prior HEP      []  Patient verbalized and/or demonstrated understanding of education provided. []  Patient unable to verbalize and/or demonstrate understanding of education provided. Will continue education.   []  Barriers to learning: none    PLAN:  Treatment Recommendations: Strengthening, Balance Training, Functional Mobility Training, Endurance Training, Gait Training, Neuromuscular Re-education, Home Exercise Program, Patient Education, and Vestibular Rehabilitation    []  Plan of care initiated. Plan to see patient 2 times per week for 8 weeks to address the treatment planned outlined above.   [x]  Continue with current plan of care  []  Modify plan of care as follows:    []  Hold pending physician visit  []  Discharge    Time In 1404   Time Out 1430   Timed Code Minutes: 26   Total Treatment Time: 26       Electronically Signed by: Janusz Bhatt PT

## 2023-03-07 NOTE — TELEPHONE ENCOUNTER
Pt had cardiac event monitor report come through fax. Received on my desk on 3-7-2023. It states event was on 3-4-2023 for an episode of a-fib RVR. Please advise.

## 2023-03-08 ENCOUNTER — APPOINTMENT (OUTPATIENT)
Dept: SPEECH THERAPY | Age: 88
End: 2023-03-08
Payer: MEDICARE

## 2023-03-08 ENCOUNTER — APPOINTMENT (OUTPATIENT)
Dept: PHYSICAL THERAPY | Age: 88
End: 2023-03-08
Payer: MEDICARE

## 2023-03-08 ENCOUNTER — APPOINTMENT (OUTPATIENT)
Dept: OCCUPATIONAL THERAPY | Age: 88
End: 2023-03-08
Payer: MEDICARE

## 2023-03-13 RX ORDER — OMEPRAZOLE MAGNESIUM 20 MG
CAPSULE,DELAYED RELEASE (ENTERIC COATED) ORAL
Qty: 60 TABLET | Refills: 3 | OUTPATIENT
Start: 2023-03-13

## 2023-03-13 RX ORDER — METOPROLOL SUCCINATE 25 MG/1
TABLET, EXTENDED RELEASE ORAL
Qty: 30 TABLET | Refills: 3 | OUTPATIENT
Start: 2023-03-13

## 2023-03-13 RX ORDER — ASPIRIN 81 MG/1
TABLET, COATED ORAL
Qty: 30 TABLET | Refills: 3 | OUTPATIENT
Start: 2023-03-13

## 2023-03-13 RX ORDER — TRAZODONE HYDROCHLORIDE 50 MG/1
TABLET ORAL
Qty: 30 TABLET | Refills: 1 | OUTPATIENT
Start: 2023-03-13

## 2023-03-13 RX ORDER — ROSUVASTATIN CALCIUM 40 MG/1
40 TABLET, COATED ORAL NIGHTLY
Qty: 30 TABLET | Refills: 3 | OUTPATIENT
Start: 2023-03-13

## 2023-03-14 ENCOUNTER — HOSPITAL ENCOUNTER (OUTPATIENT)
Dept: PHYSICAL THERAPY | Age: 88
Setting detail: THERAPIES SERIES
Discharge: HOME | End: 2023-03-14
Payer: MEDICARE

## 2023-03-14 ENCOUNTER — HOSPITAL ENCOUNTER (OUTPATIENT)
Dept: SPEECH THERAPY | Age: 88
Setting detail: THERAPIES SERIES
Discharge: HOME | End: 2023-03-14
Payer: MEDICARE

## 2023-03-14 ENCOUNTER — HOSPITAL ENCOUNTER (OUTPATIENT)
Dept: OCCUPATIONAL THERAPY | Age: 88
Setting detail: THERAPIES SERIES
Discharge: HOME | End: 2023-03-14
Payer: MEDICARE

## 2023-03-14 PROCEDURE — 97130 THER IVNTJ EA ADDL 15 MIN: CPT | Performed by: SPEECH-LANGUAGE PATHOLOGIST

## 2023-03-14 PROCEDURE — 97535 SELF CARE MNGMENT TRAINING: CPT

## 2023-03-14 PROCEDURE — 97110 THERAPEUTIC EXERCISES: CPT

## 2023-03-14 PROCEDURE — 97112 NEUROMUSCULAR REEDUCATION: CPT

## 2023-03-14 PROCEDURE — 97129 THER IVNTJ 1ST 15 MIN: CPT | Performed by: SPEECH-LANGUAGE PATHOLOGIST

## 2023-03-14 NOTE — PROGRESS NOTES
[address] THERAPY  [] EVALUATION  [x] DAILY NOTE (LAND) [] DAILY NOTE (AQUATIC )   [] PROGRESS NOTE [] DISCHARGE NOTE    [x] OUTPATIENT REHABILITATION [facility]   [] [place] [address]    [] [facility]   [] Kevin Bachelor    Date: 3/14/2023  Patient Name:  Grisel Cueva  : 1935  MRN: [id number]  CSN: [id number]    Referring Practitioner EDUARDO James - CNP   Diagnosis Vertigo as late effect of stroke [I69.398, R42]    Treatment Diagnosis Decreased strength and ADL tasks   Date of Evaluation 23      Functional Outcome Measure Used UEFI   Functional Outcome Score 53/80 (23)       Insurance: Primary: Payor: Madison Caceres /  /  / ,   Secondary:    Authorization Information: PRE CERTIFICATION REQUIRED: NO  INSURANCE THERAPY BENEFIT:  UNLIMITED VISITS BASED ON MEDICAL NECESSITY   AQUATIC THERAPY COVERED: YES  MODALITIES COVERED:  YES, YES MASSAGE  TELEHEALTH COVERED: NA  REFERENCE NUMBER: [id number]   Visit # 3, 3/10 for progress note   Visits Allowed: unlimited   Recertification Date:    Physician Follow-Up: 23 with Dr. Nu Mcarthur   Physician Orders: Fleeta Musca and treat, driving eval    Pertinent History: On 23, pt stating she collapsed at home with no initial symptoms. Pt stating after she got to the hospital, she started having increased dizziness. MRI + showing   Small acute infarct in the right posterior frontal/anterior parietal cortex   2. Mild atrophy and probable ischemic changes in the white matter. 3. Mild inflammatory changes in ethmoid air cells bilaterally and left mastoid tip. Pt transitioned to inpatient rehab on -23. She was discharged home alone with family checking in approx 1x day.       SUBJECTIVE: Patient reported to speech therapy that she is feeling tired today, had a later evening at the musical. Ewing a little weak this morning before breakfast, moreso than normal, feels better now but is fatigued. States her vision does seem to be more blurry since she had the CVA. TREATMENT   Precautions: fall risk, decreased vision     Pain: No pain at rest.      X in shaded column indicates Activity Completed Today   Modalities Parameters/  Location  Notes/Comments                     Manual Therapy Time/  Technique  Notes/Comments                     Exercises   Sets/  Sec Reps  Notes/Comments   Orange theraband  1 10 ea x Riivalid, bicep curl    Red flex bar  1 10 ea x Pronation, supination, twists    Biodex 120 RPM x2 minutes backward    x Towel roll behind the back for support. Activities Time    Notes/Comments   Dynavision  2 trials  41 hits, 51 hits    Home making tasks  x Gave instructions for patient to retrieve a wash cloth from cabinet and bring to ADL kitchen. Asked patient to wet the wash cloth and wipe down the stove and counter tops. Patient followed instructions well with only cues for locating wash cloth in upper shelf of the cabinet. Wiped down cabinets, stove, and . Demonstrates ambulation within kitchen and leaning below waist and knee levels to wipe down the front of the stove and . No loss of balance. Returned to cabinet to retrieve a dry wash cloth and again dried stove and . Patient is oriented to the OT space within the clinic without cues needed. Table top problem solving activity to replicate rubber band/peg board picture of minimal difficulty. Requires 2 cues throughout activity for problem solving.    x      Specific Interventions Next Treatment: UB strengthening/endurance tasks, standing balance, core strength, homemaking tasks such as baking requiring multiple steps, eventual driving eval.     Activity/Treatment Tolerance:  [x]  Patient tolerated treatment well  []  Patient limited by fatigue  []  Patient limited by pain   []  Patient limited by other medical complications  []  Other:     Assessment: Patient is progressing slowly towards goals. Areas for Improvement: impaired activity tolerance, impaired balance, impaired cognition, impaired endurance, impaired safety awareness, and impaired strength  Prognosis: good    GOALS:  Patient Goal: get stronger and be indep including with driving     Short Term Goals:  Time Frame: 10 visits   Pt to complete simple kitchen task including completing baking tasks with multiple steps with S and requiring no cues for safety or direction to tasks   Pt to demo dynamic standing balance > 15 min with no UE support while reaching outside base of [facility] completing simple homemaking tasks such a sweeping/vacuuming with no difficulty, need for rest break for LOB during   Pt to increase UB strength of left UE especially to 4+/5 to ease of carrying and lifting objects needed during simple homemaking tasks   Pt to be indep with completing UE strenghtening HEP 2x day with use of handouts for increased strength and endurance for ADLs and homemaking tasks     Long Term Goals:  Time Frame: 8   Pt to complete all ADL tasks and homemaking tasks indep with no LOB and good safety awareness       Patient Education:   [x]  HEP/Education Completed: Plan of Care, Goals,   Orange theraband ex in chest press, shoulder flexion, horiz abd/add, tricep ext and bicep curls   []  No new Education completed  []  Reviewed Prior HEP      [x]  Patient verbalized and/or demonstrated understanding of education provided. []  Patient unable to verbalize and/or demonstrate understanding of education provided. Will continue education. [x]  Barriers to learning: none     PLAN:  Treatment Recommendations: Strengthening, Balance Training, Endurance Training, Home Exercise Program, Patient Education, Safety Education and Training, Self-Care Education and Training, and driving eval eventually     []  Plan of care initiated.   Plan to see patient 2 times per week for 8 weeks to address the treatment planned outlined above.   [x]  Continue with current plan of care  []  Modify plan of care as follows:    []  Hold pending physician visit  []  Discharge    Time In 1345   Time Out 1430   Timed Code Minutes: 45 min   Total Treatment Time: 45 min       MARGO CHACON/PIETRO #[id number]

## 2023-03-14 NOTE — PROGRESS NOTES
Date   Time  [facility]  PHYSICAL THERAPY  [] EVALUATION  [x] DAILY NOTE (LAND) [] DAILY NOTE (AQUATIC ) [] PROGRESS NOTE [] DISCHARGE NOTE    [x] OUTPATIENT REHABILITATION [facility]   [] [place] [address]    [] [facility]   [] Cherelle Denney    Date: 3/14/2023  Patient Name:  Didi Manriquez  : 1935  MRN: [id number]  CSN: [id number]    Referring Practitioner EDUARDO Breaux - CNP   Diagnosis Vertigo as late effect of stroke [I69.398, R42]    Treatment Diagnosis Late effect CVA with decreased balance, episodes of vertigo   Date of Evaluation 23    Additional Pertinent History Anxiety, bilateral eyes glaucoma, osteoporosis, history of admission to [facility] s/p 2 falls, MRI revealed small acute infarct in right posterior frontal/anterior parietal cortex. IP rehab PMR admission on 2023.        Functional Outcome Measure Used TUG, TURNER   Functional Outcome Score TUG 11 seconds, Turner 51/56  (23)       Insurance: Primary: Payor: [employer] /  /  / ,   Secondary:    Authorization Information: PRE CERTIFICATION REQUIRED: NO  INSURANCE THERAPY BENEFIT:  UNLIMITED VISITS BASED ON MEDICAL NECESSITY   AQUATIC THERAPY COVERED: YES  MODALITIES COVERED:  YES, YES MASSAGE  TELEHEALTH COVERED: NA  REFERENCE NUMBER: [id number]   Visit # 4, 4/10 for progress note   Visits Allowed: Unlimited based on medical necessity   Recertification Date:    Physician Follow-Up: 2023-Dr. Melia Herring   Physician Orders: PT evaluate and treat vertigo as late effect CVA   History of Present Illness: Inpatient Rehabilitation Course:   Didi Manriquez is a 81 y.o. right-handed  female with history of bilateral eye glaucoma requiring eye stent placement, anxiety, osteoporosis, status post appendectomy, tonsillectomy and bladder suspension surgery, was admitted to inpatient rehabilitation on 2023 for intensive inpatient management of impairment & disability secondary to  recent stroke resulting fall accident on 2/2/2023. Additional Pertinent Hx: Pamela Khalil is a 81 y.o. female who presents to Emergency Department with Fall and Other (Feels like something is stuck in throat). Patient is brought in by EMS for evaluation of fall x2 today. Patient says she fell in the kitchen and again in the dining room. Lives at home by herself. Patient states she had no idea why and how she fell. MRI shows small acute infarct in the right posterior frontal/anterior parietal cortex. To  rehab 2/9. SUBJECTIVE: Patient reports she is doing fairly well today but is tired. States she may be more tired because she was at a musical. Reports compliance with HEP. TREATMENT   Precautions: S/p CVA with vertigo present   Pain: Denies    \"X in shaded column indicates activity completed today    *\" next to exercise/intervention indicates progression   Modalities Parameters/  Location  Notes                     Manual Therapy Time/Technique  Notes                     Exercise/  Intervention   Notes   Functional reach test  10 inches      TUG test  12, 12 13 seconds      Adam Balance  51/56             NBOS on airex - EO/EC 2x30 sec  X    Tandem Stance - B on Airex* 2x30 sec  X    SLS - B 10 seconds  X Occasional touches to //bars   Alternating taps to foam 2x10  X No UE support          Gait rapid and slow walking in clinic independent   No path deviation.  No loss of balance          Nustep (arms 8 seat 5) 5 mins Level 4 X    Gait in // bars forward, retro, sidestep 2 laps  X    Gait over hurdles in // bars forward/lateral 2 laps  X NO UE,    Samir Step Over and Back - B 10x  X           Standing       Marches  10x  X No UE support   HR/TR 10x  X No UE   Hip 3-way 10x  X No UE   Mini squats 10x  X           Standing shoulder retraction  10x   Back against wall   Stairs 4 steps   With and without UE support     Specific Interventions Next Treatment: postural ex , posterior shoulder girdle, balance retraining, gait training, dynamic gait activities. Oculomotor testing, BPPV screening. Possible positive on left due to rolling to left causing symptoms of vertigo    Activity/Treatment Tolerance:  [x]  Patient tolerated treatment well  []  Patient limited by fatigue  []  Patient limited by pain   []  Patient limited by medical complications  []  Other:     Assessment:  Patient continues to be challenged with balance activities and requires some encouragement to complete with less UE support. Patient tolerated session well overall and denies need for seated rest breaks, however, admits fatigue at conclusion of therapy session. Verbal cues provide to slow down and focus on posture and correct muscle activation. GOALS:  Patient Goal: To improve endurance, be able to walk further and improve balance. Resume a walking program,    Short Term Goals:  Time Frame: 8 weeks  TUG test from 11 seconds to 9-10 seconds. Adam Balance Scale 51/56 to 53/56  Patient to demonstrate tolerance of 45 minute session with 1-2 rest breaks with minimal perceived exertion from 4 to 2. Patient to demonstrate ability to negotiate 16 stair steps from single rail use in order to carry object up/down stairsteps. 5. Patient to demonstrate normal oculomotor testing and normal testing for THE [facility] left and right. (TO BE FURTHER ASSESSED)    Long Term Goals:  Time Frame: 12 weeks  Adam Balance Scale from 53/56 to 55/56. Patient to demonstrate progression in an HEP with independence in the ex with increased balance, statically/dynamically and increased endurance. Patient Education:   [x]  HEP/Education Completed: Continue HEP  [facility] Access Code:  []  No new Education completed  [x]  Reviewed Prior HEP      []  Patient verbalized and/or demonstrated understanding of education provided.   []  Patient unable to verbalize and/or demonstrate understanding of education provided. Will continue education. []  Barriers to learning: none    PLAN:  Treatment Recommendations: Strengthening, Balance Training, Functional Mobility Training, Endurance Training, Gait Training, Neuromuscular Re-education, Home Exercise Program, Patient Education, and Vestibular Rehabilitation    []  Plan of care initiated. Plan to see patient 2 times per week for 8 weeks to address the treatment planned outlined above.   [x]  Continue with current plan of care  []  Modify plan of care as follows:    []  Hold pending physician visit  []  Discharge    Time In 1433   Time Out 1515   Timed Code Minutes: 42 min   Total Treatment Time: 42 min       Electronically Signed by: Ricardo Luz PTA

## 2023-03-14 NOTE — PROGRESS NOTES
[facility]  [] SPEECH LANGUAGE COGNITIVE EVALUATION  [x] DAILY NOTE   [] PROGRESS NOTE [] DISCHARGE NOTE    [x] [facility] [place]   [] [facility]    [] [facility]   [] Yoselin Contreras    Date: 3/14/2023  Patient Name:  Miley Sánchez  : 1935  MRN: [id number]  CSN: [id number]    Referring Practitioner EDUARDO Lucero - CNP   Diagnosis Vertigo as late effect of stroke [I69.398, R42]    Treatment Diagnosis Cognitive deficits   Date of Evaluation 23      Functional Outcome Measure Used MOCA    Functional Outcome Score 23, (23)       Insurance: Primary: Payor: Edison Woodard /  /  / ,   Secondary:    Authorization Information: No precert required    Visit # 4, 4/10 for progress note   Visits Allowed: Unlimited visits based on medical necessity    Recertification Date: 3/44/53   Physician Follow-Up: 3/28/23 with Dr. Demetri Hernandez, 2023   Physician Orders: Eden Flower and treat   Pertinent History: Patient was admitted to [facility] on 23 after 2 falls at home. Per chart review \"she began feeling something stuck in her throat last night, 2023. Today, 2023, while she was walking in kitchen to try to get something to help him the throat discomfort sensation, she suddenly fell for no reason. She denies having weakness, pain, syncope or fainting prior to the fall. She struggled to get up but fell again after few step into her dining room. At that time she felt the need for bowel movement so she crawled to bathroom and had diarrhea like bowel movement. She did not call her daughter who called 911. The patient was sent to [address] [place] [address] for evaluation on . She was found to have tenderness at the her left hip area. She also complains of feeling dizzy with room spinning sensation when she says suddenly sitting up from supine position.   Her blood tests and urine test showed no significant abnormality. X-ray of left hip and chest revealed no acute abnormality. CT of the head without contrast done on 2/2/2023 showed small lacunar infarction at the right insula. CT of cervical spine done on 2/2/2023 was reported to show no acute process. CTA of head and neck done on 2/2/2023 show no significant hemodynamic stenosis in bilateral common and internal carotid arteries, and hypoplastic P1 segment of the right posterior cerebral artery. MRI of brain was also performed on 2/2/2023 and revealed a small acute right posterior frontal/anterior parietal cortex infarct stroke, and mild white matter atrophy. \" She received skilled speech therapy during her hospital stay to target visual/verbal reasoning, thought organization/working memory, problem solving, executive function tasks, and pharyngeal strengthening exercises. Patient did meet her dysphagia goals. She denies any difficulty with swallowing at this time. Does notice to be having more word-finding difficulties. MOCA 7.2 completed 2/10/23 : 26/30. MOCA 7.1 completed 2/3/23 : 22/30. SUBJECTIVE: Patient arrived this date stating that she is more tired than usual.  Reports she took her blood pressure with a wrist machine at home about an hour prior to therapy and it was lower than typical. Patient requested that her blood pressure be taken again with a different machine to see what it is. This ST checked the patient's blood pressure in the beginning of this session and it was 129/63, HR 75. Patient felt much better about it after having it re-checked. GOALS:  Short Term Goals:  Time Frame for Short-Term Goals: 4 weeks    SHORT TERM GOAL #1: Patient will complete delayed recall tasks of up to 5 units of information given a 10 minute delay with 80% accuracy without cues to promote retention of novel information. INTERVENTIONS: Patient independently recalled 4/4 of the memory strategies.  Patient did not bring in her notebook to this session. Patient states \"I have to find it. \"    Patient was provided with 5 pieces of information about an upcoming appointment with auditory provision and instructed to recall. Patient recalled the following:   - immediate recall: 5/5 independent   - 10 minute delay: 5/5 independent     SHORT TERM GOAL #2: Patient will complete working memory tasks with 80% accuracy without cues to improve mental flexibility. INTERVENTIONS: did not test this date d/t focus on other goals. SHORT TERM GOAL #3: Patient will complete attention tasks (sustained/selective/alternating/divided) with no more more than 1 error within a 5 minute task to promote potential readiness for completion of driving evaluation. INTERVENTIONS:   Divided attention task:  Patient was instructed to [place] any number containing a '3' or '7' throughout stimulus sheet #8 while simultaneously answering complex questions. Patient completed the task with 0 errors with the auditory portion of the task and 8 errors with the written portion of the task. The patient double checked her work and it took 5 minutes and 40 seconds to complete the task. SHORT TERM GOAL #4: Patient will complete verbal/visual reasoning activities with 80% accuracy given min cues to support safe return to independent completion of ADLs/iADLs. INTERVENTIONS: did not test this date d/t focus on other goals. SHORT TERM GOAL #5: Patient will complete executive function tasks (math computation, time management, scheduling, finances, medications) with 80% accuracy given min cues to allow for safe return to independent completion of these tasks in home environment. INTERVENTIONS: Completed functional word problems involving time. Right away the patient stated she is not good at word problems, but went on to say that she has always had to be good with \"time\" when she was working. Patient completed 1/2 of the problems without cues and 1/2 with min cues.       Long Term Goals: Time Frame for Long-Term Goals: 8 weeks  LONG-TERM GOAL #1: Patient will improve score on the [address] by +3 points to indicate cognitive skills to be [facility] to indicate potential readiness for completion of a driving evaluation and/or return to independent completion of ADLs/iADLs. - ONGOING      Assessment: Progressing towards goals  Areas for Improvement: Impaired cognition  Prognosis: good  Specific Interventions Next Treatment: delayed recall, working memory, attention, verbal/visual reasoning, executive function tasks     Activity/Treatment Tolerance:  [x]  Patient tolerated treatment well  []  Patient limited by fatigue  []  Patient limited by pain   []  Patient limited by other medical complications  []  Other:     Patient Education:   [x]  HEP/Education Completed: memory strategies, rationale for tasks,   []  No new Education completed  []  Reviewed Prior HEP      [x]  Patient verbalized and/or demonstrated understanding of education provided. []  Patient unable to verbalize and/or demonstrate understanding of education provided. Will continue education. [x]  Barriers to learning: insight towards deficits       PLAN:  Treatment Recommendations:     []  Plan of care initiated. Plan to see patient 2 times per week for 8 weeks to address the treatment planned outlined above.   [x]  Continue with current plan of care  []  Modify plan of care as follows:    []  Hold pending physician visit  []  Discharge    Time In 1317   Time Out 1345   Timed Code Minutes: 28 min   Total Treatment Time: 28 min       Gibson Galvin M.S. [address] [place] [address]

## 2023-03-15 NOTE — PROCEDURES
[address] [place] [address]                                 EVENT MONITOR    PATIENT NAME: Elizabeth Tinoco               :        1935  MED REC NO:   [id number]                           ROOM:       0017  ACCOUNT NO:   [de-identified]                           ADMIT DATE: 2023  PROVIDER:     Radha Ga M.D.    TEST TYPE:  Event monitor. CLINICAL HISTORY AND INDICATION:  The patient with AFib. EVENT MONITOR DESCRIPTION:  Event monitor was attached to the patient  between 2023 and 03/10/2023. EVENT MONITOR FINDINGS:  Baseline rhythm showed sinus rhythm with short  intermittent episodes of atrial fibrillation with rapid ventricular  response. The patient had many episodes of atrial fibrillation reported  throughout the monitoring that needs further evaluation and treatment. CONCLUSION:  Sinus rhythm alternating with multiple episodes of  intermittent atrial fibrillation, rapid ventricular response. Needs  further evaluation and treatment.         Falguni Marcos M.D.    D: 03/15/2023 6:50:50       T: 03/15/2023 10:25:02     [email or web address]  Job#: [id number]     Doc#: [id number]    CC:

## 2023-03-16 RX ORDER — NITROFURANTOIN 25; 75 MG/1; MG/1
100 CAPSULE ORAL 2 TIMES DAILY
Qty: 3 CAPSULE | Refills: 0 | OUTPATIENT
Start: 2023-03-16 | End: 2023-03-18

## 2023-03-17 ENCOUNTER — HOSPITAL ENCOUNTER (OUTPATIENT)
Dept: PHYSICAL THERAPY | Age: 88
Setting detail: THERAPIES SERIES
Discharge: HOME | End: 2023-03-17
Payer: MEDICARE

## 2023-03-17 ENCOUNTER — HOSPITAL ENCOUNTER (OUTPATIENT)
Dept: SPEECH THERAPY | Age: 88
Setting detail: THERAPIES SERIES
Discharge: HOME | End: 2023-03-17
Payer: MEDICARE

## 2023-03-17 ENCOUNTER — HOSPITAL ENCOUNTER (OUTPATIENT)
Dept: OCCUPATIONAL THERAPY | Age: 88
Setting detail: THERAPIES SERIES
Discharge: HOME | End: 2023-03-17
Payer: MEDICARE

## 2023-03-17 PROCEDURE — 97110 THERAPEUTIC EXERCISES: CPT

## 2023-03-17 PROCEDURE — 97112 NEUROMUSCULAR REEDUCATION: CPT

## 2023-03-17 PROCEDURE — 97130 THER IVNTJ EA ADDL 15 MIN: CPT | Performed by: SPEECH-LANGUAGE PATHOLOGIST

## 2023-03-17 PROCEDURE — 97129 THER IVNTJ 1ST 15 MIN: CPT | Performed by: SPEECH-LANGUAGE PATHOLOGIST

## 2023-03-17 NOTE — PROGRESS NOTES
[address] THERAPY  [] EVALUATION  [x] DAILY NOTE (LAND) [] DAILY NOTE (AQUATIC )   [] PROGRESS NOTE [] DISCHARGE NOTE    [x] OUTPATIENT REHABILITATION [facility]   [] [place] [address]    [] [facility]   [] Jevon Crisostomo    Date: 3/17/2023  Patient Name:  Serg Welch  : 1935  MRN: [id number]  CSN: [id number]    Referring Practitioner EDUARDO Moya CNP   Diagnosis Vertigo as late effect of stroke [I69.398, R42]    Treatment Diagnosis Decreased strength and ADL tasks   Date of Evaluation 23      Functional Outcome Measure Used UEFI   Functional Outcome Score 53/80 (23)       Insurance: Primary: Payor: Milton Mccall /  /  / ,   Secondary:    Authorization Information: PRE CERTIFICATION REQUIRED: NO  INSURANCE THERAPY BENEFIT:  UNLIMITED VISITS BASED ON MEDICAL NECESSITY   AQUATIC THERAPY COVERED: YES  MODALITIES COVERED:  YES, YES MASSAGE  TELEHEALTH COVERED: NA  REFERENCE NUMBER: [id number]   Visit # 4, 4/10 for progress note   Visits Allowed: unlimited   Recertification Date:    Physician Follow-Up: 23 with Dr. Jersey Hayes   Physician Orders: Tanya Beltre and treat, driving eval    Pertinent History: On 23, pt stating she collapsed at home with no initial symptoms. Pt stating after she got to the hospital, she started having increased dizziness. MRI + showing   Small acute infarct in the right posterior frontal/anterior parietal cortex   2. Mild atrophy and probable ischemic changes in the white matter. 3. Mild inflammatory changes in ethmoid air cells bilaterally and left mastoid tip. Pt transitioned to inpatient rehab on -23. She was discharged home alone with family checking in approx 1x day. SUBJECTIVE: Pt reports she had a nightmare last night and did not sleep well, feeling tired today. Reports she has not been completing HEP on a regular basis.        TREATMENT   Precautions: fall risk, decreased vision     Pain: No pain at rest.      X in shaded column indicates Activity Completed Today   Modalities Parameters/  Location  Notes/Comments                     Manual Therapy Time/  Technique  Notes/Comments                     Exercises   Sets/  Sec Reps  Notes/Comments   Orange theraband  1 12 ea x Riivald, bicep curl   Discussed completing HEP before or after she eats as a way to develop the habit of completing HEP on a regular basis. Red flex bar  1 12 ea x Pronation, supination, twists   Cues for control   Biodex 60 RPM 2 min forward, 2 min backward   x Seat distance at #9   Gripper   12 x 2 red, 1 blue band    Digiflex  12 x Yellow, isolated finger flexion                 Activities Time    Notes/Comments   Dynavision  2 trials x 1st trial: 43 hits -Mode A, All quadrants, 5 rings   2nd trial: 45 hits -Same as above   Home making tasks   Gave instructions for patient to retrieve a wash cloth from cabinet and bring to ADL kitchen. Asked patient to wet the wash cloth and wipe down the stove and counter tops. Patient followed instructions well with only cues for locating wash cloth in upper shelf of the cabinet. Wiped down cabinets, stove, and . Demonstrates ambulation within kitchen and leaning below waist and knee levels to wipe down the front of the stove and . No loss of balance. Returned to cabinet to retrieve a dry wash cloth and again dried stove and . Patient is oriented to the OT space within the clinic without cues needed. Table top problem solving activity to replicate rubber band/peg board picture of minimal difficulty. Requires 2 cues throughout activity for problem solving.          Specific Interventions Next Treatment: UB strengthening/endurance tasks, standing balance, core strength, homemaking tasks such as baking requiring multiple steps, eventual driving eval.     Activity/Treatment Tolerance:  [x]  Patient tolerated treatment well  []  Patient limited by fatigue  []  Patient limited by pain   []  Patient limited by other medical complications  []  Other:     Assessment: Patient did well with B UE strengthening but did require cues, demonstration, and manual assist for technique/direction following. Pt noted fatigue and mild soreness after strengthening ex. Dynavision score improved within the session. Pt was pleased with progress. Areas for Improvement: impaired activity tolerance, impaired balance, impaired cognition, impaired endurance, impaired safety awareness, and impaired strength  Prognosis: good    GOALS:  Patient Goal: get stronger and be indep including with driving     Short Term Goals:  Time Frame: 10 visits   Pt to complete simple kitchen task including completing baking tasks with multiple steps with S and requiring no cues for safety or direction to tasks   Pt to demo dynamic standing balance > 15 min with no UE support while reaching outside base of [facility] completing simple homemaking tasks such a sweeping/vacuuming with no difficulty, need for rest break for LOB during   Pt to increase UB strength of left UE especially to 4+/5 to ease of carrying and lifting objects needed during simple homemaking tasks   Pt to be indep with completing UE strenghtening HEP 2x day with use of handouts for increased strength and endurance for ADLs and homemaking tasks     Long Term Goals:  Time Frame: 8   Pt to complete all ADL tasks and homemaking tasks indep with no LOB and good safety awareness       Patient Education:   [x]  HEP/Education Completed: Plan of Care, Goals,   Orange theraband ex in chest press, shoulder flexion, horiz abd/add, tricep ext and bicep curls   []  No new Education completed  [x]  Reviewed Prior HEP      [x]  Patient verbalized and/or demonstrated understanding of education provided. []  Patient unable to verbalize and/or demonstrate understanding of education provided. Will continue education.   [x]  Barriers to learning: none     PLAN:  Treatment Recommendations: Strengthening, Balance Training, Endurance Training, Home Exercise Program, Patient Education, Safety Education and Training, Self-Care Education and Training, and driving eval eventually     []  Plan of care initiated. Plan to see patient 2 times per week for 8 weeks to address the treatment planned outlined above.   [x]  Continue with current plan of care  []  Modify plan of care as follows:    []  Hold pending physician visit  []  Discharge    Time In 1320   Time Out 1405   Timed Code Minutes: 45 min   Total Treatment Time: 45 min       PARAS Santos 23, MOT/L, [place], 2823

## 2023-03-17 NOTE — PROGRESS NOTES
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 Merrill Swanson, 2023   Physician Orders: Relda Bence and treat   Pertinent History: Patient was admitted to [facility] on 23 after 2 falls at home. Per chart review \"she began feeling something stuck in her throat last night, 2023. Today, 2023, while she was walking in kitchen to try to get something to help him the throat discomfort sensation, she suddenly fell for no reason. She denies having weakness, pain, syncope or fainting prior to the fall. She struggled to get up but fell again after few step into her dining room. At that time she felt the need for bowel movement so she crawled to bathroom and had diarrhea like bowel movement. She did not call her daughter who called 911. The patient was sent to [address] [place] [address] for evaluation on . She was found to have tenderness at the her left hip area. She also complains of feeling dizzy with room spinning sensation when she says suddenly sitting up from supine position.   Her blood tests and urine test showed no significant abnormality. X-ray of left hip and chest revealed no acute abnormality. CT of the head without contrast done on 2/2/2023 showed small lacunar infarction at the right insula. CT of cervical spine done on 2/2/2023 was reported to show no acute process. CTA of head and neck done on 2/2/2023 show no significant hemodynamic stenosis in bilateral common and internal carotid arteries, and hypoplastic P1 segment of the right posterior cerebral artery. MRI of brain was also performed on 2/2/2023 and revealed a small acute right posterior frontal/anterior parietal cortex infarct stroke, and mild white matter atrophy. \" She received skilled speech therapy during her hospital stay to target visual/verbal reasoning, thought organization/working memory, problem solving, executive function tasks, and pharyngeal strengthening exercises. Patient did meet her dysphagia goals. She denies any difficulty with swallowing at this time. Does notice to be having more word-finding difficulties. MOCA 7.2 completed 2/10/23 : 26/30. MOCA 7.1 completed 2/3/23 : 22/30. SUBJECTIVE: Patient reports she is feeling better after she got a little bit of something to eat during her PT session. Patient needed to use the bathroom at the beginning of this session so the session was shortened a little. GOALS:  Short Term Goals:  Time Frame for Short-Term Goals: 4 weeks    SHORT TERM GOAL #1: Patient will complete delayed recall tasks of up to 5 units of information given a 10 minute delay with 80% accuracy without cues to promote retention of novel information. INTERVENTIONS: Briefly reviewed memory strategies, especially how one or more of them could be helpful while completing the working memory task. SHORT TERM GOAL #2: Patient will complete working memory tasks with 80% accuracy without cues to improve mental flexibility.   INTERVENTIONS: Patient was provided with lists of 4 words with auditory provision and was instructed to mentally manipulate the words into reverse order. Discussed the possibility of using repetition or visualization as a strategy to assist in recall of the words. Patient completed this task without cues on 4/10 trials, min cues on 4/10 trials and mod cues on 2/10 trials. **During completion of this task, patient stated the following:       - \"I lost it already, but that happens. That's crazy. \"       - \"I think fatigue has kind of got me. \"    SHORT TERM GOAL #3: Patient will complete attention tasks (sustained/selective/alternating/divided) with no more more than 1 error within a 5 minute task to promote potential readiness for completion of driving evaluation. INTERVENTIONS: Patient with moderate difficulty alternating between the clues and chart while completing the deductive reasoning task. SHORT TERM GOAL #4: Patient will complete verbal/visual reasoning activities with 80% accuracy given min cues to support safe return to independent completion of ADLs/iADLs. INTERVENTIONS: Patient completed a deductive reasoning task (puzzle #1 - language, sport, job) with 7/12 responses completed without cues, 2/12 with min cues, 2/12 with mod cues, 1/12 with max cues. SHORT TERM GOAL #5: Patient will complete executive function tasks (math computation, time management, scheduling, finances, medications) with 80% accuracy given min cues to allow for safe return to independent completion of these tasks in home environment. INTERVENTIONS: did not test this date d/t focus on other goals.     Long Term Goals:  Time Frame for Long-Term Goals: 8 weeks  LONG-TERM GOAL #1: Patient will improve score on the [address] [place] [address] by +3 points to indicate cognitive skills to be [facility] to indicate potential readiness for completion of a driving evaluation and/or return to independent completion of ADLs/iADLs. - ONGOING      Assessment: Progressing towards goals  Areas for Improvement: Impaired cognition  Prognosis: good  Specific Interventions Next Treatment: delayed recall, working memory, attention, verbal/visual reasoning, executive function tasks     Activity/Treatment Tolerance:  [x]  Patient tolerated treatment well  []  Patient limited by fatigue  []  Patient limited by pain   []  Patient limited by other medical complications  []  Other:     Patient Education:   [x]  HEP/Education Completed: memory strategies, rationale for tasks, how reasoning skills can affect daily life. []  No new Education completed  []  Reviewed Prior HEP      [x]  Patient verbalized and/or demonstrated understanding of education provided. []  Patient unable to verbalize and/or demonstrate understanding of education provided. Will continue education. []  Barriers to learning:       PLAN:  Treatment Recommendations:     []  Plan of care initiated. Plan to see patient 2 times per week for 8 weeks to address the treatment planned outlined above.   [x]  Continue with current plan of care  []  Modify plan of care as follows:    []  Hold pending physician visit  []  Discharge    Time In 1320   Time Out 1346   Timed Code Minutes: 26 min   Total Treatment Time: 26 min       Amrit Chatman M.S. [place] [id number]

## 2023-03-17 NOTE — PROGRESS NOTES
Date   Time  [address] [facility]  PHYSICAL THERAPY  [] EVALUATION  [x] DAILY NOTE (LAND) [] DAILY NOTE (AQUATIC ) [] PROGRESS NOTE [] DISCHARGE NOTE    [x] OUTPATIENT REHABILITATION [facility]   [] [place] [address]    [] [facility]   [] [facility]    Date: 3/17/2023  Patient Name:  Ricardo Rodriguez  : 1935  MRN: [id number]  CSN: [id number]    Referring Practitioner EDUARDO Flores - [facility]   Diagnosis Vertigo as late effect of stroke [I69.398, R42]    Treatment Diagnosis Late effect CVA with decreased balance, episodes of vertigo   Date of Evaluation 23    Additional Pertinent History Anxiety, bilateral eyes glaucoma, osteoporosis, history of admission to [facility] s/p 2 falls, MRI revealed small acute infarct in right posterior frontal/anterior parietal cortex. IP rehab PMR admission on 2023.        Functional Outcome Measure Used TUG, TURNER   Functional Outcome Score TUG 11 seconds, Turner 51/56  (23)       Insurance: Primary: Payor: Vaibhav Bowen /  /  / ,   Secondary:    Authorization Information: PRE CERTIFICATION REQUIRED: NO  INSURANCE THERAPY BENEFIT:  UNLIMITED VISITS BASED ON MEDICAL NECESSITY   AQUATIC THERAPY COVERED: YES  MODALITIES COVERED:  YES, YES MASSAGE  TELEHEALTH COVERED: NA  REFERENCE NUMBER: [id number]   Visit # 5, 5/10 for progress note   Visits Allowed: Unlimited based on medical necessity   Recertification Date: [id number]   Physician Follow-Up: 2023-Dr. Shayla Sousa   Physician Orders: PT evaluate and treat vertigo as late effect CVA   History of Present Illness: Inpatient Rehabilitation Course:   Ricardo Rodriguez is a 81 y.o. right-handed  female with history of bilateral eye glaucoma requiring eye stent placement, anxiety, osteoporosis, status post appendectomy, tonsillectomy and bladder suspension surgery, was admitted to inpatient rehabilitation on 2023 for intensive inpatient management of impairment & disability secondary to  recent stroke resulting fall accident on 2/2/2023. Additional Pertinent Hx: Laisha Hammonds is a 81 y.o. female who presents to Emergency Department with Fall and Other (Feels like something is stuck in throat). Patient is brought in by EMS for evaluation of fall x2 today. Patient says she fell in the kitchen and again in the dining room. Lives at home by herself. Patient states she had no idea why and how she fell. MRI shows small acute infarct in the right posterior frontal/anterior parietal cortex. To  rehab 2/9. SUBJECTIVE: Patient reports she is feeling a little weak following OT session today. \"My blood sugar may be low\" I haven't eaten lunch due to transportation early . TREATMENT   Precautions: S/p CVA with vertigo present   Pain: Denies    \"X in shaded column indicates activity completed today    *\" next to exercise/intervention indicates progression   Modalities Parameters/  Location  Notes                     Manual Therapy Time/Technique  Notes                     Exercise/  Intervention   Notes   Functional reach test  10 inches      TUG test  12, 12 13 seconds      Adam Balance  51/56      BP seated and standing Beginning of session. x BP seated 117/68 standing 131/73   NBOS on airex - EO/EC 2x30 sec      Tandem Stance - B on Airex* 2x30 sec      SLS - B 10 seconds   Occasional touches to //bars   Alternating taps to foam 2x10   No UE support          Gait rapid and slow walking in clinic independent   No path deviation.  No loss of balance          Nustep (arms 8 seat 5) 5 mins Level 4     Gait in // bars forward, tandem heel to toe, sidestep 2 laps  x    Gait over hurdles in // bars forward/lateral 2 laps   NO UE,    Samir Step Over and Back - B 10x      Step ups forward/lateral  4 inch X5 each x    Standing       Marches  10x  x Single UE support   HR/TR 10x  x Single UE support    Hip 3-way 10x  x Single UE support   Mini squats 10x  x           Standing shoulder retraction and retro shoulder rolls 10x  x Back against wall   Stairs 4 steps   With and without UE support     Specific Interventions Next Treatment: postural ex , posterior shoulder girdle, balance retraining, gait training, dynamic gait activities. Oculomotor testing, BPPV screening. Possible positive on left due to rolling to left causing symptoms of vertigo    Activity/Treatment Tolerance:  [x]  Patient tolerated treatment well  []  Patient limited by fatigue  []  Patient limited by pain   []  Patient limited by medical complications  []  Other:     Assessment:  Patient not feeling well at beginning of PT session. /68, seated, 131/73. Patient also given snack of orange juice and peanut butter. crackers. (20 minutes of session delayed due to this). Completed conservative program with patient with rest breaks every 3 exercises. Patient fatigued easily with the ex. Required one hand on // bar for exercises in standing at // bars, dynamic gait activities in // bars with exception of sidestepping. Speech Therapist notified regarding patient not feeling well prior to PT session. GOALS:  Patient Goal: To improve endurance, be able to walk further and improve balance. Resume a walking program,    Short Term Goals:  Time Frame: 8 weeks  TUG test from 11 seconds to 9-10 seconds. Adam Balance Scale 51/56 to 53/56  Patient to demonstrate tolerance of 45 minute session with 1-2 rest breaks with minimal perceived exertion from 4 to 2. Patient to demonstrate ability to negotiate 16 stair steps from single rail use in order to carry object up/down stairsteps. 5. Patient to demonstrate normal oculomotor testing and normal testing for [facility] left and right. (TO BE FURTHER ASSESSED)    Long Term Goals:  Time Frame: 12 weeks  Adam Balance Scale from 53/56 to 55/56.   Patient to demonstrate progression in an HEP with independence in the ex with increased balance, statically/dynamically and increased endurance. Patient Education:   [x]  HEP/Education Completed: Continue HEP  [employer] Access Code:  []  No new Education completed  [x]  Reviewed Prior HEP      []  Patient verbalized and/or demonstrated understanding of education provided. []  Patient unable to verbalize and/or demonstrate understanding of education provided. Will continue education. []  Barriers to learning: none    PLAN:  Treatment Recommendations: Strengthening, Balance Training, Functional Mobility Training, Endurance Training, Gait Training, Neuromuscular Re-education, Home Exercise Program, Patient Education, and Vestibular Rehabilitation    []  Plan of care initiated. Plan to see patient 2 times per week for 8 weeks to address the treatment planned outlined above.   [x]  Continue with current plan of care  []  Modify plan of care as follows:    []  Hold pending physician visit  []  Discharge    Time In 1433   Time Out 1512   Timed Code Minutes: 20 min   Total Treatment Time: 39 min       Electronically Signed by: Reji Hinds PT

## 2023-03-21 ENCOUNTER — HOSPITAL ENCOUNTER (OUTPATIENT)
Dept: OCCUPATIONAL THERAPY | Age: 88
Setting detail: THERAPIES SERIES
Discharge: HOME | End: 2023-03-21
Payer: MEDICARE

## 2023-03-21 ENCOUNTER — HOSPITAL ENCOUNTER (OUTPATIENT)
Dept: PHYSICAL THERAPY | Age: 88
Setting detail: THERAPIES SERIES
Discharge: HOME | End: 2023-03-21
Payer: MEDICARE

## 2023-03-21 ENCOUNTER — HOSPITAL ENCOUNTER (OUTPATIENT)
Dept: SPEECH THERAPY | Age: 88
Setting detail: THERAPIES SERIES
Discharge: HOME | End: 2023-03-21
Payer: MEDICARE

## 2023-03-21 PROCEDURE — 97130 THER IVNTJ EA ADDL 15 MIN: CPT | Performed by: SPEECH-LANGUAGE PATHOLOGIST

## 2023-03-21 PROCEDURE — 97112 NEUROMUSCULAR REEDUCATION: CPT

## 2023-03-21 PROCEDURE — 97110 THERAPEUTIC EXERCISES: CPT

## 2023-03-21 PROCEDURE — 97129 THER IVNTJ 1ST 15 MIN: CPT | Performed by: SPEECH-LANGUAGE PATHOLOGIST

## 2023-03-21 PROCEDURE — 97530 THERAPEUTIC ACTIVITIES: CPT

## 2023-03-21 NOTE — PROGRESS NOTES
HEP  [facility] Access Code:  []  No new Education completed  [x]  Reviewed Prior HEP      []  Patient verbalized and/or demonstrated understanding of education provided. []  Patient unable to verbalize and/or demonstrate understanding of education provided. Will continue education. []  Barriers to learning: none    PLAN:  Treatment Recommendations: Strengthening, Balance Training, Functional Mobility Training, Endurance Training, Gait Training, Neuromuscular Re-education, Home Exercise Program, Patient Education, and Vestibular Rehabilitation    []  Plan of care initiated. Plan to see patient 2 times per week for 8 weeks to address the treatment planned outlined above.   [x]  Continue with current plan of care  []  Modify plan of care as follows:    []  Hold pending physician visit  []  Discharge    Time In 1433   Time Out 1515   Timed Code Minutes: 42 min   Total Treatment Time: 42 min       Electronically Signed by: Tarik Carr PTA

## 2023-03-21 NOTE — PROGRESS NOTES
good safety awareness       Patient Education:   []  HEP/Education Completed: Plan of Care, Goals,   Orange theraband ex in chest press, shoulder flexion, horiz abd/add, tricep ext and bicep curls   []  No new Education completed  [x]  Reviewed Prior HEP      [x]  Patient verbalized and/or demonstrated understanding of education provided. []  Patient unable to verbalize and/or demonstrate understanding of education provided. Will continue education. [x]  Barriers to learning: none     PLAN:  Treatment Recommendations: Strengthening, Balance Training, Endurance Training, Home Exercise Program, Patient Education, Safety Education and Training, Self-Care Education and Training, and driving eval eventually     []  Plan of care initiated. Plan to see patient 2 times per week for 8 weeks to address the treatment planned outlined above.   [x]  Continue with current plan of care  []  Modify plan of care as follows:    []  Hold pending physician visit  []  Discharge    Time In 1348   Time Out 1429   Timed Code Minutes: 41 min   Total Treatment Time: 41 min       Reinaldo CHACON/L #[id number]

## 2023-03-21 NOTE — PROGRESS NOTES
STG #5 and then located the information that she needed to find in the chart. Limited carryover of using repetition by the patient this session. **Patient stated multiple times during completion of the task for STG #5: \"I can't remember what I just read. \"    SHORT TERM GOAL #3: Patient will complete attention tasks (sustained/selective/alternating/divided) with no more more than 1 error within a 5 minute task to promote potential readiness for completion of driving evaluation. INTERVENTIONS:   Alternating attention task:   Patient was instructed to alternate between verbalizing the same number or opposite number when presented with '1' or '2' and switch when requested. Patient completed this task for 3 minutes with 2 errors initially, but slower, accurate responses later in the task. SHORT TERM GOAL #4: Patient will complete verbal/visual reasoning activities with 80% accuracy given min cues to support safe return to independent completion of ADLs/iADLs. INTERVENTIONS: did not test this date d/t focus on other goals. SHORT TERM GOAL #5: Patient will complete executive function tasks (math computation, time management, scheduling, finances, medications) with 80% accuracy given min cues to allow for safe return to independent completion of these tasks in home environment. INTERVENTIONS:   Math computation task:  Given a mileage chart, patient was asked to figure the mileage from one city to the next (and back as needed). Patient completed this task without cues on 2/7 trials, min cues on 4/7 trials and mod cues on 1/7 trials.       Long Term Goals:  Time Frame for Long-Term Goals: 8 weeks  LONG-TERM GOAL #1: Patient will improve score on the MOCA by +3 points to indicate cognitive skills to be [facility] to indicate potential readiness for completion of a driving evaluation and/or return to independent completion of ADLs/iADLs. - ONGOING      Assessment: Progressing towards goals  Areas for Improvement:

## 2023-03-22 ENCOUNTER — HOSPITAL ENCOUNTER (OUTPATIENT)
Age: 88
Discharge: HOME | End: 2023-03-22
Payer: MEDICARE

## 2023-03-22 ENCOUNTER — OFFICE VISIT (OUTPATIENT)
Dept: CARDIOLOGY CLINIC | Age: 88
End: 2023-03-22
Payer: MEDICARE

## 2023-03-22 VITALS
BODY MASS INDEX: 25.82 KG/M2 | WEIGHT: 123 LBS | HEIGHT: 58 IN | HEART RATE: 73 BPM | SYSTOLIC BLOOD PRESSURE: 131 MMHG | DIASTOLIC BLOOD PRESSURE: 72 MMHG

## 2023-03-22 DIAGNOSIS — I48.0 PAF (PAROXYSMAL ATRIAL FIBRILLATION) (HCC): Primary | ICD-10-CM

## 2023-03-22 DIAGNOSIS — I48.0 PAF (PAROXYSMAL ATRIAL FIBRILLATION) (HCC): ICD-10-CM

## 2023-03-22 LAB
DEPRECATED RDW RBC AUTO: 46 FL (ref 35–45)
ERYTHROCYTE [DISTWIDTH] IN BLOOD BY AUTOMATED COUNT: 13 % (ref 11.5–14.5)
HCT VFR BLD AUTO: 36 % (ref 37–47)
HGB BLD-MCNC: 12.2 GM/DL (ref 12–16)
MCH RBC QN AUTO: 32.6 PG (ref 26–33)
MCHC RBC AUTO-ENTMCNC: 33.9 GM/DL (ref 32.2–35.5)
MCV RBC AUTO: 96.3 FL (ref 81–99)
PLATELET # BLD AUTO: 200 THOU/MM3 (ref 130–400)
PMV BLD AUTO: 10.7 FL (ref 9.4–12.4)
RBC # BLD AUTO: 3.74 MILL/MM3 (ref 4.2–5.4)
WBC # BLD AUTO: 5.8 THOU/MM3 (ref 4.8–10.8)

## 2023-03-22 PROCEDURE — 99214 OFFICE O/P EST MOD 30 MIN: CPT | Performed by: INTERNAL MEDICINE

## 2023-03-22 PROCEDURE — 85027 COMPLETE CBC AUTOMATED: CPT

## 2023-03-22 PROCEDURE — 36415 COLL VENOUS BLD VENIPUNCTURE: CPT

## 2023-03-22 PROCEDURE — 1123F ACP DISCUSS/DSCN MKR DOCD: CPT | Performed by: INTERNAL MEDICINE

## 2023-03-22 NOTE — PROGRESS NOTES
Pt here for [facility] fu     Pt states no c/o
60 capsule, Rfl: 3    senna (SENOKOT) 8.6 MG tablet, Take 1 tablet by mouth nightly as needed (constipation), Disp: 30 tablet, Rfl: 1    melatonin 3 MG TABS tablet, Take 2 tablets by mouth at bedtime For insomnia, Disp: 60 tablet, Rfl: 3    polyvinyl alcohol (LIQUIFILM TEARS) 1.4 % ophthalmic solution, 1 drop as needed, Disp: , Rfl:     ibuprofen (ADVIL;MOTRIN) 400 MG tablet, Take 1.5 tablets by mouth every 6 hours as needed for Pain, Disp: 12 tablet, Rfl: 0    vitamin D (CHOLECALCIFEROL) 400 UNITS TABS tablet, Take 400 Units by mouth daily. , Disp: , Rfl:     clonazePAM (KLONOPIN) 0.5 MG tablet, Take 0.5 mg by mouth 2 times daily as needed. , Disp: , Rfl:     fluticasone (FLONASE) 50 MCG/ACT nasal spray, 1 spray by Nasal route daily. , Disp: , Rfl:     Glucosamine Sulfate 750 MG TABS, Take 1 tablet by mouth daily. , Disp: , Rfl:     therapeutic multivitamin-minerals (THERAGRAN-M) tablet, Take 1 tablet by mouth daily. , Disp: , Rfl:     fish oil-omega-3 fatty acids 1000 MG capsule, Take 2 g by mouth daily. , Disp: , Rfl:     magnesium 30 MG tablet, Take 30 mg by mouth 2 times daily. , Disp: , Rfl:     polyethylene glycol (GLYCOLAX) packet, Take 17 g by mouth daily as needed. , Disp: , Rfl:     Past Medical History  Shilpi Wilkerson  has a past medical history of Anxiety, Glaucoma, bilateral, Osteopenia, and Osteoporosis. Social History  Shilpi Wilkerson  reports that she has never smoked. She has never used smokeless tobacco. She reports that she does not currently use alcohol. She reports that she does not use drugs. Family History  Shilpi Wilkerson family history includes Alcohol Abuse in her maternal grandfather and paternal grandfather; Anxiety Disorder in her father, sister, and sister; Bipolar Disorder in her father; Blindness in her sister; COPD in her brother; Depression in her sister; Diabetes in her mother; Heart Disease in her brother and father; Heart Failure in her mother;  Other in her brother; Personality

## 2023-03-24 ENCOUNTER — HOSPITAL ENCOUNTER (OUTPATIENT)
Dept: OCCUPATIONAL THERAPY | Age: 88
Setting detail: THERAPIES SERIES
Discharge: HOME | End: 2023-03-24
Payer: MEDICARE

## 2023-03-24 ENCOUNTER — HOSPITAL ENCOUNTER (OUTPATIENT)
Dept: SPEECH THERAPY | Age: 88
Setting detail: THERAPIES SERIES
Discharge: HOME | End: 2023-03-24
Payer: MEDICARE

## 2023-03-24 ENCOUNTER — HOSPITAL ENCOUNTER (OUTPATIENT)
Dept: PHYSICAL THERAPY | Age: 88
Setting detail: THERAPIES SERIES
Discharge: HOME | End: 2023-03-24
Payer: MEDICARE

## 2023-03-24 PROCEDURE — 97110 THERAPEUTIC EXERCISES: CPT

## 2023-03-24 PROCEDURE — 97116 GAIT TRAINING THERAPY: CPT

## 2023-03-24 PROCEDURE — 97130 THER IVNTJ EA ADDL 15 MIN: CPT | Performed by: SPEECH-LANGUAGE PATHOLOGIST

## 2023-03-24 PROCEDURE — 97112 NEUROMUSCULAR REEDUCATION: CPT

## 2023-03-24 PROCEDURE — 97530 THERAPEUTIC ACTIVITIES: CPT

## 2023-03-24 PROCEDURE — 97129 THER IVNTJ 1ST 15 MIN: CPT | Performed by: SPEECH-LANGUAGE PATHOLOGIST

## 2023-03-24 NOTE — PROGRESS NOTES
the ophthalmologist next week. TREATMENT   Precautions: fall risk, decreased vision     Pain: No pain at rest.      X in shaded column indicates Activity Completed Today   Modalities Parameters/  Location  Notes/Comments                     Manual Therapy Time/  Technique  Notes/Comments                     Exercises   Sets/  Sec Reps  Notes/Comments   Orange theraband- Riivald, B bicep curl, B rows, horiz abd/add, chest press, tricep ext, shoulder flexion 1 12 ea x Completed all in sitting this date    Red flex bar pronation/supination bends, wrist flex/ext twist  1 12 ea   Cues for control with fatigue noted    Biodex 60 RPM 2 min fwd, 2 min bwd  4 min  Towel roll behind back for posture    Gripper   12  2 red, 1 blue band    Digiflex  12  Yellow, isolated finger flexion                 Activities Time    Notes/Comments   Driving simulator reaction testing   x Total pedal reaction time = 0.8 sec   Total gas pedal reaction time= 0.46  Stopping feet 170.83 feet  Pt with noted increased anxiety throughout trial with reassurance provided during. Pt providing reasons (gas pedal/brake to close together) on why she thought she didn't do well. Completed further education on the complete driving evaluation at this time. Dynavision  Mode A 60 seconds  x 46 hits and 35 hits   Home Making Tasks- ADL Kitchen  9 minutes 45 seconds   Loading and unloading  with all 4 large plates, 4 small plates, and 4 bowls from bottom OT kitchen shelf. Patient was able to lift all 4 plates down from the shelf to load in one at a time with min difficulty with reaching     Gather objects to boil 3 cups of water- fair tolerance with cognition to read measuring cups for appropriate water. Carried pot of water from sink to stove with no difficulty.  Was able to correctly locate appropriate burner, dump water and clean and place back in appropriate cabinet    Home making tasks   Gave instructions for patient to retrieve a wash

## 2023-03-24 NOTE — PROGRESS NOTES
Date   Time  [address] [facility]  PHYSICAL THERAPY  [] EVALUATION  [] DAILY NOTE (LAND) [] DAILY NOTE (AQUATIC ) [x] PROGRESS NOTE [] DISCHARGE NOTE    [x] OUTPATIENT REHABILITATION CENTER [facility]   [] [place] [address]    [] [facility]   [] Michael Copeland    Date: 3/24/2023  Patient Name:  Camryn Petit  : 1935  MRN: [id number]  CSN: [id number]    Referring Practitioner EDUARDO Jimenez - [facility]   Diagnosis Vertigo as late effect of stroke [I69.398, R42]    Treatment Diagnosis Late effect CVA with decreased balance, episodes of vertigo   Date of Evaluation 23    Additional Pertinent History Anxiety, bilateral eyes glaucoma, osteoporosis, history of admission to [facility] s/p 2 falls, MRI revealed small acute infarct in right posterior frontal/anterior parietal cortex. IP rehab PMR admission on 2023.        Functional Outcome Measure Used TUG, TURNER   Functional Outcome Score TUG 11 seconds, Turner 51/56  (23)       Insurance: Primary: Payor: Megan Polo /  /  / ,   Secondary:    Authorization Information: PRE CERTIFICATION REQUIRED: NO  INSURANCE THERAPY BENEFIT:  UNLIMITED VISITS BASED ON MEDICAL NECESSITY   AQUATIC THERAPY COVERED: YES  MODALITIES COVERED:  YES, YES MASSAGE  TELEHEALTH COVERED: NA  REFERENCE NUMBER: [id number]   Visit # 7,, 0/10 for progress note   Visits Allowed: Unlimited based on medical necessity   Recertification Date: 3/01/3760   Physician Follow-Up: 2023-Dr. Brenton Theodore   Physician Orders: PT evaluate and treat vertigo as late effect CVA   History of Present Illness: Inpatient Rehabilitation Course:   Camryn Petit is a 81 y.o. right-handed  female with history of bilateral eye glaucoma requiring eye stent placement, anxiety, osteoporosis, status post appendectomy, tonsillectomy and bladder suspension surgery, was admitted to inpatient rehabilitation on 2023 for intensive inpatient management of impairment &

## 2023-03-24 NOTE — PROGRESS NOTES
went to the grocery store the other day and she forgot the item that she needed the most.  Patient realized that she needs to start making a grocery list so she doesn't forget things. Patient required mod cues to remember to  a card as she laid a card to give her more options to play throughout the card game \"speed\" this session. SHORT TERM GOAL #2: Patient will complete working memory tasks with 80% accuracy without cues to improve mental flexibility. INTERVENTIONS:   Patient was provided with lists of 4 words with auditory provision and then was asked a question about the words category inclusion/exclusion). Patient responded to the questions with 70% accuracy without cues. SHORT TERM GOAL #3: Patient will complete attention tasks (sustained/selective/alternating/divided) with no more more than 1 error within a 5 minute task to promote potential readiness for completion of driving evaluation. INTERVENTIONS:   Patient required min cues to focus throughout playing the card game \"speed\" this session. At the end of the session, the patient walked out to the [facility] without her purse and realized it was still in the therapy room. Patient walked back with this ST to get her purse and she stated, \"I got distracted. I was distracted when I picked up my coat so I forgot my purse. \"  Discussed where she could put her purse in future sessions to avoid forgetting it. SHORT TERM GOAL #4: Patient will complete verbal/visual reasoning activities with 80% accuracy given min cues to support safe return to independent completion of ADLs/iADLs. INTERVENTIONS: did not test this date d/t focus on other goals. SHORT TERM GOAL #5: Patient will complete executive function tasks (math computation, time management, scheduling, finances, medications) with 80% accuracy given min cues to allow for safe return to independent completion of these tasks in home environment.    INTERVENTIONS: Taught the patient a new card

## 2023-03-28 ENCOUNTER — APPOINTMENT (OUTPATIENT)
Dept: SPEECH THERAPY | Age: 88
End: 2023-03-28
Payer: MEDICARE

## 2023-03-28 ENCOUNTER — APPOINTMENT (OUTPATIENT)
Dept: PHYSICAL THERAPY | Age: 88
End: 2023-03-28
Payer: MEDICARE

## 2023-03-28 ENCOUNTER — APPOINTMENT (OUTPATIENT)
Dept: OCCUPATIONAL THERAPY | Age: 88
End: 2023-03-28
Payer: MEDICARE

## 2023-03-30 ENCOUNTER — HOSPITAL ENCOUNTER (OUTPATIENT)
Dept: SPEECH THERAPY | Age: 88
Setting detail: THERAPIES SERIES
Discharge: HOME | End: 2023-03-30
Payer: MEDICARE

## 2023-03-30 ENCOUNTER — HOSPITAL ENCOUNTER (OUTPATIENT)
Dept: OCCUPATIONAL THERAPY | Age: 88
Setting detail: THERAPIES SERIES
Discharge: HOME | End: 2023-03-30
Payer: MEDICARE

## 2023-03-30 ENCOUNTER — HOSPITAL ENCOUNTER (OUTPATIENT)
Dept: PHYSICAL THERAPY | Age: 88
Setting detail: THERAPIES SERIES
Discharge: HOME | End: 2023-03-30
Payer: MEDICARE

## 2023-03-30 PROCEDURE — 97535 SELF CARE MNGMENT TRAINING: CPT

## 2023-03-30 PROCEDURE — 97129 THER IVNTJ 1ST 15 MIN: CPT

## 2023-03-30 PROCEDURE — 97112 NEUROMUSCULAR REEDUCATION: CPT

## 2023-03-30 PROCEDURE — 97110 THERAPEUTIC EXERCISES: CPT

## 2023-03-30 PROCEDURE — 97130 THER IVNTJ EA ADDL 15 MIN: CPT

## 2023-03-30 NOTE — PROGRESS NOTES
[address] THERAPY  [] EVALUATION  [x] DAILY NOTE (LAND) [] DAILY NOTE (AQUATIC )   [] PROGRESS NOTE [] DISCHARGE NOTE    [x] OUTPATIENT REHABILITATION [facility]   [] [place] [address]    [] [facility]   [] Margaret Galeazzi    Date: 3/30/2023  Patient Name:  Aby Thompson  : 1935  MRN: [id number]  CSN: [id number]    Referring Practitioner EDUARDO Stanley - CNP   Diagnosis Vertigo as late effect of stroke [I69.398, R42]    Treatment Diagnosis Decreased strength and ADL tasks   Date of Evaluation 23      Functional Outcome Measure Used UEFI   Functional Outcome Score 53/80 (23)       Insurance: Primary: Payor: Yazmin Randall /  /  / ,   Secondary:    Authorization Information: PRE CERTIFICATION REQUIRED: NO  INSURANCE THERAPY BENEFIT:  UNLIMITED VISITS BASED ON MEDICAL NECESSITY   AQUATIC THERAPY COVERED: YES  MODALITIES COVERED:  YES, YES MASSAGE  TELEHEALTH COVERED: NA  REFERENCE NUMBER: [id number]   Visit # 6, 6/10 for progress note   Visits Allowed: unlimited   Recertification Date: 3/55/21   Physician Follow-Up: 23 with Dr. Yessy Ybarra   Physician Orders: Patricia Grippe and treat, driving eval    Pertinent History: On 23, pt stating she collapsed at home with no initial symptoms. Pt stating after she got to the hospital, she started having increased dizziness. MRI + showing   Small acute infarct in the right posterior frontal/anterior parietal cortex   2. Mild atrophy and probable ischemic changes in the white matter. 3. Mild inflammatory changes in ethmoid air cells bilaterally and left mastoid tip. Pt transitioned to inpatient rehab on -23. She was discharged home alone with family checking in approx 1x day. SUBJECTIVE: Pt coming from [address] [place] [address] this date. Pt stating she seen her ophthalmologist with treatment being the same. Pt requiring cues for reminders on the plan was for this date.        TREATMENT

## 2023-03-30 NOTE — PROGRESS NOTES
showed no significant abnormality. X-ray of left hip and chest revealed no acute abnormality. CT of the head without contrast done on 2/2/2023 showed small lacunar infarction at the right insula. CT of cervical spine done on 2/2/2023 was reported to show no acute process. CTA of head and neck done on 2/2/2023 show no significant hemodynamic stenosis in bilateral common and internal carotid arteries, and hypoplastic P1 segment of the right posterior cerebral artery. MRI of brain was also performed on 2/2/2023 and revealed a small acute right posterior frontal/anterior parietal cortex infarct stroke, and mild white matter atrophy. \" She received skilled speech therapy during her hospital stay to target visual/verbal reasoning, thought organization/working memory, problem solving, executive function tasks, and pharyngeal strengthening exercises. Patient did meet her dysphagia goals. She denies any difficulty with swallowing at this time. Does notice to be having more word-finding difficulties. MOCA 7.2 completed 2/10/23 : 26/30. MOCA 7.1 completed 2/3/23 : 22/30. SUBJECTIVE: Reports she recently went to the Opthamologist and was told she has not had any damage to her optic nerve since the stroke. No observation. GOALS:  Short Term Goals:  Time Frame for Short-Term Goals: 4 weeks    SHORT TERM GOAL #1: Patient will complete delayed recall tasks of up to 5 units of information given a 10 minute delay with 80% accuracy without cues to promote retention of novel information. INTERVENTIONS: Patient was provided with x5 portraits of women with their name written below. Asked patient to associate their name with their faces. Following 2 minutes of studying, ST removed the names with the faces and asked patient to report their names. *immediate recall: 5/5 independent   *10 minute delay: 5/5 independent  **Patient report she utilized the associate it strategy to recall their names.      SHORT TERM GOAL

## 2023-03-30 NOTE — PROGRESS NOTES
disability secondary to  recent stroke resulting fall accident on 2/2/2023. Additional Pertinent Hx: Bruno Warren is a 81 y.o. female who presents to Emergency Department with Fall and Other (Feels like something is stuck in throat). Patient is brought in by EMS for evaluation of fall x2 today. Patient says she fell in the kitchen and again in the dining room. Lives at home by herself. Patient states she had no idea why and how she fell. MRI shows small acute infarct in the right posterior frontal/anterior parietal cortex. To  rehab 2/9. SUBJECTIVE:  Patient states she is tired today. Patient states she is having trouble with dry eyes today but otherwise doing okay. Patient reports she is trying to do exercises at home everyday. TREATMENT   Precautions: S/p CVA with vertigo present   Pain: Denies    \"X in shaded column indicates activity completed today    *\" next to exercise/intervention indicates progression   Modalities Parameters/  Location  Notes                     Manual Therapy Time/Technique  Notes                     Exercise/  Intervention   Notes   Functional reach test  11 inches      TUG test  8.81, 8.39 seconds      Adam Balance  52/56      BP seated and standing Beginning of session. BP seated 117/68 standing 131/73   NBOS on airex - EO/EC 2x30 sec  X    Tandem Stance - B on Airex 2x30 sec  X    SLS - B 10 seconds  X Occasional touches to //bars   Alternating taps to foam 2x10  X No UE support          Gait rapid and slow walking in clinic independent   No path deviation.  No loss of balance          Nustep (arms 8 seat 5) 5 mins Level 4     Gait in // bars forward, tandem heel to toe, sidestep, marching 2 laps  X    Gait over hurdles in // bars forward/lateral 2 laps   NO UE    Samir Step Over and Back - B 10x      Step ups forward/lateral  10x 4 inch X    Standing       Marches on Airex 10x  X Single UE support   HR/TR on Airex 10x  X Single UE support

## 2023-04-04 ENCOUNTER — HOSPITAL ENCOUNTER (OUTPATIENT)
Dept: OCCUPATIONAL THERAPY | Age: 88
Setting detail: THERAPIES SERIES
Discharge: HOME | End: 2023-04-04
Payer: MEDICARE

## 2023-04-04 ENCOUNTER — HOSPITAL ENCOUNTER (OUTPATIENT)
Dept: SPEECH THERAPY | Age: 88
Setting detail: THERAPIES SERIES
Discharge: HOME | End: 2023-04-04
Payer: MEDICARE

## 2023-04-04 ENCOUNTER — HOSPITAL ENCOUNTER (OUTPATIENT)
Dept: PHYSICAL THERAPY | Age: 88
Setting detail: THERAPIES SERIES
Discharge: HOME | End: 2023-04-04
Payer: MEDICARE

## 2023-04-04 PROCEDURE — 97129 THER IVNTJ 1ST 15 MIN: CPT | Performed by: SPEECH-LANGUAGE PATHOLOGIST

## 2023-04-04 PROCEDURE — 97110 THERAPEUTIC EXERCISES: CPT

## 2023-04-04 PROCEDURE — 97130 THER IVNTJ EA ADDL 15 MIN: CPT | Performed by: SPEECH-LANGUAGE PATHOLOGIST

## 2023-04-04 PROCEDURE — 97530 THERAPEUTIC ACTIVITIES: CPT

## 2023-04-04 NOTE — PROGRESS NOTES
anxious with dressing and making sure she is on time for her appointments and events, patient reports she tries to give herself allotted time and still feels anxious. TREATMENT   Precautions: fall risk, decreased vision     Pain: No pain at rest.      X in shaded column indicates Activity Completed Today   Modalities Parameters/  Location  Notes/Comments                     Manual Therapy Time/  Technique  Notes/Comments                     Exercises   Sets/  Sec Reps  Notes/Comments   Orange theraband- Riivald, B bicep curl, B rows, horiz abd/add, chest press, tricep ext, shoulder flexion 1 12 ea x Completed all in sitting this date- min cues for technique. Fatigue noted throughout with RB provided    Red flex bar pronation/supination bends, wrist flex/ext twist  1 12 ea   Cues for control with fatigue noted    Biodex 60 RPM 2 min fwd, 2 min bwd  4 min x Towel roll behind back for posture    Gripper   12  2 red, 1 blue band    Digiflex  12  Yellow, isolated finger flexion                 Activities Time    Notes/Comments   Driving simulator reaction testing    Total pedal reaction time = 0.8 sec   Total gas pedal reaction time= 0.46  Stopping feet 170.83 feet  Pt with noted increased anxiety throughout trial with reassurance provided during. Pt providing reasons (gas pedal/brake to close together) on why she thought she didn't do well. Completed further education on the complete driving evaluation at this time. [employer]  Mode A 60 seconds   46 hits and 35 hits   Discussed diaphragm breathing for stressful events to decrease stress   x Discussed adding a count to her breathing, 5 seconds in and 5 seconds out for 10 reps- patient reports she is compliant with HEP for her exercises and reps, and to look at breathing as her exercise, along with counting to help distract and slow breathing and HR. Reviewed and practiced 5 seconds in through nose and 5 seconds out through mouth.     Home Making Tasks- ADL Render Note In Bullet Format When Appropriate: No Include Z78.9 (Other Specified Conditions Influencing Health Status) As An Associated Diagnosis?: Yes Detail Level: Simple Medical Necessity Clause: This procedure was medically necessary because the lesions that were treated were: Consent: The patient's consent was obtained including but not limited to risks of crusting, scabbing, blistering, scarring, darker or lighter pigmentary change, recurrence, incomplete removal and infection. Medical Necessity Information: It is in your best interest to select a reason for this procedure from the list below. All of these items fulfill various CMS LCD requirements except the new and changing color options. Post-Care Instructions: I reviewed with the patient in detail post-care instructions. Patient is to wear sunprotection, and avoid picking at any of the treated lesions. Pt may apply Vaseline to crusted or scabbing areas.

## 2023-04-04 NOTE — PROGRESS NOTES
Date   Time  [address] [facility]  PHYSICAL THERAPY  [] EVALUATION  [x] DAILY NOTE (LAND) [] DAILY NOTE (AQUATIC ) [] PROGRESS NOTE [] DISCHARGE NOTE    [x] OUTPATIENT REHABILITATION [facility]   [] [place] [address]    [] [facility]   [] Yoselin Contreras    Date: 2023  Patient Name:  Miley Sánchez  : 1935  MRN: [id number]  CSN: [id number]    Referring Practitioner EDUARDO Lucero - CNP   Diagnosis Vertigo as late effect of stroke [I69.398, R42]    Treatment Diagnosis Late effect CVA with decreased balance, episodes of vertigo   Date of Evaluation 23    Additional Pertinent History Anxiety, bilateral eyes glaucoma, osteoporosis, history of admission to [facility] s/p 2 falls, MRI revealed small acute infarct in right posterior frontal/anterior parietal cortex. IP rehab PMR admission on 2023.        Functional Outcome Measure Used TUG, TURNER   Functional Outcome Score TUG 11 seconds, Turner 51/56  (23)       Insurance: Primary: Payor: Edison Woodard /  /  / ,   Secondary:    Authorization Information: PRE CERTIFICATION REQUIRED: NO  INSURANCE THERAPY BENEFIT:  UNLIMITED VISITS BASED ON MEDICAL NECESSITY   AQUATIC THERAPY COVERED: YES  MODALITIES COVERED:  YES, YES MASSAGE  TELEHEALTH COVERED: NA  REFERENCE NUMBER: [id number]   Visit # 9, 2/10 for progress note   Visits Allowed: Unlimited based on medical necessity   Recertification Date:    Physician Follow-Up: 2023-Dr. Edith Romeo   Physician Orders: PT evaluate and treat vertigo as late effect CVA   History of Present Illness: Inpatient Rehabilitation Course:   Miley Sánchez is a 81 y.o. right-handed  female with history of bilateral eye glaucoma requiring eye stent placement, anxiety, osteoporosis, status post appendectomy, tonsillectomy and bladder suspension surgery, was admitted to inpatient rehabilitation on 2023 for intensive inpatient management of impairment &

## 2023-04-04 NOTE — PROGRESS NOTES
[address] [facility]  [] SPEECH LANGUAGE COGNITIVE EVALUATION  [x] DAILY NOTE   [] PROGRESS NOTE [] DISCHARGE NOTE    [x] OUTPATIENT REHABILITATION CENTER - [place]   [] [place] [address]    [] [facility]   [] Josy Diez    Date: 2023  Patient Name:  Charbel Horner  : 1935  MRN: [id number]  CSN: [id number]    Referring Practitioner EDUARDO Rosenberg - CNP   Diagnosis Vertigo as late effect of stroke [I69.398, R42]    Treatment Diagnosis Cognitive deficits   Date of Evaluation 23      Functional Outcome Measure Used MOCA    Functional Outcome Score 23, (23)       Insurance: Primary: Payor: Roxana Bower /  /  / ,   Secondary:    Authorization Information: No precert required    Visit # 9, 9/10 for progress note   Visits Allowed: Unlimited visits based on medical necessity    Recertification Date: 92   Physician Follow-Up: Dr. Jerry Tracy - 23   Physician Orders: Kacey Mcgrath and treat   Pertinent History: Patient was admitted to [facility] on 23 after 2 falls at home. Per chart review \"she began feeling something stuck in her throat last night, 2023. Today, 2023, while she was walking in kitchen to try to get something to help him the throat discomfort sensation, she suddenly fell for no reason. She denies having weakness, pain, syncope or fainting prior to the fall. She struggled to get up but fell again after few step into her dining room. At that time she felt the need for bowel movement so she crawled to bathroom and had diarrhea like bowel movement. She did not call her daughter who called 911. The patient was sent to [address] [place] [address] ER for evaluation on . She was found to have tenderness at the her left hip area. She also complains of feeling dizzy with room spinning sensation when she says suddenly sitting up from supine position.   Her blood tests and urine test showed no

## 2023-04-06 ENCOUNTER — HOSPITAL ENCOUNTER (OUTPATIENT)
Dept: OCCUPATIONAL THERAPY | Age: 88
Setting detail: THERAPIES SERIES
Discharge: HOME | End: 2023-04-06
Payer: MEDICARE

## 2023-04-06 ENCOUNTER — HOSPITAL ENCOUNTER (OUTPATIENT)
Dept: PHYSICAL THERAPY | Age: 88
Setting detail: THERAPIES SERIES
Discharge: HOME | End: 2023-04-06
Payer: MEDICARE

## 2023-04-06 ENCOUNTER — HOSPITAL ENCOUNTER (OUTPATIENT)
Dept: SPEECH THERAPY | Age: 88
Setting detail: THERAPIES SERIES
Discharge: HOME | End: 2023-04-06
Payer: MEDICARE

## 2023-04-06 PROCEDURE — 97530 THERAPEUTIC ACTIVITIES: CPT

## 2023-04-06 PROCEDURE — 97130 THER IVNTJ EA ADDL 15 MIN: CPT

## 2023-04-06 PROCEDURE — 97129 THER IVNTJ 1ST 15 MIN: CPT

## 2023-04-06 PROCEDURE — 97112 NEUROMUSCULAR REEDUCATION: CPT

## 2023-04-06 NOTE — PROGRESS NOTES
Single UE support   Mini squats 15x*  X    Rocker board fwd/back, side/side 10x each  Balance in neutral 30 sec     Standing shoulder retraction and retro shoulder rolls 10x   Back against wall   Stairs 4 steps   With and without UE support            Specific Interventions Next Treatment: postural ex , posterior shoulder girdle, balance retraining, gait training, dynamic gait activities. Oculomotor testing, BPPV screening. Possible positive on left due to rolling to left causing symptoms of vertigo    Activity/Treatment Tolerance:  [x]  Patient tolerated treatment well  []  Patient limited by fatigue  []  Patient limited by pain   []  Patient limited by medical complications  []  Other:     Assessment: Patient tolerated session well today. No complaints of dizziness throughout session. Patient did require 3 seated rest breaks due to fatigue. Provided cues with most activities to try to use only one UE for support to further challenge balance. GOALS:  Patient Goal: To improve endurance, be able to walk further and improve balance. Resume a walking program,    Short Term Goals:  Time Frame: 8 weeks    Adam Balance Scale 51/56 to 53/56  Patient to demonstrate tolerance of 45 minute session with 1-2 rest breaks with minimal perceived exertion from 4 to 2. Patient to demonstrate ability to negotiate 16 stair steps from single rail use in order to carry object up/down stairsteps. Patient to demonstrate normal oculomotor testing and normal testing for [facility] left and right. Long Term Goals:(TO BE addressed at 8-12 weeks) Below goals in progress. Time Frame: 12 weeks  Adam Balance Scale from 53/56 to 55/56. Patient to demonstrate progression in an HEP with independence in the ex with increased balance, statically/dynamically and increased endurance.       Patient Education:   []  HEP/Education Completed: Continue HEP  [employer] Access Code:  [x]  No new Education completed  []  Reviewed Prior HEP      []

## 2023-04-06 NOTE — PROGRESS NOTES
syncope or fainting prior to the fall. She struggled to get up but fell again after few step into her dining room. At that time she felt the need for bowel movement so she crawled to bathroom and had diarrhea like bowel movement. She did not call her daughter who called 911. The patient was sent to [address] [place] [address] for evaluation on 2./2/2023. She was found to have tenderness at the her left hip area. She also complains of feeling dizzy with room spinning sensation when she says suddenly sitting up from supine position. Her blood tests and urine test showed no significant abnormality. X-ray of left hip and chest revealed no acute abnormality. CT of the head without contrast done on 2/2/2023 showed small lacunar infarction at the right insula. CT of cervical spine done on 2/2/2023 was reported to show no acute process. CTA of head and neck done on 2/2/2023 show no significant hemodynamic stenosis in bilateral common and internal carotid arteries, and hypoplastic P1 segment of the right posterior cerebral artery. MRI of brain was also performed on 2/2/2023 and revealed a small acute right posterior frontal/anterior parietal cortex infarct stroke, and mild white matter atrophy. \" She received skilled speech therapy during her hospital stay to target visual/verbal reasoning, thought organization/working memory, problem solving, executive function tasks, and pharyngeal strengthening exercises. Patient did meet her dysphagia goals. She denies any difficulty with swallowing at this time. Does notice to be having more word-finding difficulties. MOCA 7.2 completed 2/10/23 : 26/30. MOCA 7.1 completed 2/3/23 : 22/30. SUBJECTIVE: Discussed with patient her progress within therapy. Patient feels she is doing better with most tasks provided within skilled ST session. She relates many of her errors to having extra pressure with ST observation during the tasks.  Conversed with patient about

## 2023-04-06 NOTE — PROGRESS NOTES
to [employer] and around [facility] in [place]. Pt stating she has stopped doing any shopping or any other activity d/t not wanting to ask family to take her. Pt educated on getting the driving eval schedule prior to her return to Dr. Elder Ruvalcaba so it can be reviewed at her appt with Dr. Elder Ruvalcaba having final approval. Pt verbalized understanding. Another discussion held regarding pts report of increased anxiety at last session. Pt stating it increases when she has events coming up with pt reporting she is now worried about getting her house cleaned in time for [employer]. Pt educated on discussing symptoms with Dr. Elder Ruvalcaba at appt       TREATMENT   Precautions: fall risk, decreased vision     Pain: No pain at rest.      X in shaded column indicates Activity Completed Today   Modalities Parameters/  Location  Notes/Comments                     Manual Therapy Time/  Technique  Notes/Comments                     Exercises   Sets/  Sec Reps  Notes/Comments   Orange theraband- Riivald, B bicep curl, B rows, horiz abd/add, chest press, tricep ext, shoulder flexion 1 12 ea  Completed all in sitting this date- min cues for technique. Fatigue noted throughout with RB provided    Red flex bar pronation/supination bends, wrist flex/ext twist  1 12 ea   Cues for control with fatigue noted    Biodex 60 RPM 2 min fwd, 2 min bwd  4 min  Towel roll behind back for posture    Gripper   12  2 red, 1 blue band    Digiflex  12  Yellow, isolated finger flexion                 Activities Time    Notes/Comments   Driving simulator reaction testing   x Initiated introduction into the driving simulator in attempts to help ease pts anxiety about it. The warm up drive was completed with pt having immediate difficulty understanding the concept if staying on the road with pt driving off and crashing. On 2nd attempt, pt completed much better with only being out of zia approx 9.42 %of time.  [place] get

## 2023-04-17 ENCOUNTER — HOSPITAL ENCOUNTER (OUTPATIENT)
Dept: OCCUPATIONAL THERAPY | Age: 88
Setting detail: THERAPIES SERIES
Discharge: HOME | End: 2023-04-17
Payer: MEDICARE

## 2023-04-17 PROCEDURE — 97166 OT EVAL MOD COMPLEX 45 MIN: CPT

## 2023-04-17 NOTE — DISCHARGE SUMMARY
tested as questionable to return to independent driving Pt demo decreased STM and reversal of learning during cognitive testing. Pt had difficulty at times switching right foot from gas to break with complaints of her right LE becoming fatigued with increased time in driving simulator. Pt requiring repeating of directions at times throughout simulation. Pt had difficulty following a verbal command given by simulator to turn at next intersection that pt would have past if not cued from therapist. Pt stating she was looking for a stop sign to be able to turn. She then stating \"I guess you can turn without a stop sign. \" Pt with great difficulty completing the divided attention driving only identifying 5/16 arrows on own with no cues. RECOMMENDATIONS:   Pt educated on her deficits and concern of therapist at this time. If physician approves return to driving, therapist recommended to have family member drive several times with pt to familiar locations that she may frequent. Pt requiring use of round about during which may be more difficult due it having 2 lanes requiring more attention to that and the incoming traffic as well. If pt allowed to return to driving by physician but family not willing to drive with pt, a on road test is highly recommended. Patient has been instructed to contact referring physician to discuss results of this evaluation. Patient has been informed that his or her physician is responsible for making the final decision regarding driving status.  Thank you for this referral.      Time in: 0830  Time out: 1000  Timed treatment: 0  Total time: [address] MOT OTR/L 5108

## 2023-04-18 ENCOUNTER — OFFICE VISIT (OUTPATIENT)
Dept: PHYSICAL MEDICINE AND REHAB | Age: 88
End: 2023-04-18
Payer: MEDICARE

## 2023-04-18 VITALS
DIASTOLIC BLOOD PRESSURE: 76 MMHG | SYSTOLIC BLOOD PRESSURE: 118 MMHG | HEIGHT: 58 IN | WEIGHT: 123 LBS | BODY MASS INDEX: 25.82 KG/M2

## 2023-04-18 DIAGNOSIS — R41.89 IMPAIRED COGNITION: ICD-10-CM

## 2023-04-18 DIAGNOSIS — H53.8 BLURRED VISION, BILATERAL: ICD-10-CM

## 2023-04-18 DIAGNOSIS — R42 VERTIGO AS LATE EFFECT OF STROKE: Primary | ICD-10-CM

## 2023-04-18 DIAGNOSIS — I69.398 VERTIGO AS LATE EFFECT OF STROKE: Primary | ICD-10-CM

## 2023-04-18 PROCEDURE — 99213 OFFICE O/P EST LOW 20 MIN: CPT | Performed by: PHYSICAL MEDICINE & REHABILITATION

## 2023-04-18 PROCEDURE — 1123F ACP DISCUSS/DSCN MKR DOCD: CPT | Performed by: PHYSICAL MEDICINE & REHABILITATION

## 2023-04-18 ASSESSMENT — ENCOUNTER SYMPTOMS
VOMITING: 0
SORE THROAT: 0
BACK PAIN: 0
DIARRHEA: 0
RHINORRHEA: 0
TROUBLE SWALLOWING: 0
EYE PAIN: 0
COUGH: 0
NAUSEA: 0
CONSTIPATION: 0
WHEEZING: 0
SHORTNESS OF BREATH: 0
EYE DISCHARGE: 0
ABDOMINAL PAIN: 0

## 2023-04-18 NOTE — PROGRESS NOTES
accident. She should continue current OT and speech therapy treatment until their completion.       Plan:  Advised the patient to avoid driving due to poor driving evaluation result  Continue ongoing OT and speech therapy until the completion  Continue performing her home exercise program regularly  Follow-up reevaluation in 4 months      Amanda Greenwood MD

## 2023-04-19 ENCOUNTER — HOSPITAL ENCOUNTER (OUTPATIENT)
Dept: SPEECH THERAPY | Age: 88
Setting detail: THERAPIES SERIES
Discharge: HOME | End: 2023-04-19
Payer: MEDICARE

## 2023-04-19 ENCOUNTER — HOSPITAL ENCOUNTER (OUTPATIENT)
Dept: OCCUPATIONAL THERAPY | Age: 88
Setting detail: THERAPIES SERIES
Discharge: HOME | End: 2023-04-19
Payer: MEDICARE

## 2023-04-19 PROCEDURE — 97530 THERAPEUTIC ACTIVITIES: CPT

## 2023-04-19 PROCEDURE — 97129 THER IVNTJ 1ST 15 MIN: CPT

## 2023-04-19 PROCEDURE — 97130 THER IVNTJ EA ADDL 15 MIN: CPT

## 2023-04-19 NOTE — PROGRESS NOTES
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 Brenda Echevarria - 23   Physician Orders: Mihaela Dunlap and treat   Pertinent History: Patient was admitted to [facility] on 23 after 2 falls at home. Per chart review \"she began feeling something stuck in her throat last night, 2023. Today, 2023, while she was walking in kitchen to try to get something to help him the throat discomfort sensation, she suddenly fell for no reason. She denies having weakness, pain, syncope or fainting prior to the fall. She struggled to get up but fell again after few step into her dining room. At that time she felt the need for bowel movement so she crawled to bathroom and had diarrhea like bowel movement. She did not call her daughter who called 911. The patient was sent to [address] [place] [address] ER for evaluation on . She was found to have tenderness at the her left hip area. She also complains of feeling dizzy with room spinning sensation when she says suddenly sitting up from supine position.   Her blood tests and urine test showed no

## 2023-04-19 NOTE — PROGRESS NOTES
driving eval and follow through with recommendations from physician     Long Term Goals:  Time Frame: 8   Pt to complete all ADL tasks and homemaking tasks indep with no LOB and good safety awareness - GOAL PATIALLy MET Pt has returned to completing all homemaking tasks but has some difficulty identifying and problem solving safety concerns. Patient Education:   []  HEP/Education Completed: Plan of Care, Goals,   Orange theraband ex in chest press, shoulder flexion, horiz abd/add, tricep ext and bicep curls   []  No new Education completed  [x]  Reviewed Prior HEP      [x]  Patient verbalized and/or demonstrated understanding of education provided. []  Patient unable to verbalize and/or demonstrate understanding of education provided. Will continue education. [x]  Barriers to learning: none     PLAN:  Treatment Recommendations: Strengthening, Balance Training, Endurance Training, Home Exercise Program, Patient Education, Safety Education and Training, Self-Care Education and Training, and driving eval eventually     []  Plan of care initiated. Plan to see patient 2 times per week for 8 weeks to address the treatment planned outlined above.   [x]  Continue with current plan of care for rest of scheduled visits   []  Modify plan of care as follows:    []  Hold pending physician visit  []  Discharge    Time In 1348   Time Out 1433   Timed Code Minutes: 45 min   Total Treatment Time: 45 min       Donald CHACON/PIETRO #[id number]

## 2023-04-21 ENCOUNTER — HOSPITAL ENCOUNTER (OUTPATIENT)
Dept: OCCUPATIONAL THERAPY | Age: 88
Setting detail: THERAPIES SERIES
Discharge: HOME | End: 2023-04-21
Payer: MEDICARE

## 2023-04-21 ENCOUNTER — HOSPITAL ENCOUNTER (OUTPATIENT)
Dept: SPEECH THERAPY | Age: 88
Setting detail: THERAPIES SERIES
Discharge: HOME | End: 2023-04-21
Payer: MEDICARE

## 2023-04-21 PROCEDURE — 97110 THERAPEUTIC EXERCISES: CPT

## 2023-04-21 PROCEDURE — 97129 THER IVNTJ 1ST 15 MIN: CPT | Performed by: SPEECH-LANGUAGE PATHOLOGIST

## 2023-04-21 PROCEDURE — 97130 THER IVNTJ EA ADDL 15 MIN: CPT | Performed by: SPEECH-LANGUAGE PATHOLOGIST

## 2023-04-21 NOTE — DISCHARGE SUMMARY
homemaking tasks - GOAL PARTIALLY MET Pt stating she tries to complete her HEP daily  CONTINUE GOAL 4/21/23 GOAL MET pt encouraged to continue to complete upon discharge   NEW GOAL Pt to complete driving eval and follow through with recommendations from physician 4/21/23 Driving eval completed on 4/17/23 with pt a difficulty with divided attention tasks. Pt not deemed to be able to drive by physician after appt on 4/19/23. Pt continues to be fixated on her inability to not be able to drive providing reasons on why she didn't pass. Pt stating on several occasions that she felt she was \"tricked\" when completing the tasks focusing on divided attention. Pt provided information on how to complete a on road driving test.     Long Term Goals:  Time Frame: 8   Pt to complete all ADL tasks and homemaking tasks indep with no LOB and good safety awareness - GOAL PATIALLy MET Pt has returned to completing all homemaking tasks but has some difficulty identifying and problem solving safety concerns. 4/21/23 GOAL MET       Patient Education:   []  HEP/Education Completed: Plan of Care, Goals,   Orange theraband ex in chest press, shoulder flexion, horiz abd/add, tricep ext and bicep curls   []  No new Education completed  [x]  Reviewed Prior HEP      [x]  Patient verbalized and/or demonstrated understanding of education provided. []  Patient unable to verbalize and/or demonstrate understanding of education provided. Will continue education. [x]  Barriers to learning: none     PLAN:  Treatment Recommendations: Strengthening, Balance Training, Endurance Training, Home Exercise Program, Patient Education, Safety Education and Training, Self-Care Education and Training, and driving eval eventually     []  Plan of care initiated. Plan to see patient 2 times per week for 8 weeks to address the treatment planned outlined above.   []  Continue with current plan of care for rest of scheduled visits   []  Modify plan of care as follows:

## 2023-04-21 NOTE — PROGRESS NOTES
[address] [place] [address]  Therapy Contact Note      Date: 2023  Patient Name: Naomie Villarreal        MRN: [id number]    Account Number: [de-identified]  : 1935  (81 y.o.)  Gender: female         Set up [address] [place] [address] for 23 1300.  time is 1230. Patient notified.     Morgan Gil, Rehab Tech
PLAN:  Treatment Recommendations:     []  Plan of care initiated. Plan to see patient 2 times per week for 8 weeks to address the treatment plan outlined above.   [x]  Continue with current plan of care  []  Progress Note: 2x per week for 2 weeks  []  Hold pending physician visit  []  Discharge    Time In 1317   Time Out 1345   Timed Code Minutes: 28 min   Total Treatment Time: 28 min         [address] [facility] Shaina Wolf [address]

## 2023-04-28 ENCOUNTER — HOSPITAL ENCOUNTER (OUTPATIENT)
Dept: PHYSICAL THERAPY | Age: 88
Setting detail: THERAPIES SERIES
Discharge: HOME | End: 2023-04-28
Payer: MEDICARE

## 2023-04-28 PROCEDURE — 97164 PT RE-EVAL EST PLAN CARE: CPT

## 2023-04-28 PROCEDURE — 97112 NEUROMUSCULAR REEDUCATION: CPT

## 2023-04-28 NOTE — DISCHARGE SUMMARY
Date   Time  [address] [facility]  PHYSICAL THERAPY  [] EVALUATION  [] DAILY NOTE (LAND) [] DAILY NOTE (AQUATIC ) [] PROGRESS NOTE [x] DISCHARGE NOTE    [x] OUTPATIENT REHABILITATION [facility]   [] [place] [address]    [] [facility]   [] Kevin Bachelor    Date: 2023  Patient Name:  Grisel Cueva  : 1935  MRN: [id number]  CSN: [id number]    Referring Practitioner EDUARDO James - CNP   Diagnosis Vertigo as late effect of stroke [I69.398, R42]    Treatment Diagnosis Late effect CVA with decreased balance, episodes of vertigo   Date of Evaluation 23    Additional Pertinent History Anxiety, bilateral eyes glaucoma, osteoporosis, history of admission to [address] [place] [address] s/p 2 falls, MRI revealed small acute infarct in right posterior frontal/anterior parietal cortex. IP rehab PMR admission on 2023.        Functional Outcome Measure Used TUG, TURNER   Functional Outcome Score TUG 11 seconds, Turner 51/56  (23)       Insurance: Primary: Payor: Madison Caceres /  /  / ,   Secondary:    Authorization Information: PRE CERTIFICATION REQUIRED: NO  INSURANCE THERAPY BENEFIT:  UNLIMITED VISITS BASED ON MEDICAL NECESSITY   AQUATIC THERAPY COVERED: YES  MODALITIES COVERED:  YES, YES MASSAGE  TELEHEALTH COVERED: NA  REFERENCE NUMBER: [id number]   Visit # 6, 0/10 for progress note   Visits Allowed: Unlimited based on medical necessity   Recertification Date:    Physician Follow-Up: 2023-Dr. Nu Mcarthur   Physician Orders: PT evaluate and treat vertigo as late effect CVA   History of Present Illness: Inpatient Rehabilitation Course:   Grisel Cueva is a 81 y.o. right-handed  female with history of bilateral eye glaucoma requiring eye stent placement, anxiety, osteoporosis, status post appendectomy, tonsillectomy and bladder suspension surgery, was admitted to inpatient rehabilitation on 2023 for intensive inpatient management of impairment &

## 2023-05-02 ENCOUNTER — TELEPHONE (OUTPATIENT)
Dept: PHYSICAL MEDICINE AND REHAB | Age: 88
End: 2023-05-02

## 2023-05-02 NOTE — TELEPHONE ENCOUNTER
Called daughter and LVM that we need HIPPA approval and or PO[employer] MED[employer] form to discuss or give further medical records to her along with a consent of release of information signed.

## 2023-05-02 NOTE — TELEPHONE ENCOUNTER
Daughter Beckie Glez called to report pt. Is done with therapy after d/c from e7 and despite driving eval done approximately 2 weeks ago is driving against your recommendations. Daughter wants a letter indicating pt. Is not safe to drive,why and how long. They want to know when a repeat driving eval can be done. Pt. Insists she failed d/t not sleeping and it was early morning. Daughter is checking with [employer] if can fax the letter to them so they can deny her driving license.

## 2023-05-18 ENCOUNTER — TELEPHONE (OUTPATIENT)
Dept: PHYSICAL MEDICINE AND REHAB | Age: 88
End: 2023-05-18

## 2023-05-23 ENCOUNTER — TELEPHONE (OUTPATIENT)
Dept: PHYSICAL MEDICINE AND REHAB | Age: 88
End: 2023-05-23

## 2023-05-23 DIAGNOSIS — R41.89 IMPAIRED COGNITION: Primary | ICD-10-CM

## 2023-05-23 DIAGNOSIS — Z86.73 H/O CEREBRAL INFARCTION: ICD-10-CM

## 2023-05-23 NOTE — TELEPHONE ENCOUNTER
Tor Stockton. Called and LVM that she Wants an order for a repeat driving eval. Please advjse. Last done 4/21. Next f/u with you is 9/12.

## 2023-05-23 NOTE — TELEPHONE ENCOUNTER
Patient called our office and left message demanding to have repeated  evaluation to be done. Order for another  evaluation prescribed.     Gian Campoverde MD

## 2023-06-07 RX ORDER — RIVAROXABAN 20 MG/1
TABLET, FILM COATED ORAL
Qty: 90 TABLET | Refills: 1 | OUTPATIENT
Start: 2023-06-07

## 2023-06-08 RX ORDER — OMEPRAZOLE MAGNESIUM 20 MG
CAPSULE,DELAYED RELEASE (ENTERIC COATED) ORAL
Qty: 60 TABLET | Refills: 3 | OUTPATIENT
Start: 2023-06-08

## 2023-06-08 RX ORDER — METOPROLOL SUCCINATE 25 MG/1
TABLET, EXTENDED RELEASE ORAL
Qty: 90 TABLET | Refills: 1 | OUTPATIENT
Start: 2023-06-08

## 2023-06-13 RX ORDER — DOCUSATE SODIUM 100 MG/1
CAPSULE, LIQUID FILLED ORAL
Qty: 60 CAPSULE | Refills: 3 | OUTPATIENT
Start: 2023-06-13

## 2023-06-14 RX ORDER — ASPIRIN 81 MG/1
TABLET, COATED ORAL
Qty: 30 TABLET | Refills: 3 | OUTPATIENT
Start: 2023-06-14

## 2023-06-20 ENCOUNTER — TELEPHONE (OUTPATIENT)
Dept: PHYSICAL MEDICINE AND REHAB | Age: 88
End: 2023-06-20

## 2023-06-20 NOTE — TELEPHONE ENCOUNTER
Daughter Stefanie(poa) called and you had signed [facility] form oking pt. To drive for 1 yr. She was with understanding she was not to drive and is having the [facility] send an amended form to sign. She wants to make sure you sign the E4 box.

## 2023-06-20 NOTE — TELEPHONE ENCOUNTER
Recalled daughter and reexplained what Was signed on driving permit form signed and sent to [facility] by Dr. Namrata Booth.  She verbalized understanding

## 2023-08-03 ENCOUNTER — HOSPITAL ENCOUNTER (EMERGENCY)
Age: 88
Discharge: HOME | End: 2023-08-03
Payer: MEDICARE

## 2023-08-03 VITALS
SYSTOLIC BLOOD PRESSURE: 167 MMHG | TEMPERATURE: 98.5 F | RESPIRATION RATE: 16 BRPM | OXYGEN SATURATION: 97 % | WEIGHT: 123 LBS | BODY MASS INDEX: 25.82 KG/M2 | HEART RATE: 76 BPM | HEIGHT: 58 IN | DIASTOLIC BLOOD PRESSURE: 80 MMHG

## 2023-08-03 DIAGNOSIS — J34.0 CELLULITIS OF NOSE: Primary | ICD-10-CM

## 2023-08-03 PROCEDURE — 99213 OFFICE O/P EST LOW 20 MIN: CPT

## 2023-08-03 RX ORDER — DOXYCYCLINE HYCLATE 100 MG
100 TABLET ORAL 2 TIMES DAILY
Qty: 14 TABLET | Refills: 0 | Status: SHIPPED | OUTPATIENT
Start: 2023-08-03 | End: 2023-08-10

## 2023-08-03 ASSESSMENT — PAIN DESCRIPTION - PAIN TYPE: TYPE: ACUTE PAIN

## 2023-08-03 ASSESSMENT — ENCOUNTER SYMPTOMS
COUGH: 0
COLOR CHANGE: 1
SHORTNESS OF BREATH: 0

## 2023-08-03 ASSESSMENT — PAIN DESCRIPTION - LOCATION: LOCATION: NOSE

## 2023-08-03 ASSESSMENT — PAIN DESCRIPTION - ONSET: ONSET: ON-GOING

## 2023-08-03 ASSESSMENT — PAIN DESCRIPTION - FREQUENCY: FREQUENCY: CONTINUOUS

## 2023-08-03 ASSESSMENT — PAIN - FUNCTIONAL ASSESSMENT: PAIN_FUNCTIONAL_ASSESSMENT: 0-10

## 2023-08-03 NOTE — ED PROVIDER NOTES
[facility] Encounter      [address]       Chief Complaint   Patient presents with    Insect Bite     nose       Nurses Notes reviewed and I agree except as noted in the HPI. HISTORY OF PRESENT ILLNESS   Iveth Husain is a 81 y.o. female who presents to the urgent care. She presents for evaluation of redness and a possible insect or animal in the skin of her nose. She states that she attended some sort [place] and after that had bites on her leg and nose. She thought it was chiggers, put nail polish on the leg and it healed. The nose is worsening with pain, feelings of movement, and hardness. The patient/patient representative has no other acute complaints at this time. REVIEW OF SYSTEMS     Review of Systems   Constitutional:  Negative for chills and fever. Respiratory:  Negative for cough and shortness of breath. Cardiovascular:  Negative for chest pain. Skin:  Positive for color change (nose). PAST MEDICAL HISTORY         Diagnosis Date    Anxiety     Glaucoma, bilateral     Diagnosed in 2000's    Osteopenia     Osteoporosis     Diagnosed in 1990s       SURGICAL HISTORY     Patient  has a past surgical history that includes Tonsillectomy; Appendectomy (6017); bladder suspension; and Eye surgery (Bilateral, 2018). CURRENT MEDICATIONS       Previous Medications    ASPIRIN 81 MG EC TABLET    Take 1 tablet by mouth daily    BISACODYL (DULCOLAX) 5 MG EC TABLET    Take 1 tablet by mouth daily as needed for Constipation    CLONAZEPAM (KLONOPIN) 0.5 MG TABLET    Take 1 tablet by mouth 2 times daily as needed.     DICLOFENAC SODIUM (VOLTAREN) 1 % GEL    Apply 2 g topically 4 times daily as needed for Pain    DOCUSATE SODIUM (COLACE, DULCOLAX) 100 MG CAPS    Take 100 mg by mouth 2 times daily    FLUTICASONE (FLONASE) 50 MCG/ACT NASAL SPRAY    1 spray by Nasal route daily    GLUCOSAMINE SULFATE 750 MG TABS    Take 1 tablet evaluation. , If symptoms change/worsen, go to the [facility] [address], APRN - CNP    Please note that some or all of this chart was generated using [employer] voice recognition software. Although every effort was made to ensure the accuracy of this automated transcription, some errors in transcription may have occurred.          Ash Powell, APRN - CNP  08/03/23 [address] Daniels, APRN - CNP  08/03/23 1607

## 2023-08-07 RX ORDER — ASPIRIN 81 MG/1
TABLET, COATED ORAL
Qty: 30 TABLET | Refills: 3 | OUTPATIENT
Start: 2023-08-07

## 2023-08-26 ENCOUNTER — HOSPITAL ENCOUNTER (EMERGENCY)
Age: 88
Discharge: HOME | End: 2023-08-26
Payer: MEDICARE

## 2023-08-26 VITALS
BODY MASS INDEX: 24.19 KG/M2 | DIASTOLIC BLOOD PRESSURE: 71 MMHG | HEIGHT: 59 IN | TEMPERATURE: 97.7 F | SYSTOLIC BLOOD PRESSURE: 132 MMHG | OXYGEN SATURATION: 96 % | WEIGHT: 120 LBS | RESPIRATION RATE: 18 BRPM | HEART RATE: 75 BPM

## 2023-08-26 DIAGNOSIS — N39.0 URINARY TRACT INFECTION WITH HEMATURIA, SITE UNSPECIFIED: Primary | ICD-10-CM

## 2023-08-26 DIAGNOSIS — R31.9 URINARY TRACT INFECTION WITH HEMATURIA, SITE UNSPECIFIED: Primary | ICD-10-CM

## 2023-08-26 LAB
BILIRUB UR STRIP.AUTO-MCNC: NEGATIVE MG/DL
CHARACTER UR: CLEAR
COLOR: YELLOW
GLUCOSE UR QL STRIP.AUTO: 100 MG/DL
KETONES UR QL STRIP.AUTO: NEGATIVE
NITRITE UR QL STRIP.AUTO: POSITIVE
PH UR STRIP.AUTO: 7 [PH] (ref 5–9)
PROT UR STRIP.AUTO-MCNC: 100 MG/DL
RBC #/AREA URNS HPF: ABNORMAL /[HPF]
SP GR UR STRIP.AUTO: 1.01 (ref 1–1.03)
UROBILINOGEN, URINE: 0.2 EU/DL (ref 0.2–1)
WBC #/AREA URNS HPF: ABNORMAL /[HPF]

## 2023-08-26 PROCEDURE — 87186 SC STD MICRODIL/AGAR DIL: CPT

## 2023-08-26 PROCEDURE — 99213 OFFICE O/P EST LOW 20 MIN: CPT

## 2023-08-26 PROCEDURE — 87086 URINE CULTURE/COLONY COUNT: CPT

## 2023-08-26 PROCEDURE — 87077 CULTURE AEROBIC IDENTIFY: CPT

## 2023-08-26 PROCEDURE — 81003 URINALYSIS AUTO W/O SCOPE: CPT

## 2023-08-26 PROCEDURE — 99214 OFFICE O/P EST MOD 30 MIN: CPT | Performed by: NURSE PRACTITIONER

## 2023-08-26 RX ORDER — PHENAZOPYRIDINE HYDROCHLORIDE 100 MG/1
100 TABLET, FILM COATED ORAL 3 TIMES DAILY PRN
Qty: 6 TABLET | Refills: 0 | Status: SHIPPED | OUTPATIENT
Start: 2023-08-26 | End: 2023-08-29

## 2023-08-26 RX ORDER — NITROFURANTOIN 25; 75 MG/1; MG/1
100 CAPSULE ORAL 2 TIMES DAILY
Qty: 10 CAPSULE | Refills: 0 | Status: SHIPPED | OUTPATIENT
Start: 2023-08-26 | End: 2023-08-31

## 2023-08-26 ASSESSMENT — ENCOUNTER SYMPTOMS
SHORTNESS OF BREATH: 0
NAUSEA: 0
BACK PAIN: 0
ABDOMINAL PAIN: 0
VOMITING: 0
TROUBLE SWALLOWING: 0

## 2023-08-26 ASSESSMENT — PAIN DESCRIPTION - FREQUENCY: FREQUENCY: CONTINUOUS

## 2023-08-26 ASSESSMENT — PAIN DESCRIPTION - LOCATION: LOCATION: VAGINA

## 2023-08-26 ASSESSMENT — PAIN - FUNCTIONAL ASSESSMENT: PAIN_FUNCTIONAL_ASSESSMENT: 0-10

## 2023-08-26 ASSESSMENT — PAIN SCALES - GENERAL: PAINLEVEL_OUTOF10: 8

## 2023-08-26 NOTE — ED NOTES
Pt with complaints of pain, burning on urination and urinary frequency that started yesterday. States she has tried to drink water, cranberry juice and take AZO but nothing has helped.      Hardeep Akins, MARCIAN  43/66/42 0154

## 2023-08-28 LAB
BACTERIA UR CULT: ABNORMAL
ORGANISM: ABNORMAL

## 2023-09-05 RX ORDER — OMEPRAZOLE MAGNESIUM 20 MG
CAPSULE,DELAYED RELEASE (ENTERIC COATED) ORAL
Qty: 60 TABLET | Refills: 3 | OUTPATIENT
Start: 2023-09-05

## 2023-09-11 RX ORDER — ASPIRIN 81 MG/1
TABLET, COATED ORAL
Qty: 30 TABLET | Refills: 3 | OUTPATIENT
Start: 2023-09-11

## 2023-09-12 ENCOUNTER — OFFICE VISIT (OUTPATIENT)
Dept: PHYSICAL MEDICINE AND REHAB | Age: 88
End: 2023-09-12
Payer: MEDICARE

## 2023-09-12 VITALS
HEIGHT: 59 IN | SYSTOLIC BLOOD PRESSURE: 120 MMHG | BODY MASS INDEX: 24.19 KG/M2 | WEIGHT: 120 LBS | DIASTOLIC BLOOD PRESSURE: 68 MMHG

## 2023-09-12 DIAGNOSIS — I63.81 ACUTE LACUNAR INFARCTION (HCC): ICD-10-CM

## 2023-09-12 DIAGNOSIS — I69.398 VERTIGO AS LATE EFFECT OF STROKE: Primary | ICD-10-CM

## 2023-09-12 DIAGNOSIS — R42 VERTIGO AS LATE EFFECT OF STROKE: Primary | ICD-10-CM

## 2023-09-12 PROCEDURE — 1123F ACP DISCUSS/DSCN MKR DOCD: CPT | Performed by: PHYSICAL MEDICINE & REHABILITATION

## 2023-09-12 PROCEDURE — 99212 OFFICE O/P EST SF 10 MIN: CPT | Performed by: PHYSICAL MEDICINE & REHABILITATION

## 2023-09-12 ASSESSMENT — ENCOUNTER SYMPTOMS
NAUSEA: 0
VOMITING: 0
RHINORRHEA: 0
EYE PAIN: 0
DIARRHEA: 0
EYE DISCHARGE: 0
TROUBLE SWALLOWING: 0
SHORTNESS OF BREATH: 0
COUGH: 0
BACK PAIN: 0
WHEEZING: 0
SORE THROAT: 0
CONSTIPATION: 0
ABDOMINAL PAIN: 0

## 2023-10-09 RX ORDER — OMEPRAZOLE MAGNESIUM 20 MG
CAPSULE,DELAYED RELEASE (ENTERIC COATED) ORAL
Qty: 60 TABLET | Refills: 3 | OUTPATIENT
Start: 2023-10-09

## 2023-11-28 RX ORDER — ASPIRIN 81 MG/1
81 TABLET, COATED ORAL DAILY
Qty: 30 TABLET | Refills: 3 | OUTPATIENT
Start: 2023-11-28

## 2023-12-05 RX ORDER — OMEPRAZOLE MAGNESIUM 20 MG
CAPSULE,DELAYED RELEASE (ENTERIC COATED) ORAL
Qty: 60 TABLET | Refills: 3 | OUTPATIENT
Start: 2023-12-05

## 2024-03-20 ENCOUNTER — OFFICE VISIT (OUTPATIENT)
Dept: CARDIOLOGY CLINIC | Age: 89
End: 2024-03-20
Payer: MEDICARE

## 2024-03-20 VITALS — BODY MASS INDEX: 24.19 KG/M2 | WEIGHT: 120 LBS | HEIGHT: 59 IN

## 2024-03-20 DIAGNOSIS — I48.0 PAF (PAROXYSMAL ATRIAL FIBRILLATION) (HCC): Primary | ICD-10-CM

## 2024-03-20 PROCEDURE — 99214 OFFICE O/P EST MOD 30 MIN: CPT | Performed by: INTERNAL MEDICINE

## 2024-03-20 PROCEDURE — 93000 ELECTROCARDIOGRAM COMPLETE: CPT | Performed by: INTERNAL MEDICINE

## 2024-03-20 PROCEDURE — 1123F ACP DISCUSS/DSCN MKR DOCD: CPT | Performed by: INTERNAL MEDICINE

## 2024-03-20 RX ORDER — CYANOCOBALAMIN (VITAMIN B-12) 500 MCG
TABLET ORAL
COMMUNITY

## 2024-03-20 NOTE — PROGRESS NOTES
03/22/2023 01:46 PM    MPV 10.7 03/22/2023 01:46 PM       Lab Results   Component Value Date/Time     02/10/2023 06:36 AM    K 4.2 02/10/2023 06:36 AM     02/10/2023 06:36 AM    CO2 21 02/10/2023 06:36 AM    BUN 21 02/10/2023 06:36 AM    LABALBU 3.9 02/10/2023 06:36 AM    CREATININE 0.6 02/10/2023 06:36 AM    CALCIUM 9.1 02/10/2023 06:36 AM    LABGLOM >60 02/10/2023 06:36 AM    GLUCOSE 101 02/10/2023 06:36 AM       Lab Results   Component Value Date/Time    ALKPHOS 45 02/10/2023 06:36 AM    ALT 28 02/10/2023 06:36 AM    AST 21 02/10/2023 06:36 AM    PROT 6.4 02/10/2023 06:36 AM    BILITOT 0.3 02/10/2023 06:36 AM    BILIDIR <0.2 02/02/2023 12:00 PM    LABALBU 3.9 02/10/2023 06:36 AM       Lab Results   Component Value Date/Time    MG 2.0 02/07/2023 07:15 PM       No results found for: \"INR\", \"PROTIME\"      Lab Results   Component Value Date/Time    LABA1C 6.1 02/03/2023 03:42 AM       Lab Results   Component Value Date/Time    TRIG 129 02/03/2023 03:42 AM    HDL 48 02/03/2023 03:42 AM    LDLCALC 80 02/03/2023 03:42 AM       Lab Results   Component Value Date/Time    TSH 2.030 02/07/2023 07:15 PM         Testing Reviewed:      I have individually reviewed the cardiac test below:    ECHO:   Summary   Normal left ventricle size and systolic function. Ejection fraction was   estimated at 60 %. There were no regional left ventricular wall motion   abnormalities and wall thickness was within normal limits.   Doppler parameters were consistent with abnormal left ventricular   relaxation (grade 1 diastolic dysfunction).   The left atrium is Mildly dilated.   Mild aortic regurgitation is noted.      Signature      ----------------------------------------------------------------   Electronically signed by Phillip Espinosa MDInterpreting   physician) on 02/03/2023 at 05:58 PM   ----------------------------------------------------------------    Cardiac Monitor: 3/2023  TEST TYPE:  Event monitor.     CLINICAL HISTORY

## 2025-04-01 ENCOUNTER — OFFICE VISIT (OUTPATIENT)
Dept: CARDIOLOGY CLINIC | Age: 89
End: 2025-04-01
Payer: MEDICARE

## 2025-04-01 VITALS
HEIGHT: 59 IN | SYSTOLIC BLOOD PRESSURE: 118 MMHG | WEIGHT: 122.2 LBS | HEART RATE: 76 BPM | DIASTOLIC BLOOD PRESSURE: 70 MMHG | BODY MASS INDEX: 24.64 KG/M2

## 2025-04-01 DIAGNOSIS — I48.0 PAF (PAROXYSMAL ATRIAL FIBRILLATION) (HCC): Primary | ICD-10-CM

## 2025-04-01 PROCEDURE — 1123F ACP DISCUSS/DSCN MKR DOCD: CPT | Performed by: INTERNAL MEDICINE

## 2025-04-01 PROCEDURE — 99214 OFFICE O/P EST MOD 30 MIN: CPT | Performed by: INTERNAL MEDICINE

## 2025-04-01 PROCEDURE — 1159F MED LIST DOCD IN RCRD: CPT | Performed by: INTERNAL MEDICINE

## 2025-04-01 PROCEDURE — 93000 ELECTROCARDIOGRAM COMPLETE: CPT | Performed by: INTERNAL MEDICINE

## 2025-04-01 RX ORDER — DORZOLAMIDE HCL 20 MG/ML
2 SOLUTION/ DROPS OPHTHALMIC 3 TIMES DAILY
COMMUNITY

## 2025-04-01 NOTE — PROGRESS NOTES
Patient here for a 1 year cardiac follow up    EKG completed today in office.     Med list up to date and pharmacy verified.     Reports she has been having a lot more anxiety lately. Reports a lot more constipation.     Denies chest pain/pressure or palpitations.     SOB with exertion at times.     
Sensitive mcg/mL     gentamicin <=1 Sensitive mcg/mL     trimethoprim-sulfamethoxazole <=20 Sensitive mcg/mL     ciprofloxacin <=0.25 Sensitive mcg/mL     levofloxacin <=0.12 Sensitive mcg/mL     tetracycline <=1 Sensitive mcg/mL     nitrofurantoin <=16 Sensitive mcg/mL   Urinalysis   Result Value Ref Range    Glucose, Ur 100 (A) NEGATIVE mg/dl    Bilirubin Urine Negative NEGATIVE    Ketones, Urine Negative NEGATIVE    Specific Gravity, UA 1.010 1.002 - 1.030    Blood, Urine Moderate (A) NEGATIVE    pH, UA 7.00 5.0 - 9.0    Protein,  (A) NEGATIVE mg/dl    Urobilinogen, Urine 0.20 0.2 - 1.0 eu/dl    Nitrite, Urine Positive (A) NEGATIVE    Leukocyte Esterase, Urine Large (A) NEGATIVE    Color, UA Yellow STRAW-YELLOW    Character, Urine Clear CLEAR-SL CLOUD       I have individually reviewed the below cardiac tests below:    ECHO:   Summary   Normal left ventricle size and systolic function. Ejection fraction was   estimated at 60 %. There were no regional left ventricular wall motion   abnormalities and wall thickness was within normal limits.   Doppler parameters were consistent with abnormal left ventricular   relaxation (grade 1 diastolic dysfunction).   The left atrium is Mildly dilated.   Mild aortic regurgitation is noted.      Signature      ----------------------------------------------------------------   Electronically signed by Phillip Espinosa MD (Interpreting   physician) on 02/03/2023 at 05:58 PM   ----------------------------------------------------------------     Cardiac Monitor: 3/2023  TEST TYPE:  Event monitor.     CLINICAL HISTORY AND INDICATION:  The patient with AFib.     EVENT MONITOR DESCRIPTION:  Event monitor was attached to the patient  between 02/09/2023 and 03/10/2023.     EVENT MONITOR FINDINGS:  Baseline rhythm showed sinus rhythm with short  intermittent episodes of atrial fibrillation with rapid ventricular  response.  The patient had many episodes of atrial fibrillation

## 2025-04-01 NOTE — PATIENT INSTRUCTIONS
Your staff today were Maximus  Your provider today was Dr. Santana   Phone number: [phone]

## 2025-07-21 ENCOUNTER — HOSPITAL ENCOUNTER (EMERGENCY)
Age: 89
Discharge: HOME | End: 2025-07-21
Payer: MEDICARE

## 2025-07-21 VITALS
TEMPERATURE: 98.3 F | RESPIRATION RATE: 16 BRPM | SYSTOLIC BLOOD PRESSURE: 156 MMHG | WEIGHT: 120 LBS | BODY MASS INDEX: 24.24 KG/M2 | HEART RATE: 68 BPM | OXYGEN SATURATION: 97 % | DIASTOLIC BLOOD PRESSURE: 73 MMHG

## 2025-07-21 DIAGNOSIS — B88.0 CHIGGER BITES: Primary | ICD-10-CM

## 2025-07-21 PROCEDURE — 99213 OFFICE O/P EST LOW 20 MIN: CPT | Performed by: EMERGENCY MEDICINE

## 2025-07-21 PROCEDURE — 99213 OFFICE O/P EST LOW 20 MIN: CPT

## 2025-07-21 RX ORDER — PERMETHRIN 50 MG/G
CREAM TOPICAL
Qty: 1 EACH | Refills: 0 | Status: SHIPPED | OUTPATIENT
Start: 2025-07-21

## 2025-07-21 RX ORDER — TRIAMCINOLONE ACETONIDE 1 MG/G
CREAM TOPICAL
Qty: 45 G | Refills: 0 | Status: SHIPPED | OUTPATIENT
Start: 2025-07-21

## 2025-07-21 ASSESSMENT — PAIN - FUNCTIONAL ASSESSMENT: PAIN_FUNCTIONAL_ASSESSMENT: 0-10

## 2025-07-21 ASSESSMENT — PAIN SCALES - GENERAL: PAINLEVEL_OUTOF10: 5

## 2025-07-21 ASSESSMENT — PAIN DESCRIPTION - PAIN TYPE: TYPE: ACUTE PAIN

## 2025-07-21 NOTE — DISCHARGE INSTRUCTIONS
Apply the permethrin cream this afternoon from your neck down.  Wear 8 hours and then shower off.  You may wear throughout the night and shower off in the morning.  Wash bed linens    Apply triamcinolone cream to areas of the rash twice daily to help with swelling and itch

## 2025-07-21 NOTE — ED PROVIDER NOTES
[facility] URGENT CARE  Urgent Care Encounter       CHIEF COMPLAINT       Chief Complaint   Patient presents with    Rash       Nurses Notes reviewed and I agree except as noted in the HPI.  HISTORY OF PRESENT ILLNESS   Summer Calvo is a 89 y.o. female who presents for red spots to bilateral legs, waistline, right abdomen and now beginning spot on her right arm.  Patient states the spots began to appear [place] and [place] a lot of overgrowth that was present.  Patient for started no spots that evening and then yesterday symptoms worsen.  Last evening, she applied hydrocortisone cream to the areas of rash with some improvement.  States today the red spots are bigger and are itchy and sometimes painful.    HPI    REVIEW OF SYSTEMS     Review of Systems   Constitutional:  Negative for chills, fatigue and fever.   Skin:  Positive for rash.       PAST MEDICAL HISTORY         Diagnosis Date    Anxiety     Glaucoma, bilateral     Diagnosed in 2000's    Osteopenia     Osteoporosis     Diagnosed in 1990s       SURGICALHISTORY     Patient  has a past surgical history that includes Tonsillectomy; Appendectomy (1957); bladder suspension; and Eye surgery (Bilateral, 2018).    CURRENT MEDICATIONS       Previous Medications    BISACODYL (DULCOLAX) 5 MG EC TABLET    Take 1 tablet by mouth daily as needed for Constipation    CLONAZEPAM (KLONOPIN) 0.5 MG TABLET    Take 1 tablet by mouth 2 times daily as needed.    DICLOFENAC SODIUM (VOLTAREN) 1 % GEL    Apply 2 g topically 4 times daily as needed for Pain    DOCUSATE SODIUM (COLACE, DULCOLAX) 100 MG CAPS    Take 100 mg by mouth 2 times daily    DORZOLAMIDE (TRUSOPT) 2 % OPHTHALMIC SOLUTION    2 drops 3 times daily    FLUTICASONE (FLONASE) 50 MCG/ACT NASAL SPRAY    1 spray by Nasal route daily    GLUCOSAMINE SULFATE 750 MG TABS    Take 1 tablet by mouth daily.      IBUPROFEN (ADVIL;MOTRIN) 400 MG TABLET    Take 1.5 tablets by mouth every 6

## 2025-07-21 NOTE — ED TRIAGE NOTES
Pt ambulatory to [facility] with c/o rash on various spots of the body. Pt reports being at the [place] the weekend cleaning off grandmother's grave of weeds and plants and started with various spots over her body. Pt reports pain and itching, however relieved by Hydrocortisone cream. No other concerns noted.